# Patient Record
Sex: FEMALE | Race: WHITE | NOT HISPANIC OR LATINO | Employment: OTHER | URBAN - METROPOLITAN AREA
[De-identification: names, ages, dates, MRNs, and addresses within clinical notes are randomized per-mention and may not be internally consistent; named-entity substitution may affect disease eponyms.]

---

## 2017-11-10 PROBLEM — E03.9 HYPOTHYROIDISM: Status: ACTIVE | Noted: 2017-11-10

## 2017-11-10 PROBLEM — G47.33 OBSTRUCTIVE SLEEP APNEA SYNDROME: Status: ACTIVE | Noted: 2017-11-10

## 2017-11-10 PROBLEM — E66.9 OBESITY: Status: ACTIVE | Noted: 2017-11-10

## 2017-11-10 PROBLEM — E55.9 VITAMIN D DEFICIENCY: Status: ACTIVE | Noted: 2017-11-10

## 2017-11-10 PROBLEM — K21.9 GASTROESOPHAGEAL REFLUX DISEASE: Status: ACTIVE | Noted: 2017-11-10

## 2017-11-10 PROBLEM — G47.61 PERIODIC LIMB MOVEMENT DISORDER: Status: ACTIVE | Noted: 2017-11-10

## 2017-11-10 PROBLEM — J44.9 CHRONIC OBSTRUCTIVE LUNG DISEASE (CMS/HCC): Status: ACTIVE | Noted: 2017-11-10

## 2017-11-24 PROBLEM — J30.9 ALLERGIC RHINITIS: Status: ACTIVE | Noted: 2017-11-24

## 2017-11-24 PROBLEM — I10 BENIGN ESSENTIAL HYPERTENSION: Status: ACTIVE | Noted: 2017-11-24

## 2017-11-26 PROBLEM — R11.2 NON-INTRACTABLE VOMITING WITH NAUSEA: Status: ACTIVE | Noted: 2017-11-26

## 2017-12-28 DIAGNOSIS — N95.1 MENOPAUSAL FLUSHING: Primary | ICD-10-CM

## 2018-01-03 ENCOUNTER — TELEPHONE (OUTPATIENT)
Dept: BARIATRICS | Facility: CLINIC | Age: 78
End: 2018-01-03

## 2018-01-03 RX ORDER — ESTROGENS, CONJUGATED 0.9 MG/1
TABLET, FILM COATED ORAL
Qty: 90 TABLET | Refills: 1 | Status: SHIPPED | OUTPATIENT
Start: 2018-01-03 | End: 2020-11-03 | Stop reason: SDUPTHER

## 2018-01-03 NOTE — TELEPHONE ENCOUNTER
Called patient, she wanted to schedule an adjustment with Thelma on January 25th.  Thelma does not have an opening on that day.  Will put her on a wait list in case something opens up.  Patient does not want to schedule another day at this time.

## 2018-01-08 ENCOUNTER — OFFICE VISIT (OUTPATIENT)
Dept: FAMILY MEDICINE | Facility: CLINIC | Age: 78
End: 2018-01-08
Payer: MEDICARE

## 2018-01-08 VITALS
RESPIRATION RATE: 16 BRPM | BODY MASS INDEX: 33.98 KG/M2 | SYSTOLIC BLOOD PRESSURE: 126 MMHG | OXYGEN SATURATION: 96 % | WEIGHT: 174 LBS | DIASTOLIC BLOOD PRESSURE: 62 MMHG | TEMPERATURE: 99 F | HEART RATE: 69 BPM

## 2018-01-08 DIAGNOSIS — K64.9 HEMORRHOIDS, UNSPECIFIED HEMORRHOID TYPE: Primary | ICD-10-CM

## 2018-01-08 PROCEDURE — 99213 OFFICE O/P EST LOW 20 MIN: CPT | Performed by: FAMILY MEDICINE

## 2018-01-08 PROCEDURE — 99213 OFFICE O/P EST LOW 20 MIN: CPT | Mod: PO | Performed by: FAMILY MEDICINE

## 2018-01-08 ASSESSMENT — PAIN SCALES - GENERAL: PAINLEVEL: 0-NO PAIN

## 2018-01-08 NOTE — PROGRESS NOTES
Subjective      Katy Stack is a 77 y.o. female who presents for Hemorrhoids      HPI  Patient presents for continued hemorrhoid discomfort and bleeding. Hemorrhoids have been bothering patient for 3+ months.Patient has tried rectal suppositories with minimal relief. Pt denies fevers, chills, constipation, diarrhea, melena. +hematochezia.     The following have been reviewed and updated as appropriate in this visit:  No text in SmartText        Allergies   Allergen Reactions   • Strawberry      hives, tongue swells   • Adhesive      welts     Current Outpatient Prescriptions   Medication Sig Dispense Refill   • aspirin (ASPIR-LOW) 81 mg EC tablet 1 tab by oral route every day     • atenolol (TENORMIN) 25 mg tablet  90 0   • atorvastatin (LIPITOR) 20 mg tablet  90 0   • gabapentin (NEURONTIN) 100 mg capsule  180 0   • levothyroxine (SYNTHROID, LEVOTHROID) 100 mcg tablet  90 0   • losartan (COZAAR) 50 mg tablet  90 0   • nitroglycerin (NITROSTAT) 0.4 mg SL tablet Place by sublingual route. 25 1   • pantoprazole (PROTONIX) 40 mg EC tablet Take 1 tablet (40 mg total) by mouth daily. 90 tablet 3   • PREMARIN 0.9 mg tablet TAKE 1 TABLET BY MOUTH  EVERY DAY (Patient taking differently: TAKE 1 TABLET BY MOUTH EVERY WEEK) 90 tablet 1   • FLUZONE HIGH-DOSE 2017-18, PF, 180 mcg/0.5 mL syringe injection (65 years and up) ADM 0.5ML IM UTD  0   • omeprazole (PriLOSEC) 20 mg capsule Take 20 mg by mouth daily.       No current facility-administered medications for this visit.      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 Vela- shoulder   • THYROID LOBECTOMY Left 18/18/2010   • THYROIDECTOMY, PARTIAL  08/18/2010   • UPPER GASTROINTESTINAL ENDOSCOPY  02/19/2014    Mild chronic gastritis     Family History   Problem Relation Age of Onset   • Heart disease Mother    • Heart disease Father      Myocardial infarction   • Parkinsonism Father    • Thyroid disease Father    • Alzheimer's disease Sister    • Arthritis Sister    • Osteoporosis Sister    • Lung cancer Brother    • Diabetes Maternal Grandmother      Social History     Social History   • Marital status:      Spouse name: N/A   • Number of children: N/A   • Years of education: N/A     Social History Main Topics   • Smoking status: Former Smoker     Packs/day: 2.00     Years: 35.00     Quit date: 1955   • Smokeless tobacco: Never Used   • Alcohol use No   • Drug use: No   • Sexual activity: Not on file     Other Topics Concern   • Not on file     Social History Narrative   • No narrative on file       Review of Systems    Objective     Vitals  /62 (BP Location: Right arm, Patient Position: Sitting, Cuff Size: Large)   Pulse 69   Temp 37.2 °C (99 °F) (Temporal)   Resp 16   Wt 78.9 kg (174 lb)   SpO2 96%   BMI 33.98 kg/m²     Physical Exam   Constitutional: She appears well-developed and well-nourished. No distress.   Abdominal: Soft. Bowel sounds are normal. She exhibits no distension. There is no tenderness. There is no guarding.   Skin: Skin is warm and dry. No rash noted. She is not diaphoretic. No erythema. No pallor.   Psychiatric: She has a normal mood and affect. Her behavior is normal. Judgment and thought content normal.   Nursing note and vitals reviewed.      Assessment/Plan    Diagnoses and all orders for this visit:    Hemorrhoids, unspecified hemorrhoid type  Comments:  Will refer to general surgery for futher evaluation.  Orders:  -     Ambulatory referral to General Surgery; Future        Return if symptoms worsen or fail to improve.    ENRRIQUE BALES MD

## 2018-01-12 ENCOUNTER — CONSULT (OUTPATIENT)
Dept: SURGERY | Facility: CLINIC | Age: 78
End: 2018-01-12
Payer: MEDICARE

## 2018-01-12 VITALS
RESPIRATION RATE: 20 BRPM | WEIGHT: 174 LBS | SYSTOLIC BLOOD PRESSURE: 132 MMHG | TEMPERATURE: 99.2 F | BODY MASS INDEX: 34.16 KG/M2 | DIASTOLIC BLOOD PRESSURE: 78 MMHG | HEIGHT: 60 IN

## 2018-01-12 DIAGNOSIS — K60.0 ACUTE ANAL FISSURE: ICD-10-CM

## 2018-01-12 PROCEDURE — 99202 OFFICE O/P NEW SF 15 MIN: CPT | Performed by: SURGERY

## 2018-01-12 ASSESSMENT — ENCOUNTER SYMPTOMS
BLOOD IN STOOL: 0
ABDOMINAL PAIN: 0
CONSTIPATION: 0
RECTAL PAIN: 1
VOMITING: 0
NAUSEA: 0
DIARRHEA: 0
ANAL BLEEDING: 0
ABDOMINAL DISTENTION: 0
CONSTITUTIONAL NEGATIVE: 1

## 2018-01-12 ASSESSMENT — PAIN SCALES - GENERAL: PAINLEVEL: 4

## 2018-01-12 NOTE — PROGRESS NOTES
Clinic note    01/12/18  2:39 PM        HPI  Katy Stack is a 77 y.o. female who presents with 6 months of anal pain.  This is worse with bowel movements and last 30-45 minutes and feels like spasms.  She does have some bleeding with a bowel movement.  She states it only happens on the right side.  She currently takes MiraLAX and fiber for her bowels.  She is not constipated with this.    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 Vela- shoulder   • THYROID LOBECTOMY Left 18/18/2010   • THYROIDECTOMY, PARTIAL  08/18/2010   • UPPER GASTROINTESTINAL ENDOSCOPY  02/19/2014    Mild chronic gastritis       Family History   Problem Relation Age of Onset   • Heart disease Mother    • Heart disease Father      Myocardial infarction   • Parkinsonism Father    • Thyroid disease Father    • Alzheimer's disease Sister    • Arthritis Sister    • Osteoporosis Sister    • Lung cancer Brother    • Diabetes Maternal Grandmother        Social History     Social History   • Marital status:      Spouse name: N/A   • Number of children: N/A   • Years of education: N/A     Occupational History   • Not on file.     Social History Main Topics   • Smoking status: Former Smoker     Packs/day: 2.00     Years: 35.00     Quit date: 1955   • Smokeless tobacco:  Never Used   • Alcohol use No   • Drug use: No   • Sexual activity: Not on file     Other Topics Concern   • Not on file     Social History Narrative   • No narrative on file       Allergies   Allergen Reactions   • Strawberry      hives, tongue swells   • Adhesive      welts       Prior to Admission medications    Medication Sig Start Date End Date Taking? Authorizing Provider   aspirin (ASPIR-LOW) 81 mg EC tablet 1 tab by oral route every day 10/8/10  Yes Conversion Provider   atenolol (TENORMIN) 25 mg tablet  9/22/17  Yes Conversion Provider   atorvastatin (LIPITOR) 20 mg tablet  9/22/17  Yes Conversion Provider   FLUZONE HIGH-DOSE 2017-18, PF, 180 mcg/0.5 mL syringe injection (65 years and up) ADM 0.5ML IM UTD 9/28/17  Yes Historical Provider, MD   gabapentin (NEURONTIN) 100 mg capsule  9/26/17  Yes Conversion Provider   levothyroxine (SYNTHROID, LEVOTHROID) 100 mcg tablet  9/22/17  Yes Conversion Provider   losartan (COZAAR) 50 mg tablet  9/22/17  Yes Conversion Provider   nitroglycerin (NITROSTAT) 0.4 mg SL tablet Place by sublingual route. 5/8/15  Yes Prasanna Helms MD   omeprazole (PriLOSEC) 20 mg capsule Take 20 mg by mouth daily.   Yes Historical Provider, MD   pantoprazole (PROTONIX) 40 mg EC tablet Take 1 tablet (40 mg total) by mouth daily. 12/11/17 12/11/18 Yes Bronson Travis MD   PREMARIN 0.9 mg tablet TAKE 1 TABLET BY MOUTH  EVERY DAY  Patient taking differently: TAKE 1 TABLET BY MOUTH EVERY WEEK 1/3/18  Yes Bronson Travis MD       Review of Systems   Constitutional: Negative.    Gastrointestinal: Positive for rectal pain. Negative for abdominal distention, abdominal pain, anal bleeding, blood in stool, constipation, diarrhea, nausea and vomiting.       Temp:  [37.3 °C (99.2 °F)] 37.3 °C (99.2 °F)  Resp:  [20] 20  BP: (132)/(78) 132/78    Physical Exam   Constitutional: She appears well-developed and well-nourished.   Genitourinary:   Genitourinary Comments: Acute anal fissure with sentinel pile  at 3 o'clock position of the anal rectal ring   Vitals reviewed.      CBC with Platelet:    Lab Results   Component Value Date    WBC 9.1 11/24/2017    HGB 12.6 11/24/2017    HCT 38.8 11/24/2017     11/24/2017     Lab Results   Component Value Date    GLUCOSE 100 11/24/2017    CALCIUM 9.0 11/24/2017     11/24/2017    K 4.2 11/24/2017    CO2 25 11/24/2017     (H) 11/24/2017    BUN 23 11/24/2017    CREATININE 1.1 11/24/2017    ANIONGAP 7 11/24/2017     Magnesium:   Lab Results   Component Value Date    MG 2.1 05/08/2015     Coags: No results found for: PT, APTT, INR    No results found.    Assessment and Plan  Active Problems:  No Active Problems: There are no active problems currently on the Problem List. Please update the Problem List and refresh.      Assessment/Plan      #1 acute anal fissure    Plan I discussed the pathophysiology of acute anal fissure.  She is to continue her sitz baths twice a day.  We are going to send in prescription for nifedipine and lidocaine cream compounded to the [facility] pharmacy in [place].  She is to apply this 4 times a day.  I have told her that if she needs extra help with discomfort she may apply a and D ointment over the top of the nifedipine cream I told her to expect fairly rapid resolution of her pain.  She is to continue the medication for 1 month to allow adequate healing.    SUZANNE TOLLIVER MD

## 2018-01-25 ENCOUNTER — OFFICE VISIT (OUTPATIENT)
Dept: RHEUMATOLOGY | Facility: CLINIC | Age: 78
End: 2018-01-25
Payer: MEDICARE

## 2018-01-25 VITALS
HEART RATE: 64 BPM | BODY MASS INDEX: 35.04 KG/M2 | DIASTOLIC BLOOD PRESSURE: 56 MMHG | WEIGHT: 179.4 LBS | SYSTOLIC BLOOD PRESSURE: 138 MMHG

## 2018-01-25 DIAGNOSIS — M54.5 LOW BACK PAIN, UNSPECIFIED BACK PAIN LATERALITY, UNSPECIFIED CHRONICITY, WITH SCIATICA PRESENCE UNSPECIFIED: Primary | ICD-10-CM

## 2018-01-25 DIAGNOSIS — H16.9: ICD-10-CM

## 2018-01-25 DIAGNOSIS — M45.0 ANKYLOSING SPONDYLITIS OF MULTIPLE SITES IN SPINE (CMS/HCC): ICD-10-CM

## 2018-01-25 PROCEDURE — 99204 OFFICE O/P NEW MOD 45 MIN: CPT | Performed by: INTERNAL MEDICINE

## 2018-01-25 PROCEDURE — 99204 OFFICE O/P NEW MOD 45 MIN: CPT | Mod: PO | Performed by: INTERNAL MEDICINE

## 2018-01-25 ASSESSMENT — ENCOUNTER SYMPTOMS
MYALGIAS: 0
CONSTIPATION: 0
BRUISES/BLEEDS EASILY: 0
WEAKNESS: 0
JOINT SWELLING: 0
HEADACHES: 0
SHORTNESS OF BREATH: 0
ARTHRALGIAS: 0
CONSTITUTIONAL NEGATIVE: 1
SLEEP DISTURBANCE: 0
BACK PAIN: 0
DIARRHEA: 0
ADENOPATHY: 0
COUGH: 0

## 2018-01-25 ASSESSMENT — PAIN SCALES - GENERAL: PAINLEVEL: 4

## 2018-01-25 NOTE — PROGRESS NOTES
CONSULT NOTE      Subjective:   Chief Complaint   ?cormeal inflammation    HPI  Katy Stack is a 77 y.o. female who presents today with corneal inflammation. She states that her eye doctor recommended that she be examined for an autoimmune disease due to a film over her eye. Patient confirms that she does have sore joints in bilateral hands for the past year. Katy states that they do swell and become red. She states that she experiences these symptoms consistently. Patient states that she did have two back surgeries with Dr. Romo between November of 2017 and May of 2017. She states that her back issues give her constant pain and numbness at times in the lower extremities. Katy confirms that she has never fallen because of the pain and numbness she is experiencing. Dr. Romo has had Katy complete x-rays numerous times of the SI joints. She has also received injections from Dr. Montano. Patient denies any psoriasis or rash due to sun exposure. Patient denies any history of crohn's or ulcerative colitis. Katy also denies any discoloration in fingers and toes due to the cold weather. Katy confirms that her father and sister do have a history of Osteoarthritis. Katy has had an Echocardiogram completed.    History:  Patient Active Problem List    Diagnosis Date Noted   • Non-intractable vomiting with nausea 11/26/2017   • Allergic rhinitis 11/24/2017   • Benign essential hypertension 11/24/2017   • Chronic obstructive lung disease (CMS/HCC) 11/10/2017   • Gastroesophageal reflux disease 11/10/2017   • Hypothyroidism 11/10/2017   • Obesity 11/10/2017   • Obstructive sleep apnea syndrome 11/10/2017   • Periodic limb movement disorder 11/10/2017   • Vitamin D deficiency 11/10/2017      Current Outpatient Prescriptions on File Prior to Visit   Medication Sig Dispense Refill   • aspirin (ASPIR-LOW) 81 mg EC tablet 1 tab by oral route every day     • atenolol (TENORMIN) 25 mg tablet  90 0   • atorvastatin  (LIPITOR) 20 mg tablet  90 0   • FLUZONE HIGH-DOSE 2017-18, PF, 180 mcg/0.5 mL syringe injection (65 years and up) ADM 0.5ML IM UTD  0   • gabapentin (NEURONTIN) 100 mg capsule  180 0   • levothyroxine (SYNTHROID, LEVOTHROID) 100 mcg tablet  90 0   • losartan (COZAAR) 50 mg tablet  90 0   • nitroglycerin (NITROSTAT) 0.4 mg SL tablet Place by sublingual route. 25 1   • pantoprazole (PROTONIX) 40 mg EC tablet Take 1 tablet (40 mg total) by mouth daily. 90 tablet 3   • PREMARIN 0.9 mg tablet TAKE 1 TABLET BY MOUTH  EVERY DAY (Patient taking differently: TAKE 1 TABLET BY MOUTH EVERY WEEK) 90 tablet 1   • omeprazole (PriLOSEC) 20 mg capsule Take 20 mg by mouth daily.       No current facility-administered medications on file prior to visit.      Outpatient Prescriptions Marked as Taking for the 1/25/18 encounter (Office Visit) with Jaime Stovall DO   Medication Indication(s) Sig   • aspirin (ASPIR-LOW) 81 mg EC tablet   1 tab by oral route every day   • atenolol (TENORMIN) 25 mg tablet      • atorvastatin (LIPITOR) 20 mg tablet      • FLUZONE HIGH-DOSE 2017-18, PF, 180 mcg/0.5 mL syringe injection (65 years and up)   ADM 0.5ML IM UTD   • gabapentin (NEURONTIN) 100 mg capsule      • levothyroxine (SYNTHROID, LEVOTHROID) 100 mcg tablet      • losartan (COZAAR) 50 mg tablet      • nitroglycerin (NITROSTAT) 0.4 mg SL tablet   Place by sublingual route.   • pantoprazole (PROTONIX) 40 mg EC tablet   Take 1 tablet (40 mg total) by mouth daily.   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 Vela- shoulder   • THYROID LOBECTOMY Left 18/18/2010   • THYROIDECTOMY, PARTIAL  08/18/2010   • UPPER GASTROINTESTINAL ENDOSCOPY  02/19/2014    Mild chronic gastritis     Family History   Problem Relation Age of Onset   • Heart disease Mother    • Heart disease Father      Myocardial infarction   • Parkinsonism Father    • Thyroid disease Father    • Alzheimer's disease Sister    • Arthritis Sister    • Osteoporosis Sister    • Lung cancer Brother    • Diabetes Maternal Grandmother      Social History   Substance Use Topics   • Smoking status: Former Smoker     Packs/day: 2.00     Years: 35.00     Quit date: 1955   • Smokeless tobacco: Never Used   • Alcohol use No     Social History     Social History Narrative   • No narrative on file        Review of Systems  Review of Systems   Constitutional: Negative.    HENT: Negative for mouth sores.    Eyes: Negative for visual disturbance.   Respiratory: Negative for cough and shortness of breath.    Cardiovascular: Negative for chest pain.   Gastrointestinal: Negative for constipation and diarrhea.   Musculoskeletal: Negative for arthralgias, back pain, joint swelling and myalgias.   Skin: Negative for rash.   Neurological: Negative for weakness and headaches.   Hematological: Negative for adenopathy. Does not bruise/bleed easily.   Psychiatric/Behavioral: Negative for sleep disturbance.       Physical Exam  /56 (BP Location: Left arm, Patient Position: Sitting, Cuff Size: Reg)   Pulse 64   Wt  81.4 kg (179 lb 6.4 oz)   BMI 35.04 kg/m²   Physical Exam   Constitutional: She is oriented to person, place, and time. She appears well-developed and well-nourished.   HENT:   Head: Normocephalic and atraumatic.   Nose: Nose normal.   Mouth/Throat: Oropharynx is clear and moist.   Eyes: Conjunctivae are normal. Pupils are equal, round, and reactive to light.   Neck: Normal range of motion. Neck supple.   Cardiovascular: Normal rate, regular rhythm, normal heart sounds and intact distal pulses.    Pulmonary/Chest: Effort normal and breath sounds normal.   Musculoskeletal:        Right shoulder: Normal.        Left shoulder: Normal.        Right elbow: Normal.       Left elbow: Normal.        Right wrist: Normal.        Left wrist: Normal.        Right knee: Normal.        Left knee: Normal.        Right ankle: Normal.        Left ankle: Normal.        Right hand: Normal.        Left hand: Normal.   Neurological: She is alert and oriented to person, place, and time.   Skin: Skin is warm and dry.        Imaging  No results found.    Outside Labs  CBC with Platelet:    Lab Results   Component Value Date    WBC 9.1 11/24/2017    HGB 12.6 11/24/2017    HCT 38.8 11/24/2017     11/24/2017    RBC 4.67 11/24/2017    MCV 83.1 11/24/2017    MCH 27.0 (L) 11/24/2017    MCHC 32.5 11/24/2017    RDW 16.3 (H) 11/24/2017    MPV 8.2 11/24/2017     Comp:   Lab Results   Component Value Date     11/24/2017    K 4.2 11/24/2017     (H) 11/24/2017    CO2 25 11/24/2017    BUN 23 11/24/2017    CREATININE 1.1 11/24/2017    GLUCOSE 100 11/24/2017    CALCIUM 9.0 11/24/2017    PROT 6.6 11/24/2017    ALBUMIN 3.8 11/24/2017    AST 15 11/24/2017    ALT 8 11/24/2017    ALKPHOS 96 11/24/2017    BILITOT 0.29 11/24/2017     C-Reactive Protein Screen:   Lab Results   Component Value Date    CRP 20.7 (H) 11/24/2017     ESR:   Lab Results   Component Value Date    SEDRATE 27 (H) 11/24/2017     Lipid:   Lab Results   Component Value  Date    CHOL 97 01/20/2017    TRIG 134 01/20/2017    HDL 31 (L) 01/20/2017     U/A Macroscopic:    Lab Results   Component Value Date    COLORU Yellow 03/22/2017    SPECGRAVU 1.015 03/22/2017    KENIA 5.0 03/22/2017    LEUKOCYTESU NEG 03/22/2017    NITRITEU NEG 03/22/2017    PROTUR NEG 03/22/2017    GLUCOSEU NEG 03/22/2017    KETONESU NEG 03/22/2017    UROBILINOGEN 0.2 03/22/2017    BLOODU NEG 03/22/2017               Currently available Rheumatologic testing values noted below:  Lab Results   Component Value Date    SEDRATE 27 (H) 11/24/2017    RF <10 11/24/2017    JAILYN Negative 11/24/2017    CRP 20.7 (H) 11/24/2017     No results found for: ALDOLASE, ACE, SCL70, C3, C4, CH50, HLAB27, CKTOTAL, CKMM  No results found for: HEPAIGM, HEPBIGM, HEPCAB, HEPBSAB, HEPBSAG  TB GOLD       Assessment:   Low back pain  Corneal inflammation      Plan:   Patient is a 77-year-old female with chronic low back pain found to have 2 episodes of inflammation in the eye.  As we discussed, her clinical question today is is there a diffuse autoimmune or connective tissue etiology to the inflammation found in her eyes.  From her exam there does not appear to be so her physical exam is quite benign.  Review of her workup does not reveal a suggestion of either seronegative spinal arthropathy, inflammatory polyarthritis nor autoimmune disease.  There are some antibodies missing from the workup which we will do today.    The one interesting thing on her history would be she reports in the past she has had the sacroiliac joints injected due to inflammation if she did have some sacroiliitis we can see seronegative spinal arthropathy is a etiology of recurrent eye inflammation.  She is to see her neurosurgeon today who will likely do imaging of the low back and I have given her prescription for SI joint x-rays.  She said some of the MRIs I do not want to order an MRI dedicated to the SI joints presently but if there is a concern of active  inflammation that would be the imaging modality of choice.  We will contact her with her as to the workup if it is nonrevealing for this diffuse autoimmune etiology of her complaints we could see her back as needed      Recommendations at this time would be to complete blood work up for further investigation. Discussed x-ray order to carry to Neurology appointment today to have completed while they are already x-raying patient.     I spent 40 min with the patient, over 50% of time spent directly face-to-face counseling on the differential diagnosis of inflammation in the eye    No Follow-up on file.    Magaly Vaughn RN

## 2018-01-26 ENCOUNTER — APPOINTMENT (OUTPATIENT)
Dept: LAB | Facility: CLINIC | Age: 78
End: 2018-01-26
Payer: MEDICARE

## 2018-01-26 ENCOUNTER — ANCILLARY PROCEDURE (OUTPATIENT)
Dept: RADIOLOGY | Facility: CLINIC | Age: 78
End: 2018-01-26
Payer: MEDICARE

## 2018-01-26 DIAGNOSIS — M54.5 LOW BACK PAIN, UNSPECIFIED BACK PAIN LATERALITY, UNSPECIFIED CHRONICITY, WITH SCIATICA PRESENCE UNSPECIFIED: ICD-10-CM

## 2018-01-26 DIAGNOSIS — I10 ESSENTIAL HYPERTENSION: Primary | ICD-10-CM

## 2018-01-26 LAB
CRP SERPL-MCNC: 15.7 MG/L
ERYTHROCYTE [SEDIMENTATION RATE] IN BLOOD: 23 MM/HR

## 2018-01-26 PROCEDURE — 85652 RBC SED RATE AUTOMATED: CPT | Mod: PO | Performed by: INTERNAL MEDICINE

## 2018-01-26 PROCEDURE — 86140 C-REACTIVE PROTEIN: CPT | Performed by: INTERNAL MEDICINE

## 2018-01-26 PROCEDURE — 72202 X-RAY EXAM SI JOINTS 3/> VWS: CPT | Mod: 26 | Performed by: RADIOLOGY

## 2018-01-26 PROCEDURE — 72202 X-RAY EXAM SI JOINTS 3/> VWS: CPT | Mod: PO,FY

## 2018-01-26 PROCEDURE — 86812 HLA TYPING A B OR C: CPT | Performed by: INTERNAL MEDICINE

## 2018-01-26 PROCEDURE — 86200 CCP ANTIBODY: CPT | Performed by: INTERNAL MEDICINE

## 2018-01-26 PROCEDURE — 36415 COLL VENOUS BLD VENIPUNCTURE: CPT | Mod: PO | Performed by: INTERNAL MEDICINE

## 2018-01-26 RX ORDER — ATENOLOL 25 MG/1
TABLET ORAL
Qty: 90 TABLET | Refills: 0 | Status: SHIPPED | OUTPATIENT
Start: 2018-01-26 | End: 2018-04-22 | Stop reason: SDUPTHER

## 2018-01-29 LAB
FLOW CYTOMETRY SPECIALIST REVIEW: NORMAL
HLA-B27 QL FC: NEGATIVE

## 2018-01-30 LAB — CCP AB SER IA-ACNC: <0.5 U/ML

## 2018-01-31 ENCOUNTER — ANCILLARY PROCEDURE (OUTPATIENT)
Dept: RADIOLOGY | Facility: CLINIC | Age: 78
End: 2018-01-31
Payer: MEDICARE

## 2018-01-31 ENCOUNTER — OFFICE VISIT (OUTPATIENT)
Dept: ORTHOPEDIC SURGERY | Facility: CLINIC | Age: 78
End: 2018-01-31
Payer: MEDICARE

## 2018-01-31 VITALS — DIASTOLIC BLOOD PRESSURE: 78 MMHG | WEIGHT: 179.5 LBS | SYSTOLIC BLOOD PRESSURE: 138 MMHG | BODY MASS INDEX: 35.06 KG/M2

## 2018-01-31 DIAGNOSIS — M25.512 ACUTE PAIN OF LEFT SHOULDER: Primary | ICD-10-CM

## 2018-01-31 PROCEDURE — 73030 X-RAY EXAM OF SHOULDER: CPT | Mod: LT,PO,FY

## 2018-01-31 PROCEDURE — 73030 X-RAY EXAM OF SHOULDER: CPT | Mod: 26,FY | Performed by: ORTHOPAEDIC SURGERY

## 2018-01-31 PROCEDURE — 99213 OFFICE O/P EST LOW 20 MIN: CPT | Mod: PO | Performed by: ORTHOPAEDIC SURGERY

## 2018-01-31 PROCEDURE — 99213 OFFICE O/P EST LOW 20 MIN: CPT | Performed by: ORTHOPAEDIC SURGERY

## 2018-01-31 RX ORDER — PANTOPRAZOLE SODIUM 40 MG/1
40 TABLET, DELAYED RELEASE ORAL DAILY
COMMUNITY
End: 2018-06-21 | Stop reason: WASHOUT

## 2018-01-31 ASSESSMENT — ENCOUNTER SYMPTOMS
AGITATION: 0
VOMITING: 0
PALPITATIONS: 0
COLOR CHANGE: 0
EYE PAIN: 0
ABDOMINAL PAIN: 0
JOINT SWELLING: 0
SORE THROAT: 0
SEIZURES: 0
CHILLS: 0
HEMATURIA: 0
BACK PAIN: 0
SHORTNESS OF BREATH: 0
ARTHRALGIAS: 1
FEVER: 0
DYSURIA: 0
COUGH: 0

## 2018-01-31 ASSESSMENT — PAIN - FUNCTIONAL ASSESSMENT: PAIN_FUNCTIONAL_ASSESSMENT: 0-10

## 2018-01-31 ASSESSMENT — PAIN DESCRIPTION - DESCRIPTORS: DESCRIPTORS: BURNING;SHARP

## 2018-01-31 NOTE — PROGRESS NOTES
"  Chief Complaint   Patient presents with   • Left Shoulder - Pain   • Shoulder Pain     Pt reports several monts ago she had to use a cane and \"could feel it was starting to bother me.\" Pain with lifting her arm. No direct injury to her shoulder. Taking Norco for pain.        Subjective     Katy Stack is a 77 y.o. female who presents with left shoulder pain. The symptoms began several weeks ago. Aggravating factors: repetitive activity, using cane. Pain is located diffusely throughout the shoulder. Discomfort is described as aching, sharp/stabbing and throbbing. Symptoms are exacerbated by repetitive movements. Evaluation to date: none. Therapy to date includes: nothing specific.    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 Vela- shoulder   • THYROID LOBECTOMY Left 18/18/2010   • THYROIDECTOMY, PARTIAL  08/18/2010   • UPPER GASTROINTESTINAL ENDOSCOPY  02/19/2014    Mild chronic gastritis     Prior to Admission medications    Medication Sig Start Date End Date Taking? Authorizing Provider   aspirin (ASPIR-LOW) 81 mg EC tablet 1 tab by oral route every day 10/8/10  Yes Conversion Provider   atenolol (TENORMIN) 25 mg tablet TAKE 1 TABLET BY MOUTH  EVERY DAY AS DIRECTED 1/26/18  Yes Ruthann Simmons MD   atorvastatin (LIPITOR) 20 mg tablet  " 9/22/17  Yes Conversion Provider   gabapentin (NEURONTIN) 100 mg capsule  9/26/17  Yes Conversion Provider   levothyroxine (SYNTHROID, LEVOTHROID) 100 mcg tablet  9/22/17  Yes Conversion Provider   losartan (COZAAR) 50 mg tablet  9/22/17  Yes Conversion Provider   nitroglycerin (NITROSTAT) 0.4 mg SL tablet Place by sublingual route. 5/8/15  Yes Prasanna Helms MD   pantoprazole (PROTONIX) 40 mg EC tablet Take 40 mg by mouth daily.   Yes Historical Provider, MD   omeprazole (PriLOSEC) 20 mg capsule Take 20 mg by mouth daily.  1/31/18 Yes Historical Provider, MD   PREMARIN 0.9 mg tablet TAKE 1 TABLET BY MOUTH  EVERY DAY  Patient taking differently: TAKE 1 TABLET BY MOUTH EVERY WEEK 1/3/18   Bronson Travis MD   FLUZONE HIGH-DOSE 2017-18, PF, 180 mcg/0.5 mL syringe injection (65 years and up) ADM 0.5ML IM UTD 9/28/17 1/31/18  Historical Provider, MD   pantoprazole (PROTONIX) 40 mg EC tablet Take 1 tablet (40 mg total) by mouth daily. 12/11/17 1/31/18  Bronson Travis MD     Allergies   Allergen Reactions   • Strawberry      hives, tongue swells   • Adhesive      welts     Social History     Social History   • Marital status:      Spouse name: N/A   • Number of children: N/A   • Years of education: N/A     Occupational History   • Not on file.     Social History Main Topics   • Smoking status: Former Smoker     Packs/day: 2.00     Years: 35.00     Quit date: 1955   • Smokeless tobacco: Never Used   • Alcohol use No   • Drug use: No   • Sexual activity: Not on file     Other Topics Concern   • Not on file     Social History Narrative   • No narrative on file     Family History   Problem Relation Age of Onset   • Heart disease Mother    • Heart disease Father      Myocardial infarction   • Parkinsonism Father    • Thyroid disease Father    • Alzheimer's disease Sister    • Arthritis Sister    • Osteoporosis Sister    • Lung cancer Brother    • Diabetes Maternal Grandmother        Review of Systems  Review of  Systems   Constitutional: Negative for chills and fever.   HENT: Negative for ear pain and sore throat.    Eyes: Negative for pain and visual disturbance.   Respiratory: Negative for cough and shortness of breath.    Cardiovascular: Negative for chest pain and palpitations.   Gastrointestinal: Negative for abdominal pain and vomiting.   Genitourinary: Negative for dysuria and hematuria.   Musculoskeletal: Positive for arthralgias. Negative for back pain, gait problem and joint swelling.   Skin: Negative for color change and rash.   Neurological: Negative for seizures and syncope.   Psychiatric/Behavioral: Negative for agitation and behavioral problems.   All other systems reviewed and are negative.      Objective     /78 (BP Location: Right arm, Patient Position: Sitting, Cuff Size: Reg)   Wt 81.4 kg (179 lb 8 oz)   BMI 35.06 kg/m²   Physical Exam   Constitutional: She is oriented to person, place, and time. She appears well-developed and well-nourished. No distress.   HENT:   Head: Normocephalic and atraumatic.   Eyes: Right eye exhibits no discharge. Left eye exhibits no discharge. No scleral icterus.   Neck: Normal range of motion. Neck supple.   Cardiovascular: Normal rate.    Pulmonary/Chest: Effort normal. No respiratory distress. She has no wheezes.   Musculoskeletal: She exhibits tenderness. She exhibits no edema or deformity.   Neurological: She is alert and oriented to person, place, and time.   Skin: Skin is warm and dry. Capillary refill takes less than 2 seconds. No rash noted. She is not diaphoretic. No erythema. No pallor.   Psychiatric: She has a normal mood and affect. Her behavior is normal.   Nursing note and vitals reviewed.    Right shoulder: normal active ROM, no tenderness, no impingement sign   Left shoulder: Has full active range of motion however it is quite painful with both elevation portion of range of motion and the return to neutral.  There is significant weakness in the  supraspinatus and biceps tendon region.       Assessment/Plan     Left rotator cuff tendinitis, And likely bicep tendon/SLAP tear..    Physical therapy referral..

## 2018-02-06 ENCOUNTER — TELEPHONE (OUTPATIENT)
Dept: RHEUMATOLOGY | Facility: CLINIC | Age: 78
End: 2018-02-06

## 2018-02-07 NOTE — TELEPHONE ENCOUNTER
I was able to connect with Katy and let her know that I have submitted her x ray results to  and that we will be in touch with her as soon as I hear from .

## 2018-02-08 NOTE — TELEPHONE ENCOUNTER
Please advise si joint xrays revelaed osteoarthritis. There was no inflamation/sacroilitis. If sympotms continue she can be seen by physiatry or pain for si joint injections

## 2018-02-08 NOTE — TELEPHONE ENCOUNTER
Called zhanna to advise her of  note she did not answer. Left a message for her to call our office back.

## 2018-02-14 NOTE — TELEPHONE ENCOUNTER
Please advise zhanna that the specific blood tests for inflammatory arthritis were good. The non specific inflamatory markers were slightly elevated thanks

## 2018-02-14 NOTE — TELEPHONE ENCOUNTER
Spoke with Katy and notified her of Dr. Stovall note on her recent x-ray results. Patient stated understanding. Patient was also requesting her recent lab results. Please advise. Thank you.

## 2018-03-08 ENCOUNTER — OFFICE VISIT (OUTPATIENT)
Dept: BARIATRICS | Facility: CLINIC | Age: 78
End: 2018-03-08
Payer: MEDICARE

## 2018-03-08 VITALS
SYSTOLIC BLOOD PRESSURE: 161 MMHG | DIASTOLIC BLOOD PRESSURE: 93 MMHG | WEIGHT: 178 LBS | RESPIRATION RATE: 20 BRPM | OXYGEN SATURATION: 96 % | TEMPERATURE: 97.8 F | HEART RATE: 60 BPM | BODY MASS INDEX: 35.88 KG/M2 | HEIGHT: 59 IN

## 2018-03-08 DIAGNOSIS — E66.9 OBESITY (BMI 35.0-39.9 WITHOUT COMORBIDITY): Primary | ICD-10-CM

## 2018-03-08 DIAGNOSIS — K21.9 GASTROESOPHAGEAL REFLUX DISEASE WITHOUT ESOPHAGITIS: ICD-10-CM

## 2018-03-08 PROCEDURE — 43999 UNLISTED PROCEDURE STOMACH: CPT

## 2018-03-08 PROCEDURE — 43999 UNLISTED PROCEDURE STOMACH: CPT | Performed by: NURSE PRACTITIONER

## 2018-03-08 PROCEDURE — S2083 ADJUSTMENT GASTRIC BAND: HCPCS | Performed by: NURSE PRACTITIONER

## 2018-03-08 ASSESSMENT — ENCOUNTER SYMPTOMS
COUGH: 0
CONSTIPATION: 0
NAUSEA: 0
CHOKING: 0
ABDOMINAL PAIN: 0
DIARRHEA: 0
TROUBLE SWALLOWING: 0
ROS SKIN COMMENTS: NO REDNESS OR TENDERNESS TO PORT SITE
APPETITE CHANGE: 0
VOMITING: 0

## 2018-03-08 ASSESSMENT — PAIN SCALES - GENERAL: PAINLEVEL: 2

## 2018-03-08 NOTE — PROGRESS NOTES
Lap Band Adjustment    Subjective      Patient ID: Katy Stack is a 77 y.o. female.    HPI  Patient presents today for lab and adjustment.  Her last adjustment was in April 2016.  She has noticed lately that she can eat more and has more hunger between meals.  Is also noticed some weight regain.  She states her heartburn symptoms are completely controlled on current therapy.  She is not having any cough, nighttime reflux or any other symptoms.  We did review her diet.  She reports breakfast is typically half a cup or raisin Bran with some walnuts.  She reports lunch is typically 3 ounces of meat with a vegetable and a potato or salad.  Dinner is mostly soup of some sort.  She reports no consumption of sugar containing liquids.  She does report some snacking on popcorn, chips and candy.    Review of Systems   Constitutional: Negative for appetite change.   HENT: Negative for trouble swallowing.    Respiratory: Negative for cough and choking.    Gastrointestinal: Negative for abdominal pain, constipation, diarrhea, nausea and vomiting.   Skin:        No redness or tenderness to port site            Past Medical History:   Diagnosis Date   • Benign essential hypertension 11/24/2017   • Chronic obstructive lung disease (CMS/HCC) 11/10/2017   • Hypertension    • Hypothyroid    • Hypothyroidism 11/10/2017   • Obesity 11/10/2017   • Obstructive sleep apnea syndrome 11/10/2017    Night time oxygen   • Osteoarthritis    • Periodic limb movement disorder 11/10/2017   • Rectocele        Past Surgical History:   Procedure Laterality Date   • APPENDECTOMY  1987   • BACK SURGERY      2 surgeries [id number]   • BACK SURGERY  11/30/2016   • BACK SURGERY  05/01/2017    vetebral fusion   • CARDIAC CATHETERIZATION  08/31/2011   • CHOLECYSTECTOMY     • COLONOSCOPY  02/19/2014    No polyps   • COLONOSCOPY  04/26/2011   • FOOT SURGERY      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 Vela- shoulder   • THYROID LOBECTOMY Left 18/18/2010   • THYROIDECTOMY, PARTIAL  08/18/2010   • UPPER GASTROINTESTINAL ENDOSCOPY  02/19/2014    Mild chronic gastritis       Allergies   Allergen Reactions   • Strawberry      hives, tongue swells   • Adhesive      welts         Current Outpatient Prescriptions:   •  aspirin (ASPIR-LOW) 81 mg EC tablet, 1 tab by oral route every day, Disp: , Rfl:   •  atenolol (TENORMIN) 25 mg tablet, TAKE 1 TABLET BY MOUTH  EVERY DAY AS DIRECTED, Disp: 90 tablet, Rfl: 0  •  atorvastatin (LIPITOR) 20 mg tablet, , Disp: 90, Rfl: 0  •  gabapentin (NEURONTIN) 100 mg capsule, , Disp: 180, Rfl: 0  •  levothyroxine (SYNTHROID, LEVOTHROID) 100 mcg tablet, , Disp: 90, Rfl: 0  •  losartan (COZAAR) 50 mg tablet, , Disp: 90, Rfl: 0  •  nitroglycerin (NITROSTAT) 0.4 mg SL tablet, Place by sublingual route., Disp: 25, Rfl: 1  •  pantoprazole (PROTONIX) 40 mg EC tablet, Take 40 mg by mouth daily., Disp: , Rfl:   •  PREMARIN 0.9 mg tablet, TAKE 1 TABLET BY MOUTH  EVERY DAY (Patient taking differently: TAKE 1 TABLET BY MOUTH EVERY WEEK), Disp: 90 tablet, Rfl: 1    Family History   Problem Relation Age of Onset   • Heart disease Mother    • Heart disease Father      Myocardial infarction   • Parkinsonism Father    • Thyroid disease Father    • Alzheimer's disease Sister    • Arthritis Sister    • Osteoporosis Sister    • Lung cancer Brother    • Diabetes Maternal Grandmother        Social History     Social History   • Marital status:      Spouse name: N/A   • Number of children: N/A   • Years of education: N/A     Occupational History   • Not on file.     Social History Main Topics   • Smoking status: Former  "Smoker     Packs/day: 2.00     Years: 35.00     Quit date: 1955   • Smokeless tobacco: Never Used   • Alcohol use No   • Drug use: No   • Sexual activity: Not on file     Other Topics Concern   • Not on file     Social History Narrative   • No narrative on file        Objective   /93 (BP Location: Left arm, Patient Position: Sitting, Cuff Size: Reg)   Pulse 60   Temp 36.6 °C (97.8 °F) (Oral)   Resp 20   Ht 1.499 m (4' 11\")   Wt 80.7 kg (178 lb)   SpO2 96%   BMI 35.95 kg/m²   Physical Exam   Constitutional: She is oriented to person, place, and time. She appears well-developed and well-nourished.   Abdominal: Soft. Normal appearance. There is no tenderness.   No erythema or induration noted to port site   Neurological: She is alert and oriented to person, place, and time.   Psychiatric: She has a normal mood and affect. Judgment normal.          General Procedure  Date/Time: 3/8/2018 1:07 PM  Performed by: NETO PRABHAKAR  Authorized by: NETO PRABHAKAR   Preparation: Prepped the area over the port with betadine.  Local anesthesia used: yes  Anesthesia: local infiltration    Anesthesia:  Local anesthesia used: yes  Local Anesthetic: lidocaine 1% without epinephrine    Sedation:  Patient sedated: no  Patient tolerance: Patient tolerated the procedure well with no immediate complications  Comments: Verbal consent obtained. Isolated the lap band port. 22g non-coring, access-port needle inserted into access port. Clear fluid was aspirated. Instilled 0.25ml of saline into the port for a total of 7.8ml in a standard lap-band. Cleansed the area over the port and applied a band-aid.               Assessment and Plan:    1. Obesity (BMI 35.0-39.9 without comorbidity)  Is lost nearly 30 pounds.  She has noticed slight weight regain recently.  Did review her diet.  LAP-BAND adjustment performed today. Patient was able to tolerate fluids immediately following the procedure.  Instructed patient on a liquid diet for " the remainder of the day.  Patient may return to regular diet tomorrow as tolerated.  Patient will contact the clinic with any problems or concerns.  I will see the patient back on an as-needed basis for further LAP-BAND adjustments.    2. Gastroesophageal reflux disease without esophagitis  Patient reports complete control of symptoms on current medication.        A voice recognition program was used to aid in medical record documentation. Sometimes words are printed not exactly as they were spoken. While efforts were made to carefully edit and correct any inaccuracies, some errors may be present and should be taken within the context of the discussion.  Please contact our office if you need assistance interpreting this medical record or notice any mistakes.    Thelma White, CNP  03/08/18

## 2018-03-08 NOTE — PATIENT INSTRUCTIONS
Dr. Khalil    PRIMARY OFFICE  [address] [address]   [phone]     Lap Band Adjustment After Care    Your physician/provider has added fluid to your Lap Band today. You may notice a large difference in the amount of food you can eat or you may only notice a slight difference. It is recommended that with any intake that you eat slowly and see how you will tolerate the change in your band. You may not be able to tolerate the same foods as you did prior to today’s adjustment.  Liquids  • Only consume liquids for the first day following your adjustment  • Examples of acceptable liquids include: water, broth, tea, plain coffee, protein shake, soup without chunks.  • Always avoid liquids with calories. A protein shake as a supplement or meal replacement is the exception.    Solids  • You may start solids the day following your adjustment. GO SLOW, chew your food well and stop when you are full (even if you’ve only eaten a small amount of food).  • Foods commonly not tolerated: Soft breads, dry meats, raw fruits and vegetables (peel these first)  When to return to the Bariat[facility] Clinic  • You do not notice an increase food restriction  • You are following the recommended diet and not losing weight  • Increased heartburn, nausea, vomiting that does not resolve with a change in eating behavior or food types  • Inability to eat solid foods  • Tolerating very little liquids    If you are having issues that you feel need immediate attention after clinic hours or on the weekend go to the emergency room.

## 2018-04-03 ENCOUNTER — TELEPHONE (OUTPATIENT)
Dept: BARIATRICS | Facility: CLINIC | Age: 78
End: 2018-04-03

## 2018-04-18 ENCOUNTER — ANCILLARY PROCEDURE (OUTPATIENT)
Dept: RADIOLOGY | Facility: CLINIC | Age: 78
End: 2018-04-18
Payer: MEDICARE

## 2018-04-18 ENCOUNTER — OFFICE VISIT (OUTPATIENT)
Dept: FAMILY MEDICINE | Facility: CLINIC | Age: 78
End: 2018-04-18
Payer: MEDICARE

## 2018-04-18 ENCOUNTER — APPOINTMENT (OUTPATIENT)
Dept: CARDIOLOGY | Facility: CLINIC | Age: 78
End: 2018-04-18
Payer: MEDICARE

## 2018-04-18 VITALS
WEIGHT: 178 LBS | BODY MASS INDEX: 35.88 KG/M2 | HEIGHT: 59 IN | TEMPERATURE: 98.6 F | HEART RATE: 68 BPM | SYSTOLIC BLOOD PRESSURE: 136 MMHG | RESPIRATION RATE: 16 BRPM | OXYGEN SATURATION: 95 % | DIASTOLIC BLOOD PRESSURE: 68 MMHG

## 2018-04-18 DIAGNOSIS — R04.2 HEMOPTYSIS: Primary | ICD-10-CM

## 2018-04-18 DIAGNOSIS — J18.9 COMMUNITY ACQUIRED PNEUMONIA, UNSPECIFIED LATERALITY: ICD-10-CM

## 2018-04-18 PROCEDURE — 99213 OFFICE O/P EST LOW 20 MIN: CPT | Performed by: FAMILY MEDICINE

## 2018-04-18 PROCEDURE — 71046 X-RAY EXAM CHEST 2 VIEWS: CPT | Mod: PO,FY

## 2018-04-18 PROCEDURE — 71046 X-RAY EXAM CHEST 2 VIEWS: CPT | Mod: 26 | Performed by: RADIOLOGY

## 2018-04-18 PROCEDURE — 93005 ELECTROCARDIOGRAM TRACING: CPT | Mod: PO | Performed by: FAMILY MEDICINE

## 2018-04-18 PROCEDURE — 99213 OFFICE O/P EST LOW 20 MIN: CPT | Mod: PO | Performed by: FAMILY MEDICINE

## 2018-04-18 PROCEDURE — 93010 ELECTROCARDIOGRAM REPORT: CPT | Performed by: FAMILY MEDICINE

## 2018-04-18 RX ORDER — LUBIPROSTONE 24 UG/1
CAPSULE, GELATIN COATED ORAL
COMMUNITY
Start: 2018-02-03 | End: 2019-11-07 | Stop reason: ALTCHOICE

## 2018-04-18 RX ORDER — DOXYCYCLINE 100 MG/1
100 CAPSULE ORAL 2 TIMES DAILY
Qty: 14 CAPSULE | Refills: 0 | Status: SHIPPED | OUTPATIENT
Start: 2018-04-18 | End: 2018-04-25

## 2018-04-18 ASSESSMENT — ENCOUNTER SYMPTOMS
FEVER: 0
COUGH: 1
HEADACHES: 0
CHILLS: 1
SORE THROAT: 1
SHORTNESS OF BREATH: 1

## 2018-04-18 ASSESSMENT — PAIN SCALES - GENERAL: PAINLEVEL: 4

## 2018-04-18 NOTE — PROGRESS NOTES
"Subjective      Katy Stack is a 77 y.o. female who presents for Cough (for 2-3 days producitve of pinkish sputum.)      Patient presents for cough productive off and on with pink sputum for 2-3 days. Also has been having chills, sore throat, shortness of breath, tightness/pressure in chest, sinus congestion and drainage. Denies fevers, ear pain, headaches.      Cough   This is a new problem. The current episode started in the past 7 days. The problem has been unchanged. The problem occurs hourly. The cough is productive of sputum. Associated symptoms include chest pain, chills, nasal congestion, postnasal drip, a sore throat and shortness of breath. Pertinent negatives include no ear congestion, ear pain, fever or headaches.         Allergies   Allergen Reactions   • Strawberry      hives, tongue swells   • Adhesive      welts       Review of Systems   Constitutional: Positive for chills. Negative for fever.   HENT: Positive for postnasal drip and sore throat. Negative for ear pain.    Respiratory: Positive for cough and shortness of breath.    Cardiovascular: Positive for chest pain.   Neurological: Negative for headaches.       Objective     Vitals  /68 (BP Location: Left arm, Patient Position: Sitting, Cuff Size: Large)   Pulse 68   Temp 37 °C (98.6 °F) (Temporal)   Resp 16   Ht 1.499 m (4' 11\")   Wt 80.7 kg (178 lb)   SpO2 95%   BMI 35.95 kg/m²     Physical Exam   Constitutional: She appears well-developed and well-nourished. No distress.   Mildly ill-appearing.   HENT:   Mouth/Throat: Oropharynx is clear and moist. No oropharyngeal exudate.   Eyes: Conjunctivae are normal.   Neck: Neck supple.   Cardiovascular: Normal rate, regular rhythm and normal heart sounds.  Exam reveals no gallop and no friction rub.    No murmur heard.  Pulmonary/Chest: Effort normal and breath sounds normal. No respiratory distress. She has no wheezes. She has no rales.   Abdominal: Soft. Bowel sounds are normal. "   Lymphadenopathy:     She has no cervical adenopathy.   Skin: Skin is warm and dry. No rash noted. She is not diaphoretic.   Nursing note and vitals reviewed.      Assessment/Plan   Diagnoses and all orders for this visit:    Hemoptysis  -     ECG 12 lead  -     X-ray chest 2 views    Community acquired pneumonia, unspecified laterality  -     doxycycline (MONODOX) 100 mg capsule; Take 1 capsule (100 mg total) by mouth 2 (two) times a day for 7 days.    Concern for early community acquired pneumonia.  No indication for further workup for pulmonary embolism or malignancy at this time.  Start trial of doxycycline.  Return precautions discussed at length.  Patient will follow-up with me as needed.    Return if symptoms worsen or fail to improve.    ENRRIQUE BALES MD

## 2018-04-22 DIAGNOSIS — I10 ESSENTIAL HYPERTENSION: ICD-10-CM

## 2018-04-24 RX ORDER — ATENOLOL 25 MG/1
TABLET ORAL
Qty: 90 TABLET | Refills: 1 | Status: SHIPPED | OUTPATIENT
Start: 2018-04-24 | End: 2018-09-07 | Stop reason: SDUPTHER

## 2018-04-24 RX ORDER — LOSARTAN POTASSIUM 50 MG/1
TABLET ORAL
Qty: 90 TABLET | Refills: 1 | Status: SHIPPED | OUTPATIENT
Start: 2018-04-24 | End: 2019-01-11 | Stop reason: SDUPTHER

## 2018-05-03 ENCOUNTER — CONSULT (OUTPATIENT)
Dept: SURGERY | Facility: CLINIC | Age: 78
End: 2018-05-03
Payer: MEDICARE

## 2018-05-03 VITALS
BODY MASS INDEX: 35.93 KG/M2 | SYSTOLIC BLOOD PRESSURE: 142 MMHG | HEART RATE: 63 BPM | WEIGHT: 183 LBS | HEIGHT: 60 IN | DIASTOLIC BLOOD PRESSURE: 68 MMHG | TEMPERATURE: 98.2 F

## 2018-05-03 DIAGNOSIS — I10 ESSENTIAL HYPERTENSION: ICD-10-CM

## 2018-05-03 DIAGNOSIS — Z98.84 H/O LAPAROSCOPIC ADJUSTABLE GASTRIC BANDING: ICD-10-CM

## 2018-05-03 DIAGNOSIS — K21.9 GASTROESOPHAGEAL REFLUX DISEASE, ESOPHAGITIS PRESENCE NOT SPECIFIED: ICD-10-CM

## 2018-05-03 PROCEDURE — 99214 OFFICE O/P EST MOD 30 MIN: CPT | Mod: 25 | Performed by: NURSE PRACTITIONER

## 2018-05-03 PROCEDURE — 99214 OFFICE O/P EST MOD 30 MIN: CPT | Performed by: NURSE PRACTITIONER

## 2018-05-03 PROCEDURE — 99204 OFFICE O/P NEW MOD 45 MIN: CPT | Performed by: NURSE PRACTITIONER

## 2018-05-03 PROCEDURE — S2083 ADJUSTMENT GASTRIC BAND: HCPCS | Performed by: NURSE PRACTITIONER

## 2018-05-03 NOTE — PROGRESS NOTES
Get Established Weight Management  Lap Band Adjustment  Date of Service: 5/3/2018    Subjective      Patient ID: Katy Stack is a 77 y.o. female.    BROOKLYN Burns presents to the weight management and bariatric surgery clinic today, unaccompanied, in order to transition weight management care to a location near her residence, which is here in [place], [place].  She underwent lap band placement in 2014 with Dr. Alberto and has been treated at [facility] in [place], [place] since this time.  She reports a total loss of 20-30 lbs since lap-band placement.  Her last adjustment was 3/8/2018 with THIERNO Aguirre CNP at which point 0.25 ml was instilled into a standard lap-band for a total volume of 7.8 ml.  She reports that she is able to eat more than 1 cup portion sizes, and desires lap band fill.  She denies any routine exercise over past month due to recent pneumonia.  Prior to this she had been riding the recombinant bicycle at the local [facility] center 3-4 days per week.  She looks forward to the warmer weather as she [place] city [place] for 1 hour 6 days per week.  Due to recent illness she has been eating a fairly soft diet over the past month namely mashed potatoes and gravy, steamed vegetables for lunch, and mixed greens with egg and cubed ham.  She primarily drinks iced green tea and has 1 cup of coffee with creamer daily.  She denies any soda intake or other added sugars. She reports drinking about 48-60 fl oz water and <50 g protein daily.  She no longer maintains a food/fluid log, but does feel that this has helped her in the past. She denies meeting routinely with a RD. 9-point ROS reviewed.     Review of Systems   Constitutional: Positive for activity change (no added routine exercise over past month.). Negative for appetite change, chills, diaphoresis, fatigue, fever and unexpected weight change.   HENT: Negative for trouble swallowing and voice change.     Respiratory: Negative for cough, chest tightness and shortness of breath.         COPD and mild KATY- no supplemental O2 or CPAP    Cardiovascular: Negative for chest pain and palpitations.        Reports HTN and BBB- on losartan and atenolol   Gastrointestinal: Positive for constipation (amitizia daily). Negative for abdominal pain, diarrhea, nausea and vomiting.        Does struggle with heartburn- symptoms worse at night.   Endocrine:        History of left thyroid lobectomy- well controlled on current dose of levothyroxine   Musculoskeletal: Positive for arthralgias (left shoulder). Negative for gait problem.   Skin: Negative for color change and rash.   Neurological: Negative for dizziness, syncope, weakness and light-headedness.   Psychiatric/Behavioral: Negative.         Past Medical History:   Diagnosis Date   • Benign essential hypertension 11/24/2017   • Chronic obstructive lung disease (CMS/HCC) 11/10/2017   • Hypertension    • Hypothyroid    • Hypothyroidism 11/10/2017   • Obesity 11/10/2017   • Obstructive sleep apnea syndrome 11/10/2017    Night time oxygen   • Osteoarthritis    • Periodic limb movement disorder 11/10/2017   • Rectocele        Past Surgical History:   Procedure Laterality Date   • APPENDECTOMY  1987   • BACK SURGERY      2 surgeries [id number]   • BACK SURGERY  11/30/2016   • BACK SURGERY  05/01/2017    vetebral fusion   • CARDIAC CATHETERIZATION  08/31/2011   • CHOLECYSTECTOMY     • COLONOSCOPY  02/19/2014    No polyps   • COLONOSCOPY  04/26/2011   • FOOT SURGERY      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 Vela- shoulder   • THYROID LOBECTOMY Left  18/18/2010   • THYROIDECTOMY, PARTIAL  08/18/2010   • UPPER GASTROINTESTINAL ENDOSCOPY  02/19/2014    Mild chronic gastritis       Allergies   Allergen Reactions   • Strawberry      hives, tongue swells   • Adhesive      welts         Current Outpatient Prescriptions:   •  AMITIZA 24 mcg capsule, , Disp: , Rfl:   •  aspirin (ASPIR-LOW) 81 mg EC tablet, 1 tab by oral route every day, Disp: , Rfl:   •  atenolol (TENORMIN) 25 mg tablet, TAKE 1 TABLET BY MOUTH  EVERY DAY AS DIRECTED, Disp: 90 tablet, Rfl: 1  •  atorvastatin (LIPITOR) 20 mg tablet, , Disp: 90, Rfl: 0  •  gabapentin (NEURONTIN) 100 mg capsule, , Disp: 180, Rfl: 0  •  levothyroxine (SYNTHROID, LEVOTHROID) 100 mcg tablet, , Disp: 90, Rfl: 0  •  losartan (COZAAR) 50 mg tablet, TAKE 1 TABLET BY MOUTH  EVERY DAY AS DIRECTED, Disp: 90 tablet, Rfl: 1  •  nitroglycerin (NITROSTAT) 0.4 mg SL tablet, Place by sublingual route., Disp: 25, Rfl: 1  •  pantoprazole (PROTONIX) 40 mg EC tablet, Take 40 mg by mouth daily., Disp: , Rfl:   •  PREMARIN 0.9 mg tablet, TAKE 1 TABLET BY MOUTH  EVERY DAY (Patient taking differently: TAKE 1 TABLET BY MOUTH EVERY WEEK), Disp: 90 tablet, Rfl: 1    Family History   Problem Relation Age of Onset   • Heart disease Mother    • Heart disease Father      Myocardial infarction   • Parkinsonism Father    • Thyroid disease Father    • Alzheimer's disease Sister    • Arthritis Sister    • Osteoporosis Sister    • Lung cancer Brother    • Diabetes Maternal Grandmother        Social History     Social History   • Marital status:      Spouse name: N/A   • Number of children: N/A   • Years of education: N/A     Occupational History   • Not on file.     Social History Main Topics   • Smoking status: Former Smoker     Packs/day: 2.00     Years: 35.00     Quit date: 1955   • Smokeless tobacco: Never Used   • Alcohol use No   • Drug use: No   • Sexual activity: Not on file     Other Topics Concern   • Not on file     Social History Narrative    • No narrative on file        Objective      Vital Signs  /68   Pulse 63   Temp 36.8 °C (98.2 °F) (Temporal)   Ht 1.524 m (5')   Wt 83 kg (183 lb)   BMI 35.74 kg/m²      Physical Exam   Constitutional: She is oriented to person, place, and time. Vital signs are normal. She appears well-developed and well-nourished. No distress.   obese   HENT:   Head: Normocephalic and atraumatic.   Cardiovascular: Normal rate, regular rhythm, S1 normal, S2 normal and intact distal pulses.    Pulses:       Radial pulses are 2+ on the right side, and 2+ on the left side.   Pulmonary/Chest: Effort normal and breath sounds normal.   Abdominal: Soft. Bowel sounds are normal. There is no tenderness.   Abdominal obesity   Musculoskeletal: Normal range of motion.   Neurological: She is alert and oriented to person, place, and time.   Skin: Skin is warm, dry and intact. Capillary refill takes less than 2 seconds.   No erythema or induration over port site.    Psychiatric: She has a normal mood and affect. Her behavior is normal.   Nursing note and vitals reviewed.      Lap Band Adjustment  Date/Time: 5/3/2018 3:30 PM  Performed by: TORI QUIROZ  Authorized by: TORI QUIROZ   Preparation: Prepped area over port site with Betadine.  Local anesthesia used: yes  Anesthesia: local infiltration    Anesthesia:  Local anesthesia used: yes  Local Anesthetic: lidocaine 1% without epinephrine  Anesthetic total: 3 mL    Sedation:  Patient sedated: no  Patient tolerance: Patient tolerated the procedure well with no immediate complications  Comments: Verbal consent obtained.  Isolated the lap band port. With some difficulty a 21g standard hypdermic needle was used to access port.  Needle was inserted into access port with the assistance of Dr. Millan.  6.2 ml of clear fluid was aspirated form the lap band.  Re-instilled 6.5 ml of sterile saline into the port for a total of 8.1 ml in a standard lap-band. Cleansed the area over the port  "with alcohol swab and applied a thin layer of bacitracin and cover with a Band-Aid.      Labs:   Automated Differential:   Lab Results   Component Value Date    NEUTROABS 4.60 11/24/2017    LYMPHOPCT 36 11/24/2017    MONOPCT 9 11/24/2017    MONOSABS 0.80 11/24/2017    EOSPCT 5 (H) 11/24/2017    EOSABS 0.40 11/24/2017    BASOSABS 0.10 11/24/2017    BASOPCT 1 11/24/2017     CBC with Platelet:    Lab Results   Component Value Date    WBC 9.1 11/24/2017    HGB 12.6 11/24/2017    HCT 38.8 11/24/2017     11/24/2017    RBC 4.67 11/24/2017    MCV 83.1 11/24/2017    MCH 27.0 (L) 11/24/2017    MCHC 32.5 11/24/2017    RDW 16.3 (H) 11/24/2017    MPV 8.2 11/24/2017     Comp:   Lab Results   Component Value Date     11/24/2017    K 4.2 11/24/2017     (H) 11/24/2017    CO2 25 11/24/2017    BUN 23 11/24/2017    CREATININE 1.1 11/24/2017    GLUCOSE 100 11/24/2017    CALCIUM 9.0 11/24/2017    PROT 6.6 11/24/2017    ALBUMIN 3.8 11/24/2017    AST 15 11/24/2017    ALT 8 11/24/2017    ALKPHOS 96 11/24/2017    BILITOT 0.29 11/24/2017     Lipid:   Lab Results   Component Value Date    CHOL 97 01/20/2017    TRIG 134 01/20/2017    HDL 31 (L) 01/20/2017     Magnesium:   Lab Results   Component Value Date    MG 2.1 05/08/2015     TSH:   Lab Results   Component Value Date    TSH 0.601 05/30/2017         Assessment and Plan:  1. Class 2 obesity due to excess calories with serious comorbidity and body mass index (BMI) of 35.0 to 35.9 in adult  Katy presents today for lap band adjustment.  She has lost a total of 30 lbs since her lap-band was placed in 2014. We reviewed lifestyle modifications with diet and exercise as noted in the HPI.  We discussed the foundational components of weight loss with diet, exercise, and behavioral health.  I offered her to meet with the RD and behavioral psychologist on our bariatric team, but she declines at this time stating \"she knows what she needs to do.\"  Therefore, the following weight " loss goals were discussed and mutually agreed upon:   1. Until water walking begins, get back to recombinant bicycle for 30 minutes 4 days per week.   2. Maintain food/fluid log   3. Aim for 64 fluid ounces water daily   4. Aim for 50-70 g protein daily.   5. Follow up with weight management in 3-4 months, sooner if needed.     2. H/O laparoscopic adjustable gastric banding  - General Procedure- lap band adjustment  Lap band adjustment completed today. Patient was able to tolerate clear fluids immediately following the procedure.  Discussed full liquid diet for the remainder of the day, and advanced as tolerated to regular diet tomorrow.  Patient will contact the clinic with any problems or concerns.  We discussed that band adjustments should be carried out by one consistent provider.  She does desire care closer to home, and plans to attend [facility] clinic for future adjustments. Will see her back on an as-needed basis for further lap-band adjustments and weight management follow-up.    3. Essential hypertension  Mildly hypertensive today. Continues ARB and beta-blocker therapy. Discussed positive effects of exercise on blood pressure.     4. Gastroesophageal reflux disease, esophagitis presence not specified  Patient recently increased PPI therapy to protonix 40 mg po daily as prescribed by her PCP.  She denies any difficulty with food sticking, however, I fear that her lap-band may be exacerbating reflux symptoms. She will continue to follow with her PCP for reflux. I will continue to monitoring and subsequent weight management visits.    Katy MAIER Sreekanth will RTC in 3-4 months for ongoing weight management and as needed for further lap-band adjustments.  She participated in the decision making process and agreed with the above plan. All questions were sought and answered.     Marii Wayne CNP  5/5/2018 11:15 AM    5/3/2018 Total Time spent with the patient is 45 min with  more than 50% in direct counseling and coordination of care regarding obestiy, lap-band adjustment, GERD, HTN.

## 2018-05-03 NOTE — PATIENT INSTRUCTIONS
1. Until water walking begins, get back to recombinant bicycle for 30 minutes 4 days per week.   2. Maintain food/fluid log   3. Aim for 64 fluid ounces water daily   4. Aim for 50-70 g protein daily.   5. Follow up with weight management in 3-4 months, sooner if needed.

## 2018-05-05 ASSESSMENT — ENCOUNTER SYMPTOMS
DIZZINESS: 0
PALPITATIONS: 0
PSYCHIATRIC NEGATIVE: 1
WEAKNESS: 0
CHILLS: 0
UNEXPECTED WEIGHT CHANGE: 0
CHEST TIGHTNESS: 0
DIARRHEA: 0
SHORTNESS OF BREATH: 0
ARTHRALGIAS: 1
CONSTIPATION: 1
APPETITE CHANGE: 0
VOMITING: 0
LIGHT-HEADEDNESS: 0
ABDOMINAL PAIN: 0
VOICE CHANGE: 0
DIAPHORESIS: 0
COUGH: 0
FEVER: 0
ACTIVITY CHANGE: 1
TROUBLE SWALLOWING: 0
COLOR CHANGE: 0
NAUSEA: 0
FATIGUE: 0

## 2018-05-08 ENCOUNTER — TELEPHONE (OUTPATIENT)
Dept: SURGERY | Facility: CLINIC | Age: 78
End: 2018-05-08

## 2018-05-08 NOTE — TELEPHONE ENCOUNTER
I spoke with Katy and she is continuing to have bleeding since her appt with Dr Joshi in January. She is almost out of her fissure cream and thinks maybe she needs a recheck appt with Dr. Joshi. I was unable to find an appt time with Dr. Joshi that worked with her schedule. I will have Carolina, Dr. Joshi's nurse, give her a call tomorrow to discuss.

## 2018-05-09 NOTE — TELEPHONE ENCOUNTER
Visited with patient.  She is continuing having pain and bleeding since appointment in January.    Taking Miralax for bowels.  Using fissure ointment as directed but did not ever add A&D ointment.   Stopped sitz baths as they did not help.  Appointment made with Dr Joshi..

## 2018-05-17 ENCOUNTER — OFFICE VISIT (OUTPATIENT)
Dept: FAMILY MEDICINE | Facility: CLINIC | Age: 78
End: 2018-05-17
Payer: MEDICARE

## 2018-05-17 VITALS
HEART RATE: 64 BPM | TEMPERATURE: 98.2 F | SYSTOLIC BLOOD PRESSURE: 132 MMHG | DIASTOLIC BLOOD PRESSURE: 66 MMHG | OXYGEN SATURATION: 96 % | WEIGHT: 181 LBS | BODY MASS INDEX: 35.35 KG/M2 | RESPIRATION RATE: 16 BRPM

## 2018-05-17 DIAGNOSIS — M19.041 OSTEOARTHRITIS OF BOTH HANDS, UNSPECIFIED OSTEOARTHRITIS TYPE: Primary | ICD-10-CM

## 2018-05-17 DIAGNOSIS — M19.042 OSTEOARTHRITIS OF BOTH HANDS, UNSPECIFIED OSTEOARTHRITIS TYPE: Primary | ICD-10-CM

## 2018-05-17 DIAGNOSIS — R53.83 OTHER FATIGUE: ICD-10-CM

## 2018-05-17 PROCEDURE — 99213 OFFICE O/P EST LOW 20 MIN: CPT | Performed by: FAMILY MEDICINE

## 2018-05-17 PROCEDURE — 99213 OFFICE O/P EST LOW 20 MIN: CPT | Mod: PO | Performed by: FAMILY MEDICINE

## 2018-05-17 RX ORDER — PREDNISONE 10 MG/1
TABLET ORAL
Qty: 20 TABLET | Refills: 0 | Status: SHIPPED | OUTPATIENT
Start: 2018-05-17 | End: 2018-06-21 | Stop reason: WASHOUT

## 2018-05-17 ASSESSMENT — PAIN SCALES - GENERAL: PAINLEVEL: 2

## 2018-05-17 NOTE — PROGRESS NOTES
Subjective      Katy Stack is a 77 y.o. female who presents for Joint Swelling (Patient wondering if she has gout. Painful fingers)      Patient presents for bilateral hand/finger pain and swelling for 2-3 days. Denies fevers or chills. No other joint swelling noticed. Looks to be an arthritis flare up.    Patient has also been feeling more fatigued than normal and noticing her blood sugar numbers running in the 70's fasting in AM. 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 She states she is sleeping well, typically 7-8 hours of sleep per night.       Allergies   Allergen Reactions   • Strawberry      hives, tongue swells   • Adhesive      welts       Review of Systems    Objective     Vitals  /66 (BP Location: Left arm, Patient Position: Sitting, Cuff Size: Reg)   Pulse 64   Temp 36.8 °C (98.2 °F) (Temporal)   Resp 16   Wt 82.1 kg (181 lb)   SpO2 96%   BMI 35.35 kg/m²     Physical Exam   Constitutional: She appears well-developed and well-nourished. No distress.   Cardiovascular: Normal rate, regular rhythm and normal heart sounds.  Exam reveals no gallop and no friction rub.    No murmur heard.  Pulmonary/Chest: Effort normal and breath sounds normal. No respiratory distress. She has no wheezes. She has no rales.   Musculoskeletal:   LEFT HAND:  Mild erythema and nodules to long finger PIP joint.  Tenderness to PIP of index finger, mild erythema  RIGHT HAND:   Tenderness to DIP of all right fingers.  Degenerative changes noted BL hands     Skin: She is not diaphoretic.   Nursing note and vitals reviewed.      Assessment/Plan   Diagnoses and all orders for this visit:    Osteoarthritis of both hands, unspecified osteoarthritis type  Comments:  Start course of prednisone. F/u if symptoms worsen or persist. Monitor blood sugars.Call if sugars elevated over 300.  Orders:  -     predniSONE  (DELTASONE) 10 mg tablet; Take 4 tablets by mouth daily for 5 days    Other fatigue  Comments:  Try to increase daily exercise. F/u if symptoms worsen or persist. Return precautions given.        Return if symptoms worsen or fail to improve.    ENRRIQUE BALES MD

## 2018-06-11 ENCOUNTER — TELEPHONE (OUTPATIENT)
Dept: FAMILY MEDICINE | Facility: CLINIC | Age: 78
End: 2018-06-11

## 2018-06-21 RX ORDER — MULTIVITAMIN
1 TABLET ORAL DAILY
COMMUNITY
End: 2018-09-14 | Stop reason: WASHOUT

## 2018-06-21 ASSESSMENT — ACTIVITIES OF DAILY LIVING (ADL): ADEQUATE_TO_COMPLETE_ADL: YES

## 2018-06-21 NOTE — PRE-PROCEDURE INSTRUCTIONS
Pre-Surgery Instructions:   Medication Instructions   • AMITIZA 24 mcg capsule PRN morning of surgery/procedure   • aspirin (ASPIR-LOW) 81 mg EC tablet Stop taking 1 week prior to surgery/procedure   • atenolol (TENORMIN) 25 mg tablet Take morning of surgery/procedure   • atorvastatin (LIPITOR) 20 mg tablet Do not take morning of surgery/procedure   • gabapentin (NEURONTIN) 100 mg capsule Take morning of surgery/procedure   • levothyroxine (SYNTHROID, LEVOTHROID) 100 mcg tablet Take morning of surgery/procedure   • losartan (COZAAR) 50 mg tablet Take morning of surgery/procedure   • multivitamin (THERAGRAN) tablet tablet Take morning of surgery/procedure   • nifedipine 0.3% and lidocaine 1.5% ointment PRN morning of surgery/procedure   • nitroglycerin (NITROSTAT) 0.4 mg SL tablet PRN morning of surgery/procedure   • PREMARIN 0.9 mg tablet PRN morning of surgery/procedure   • vit A-vit C-vit E-zinc-copper (EYE VITAMIN AND MINERALS) 7,160-113-100 unit-mg-unit tablet Take morning of surgery/procedure   preop assessment and teaching done with pt,  will be with DOS, no food after 0800 and sips of liq till she comes to the surgery center [facility], pt understood.

## 2018-06-26 ENCOUNTER — ANCILLARY PROCEDURE (OUTPATIENT)
Dept: RADIOLOGY | Facility: HOSPITAL | Age: 78
End: 2018-06-26
Attending: PHYSICAL MEDICINE & REHABILITATION
Payer: MEDICARE

## 2018-06-26 ENCOUNTER — PROCEDURE VISIT (OUTPATIENT)
Dept: PAIN MEDICINE | Facility: HOSPITAL | Age: 78
End: 2018-06-26
Attending: PHYSICAL MEDICINE & REHABILITATION
Payer: MEDICARE

## 2018-06-26 VITALS
TEMPERATURE: 98 F | SYSTOLIC BLOOD PRESSURE: 165 MMHG | DIASTOLIC BLOOD PRESSURE: 64 MMHG | HEART RATE: 63 BPM | OXYGEN SATURATION: 95 %

## 2018-06-26 DIAGNOSIS — M54.16 LUMBAR RADICULOPATHY: Primary | ICD-10-CM

## 2018-06-26 DIAGNOSIS — R52 PAIN: ICD-10-CM

## 2018-06-26 PROCEDURE — 77003 FLUOROGUIDE FOR SPINE INJECT: CPT | Mod: NO READ

## 2018-06-26 PROCEDURE — 64483 NJX AA&/STRD TFRM EPI L/S 1: CPT | Performed by: PHYSICAL MEDICINE & REHABILITATION

## 2018-06-26 PROCEDURE — 6360000200 HC RX 636 W HCPCS (ALT 250 FOR IP): Performed by: PHYSICAL MEDICINE & REHABILITATION

## 2018-06-26 RX ORDER — DEXAMETHASONE SODIUM PHOSPHATE 10 MG/ML
20 INJECTION INTRAMUSCULAR; INTRAVENOUS ONCE
Status: COMPLETED | OUTPATIENT
Start: 2018-06-26 | End: 2018-06-26

## 2018-06-26 RX ORDER — LIDOCAINE HYDROCHLORIDE 10 MG/ML
3 INJECTION, SOLUTION INFILTRATION; PERINEURAL ONCE
Status: COMPLETED | OUTPATIENT
Start: 2018-06-26 | End: 2018-06-26

## 2018-06-26 RX ADMIN — LIDOCAINE HYDROCHLORIDE 3 ML: 10 INJECTION, SOLUTION INFILTRATION; PERINEURAL at 16:00

## 2018-06-26 RX ADMIN — DEXAMETHASONE SODIUM PHOSPHATE 20 MG: 10 INJECTION INTRAMUSCULAR; INTRAVENOUS at 16:00

## 2018-06-26 ASSESSMENT — PAIN SCALES - GENERAL: PAINLEVEL: 4

## 2018-06-26 ASSESSMENT — PAIN - FUNCTIONAL ASSESSMENT: PAIN_FUNCTIONAL_ASSESSMENT: 0-10

## 2018-06-26 ASSESSMENT — PAIN DESCRIPTION - DESCRIPTORS: DESCRIPTORS: ACHING;BURNING;RADIATING

## 2018-06-26 NOTE — PROGRESS NOTES
PATIENT NAME: Katy Stack  YOB: 1940  PROCEDURE DATE: 6/26/2018  PHYSICIAN: NING DUPREE MD  REFERRING PHYSICIAN: N/A  HISTORY/INDICATION: Low back and left leg pain  DIAGNOSIS: left L2 radiculopathy  PROCEDURE: left L2 transforaminal epidural steroid injection    PROCEDURE NOTE:    I reviewed the indications, contraindications and therapeutic alternatives to the procedure as well as the potential side effects and complications, including, but not necessarily limited to bleeding, infection, allergic reaction, spinal cord injury and local tissue injury. The patient agreed to proceed.    The patient's heart rate, blood pressure, temperature, and respiratory rate were all within an acceptable range prior to being taken back to the procedure room. The patient was placed in the prone position. The area over the target site was prepped with betadine and draped with sterile towels. The left L2 pedicle was identified by a fluoroscope in an oblique position. A 25g spinal needle was atraumatically introduced and advanced under fluoroscopic guidance, intermittently using a lateral view until the tip was visualized to be in the posterior/superior foramina. Following negative aspiration, 1 mL of contrast confirmed epidural flow without vascular or intrathecal uptake. A total of 5 mL of solution (3 mL 1% lidocaine and 2 mL 10 mg/mL dexamethasone) was injected without complication. The needle was removed. The patient was observed for approximately 20 minutes before being discharged in excellent condition to be driven home by a responsible adult. The patient will follow up in about two weeks.

## 2018-06-28 ENCOUNTER — ANESTHESIA EVENT (OUTPATIENT)
Dept: GASTROENTEROLOGY | Facility: HOSPITAL | Age: 78
End: 2018-06-28
Payer: MEDICARE

## 2018-06-28 ASSESSMENT — COPD QUESTIONNAIRES: COPD: 1

## 2018-06-28 NOTE — ANESTHESIA PREPROCEDURE EVALUATION
Pre-Procedure Assessment    Patient: Katy Stack, female, 78 y.o.    Ht Readings from Last 1 Encounters:   05/03/18 1.524 m (5')     Wt Readings from Last 1 Encounters:   05/17/18 82.1 kg (181 lb)       Last Vitals  /67 (07/02/18 1405)    Temp 37 °C (98.6 °F) (07/02/18 1405)    Pulse 71 (07/02/18 1405)   Resp 20 (07/02/18 1405)    SpO2 94 % (07/02/18 1405)    Pain Score         Problem list reviewed and Medical history reviewed    No history of anesthetic complications:          Airway   Mallampati: II  TM distance: <3 FB  Neck ROM: full      Dental    (+) partials    Pulmonary - normal exam    breath sounds clear to auscultation  (+) COPD moderate, sleep apnea,     ROS comment: O2 at night  No CPAP    Cardiovascular - normal exam  Exercise tolerance: good (4-7 METS)  (+) hypertension well controlled,     Rhythm: regular  Rate: normal    Mental Status/Neuro/Psych    Pt is alert.      (+) arthritis,     GI/Hepatic/Renal    (+) GERD poorly controlled,     Endo/Other    (+) hypothyroidism,   Abdominal  - normal exam          Social History   Substance Use Topics   • Smoking status: Former Smoker     Packs/day: 2.00     Years: 35.00     Quit date: 1955   • Smokeless tobacco: Never Used   • Alcohol use No      Hematology   Lab Results   Component Value Date/Time    WBC 9.1 11/24/2017 01:46 PM    RBC 4.67 11/24/2017 01:46 PM    MCV 83.1 11/24/2017 01:46 PM    HGB 12.6 11/24/2017 01:46 PM    HCT 38.8 11/24/2017 01:46 PM     11/24/2017 01:46 PM      Coagulation No results found for: PT, APTT, INR   General Chemistry   Lab Results   Component Value Date/Time    CALCIUM 9.0 11/24/2017 01:46 PM    BUN 23 11/24/2017 01:46 PM    CREATININE 1.1 11/24/2017 01:46 PM    GLUCOSE 100 11/24/2017 01:46 PM     11/24/2017 01:46 PM    K 4.2 11/24/2017 01:46 PM    MG 2.1 05/08/2015 09:48 AM    CO2 25 11/24/2017 01:46 PM     (H) 11/24/2017 01:46 PM     Anesthesia Plan    ASA 3   NPO status reviewed: > 6  hours    MAC         Induction: intravenous                 Anesthetic plan and risks discussed with patient.  Use of blood products discussed with patient whom.

## 2018-07-02 ENCOUNTER — ANESTHESIA (OUTPATIENT)
Dept: GASTROENTEROLOGY | Facility: HOSPITAL | Age: 78
End: 2018-07-02
Payer: MEDICARE

## 2018-07-02 ENCOUNTER — HOSPITAL ENCOUNTER (OUTPATIENT)
Facility: HOSPITAL | Age: 78
Setting detail: OUTPATIENT SURGERY
Discharge: HOME | End: 2018-07-02
Attending: OPHTHALMOLOGY | Admitting: OPHTHALMOLOGY
Payer: MEDICARE

## 2018-07-02 VITALS
HEART RATE: 68 BPM | RESPIRATION RATE: 20 BRPM | WEIGHT: 181 LBS | SYSTOLIC BLOOD PRESSURE: 138 MMHG | DIASTOLIC BLOOD PRESSURE: 65 MMHG | OXYGEN SATURATION: 98 % | BODY MASS INDEX: 35.35 KG/M2 | TEMPERATURE: 98.6 F

## 2018-07-02 PROCEDURE — (BLANK) HC OR LEVEL 2 PROC 1ST 15MIN: Performed by: OPHTHALMOLOGY

## 2018-07-02 PROCEDURE — 99100 ANES PT EXTEME AGE<1 YR&>70: CPT | Performed by: NURSE ANESTHETIST, CERTIFIED REGISTERED

## 2018-07-02 PROCEDURE — (BLANK) HC RECOVERY PHASE-2 1ST 1/2 HOUR ACUITY LEVEL 1: Performed by: OPHTHALMOLOGY

## 2018-07-02 PROCEDURE — (BLANK) HC OR LEVEL 2 PROC EACH ADDITIONAL MIN: Performed by: OPHTHALMOLOGY

## 2018-07-02 PROCEDURE — V2632 POST CHMBR INTRAOCULAR LENS: HCPCS | Performed by: OPHTHALMOLOGY

## 2018-07-02 PROCEDURE — 6370000100 HC RX 637 (ALT 250 FOR IP): Performed by: OPHTHALMOLOGY

## 2018-07-02 PROCEDURE — 2580000300 HC RX 258: Performed by: OPHTHALMOLOGY

## 2018-07-02 PROCEDURE — 00142 ANES PX ON EYE LENS SURGERY: CPT | Performed by: NURSE ANESTHETIST, CERTIFIED REGISTERED

## 2018-07-02 PROCEDURE — 6360000200 HC RX 636 W HCPCS (ALT 250 FOR IP): Performed by: NURSE ANESTHETIST, CERTIFIED REGISTERED

## 2018-07-02 DEVICE — IMPLANTABLE DEVICE: Type: IMPLANTABLE DEVICE | Site: EYE | Status: FUNCTIONAL

## 2018-07-02 RX ORDER — PHENYLEPHRINE HYDROCHLORIDE 25 MG/ML
1 SOLUTION/ DROPS OPHTHALMIC ONCE
Status: COMPLETED | OUTPATIENT
Start: 2018-07-02 | End: 2018-07-02

## 2018-07-02 RX ORDER — SODIUM CHLORIDE, SODIUM LACTATE, POTASSIUM CHLORIDE, CALCIUM CHLORIDE 600; 310; 30; 20 MG/100ML; MG/100ML; MG/100ML; MG/100ML
75 INJECTION, SOLUTION INTRAVENOUS CONTINUOUS
Status: DISCONTINUED | OUTPATIENT
Start: 2018-07-02 | End: 2018-07-02 | Stop reason: HOSPADM

## 2018-07-02 RX ORDER — PROPARACAINE HYDROCHLORIDE 5 MG/ML
1 SOLUTION/ DROPS OPHTHALMIC ONCE
Status: COMPLETED | OUTPATIENT
Start: 2018-07-02 | End: 2018-07-02

## 2018-07-02 RX ORDER — FENTANYL CITRATE/PF 50 MCG/ML
PLASTIC BAG, INJECTION (ML) INTRAVENOUS AS NEEDED
Status: DISCONTINUED | OUTPATIENT
Start: 2018-07-02 | End: 2018-07-02 | Stop reason: SURG

## 2018-07-02 RX ORDER — METOCLOPRAMIDE HYDROCHLORIDE 5 MG/ML
INJECTION INTRAMUSCULAR; INTRAVENOUS AS NEEDED
Status: DISCONTINUED | OUTPATIENT
Start: 2018-07-02 | End: 2018-07-02 | Stop reason: SURG

## 2018-07-02 RX ORDER — LIDOCAINE HYDROCHLORIDE 10 MG/ML
INJECTION, SOLUTION EPIDURAL; INFILTRATION; INTRACAUDAL; PERINEURAL AS NEEDED
Status: DISCONTINUED | OUTPATIENT
Start: 2018-07-02 | End: 2018-07-02 | Stop reason: HOSPADM

## 2018-07-02 RX ORDER — CYCLOPENTOLATE HYDROCHLORIDE 10 MG/ML
1 SOLUTION/ DROPS OPHTHALMIC ONCE
Status: COMPLETED | OUTPATIENT
Start: 2018-07-02 | End: 2018-07-02

## 2018-07-02 RX ORDER — MIDAZOLAM HYDROCHLORIDE 1 MG/ML
INJECTION, SOLUTION INTRAMUSCULAR; INTRAVENOUS AS NEEDED
Status: DISCONTINUED | OUTPATIENT
Start: 2018-07-02 | End: 2018-07-02 | Stop reason: SURG

## 2018-07-02 RX ADMIN — CYCLOPENTOLATE HYDROCHLORIDE 1 DROP: 10 SOLUTION/ DROPS OPHTHALMIC at 14:08

## 2018-07-02 RX ADMIN — CYCLOPENTOLATE HYDROCHLORIDE 1 DROP: 10 SOLUTION/ DROPS OPHTHALMIC at 14:24

## 2018-07-02 RX ADMIN — METOCLOPRAMIDE 10 MG: 5 INJECTION, SOLUTION INTRAMUSCULAR; INTRAVENOUS at 14:38

## 2018-07-02 RX ADMIN — SODIUM CHLORIDE, POTASSIUM CHLORIDE, SODIUM LACTATE AND CALCIUM CHLORIDE 75 ML/HR: 600; 310; 30; 20 INJECTION, SOLUTION INTRAVENOUS at 14:10

## 2018-07-02 RX ADMIN — PROPARACAINE HYDROCHLORIDE 1 DROP: 5 SOLUTION/ DROPS OPHTHALMIC at 14:03

## 2018-07-02 RX ADMIN — MIDAZOLAM HYDROCHLORIDE 0.5 MG: 1 INJECTION, SOLUTION INTRAMUSCULAR; INTRAVENOUS at 14:48

## 2018-07-02 RX ADMIN — FENTANYL CITRATE 50 MCG: 50 INJECTION, SOLUTION INTRAMUSCULAR; INTRAVENOUS at 14:46

## 2018-07-02 RX ADMIN — PHENYLEPHRINE HYDROCHLORIDE 1 DROP: 25 SOLUTION/ DROPS OPHTHALMIC at 14:22

## 2018-07-02 RX ADMIN — PHENYLEPHRINE HYDROCHLORIDE 1 DROP: 25 SOLUTION/ DROPS OPHTHALMIC at 14:05

## 2018-07-02 RX ADMIN — MIDAZOLAM HYDROCHLORIDE 0.5 MG: 1 INJECTION, SOLUTION INTRAMUSCULAR; INTRAVENOUS at 14:56

## 2018-07-02 RX ADMIN — MIDAZOLAM HYDROCHLORIDE 1 MG: 1 INJECTION, SOLUTION INTRAMUSCULAR; INTRAVENOUS at 14:46

## 2018-07-02 ASSESSMENT — ENCOUNTER SYMPTOMS: EXERCISE TOLERANCE: GOOD (4-7 METS)

## 2018-07-02 ASSESSMENT — COPD QUESTIONNAIRES: CAT_SEVERITY: MODERATE

## 2018-07-02 NOTE — ANESTHESIA POSTPROCEDURE EVALUATION
Patient: Katy Stack    Procedure Summary     Date:  07/02/18 Room / Location:  [facility] OR 01 / [facility] SURGICAL SERVICES    Anesthesia Start:  1441 Anesthesia Stop:  1521    Procedure:  OS CATARACT LEFT PHACOEMULSIFICATION OF CATARACT WITH INTRAOCULAR LENS (Left Eye) Diagnosis:  (left cataract 366.16)    Surgeon:  Emmanuel Cueto MD Responsible Provider:  Mildred Mendieta CRNA    Anesthesia Type:  MAC ASA Status:  3          Anesthesia Type: MAC  Last vitals  BP   141/95   Temp   97.7f   Pulse  69   Resp   19   SpO2   95   Pain Score   0     Anesthesia Post Evaluation    Patient location during evaluation: bedside  Patient participation: complete - patient participated  Level of consciousness: awake and alert  Pain management: satisfactory to patient  Airway patency: patent  Anesthetic complications: no  Cardiovascular status: acceptable and hemodynamically unstable  Respiratory status: acceptable, nonlabored ventilation, room air and oral airway  Hydration status: acceptable  May dismiss recovered patient based on consultation with the appropriate physicians and/or meeting appropriate discharge criteria

## 2018-07-02 NOTE — OP NOTE
OP NOTE    Procedure: Cataract extraction by phacoemulsification with intraocular lens implant  left eye    Preoperative diagnosis: Nuclear sclerosis cataract  left eye    Postoperative diagnosis: Same    Anesthesia: topical    Complications: None    Description: The patient was taken to the operative suite.  The eye was prepped and draped in the usual sterile fashion.  A lid speculum was placed.  A paracentesis incision was made.  The eye was filled with preservative-free lidocaine.  The eye was then filled with viscoelastic.  A 2.75mm temporal incision was then made in a biplanar fashion.  A cystotome was utilized to initiate and complete a capsulorrhexis.  The lens was hydrodissected and hydrodelineated and found to be freely mobile.  Phacoemulsification was then performed.  The lens was sculpted and cracked into 4 quadrants.  Each quadrant was then phacoemulsified.  Residual cortical material was removed with the irrigation aspiration tip.  The capsular bag was polished.  A 22.5 PCBOO lens was then inserted into the capsular bag.  Viscoelastic was removed with the irrigation aspiration tip.  The lens was found to be well centered.  Corneal incisions were hydrated and found to be secure.  The lid speculum was removed and the patient was transferred to the postoperative area for instructions.

## 2018-07-02 NOTE — H&P
No chief complaint on file.      HPI:  Katy Stack is an 78 y.o. female. Blurry OS    Past Medical History:   Diagnosis Date   • Benign essential hypertension 11/24/2017   • Cataract of left eye    • Chronic obstructive lung disease (CMS/HCC) 11/10/2017   • GERD (gastroesophageal reflux disease)     at times   • Hypertension    • Hypothyroid    • Hypothyroidism 11/10/2017   • Neurologic disorder    • Obesity 11/10/2017   • Obstructive sleep apnea syndrome 11/10/2017    Night time oxygen   • Osteoarthritis    • Periodic limb movement disorder 11/10/2017   • Rectocele         Past Surgical History:   Procedure Laterality Date   • APPENDECTOMY  1987   • BACK SURGERY      2 surgeries [id number]   • BACK SURGERY  11/30/2016   • BACK SURGERY  05/01/2017    vetebral fusion   • CARDIAC CATHETERIZATION  08/31/2011   • CHOLECYSTECTOMY     • COLONOSCOPY  02/19/2014    No polyps   • COLONOSCOPY  04/26/2011   • FOOT SURGERY      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 Vela- shoulder   • THYROID LOBECTOMY Left 18/18/2010   • THYROIDECTOMY, PARTIAL  08/18/2010   • UPPER GASTROINTESTINAL ENDOSCOPY  02/19/2014    Mild chronic gastritis       No current outpatient prescriptions on file.    Allergies   Allergen Reactions   • Strawberry      hives, tongue swells   • Adhesive      welts       Family History   Problem Relation Age of Onset   • Heart disease Mother    • Heart disease Father         Myocardial infarction   • Parkinsonism Father    • Thyroid disease Father    • Alzheimer's disease Sister    • Arthritis Sister    • Osteoporosis Sister    • Lung cancer Brother    • Diabetes Maternal  Grandmother        Social History     Social History   • Marital status:      Spouse name: N/A   • Number of children: N/A   • Years of education: N/A     Occupational History   • Not on file.     Social History Main Topics   • Smoking status: Former Smoker     Packs/day: 2.00     Years: 35.00     Quit date: 1955   • Smokeless tobacco: Never Used   • Alcohol use No   • Drug use: No   • Sexual activity: Not on file     Other Topics Concern   • Not on file     Social History Narrative   • No narrative on file       Review of Systems    /67   Pulse 71   Temp 37 °C (98.6 °F) (Oral)   Resp 20   Wt 82.1 kg (181 lb)   SpO2 94%   BMI 35.35 kg/m²     Physical Exam   General: a&o  Heart: RRR  Lungs: CTA    Assessment/Plan    Cataract OS: phaco/pciol OS  Active Problems:  No Active Problems: There are no active problems currently on the Problem List. Please update the Problem List and refresh.

## 2018-07-26 RX ORDER — PREDNISOLONE ACETATE 10 MG/ML
1 SUSPENSION/ DROPS OPHTHALMIC 4 TIMES DAILY
COMMUNITY
Start: 2018-06-05 | End: 2018-09-14 | Stop reason: WASHOUT

## 2018-07-26 RX ORDER — KETOROLAC TROMETHAMINE 5 MG/ML
1 SOLUTION OPHTHALMIC 4 TIMES DAILY
COMMUNITY
Start: 2018-06-05 | End: 2018-09-14 | Stop reason: WASHOUT

## 2018-07-26 RX ORDER — GATIFLOXACIN 5 MG/ML
1 SOLUTION/ DROPS OPHTHALMIC 4 TIMES DAILY
COMMUNITY
Start: 2018-06-05 | End: 2018-09-14 | Stop reason: WASHOUT

## 2018-07-26 ASSESSMENT — ACTIVITIES OF DAILY LIVING (ADL): ADEQUATE_TO_COMPLETE_ADL: YES

## 2018-07-26 NOTE — PRE-PROCEDURE INSTRUCTIONS
Pre-Surgery Instructions:   Medication Instructions   • AMITIZA 24 mcg capsule Do not take morning of surgery/procedure   • aspirin (ASPIR-LOW) 81 mg EC tablet Take morning of surgery/procedure   • atenolol (TENORMIN) 25 mg tablet Take morning of surgery/procedure   • atorvastatin (LIPITOR) 20 mg tablet Do not take morning of surgery/procedure   • gabapentin (NEURONTIN) 100 mg capsule Take morning of surgery/procedure   • levothyroxine (SYNTHROID, LEVOTHROID) 100 mcg tablet Take morning of surgery/procedure   • losartan (COZAAR) 50 mg tablet Take morning of surgery/procedure   • multivitamin (THERAGRAN) tablet tablet Take morning of surgery/procedure   • nifedipine 0.3% and lidocaine 1.5% ointment Do not take morning of surgery/procedure   • nitroglycerin (NITROSTAT) 0.4 mg SL tablet Do not take morning of surgery/procedure   • PREMARIN 0.9 mg tablet Take morning of surgery/procedure   • vit A-vit C-vit E-zinc-copper (EYE VITAMIN AND MINERALS) 7,160-113-100 unit-mg-unit tablet Take morning of surgery/procedure   PRE -OP DONE.  INSTRUCTED TO BE AT THE SURGERY CENTER AT 1230 DOS.  NOTHING TO EAT OR DRINK AFTER 0730 DOS, MAY HAVE LIGHT BREAKFAST AT 0600, BLACK COFFEE/TEA AND DRY TOAST.  REVIEWED AND DISCUSSED MEDS.  HAS EYE DROPS AND STATES UNDERSTANDS HOW TO USE, HAD L CATARACT DONE IN July.  ENCOURAGED TO HYDRATE WELL FOR THE 2 DAYS PRIOR TO DOS.  DISCUSSED SHOWERING AND NO SKIN PRODUCTS DOS.   WILL BE WITH DOS.  VERBALIZES UNDERSTANDING OF INSTRUCTIONS.

## 2018-08-01 ENCOUNTER — ANESTHESIA EVENT (OUTPATIENT)
Dept: GASTROENTEROLOGY | Facility: HOSPITAL | Age: 78
End: 2018-08-01
Payer: MEDICARE

## 2018-08-01 ENCOUNTER — TELEPHONE (OUTPATIENT)
Dept: FAMILY MEDICINE | Facility: CLINIC | Age: 78
End: 2018-08-01

## 2018-08-01 ASSESSMENT — COPD QUESTIONNAIRES: COPD: 1

## 2018-08-01 NOTE — TELEPHONE ENCOUNTER
Dr. Travis is now out of office this afternoon. Will have to address tomorrow when back in clinic. Unsure if completed DME form was saved or not, otherwise will need to re-print and re-complete and then fax back to [facility].

## 2018-08-01 NOTE — ANESTHESIA PREPROCEDURE EVALUATION
Pre-Procedure Assessment    Patient: Katy Stack, female, 78 y.o.    Ht Readings from Last 1 Encounters:   08/06/18 1.524 m (5')     Wt Readings from Last 1 Encounters:   08/06/18 81.6 kg (180 lb)       Last Vitals  /67 (08/06/18 1252)    Temp 36.9 °C (98.5 °F) (08/06/18 1252)    Pulse 70 (08/06/18 1252)   Resp 16 (08/06/18 1252)    SpO2 95 % (08/06/18 1252)    Pain Score         Problem list reviewed and Medical history reviewed           Airway   Mallampati: II  TM distance: >3 FB  Neck ROM: full      Dental - normal exam     Pulmonary - normal exam    breath sounds clear to auscultation  (+) COPD mild, sleep apnea (HS O2),   Cardiovascular - normal exam  (+) hypertension well controlled,     ECG reviewed  Rhythm: regular  Rate: normal  ROS comment: EKG 4/2018  Sinus Rhythm. LBBB    Mental Status/Neuro/Psych    Pt is alert.      (+) arthritis, back injury, neck injury (fusion),     Comments: Restless legs    GI/Hepatic/Renal    (+) GERD poorly controlled,     Endo/Other    (+) hypothyroidism,   Abdominal           Social History   Substance Use Topics   • Smoking status: Former Smoker     Packs/day: 2.00     Years: 35.00     Quit date: 1955   • Smokeless tobacco: Never Used   • Alcohol use No      Hematology   Lab Results   Component Value Date/Time    WBC 9.1 11/24/2017 01:46 PM    RBC 4.67 11/24/2017 01:46 PM    MCV 83.1 11/24/2017 01:46 PM    HGB 12.6 11/24/2017 01:46 PM    HCT 38.8 11/24/2017 01:46 PM     11/24/2017 01:46 PM      Coagulation No results found for: PT, APTT, INR   General Chemistry   Lab Results   Component Value Date/Time    CALCIUM 9.0 11/24/2017 01:46 PM    BUN 23 11/24/2017 01:46 PM    CREATININE 1.1 11/24/2017 01:46 PM    GLUCOSE 100 11/24/2017 01:46 PM     11/24/2017 01:46 PM    K 4.2 11/24/2017 01:46 PM    MG 2.1 05/08/2015 09:48 AM    CO2 25 11/24/2017 01:46 PM     (H) 11/24/2017 01:46 PM     Anesthesia Plan    ASA 2   NPO status reviewed: > 2 hours    MAC          Induction: intravenous                 Anesthetic plan and risks discussed with patient.      Plan discussed with attending and CRNA.

## 2018-08-06 ENCOUNTER — ANESTHESIA (OUTPATIENT)
Dept: GASTROENTEROLOGY | Facility: HOSPITAL | Age: 78
End: 2018-08-06
Payer: MEDICARE

## 2018-08-06 ENCOUNTER — HOSPITAL ENCOUNTER (OUTPATIENT)
Facility: HOSPITAL | Age: 78
Setting detail: OUTPATIENT SURGERY
Discharge: HOME | End: 2018-08-06
Attending: OPHTHALMOLOGY | Admitting: OPHTHALMOLOGY
Payer: MEDICARE

## 2018-08-06 VITALS
HEIGHT: 60 IN | SYSTOLIC BLOOD PRESSURE: 168 MMHG | TEMPERATURE: 98.4 F | OXYGEN SATURATION: 94 % | BODY MASS INDEX: 35.34 KG/M2 | WEIGHT: 180 LBS | DIASTOLIC BLOOD PRESSURE: 70 MMHG | HEART RATE: 69 BPM | RESPIRATION RATE: 18 BRPM

## 2018-08-06 PROCEDURE — 6360000200 HC RX 636 W HCPCS (ALT 250 FOR IP): Performed by: NURSE ANESTHETIST, CERTIFIED REGISTERED

## 2018-08-06 PROCEDURE — 2500000200 HC RX 250 WO HCPCS: Performed by: OPHTHALMOLOGY

## 2018-08-06 PROCEDURE — V2632 POST CHMBR INTRAOCULAR LENS: HCPCS | Performed by: OPHTHALMOLOGY

## 2018-08-06 PROCEDURE — (BLANK) HC OR LEVEL 2 PROC 1ST 15MIN: Performed by: OPHTHALMOLOGY

## 2018-08-06 PROCEDURE — 6370000100 HC RX 637 (ALT 250 FOR IP): Performed by: OPHTHALMOLOGY

## 2018-08-06 PROCEDURE — (BLANK) HC OR LEVEL 2 PROC EACH ADDITIONAL MIN: Performed by: OPHTHALMOLOGY

## 2018-08-06 PROCEDURE — (BLANK) HC RECOVERY PHASE-2 1ST 1/2 HOUR ACUITY LEVEL 1: Performed by: OPHTHALMOLOGY

## 2018-08-06 PROCEDURE — 00142 ANES PX ON EYE LENS SURGERY: CPT | Performed by: NURSE ANESTHETIST, CERTIFIED REGISTERED

## 2018-08-06 PROCEDURE — 2580000300 HC RX 258: Performed by: OPHTHALMOLOGY

## 2018-08-06 PROCEDURE — 99100 ANES PT EXTEME AGE<1 YR&>70: CPT | Performed by: NURSE ANESTHETIST, CERTIFIED REGISTERED

## 2018-08-06 DEVICE — LENS IMPLANT 22.0D ZCB00: Type: IMPLANTABLE DEVICE | Site: EYE | Status: FUNCTIONAL

## 2018-08-06 RX ORDER — PROPARACAINE HYDROCHLORIDE 5 MG/ML
1 SOLUTION/ DROPS OPHTHALMIC ONCE
Status: COMPLETED | OUTPATIENT
Start: 2018-08-06 | End: 2018-08-06

## 2018-08-06 RX ORDER — METOCLOPRAMIDE HYDROCHLORIDE 5 MG/ML
INJECTION INTRAMUSCULAR; INTRAVENOUS AS NEEDED
Status: DISCONTINUED | OUTPATIENT
Start: 2018-08-06 | End: 2018-08-06 | Stop reason: SURG

## 2018-08-06 RX ORDER — SODIUM CHLORIDE, SODIUM LACTATE, POTASSIUM CHLORIDE, CALCIUM CHLORIDE 600; 310; 30; 20 MG/100ML; MG/100ML; MG/100ML; MG/100ML
75 INJECTION, SOLUTION INTRAVENOUS CONTINUOUS
Status: DISCONTINUED | OUTPATIENT
Start: 2018-08-06 | End: 2018-08-06 | Stop reason: HOSPADM

## 2018-08-06 RX ORDER — MIDAZOLAM HYDROCHLORIDE 1 MG/ML
INJECTION INTRAMUSCULAR; INTRAVENOUS AS NEEDED
Status: DISCONTINUED | OUTPATIENT
Start: 2018-08-06 | End: 2018-08-06 | Stop reason: SURG

## 2018-08-06 RX ORDER — PHENYLEPHRINE HYDROCHLORIDE 25 MG/ML
1 SOLUTION/ DROPS OPHTHALMIC ONCE
Status: COMPLETED | OUTPATIENT
Start: 2018-08-06 | End: 2018-08-06

## 2018-08-06 RX ORDER — FENTANYL CITRATE/PF 50 MCG/ML
PLASTIC BAG, INJECTION (ML) INTRAVENOUS AS NEEDED
Status: DISCONTINUED | OUTPATIENT
Start: 2018-08-06 | End: 2018-08-06 | Stop reason: SURG

## 2018-08-06 RX ORDER — LIDOCAINE HYDROCHLORIDE 10 MG/ML
INJECTION, SOLUTION EPIDURAL; INFILTRATION; INTRACAUDAL; PERINEURAL AS NEEDED
Status: DISCONTINUED | OUTPATIENT
Start: 2018-08-06 | End: 2018-08-06 | Stop reason: HOSPADM

## 2018-08-06 RX ORDER — CYCLOPENTOLATE HYDROCHLORIDE 10 MG/ML
1 SOLUTION/ DROPS OPHTHALMIC ONCE
Status: COMPLETED | OUTPATIENT
Start: 2018-08-06 | End: 2018-08-06

## 2018-08-06 RX ADMIN — CYCLOPENTOLATE HYDROCHLORIDE 1 DROP: 10 SOLUTION/ DROPS OPHTHALMIC at 12:53

## 2018-08-06 RX ADMIN — FENTANYL CITRATE 25 MCG: 50 INJECTION, SOLUTION INTRAMUSCULAR; INTRAVENOUS at 13:40

## 2018-08-06 RX ADMIN — CYCLOPENTOLATE HYDROCHLORIDE 1 DROP: 10 SOLUTION/ DROPS OPHTHALMIC at 12:39

## 2018-08-06 RX ADMIN — MIDAZOLAM HYDROCHLORIDE 0.5 MG: 1 INJECTION, SOLUTION INTRAMUSCULAR; INTRAVENOUS at 13:31

## 2018-08-06 RX ADMIN — PHENYLEPHRINE HYDROCHLORIDE 1 DROP: 25 SOLUTION/ DROPS OPHTHALMIC at 12:54

## 2018-08-06 RX ADMIN — METOCLOPRAMIDE 10 MG: 5 INJECTION, SOLUTION INTRAMUSCULAR; INTRAVENOUS at 13:12

## 2018-08-06 RX ADMIN — MIDAZOLAM HYDROCHLORIDE 0.5 MG: 1 INJECTION, SOLUTION INTRAMUSCULAR; INTRAVENOUS at 13:40

## 2018-08-06 RX ADMIN — MIDAZOLAM HYDROCHLORIDE 1 MG: 1 INJECTION, SOLUTION INTRAMUSCULAR; INTRAVENOUS at 13:21

## 2018-08-06 RX ADMIN — FENTANYL CITRATE 25 MCG: 50 INJECTION, SOLUTION INTRAMUSCULAR; INTRAVENOUS at 13:31

## 2018-08-06 RX ADMIN — SODIUM CHLORIDE, POTASSIUM CHLORIDE, SODIUM LACTATE AND CALCIUM CHLORIDE 75 ML/HR: 600; 310; 30; 20 INJECTION, SOLUTION INTRAVENOUS at 12:54

## 2018-08-06 RX ADMIN — PHENYLEPHRINE HYDROCHLORIDE 1 DROP: 25 SOLUTION/ DROPS OPHTHALMIC at 12:40

## 2018-08-06 RX ADMIN — FENTANYL CITRATE 50 MCG: 50 INJECTION, SOLUTION INTRAMUSCULAR; INTRAVENOUS at 13:21

## 2018-08-06 RX ADMIN — PROPARACAINE HYDROCHLORIDE 1 DROP: 5 SOLUTION/ DROPS OPHTHALMIC at 12:38

## 2018-08-06 ASSESSMENT — COPD QUESTIONNAIRES: CAT_SEVERITY: MILD

## 2018-08-06 NOTE — ANESTHESIA POSTPROCEDURE EVALUATION
Patient: Katy Stack    Procedure Summary     Date:  08/06/18 Room / Location:  [facility] OR 01 / [facility]    Anesthesia Start:  1317 Anesthesia Stop:  1357    Procedure:  OD CATARACT RIGHT PHACOEMULSIFICATION OF CATARACT WITH INTRAOCULAR LENS (Right Eye) Diagnosis:  (right cataract  366.16)    Surgeon:  Emmanuel Cueto MD Responsible Provider:  Aysha Rollins CRNA    Anesthesia Type:  MAC ASA Status:  2          Anesthesia Type: MAC  Last vitals  BP (P) 173/72 (08/06/18 1403)    Temp (P) 36.9 °C (98.4 °F) (08/06/18 1403)    Pulse (P) 70 (08/06/18 1403)   Resp (P) 18 (08/06/18 1403)    SpO2 (P) 92 % (08/06/18 1403)    Pain Score        Anesthesia Post Evaluation    Patient location during evaluation: PACU  Patient participation: complete - patient participated  Level of consciousness: awake and alert  Pain management: adequate  Airway patency: patent  Anesthetic complications: no  Cardiovascular status: acceptable  Respiratory status: acceptable  Hydration status: acceptable  May dismiss recovered patient based on consultation with the appropriate physicians and/or meeting appropriate discharge criteria

## 2018-08-06 NOTE — H&P
No chief complaint on file.      HPI:  Katy Stack is an 78 y.o. female. Blurry OD    Past Medical History:   Diagnosis Date   • Benign essential hypertension 11/24/2017   • Cataract of left eye    • Chronic obstructive lung disease (CMS/HCC) 11/10/2017   • GERD (gastroesophageal reflux disease)     at times   • Hypertension    • Hypothyroid    • Hypothyroidism 11/10/2017   • Neurologic disorder    • Obesity 11/10/2017   • Obstructive sleep apnea syndrome 11/10/2017    Night time oxygen   • Osteoarthritis    • Periodic limb movement disorder 11/10/2017   • Rectocele         Past Surgical History:   Procedure Laterality Date   • APPENDECTOMY  1987   • BACK SURGERY      2 surgeries [id number]   • BACK SURGERY  11/30/2016   • BACK SURGERY  05/01/2017    vetebral fusion   • CARDIAC CATHETERIZATION  08/31/2011   • CATARACT EXTRACTION W/  INTRAOCULAR LENS IMPLANT Left 7/2/2018    Procedure: OS CATARACT LEFT PHACOEMULSIFICATION OF CATARACT WITH INTRAOCULAR LENS;  Surgeon: Emmanuel Cueto MD;  Location: [facility];  Service: Ophthalmology;  Laterality: Left;   • CHOLECYSTECTOMY     • COLONOSCOPY  02/19/2014    No polyps   • COLONOSCOPY  04/26/2011   • FOOT SURGERY      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 Vela- shoulder   • THYROID LOBECTOMY Left 18/18/2010   • THYROIDECTOMY, PARTIAL  08/18/2010   • UPPER GASTROINTESTINAL ENDOSCOPY  02/19/2014    Mild chronic gastritis       No current outpatient prescriptions on file.    Allergies   Allergen Reactions   • Strawberry      hives, tongue swells   • Adhesive      welts       Family History   Problem Relation Age of  Onset   • Heart disease Mother    • Heart disease Father         Myocardial infarction   • Parkinsonism Father    • Thyroid disease Father    • Alzheimer's disease Sister    • Arthritis Sister    • Osteoporosis Sister    • Lung cancer Brother    • Diabetes Maternal Grandmother        Social History     Social History   • Marital status:      Spouse name: N/A   • Number of children: N/A   • Years of education: N/A     Occupational History   • Not on file.     Social History Main Topics   • Smoking status: Former Smoker     Packs/day: 2.00     Years: 35.00     Quit date: 1955   • Smokeless tobacco: Never Used   • Alcohol use No   • Drug use: No   • Sexual activity: Not on file     Other Topics Concern   • Not on file     Social History Narrative   • No narrative on file       Review of Systems    /67   Pulse 70   Temp 36.9 °C (98.5 °F) (Oral)   Resp 16   Ht 1.524 m (5')   Wt 81.6 kg (180 lb)   SpO2 95%   BMI 35.15 kg/m²     Physical Exam   General: A&O  Heart: RRR  Lungs: CTA    Assessment/Plan    Cataract OD: phaco/pciol OD  Active Problems:  No Active Problems: There are no active problems currently on the Problem List. Please update the Problem List and refresh.

## 2018-08-06 NOTE — OP NOTE
OP NOTE    Procedure: Cataract extraction by phacoemulsification with intraocular lens implant  right eye    Preoperative diagnosis: Nuclear sclerosis cataract  right eye    Postoperative diagnosis: Same    Anesthesia: topical    Complications: None    Description: The patient was taken to the operative suite.  The eye was prepped and draped in the usual sterile fashion.  A lid speculum was placed.  A paracentesis incision was made.  The eye was filled with preservative-free lidocaine.  The eye was then filled with viscoelastic.  A 2.5 mm temporal incision was then made in a biplanar fashion.  A cystotome was utilized to initiate and complete a capsulorrhexis.  The lens was hydrodissected and hydrodelineated and found to be freely mobile.  Phacoemulsification was then performed.  The lens was sculpted and cracked into 4 quadrants.  Each quadrant was then phacoemulsified.  Residual cortical material was removed with the irrigation aspiration tip.  The capsular bag was polished.  A 22.0 ZCBOO lens was then inserted into the capsular bag.  Viscoelastic was removed with the irrigation aspiration tip.  The lens was found to be well centered.  Corneal incisions were hydrated and found to be secure.  The lid speculum was removed and the patient was transferred to the postoperative area for instructions.

## 2018-08-09 DIAGNOSIS — E03.9 HYPOTHYROIDISM, UNSPECIFIED TYPE: Primary | ICD-10-CM

## 2018-08-09 RX ORDER — LEVOTHYROXINE SODIUM 100 UG/1
TABLET ORAL
Qty: 90 TABLET | Refills: 0 | Status: SHIPPED | OUTPATIENT
Start: 2018-08-09 | End: 2018-09-12 | Stop reason: SDUPTHER

## 2018-09-07 DIAGNOSIS — E03.9 HYPOTHYROIDISM, UNSPECIFIED TYPE: ICD-10-CM

## 2018-09-07 DIAGNOSIS — I10 ESSENTIAL HYPERTENSION: ICD-10-CM

## 2018-09-08 RX ORDER — LEVOTHYROXINE SODIUM 100 UG/1
100 TABLET ORAL
OUTPATIENT
Start: 2018-09-08

## 2018-09-10 RX ORDER — ATENOLOL 25 MG/1
TABLET ORAL
Qty: 90 TABLET | Refills: 1 | Status: SHIPPED | OUTPATIENT
Start: 2018-09-10 | End: 2019-01-11 | Stop reason: SDUPTHER

## 2018-09-12 DIAGNOSIS — E03.9 HYPOTHYROIDISM, UNSPECIFIED TYPE: ICD-10-CM

## 2018-09-12 RX ORDER — LEVOTHYROXINE SODIUM 100 UG/1
100 TABLET ORAL
Qty: 90 TABLET | Refills: 0 | Status: SHIPPED | OUTPATIENT
Start: 2018-09-12 | End: 2019-01-11 | Stop reason: SDUPTHER

## 2018-09-13 ENCOUNTER — PROCEDURE VISIT (OUTPATIENT)
Dept: PAIN MEDICINE | Facility: HOSPITAL | Age: 78
End: 2018-09-13
Attending: PHYSICAL MEDICINE & REHABILITATION
Payer: MEDICARE

## 2018-09-13 ENCOUNTER — ANCILLARY PROCEDURE (OUTPATIENT)
Dept: RADIOLOGY | Facility: HOSPITAL | Age: 78
End: 2018-09-13
Attending: PHYSICAL MEDICINE & REHABILITATION
Payer: MEDICARE

## 2018-09-13 VITALS
TEMPERATURE: 98.2 F | RESPIRATION RATE: 20 BRPM | SYSTOLIC BLOOD PRESSURE: 139 MMHG | OXYGEN SATURATION: 94 % | HEART RATE: 65 BPM | DIASTOLIC BLOOD PRESSURE: 57 MMHG

## 2018-09-13 DIAGNOSIS — M51.16 DISPLACEMENT OF LUMBAR DISC WITH RADICULOPATHY: Primary | ICD-10-CM

## 2018-09-13 DIAGNOSIS — R52 PAIN: ICD-10-CM

## 2018-09-13 PROCEDURE — 64483 NJX AA&/STRD TFRM EPI L/S 1: CPT | Performed by: PHYSICAL MEDICINE & REHABILITATION

## 2018-09-13 PROCEDURE — 77003 FLUOROGUIDE FOR SPINE INJECT: CPT | Mod: NO READ

## 2018-09-13 PROCEDURE — 2550000100 HC RX 255: Mod: JW | Performed by: PHYSICAL MEDICINE & REHABILITATION

## 2018-09-13 PROCEDURE — 6360000200 HC RX 636 W HCPCS (ALT 250 FOR IP): Performed by: PHYSICAL MEDICINE & REHABILITATION

## 2018-09-13 RX ORDER — IOPAMIDOL 408 MG/ML
10 INJECTION, SOLUTION INTRATHECAL ONCE
Status: COMPLETED | OUTPATIENT
Start: 2018-09-13 | End: 2018-09-13

## 2018-09-13 RX ORDER — LIDOCAINE HYDROCHLORIDE 20 MG/ML
5 INJECTION, SOLUTION INFILTRATION; PERINEURAL ONCE
Status: COMPLETED | OUTPATIENT
Start: 2018-09-13 | End: 2018-09-13

## 2018-09-13 RX ORDER — DEXAMETHASONE SODIUM PHOSPHATE 4 MG/ML
4 INJECTION, SOLUTION INTRA-ARTICULAR; INTRALESIONAL; INTRAMUSCULAR; INTRAVENOUS; SOFT TISSUE ONCE
Status: COMPLETED | OUTPATIENT
Start: 2018-09-13 | End: 2018-09-13

## 2018-09-13 RX ORDER — PANTOPRAZOLE SODIUM 40 MG/1
40 TABLET, DELAYED RELEASE ORAL EVERY EVENING
COMMUNITY
Start: 2018-09-07 | End: 2019-03-29 | Stop reason: WASHOUT

## 2018-09-13 RX ORDER — LIDOCAINE HYDROCHLORIDE 10 MG/ML
4 INJECTION, SOLUTION EPIDURAL; INFILTRATION; INTRACAUDAL; PERINEURAL ONCE
Status: COMPLETED | OUTPATIENT
Start: 2018-09-13 | End: 2018-09-13

## 2018-09-13 RX ADMIN — LIDOCAINE HYDROCHLORIDE 4 ML: 10 INJECTION, SOLUTION EPIDURAL; INFILTRATION; INTRACAUDAL; PERINEURAL at 15:45

## 2018-09-13 RX ADMIN — IOPAMIDOL 10 ML: 408 INJECTION, SOLUTION INTRATHECAL at 15:45

## 2018-09-13 RX ADMIN — DEXAMETHASONE SODIUM PHOSPHATE 4 MG: 4 INJECTION, SOLUTION INTRAMUSCULAR; INTRAVENOUS at 15:45

## 2018-09-13 RX ADMIN — LIDOCAINE HYDROCHLORIDE 100 MG: 20 INJECTION, SOLUTION INFILTRATION; PERINEURAL at 15:45

## 2018-09-13 ASSESSMENT — PAIN - FUNCTIONAL ASSESSMENT: PAIN_FUNCTIONAL_ASSESSMENT: 0-10

## 2018-09-13 ASSESSMENT — PAIN DESCRIPTION - DESCRIPTORS: DESCRIPTORS: BURNING;TINGLING

## 2018-09-13 ASSESSMENT — PAIN SCALES - GENERAL: PAINLEVEL: 4

## 2018-09-13 NOTE — PROGRESS NOTES
[place] -  #3 Transforaminal Epidural Injection    HISTORY AND INDICATION  Katy Stack  is referred for consideration of epidural steroid injections.  She  has left lumbar radicular pain.  She has failed conservative measures including trial of oral medication, relative rest, and physical therapy.  Despite these conservative measures, She continues to have significant pain.  She  has undergone a comprehensive evaluation and is appropriately referred here for an epidural steroid injection.  She  has provided us with a past medical history, family history, social history, occupational history, medication list, and allergies, as well as the impact of their pain on their activities of daily living and a pain rating.  I have reviewed all of this information.      DIAGNOSIS  1.  Radicular syndrome, lower limbs     2.  Lumbar degenerative disk disease       PROCEDURE   L2-3   transforaminal epidural.    PROCEDURE NOTE  Katy Stack  presented to [facility] and all questions regarding the potential risks and benefits of the procedure were asked and answered.  Signed informed consent was obtained.   Heart rate, blood pressure, temperature and respiratory rate were all within acceptable range prior to being taken to the special procedure room.  She  was then placed in a prone position on the fluoroscopy table.  She  was then prepped with a Betadine scrub and draped with a sterile drape. The  L2-3  neural foramen was then localized by fluoroscopic guidance.  Then, 2 mL of 2% Xylocaine was infused into the subcutaneous tissue overlying the neural foramen for local anesthesia.  A spinal needle was then inserted into the  L2-3  level neural foramen using fluoroscopic guidance.  After negative aspiration, 0.5 mL of Omnipaque as infused and with fluoroscopic imaging, was noted to be flowing along the  L2-3  level nerve-root and within the epidural space.  Then a mixture that contained 4 mL of preservative free 1% Xylocaine and  2 mL of Dexamethasone was infused into the  L2-3  level neural foramen and epidural space.    She tolerated the procedure well.  She was taken to the recovery area, observed and was discharged with no unexpected neurologic changes.  She was instructed that should She experience any fever, chills, excessive redness at the injection site, or prolonged numbness or weakness, She should phone me immediately.  She was instructed that She should use ice over the area of the injection and limit activity for the rest of the day.      A follow-up phone call or appointment will be made in 14 days to assess the efficacy of the block.  Further treatment needs will be considered and discussed at that time.

## 2018-09-14 ENCOUNTER — OFFICE VISIT (OUTPATIENT)
Dept: FAMILY MEDICINE | Facility: CLINIC | Age: 78
End: 2018-09-14
Payer: MEDICARE

## 2018-09-14 ENCOUNTER — APPOINTMENT (OUTPATIENT)
Dept: LAB | Facility: CLINIC | Age: 78
End: 2018-09-14
Payer: MEDICARE

## 2018-09-14 ENCOUNTER — APPOINTMENT (OUTPATIENT)
Dept: CARDIOLOGY | Facility: CLINIC | Age: 78
End: 2018-09-14
Payer: MEDICARE

## 2018-09-14 VITALS
BODY MASS INDEX: 36.33 KG/M2 | WEIGHT: 186 LBS | SYSTOLIC BLOOD PRESSURE: 176 MMHG | HEART RATE: 86 BPM | OXYGEN SATURATION: 94 % | RESPIRATION RATE: 18 BRPM | TEMPERATURE: 97.6 F | DIASTOLIC BLOOD PRESSURE: 74 MMHG

## 2018-09-14 DIAGNOSIS — I10 BENIGN ESSENTIAL HYPERTENSION: ICD-10-CM

## 2018-09-14 DIAGNOSIS — R53.83 OTHER FATIGUE: Primary | ICD-10-CM

## 2018-09-14 DIAGNOSIS — R53.1 WEAKNESS: ICD-10-CM

## 2018-09-14 DIAGNOSIS — K21.9 GASTROESOPHAGEAL REFLUX DISEASE, ESOPHAGITIS PRESENCE NOT SPECIFIED: ICD-10-CM

## 2018-09-14 PROBLEM — R11.2 NON-INTRACTABLE VOMITING WITH NAUSEA: Status: RESOLVED | Noted: 2017-11-26 | Resolved: 2018-09-14

## 2018-09-14 LAB
ALBUMIN SERPL-MCNC: 3.6 G/DL (ref 3.5–5.3)
ALP SERPL-CCNC: 81 U/L (ref 55–142)
ALT SERPL-CCNC: 6 U/L (ref 0–52)
ANION GAP SERPL CALC-SCNC: 8 MMOL/L (ref 3–11)
AST SERPL-CCNC: 10 U/L (ref 0–39)
BASOPHILS # BLD AUTO: 0 10*3/UL
BASOPHILS NFR BLD AUTO: 0 % (ref 0–2)
BILIRUB SERPL-MCNC: 0.3 MG/DL (ref 0–1.4)
BUN SERPL-MCNC: 16 MG/DL (ref 7–25)
CALCIUM ALBUM COR SERPL-MCNC: 9.7 MG/DL (ref 8.5–10.1)
CALCIUM SERPL-MCNC: 9.4 MG/DL (ref 8.6–10.3)
CHLORIDE SERPL-SCNC: 110 MMOL/L (ref 98–107)
CO2 SERPL-SCNC: 23 MMOL/L (ref 21–32)
CREAT SERPL-MCNC: 0.9 MG/DL (ref 0.6–1.2)
EOSINOPHIL # BLD AUTO: 0 10*3/UL
EOSINOPHIL NFR BLD AUTO: 0 % (ref 0–3)
ERYTHROCYTE [DISTWIDTH] IN BLOOD BY AUTOMATED COUNT: 14.9 % (ref 11.5–14)
GFR SERPL CREATININE-BSD FRML MDRD: 61 ML/MIN/1.73M*2
GLUCOSE SERPL-MCNC: 140 MG/DL (ref 70–105)
HCT VFR BLD AUTO: 39.1 % (ref 34–45)
HGB BLD-MCNC: 13.1 G/DL (ref 11.5–15.5)
LYMPHOCYTES # BLD AUTO: 1.7 10*3/UL
LYMPHOCYTES NFR BLD AUTO: 17 % (ref 11–47)
MAGNESIUM SERPL-MCNC: 2.3 MG/DL (ref 1.8–2.4)
MCH RBC QN AUTO: 27.9 PG (ref 28–33)
MCHC RBC AUTO-ENTMCNC: 33.5 G/DL (ref 32–36)
MCV RBC AUTO: 83.2 FL (ref 81–97)
MONOCYTES # BLD AUTO: 0.2 10*3/UL
MONOCYTES NFR BLD AUTO: 2 % (ref 3–11)
NEUTROPHILS # BLD AUTO: 7.7 10*3/UL
NEUTROPHILS NFR BLD AUTO: 80 % (ref 41–81)
PHOSPHATE SERPL-MCNC: 2.8 MG/DL (ref 2.5–4.9)
PLATELET # BLD AUTO: 317 10*3/UL (ref 140–350)
PMV BLD AUTO: 7.6 FL (ref 6.9–10.8)
POTASSIUM SERPL-SCNC: 4.2 MMOL/L (ref 3.5–5.1)
PROT SERPL-MCNC: 6.8 G/DL (ref 6–8.3)
RBC # BLD AUTO: 4.69 10*6/ΜL (ref 3.7–5.3)
SODIUM SERPL-SCNC: 141 MMOL/L (ref 135–145)
TSH SERPL DL<=0.05 MIU/L-ACNC: 0.35 UIU/ML (ref 0.34–4.82)
WBC # BLD AUTO: 9.6 10*3/UL (ref 4.5–10.5)

## 2018-09-14 PROCEDURE — 84100 ASSAY OF PHOSPHORUS: CPT | Mod: PO | Performed by: FAMILY MEDICINE

## 2018-09-14 PROCEDURE — 99213 OFFICE O/P EST LOW 20 MIN: CPT | Performed by: FAMILY MEDICINE

## 2018-09-14 PROCEDURE — 85025 COMPLETE CBC W/AUTO DIFF WBC: CPT | Mod: PO | Performed by: FAMILY MEDICINE

## 2018-09-14 PROCEDURE — 80053 COMPREHEN METABOLIC PANEL: CPT | Mod: PO | Performed by: FAMILY MEDICINE

## 2018-09-14 PROCEDURE — 99213 OFFICE O/P EST LOW 20 MIN: CPT | Mod: PO | Performed by: FAMILY MEDICINE

## 2018-09-14 PROCEDURE — 36415 COLL VENOUS BLD VENIPUNCTURE: CPT | Mod: PO | Performed by: FAMILY MEDICINE

## 2018-09-14 PROCEDURE — 93010 ELECTROCARDIOGRAM REPORT: CPT | Performed by: FAMILY MEDICINE

## 2018-09-14 PROCEDURE — 84443 ASSAY THYROID STIM HORMONE: CPT | Performed by: FAMILY MEDICINE

## 2018-09-14 PROCEDURE — 93005 ELECTROCARDIOGRAM TRACING: CPT | Mod: PO | Performed by: FAMILY MEDICINE

## 2018-09-14 PROCEDURE — 83735 ASSAY OF MAGNESIUM: CPT | Mod: PO | Performed by: FAMILY MEDICINE

## 2018-09-14 NOTE — PROGRESS NOTES
Subjective      Katy Stack is a 78 y.o. female who presents for Extremity Weakness (x 3 episodes over 1-2 weeks ) and Headache  .    Patient presents for a thyroid recheck. She is taking levothyroxine 100mcg daily due to a hx of hypothyroidism. She has been feeling fatigued and worn out lately.    Patient has noticed 3 episodes of extremity weakness over the past 1-2 weeks. She states she all of a sudden feels like she cant lift her arms and feels tightness/band sensation across her chest and has to go and sit down, after she sat for 1-2 minutes she felt fine again. She denies dizziness, chest pain, or shortness of breath during these episodes. She was not doing anything strenuous when these episodes took place. She has also been having off and on headaches at the base of her skull since these episodes started. Her blood pressure is also elevated today upon intake. She has a hx of bundle branch block. She is currently on oxygen nightly due to hx of COPD, she is not on any inhalers at this time. She has been under a lot of stress lately due to caring for her  who has been having TIA's recently.    Patient is on pantoprazole 40mg daily for a hx of GERD. She has had worse acid reflux at night recently and has been having off and on epigastric pain.      The following have been reviewed and updated as appropriate in this visit:  Allergies  Meds  Problems         Allergies   Allergen Reactions   • Strawberry      hives, tongue swells   • Adhesive      welts     Current Outpatient Prescriptions   Medication Sig Dispense Refill   • AMITIZA 24 mcg capsule      • aspirin (ASPIR-LOW) 81 mg EC tablet 1 tab by oral route every day     • atenolol (TENORMIN) 25 mg tablet TAKE 1 TABLET BY MOUTH  EVERY DAY AS DIRECTED 90 tablet 1   • atorvastatin (LIPITOR) 20 mg tablet  90 0   • gabapentin (NEURONTIN) 100 mg capsule  180 0   • levothyroxine (SYNTHROID, LEVOTHROID) 100 mcg tablet Take 1 tablet (100 mcg total) by  mouth once daily. Pt must keep upcoming appt 90 tablet 0   • losartan (COZAAR) 50 mg tablet TAKE 1 TABLET BY MOUTH  EVERY DAY AS DIRECTED 90 tablet 1   • nifedipine 0.3% and lidocaine 1.5% ointment Insert 1 application into the rectum 4 (four) times a day. 1 each 3   • nitroglycerin (NITROSTAT) 0.4 mg SL tablet Place by sublingual route. 25 1   • pantoprazole (PROTONIX) 40 mg EC tablet      • PREMARIN 0.9 mg tablet TAKE 1 TABLET BY MOUTH  EVERY DAY (Patient taking differently: TAKE 1 TABLET BY MOUTH EVERY WEEK) 90 tablet 1   • vit A-vit C-vit E-zinc-copper (EYE VITAMIN AND MINERALS) 7,160-113-100 unit-mg-unit tablet Take 1 tab/cap by mouth 2 (two) times a day.       No current facility-administered medications for this visit.      Past Medical History:   Diagnosis Date   • Benign essential hypertension 11/24/2017   • Cataract of left eye    • Chronic obstructive lung disease (CMS/HCC) 11/10/2017   • GERD (gastroesophageal reflux disease)     at times   • Hypertension    • Hypothyroid    • Hypothyroidism 11/10/2017   • Neurologic disorder    • Obesity 11/10/2017   • Obstructive sleep apnea syndrome 11/10/2017    Night time oxygen   • Osteoarthritis    • Periodic limb movement disorder 11/10/2017   • Rectocele      Past Surgical History:   Procedure Laterality Date   • APPENDECTOMY  1987   • BACK SURGERY      2 surgeries [id number]   • BACK SURGERY  11/30/2016   • BACK SURGERY  05/01/2017    vetebral fusion   • CARDIAC CATHETERIZATION  08/31/2011   • CATARACT EXTRACTION W/  INTRAOCULAR LENS IMPLANT Left 7/2/2018    Procedure: OS CATARACT LEFT PHACOEMULSIFICATION OF CATARACT WITH INTRAOCULAR LENS;  Surgeon: Emmanuel Cueto MD;  Location: [facility];  Service: Ophthalmology;  Laterality: Left;   • CATARACT EXTRACTION W/  INTRAOCULAR LENS IMPLANT Right 8/6/2018    Procedure: OD CATARACT RIGHT PHACOEMULSIFICATION OF CATARACT WITH INTRAOCULAR LENS;  Surgeon: Emmanuel Cueto MD;  Location: [facility] SURG[facility]  SERVICES;  Service: Ophthalmology;  Laterality: Right;   • CHOLECYSTECTOMY     • COLONOSCOPY  02/19/2014    No polyps   • COLONOSCOPY  04/26/2011   • FOOT SURGERY      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 Vela- shoulder   • THYROID LOBECTOMY Left 18/18/2010   • THYROIDECTOMY, PARTIAL  08/18/2010   • UPPER GASTROINTESTINAL ENDOSCOPY  02/19/2014    Mild chronic gastritis     Family History   Problem Relation Age of Onset   • Heart disease Mother    • Heart disease Father         Myocardial infarction   • Parkinsonism Father    • Thyroid disease Father    • Alzheimer's disease Sister    • Arthritis Sister    • Osteoporosis Sister    • Lung cancer Brother    • Diabetes Maternal Grandmother      Social History     Social History   • Marital status:      Spouse name: N/A   • Number of children: N/A   • Years of education: N/A     Social History Main Topics   • Smoking status: Former Smoker     Packs/day: 2.00     Years: 35.00     Quit date: 1955   • Smokeless tobacco: Never Used   • Alcohol use No   • Drug use: No   • Sexual activity: Not on file     Other Topics Concern   • Not on file     Social History Narrative   • No narrative on file       Review of Systems    Objective     Vitals  /74 (BP Location: Right arm, Patient Position: Sitting, Cuff Size: Large)   Pulse 86   Temp 36.4 °C (97.6 °F) (Temporal)   Resp 18   Wt 84.4 kg (186 lb)   SpO2 94%   BMI 36.33 kg/m²     Physical Exam   Constitutional: She appears well-developed and well-nourished. No distress.   HENT:   Head: Normocephalic and atraumatic.   Right Ear: External ear normal.   Left Ear: External ear  normal.   Nose: Nose normal.   Mouth/Throat: Oropharynx is clear and moist. No oropharyngeal exudate.   Eyes: Pupils are equal, round, and reactive to light. Conjunctivae and EOM are normal. Right eye exhibits no discharge. Left eye exhibits no discharge. No scleral icterus.   Neck: Neck supple. No tracheal deviation present. No thyromegaly present.   Cardiovascular: Normal rate, regular rhythm and intact distal pulses.  Exam reveals no gallop and no friction rub.    Murmur (intermittent 1/6 systolic ejection murmur.) heard.  Pulmonary/Chest: Effort normal and breath sounds normal. No respiratory distress. She has no wheezes. She has no rales.   Abdominal: Soft. Bowel sounds are normal. She exhibits no distension and no mass. There is no tenderness.   Musculoskeletal: Normal range of motion. She exhibits no edema, tenderness or deformity.   Lymphadenopathy:     She has no cervical adenopathy.   Neurological: She is alert. She displays abnormal reflex. No cranial nerve deficit or sensory deficit. She exhibits normal muscle tone. Coordination normal.   4+ reflex to right arm, 3+ reflex to left.   Skin: Skin is warm and dry. No rash noted. She is not diaphoretic. No erythema. No pallor.   Psychiatric: Judgment and thought content normal.   Nursing note and vitals reviewed.      Assessment/Plan   Diagnoses and all orders for this visit:    Other fatigue   Electrolyte abnormality versus arrhythmia.  Will send for labs and EKG.  Holter if normal.  Consider echo and stress testing of all normal.  Return precautions given.  -     Thyroid Stimulating Hormone, Ultrasensitive Blood, Venous  -     CBC w/auto differential Blood, Venous  -     Comprehensive metabolic panel Blood, Venous  -     ECG 12 lead  -     Magnesium Blood, Venous  -     Phosphorus Blood, Venous    Weakness  -     Thyroid Stimulating Hormone, Ultrasensitive Blood, Venous  -     CBC w/auto differential Blood, Venous  -     Comprehensive metabolic panel Blood,  Venous  -     ECG 12 lead  -     Magnesium Blood, Venous  -     Phosphorus Blood, Venous    Gastroesophageal reflux disease, esophagitis presence not specified  Comments:  consider H2 blocker if worsens    Benign essential hypertension  Comments:  Adjust meds pending labs        Return if symptoms worsen or fail to improve.    ENRRIQUE BALES MD

## 2018-09-18 ENCOUNTER — TELEPHONE (OUTPATIENT)
Dept: FAMILY MEDICINE | Facility: CLINIC | Age: 78
End: 2018-09-18

## 2018-09-18 NOTE — TELEPHONE ENCOUNTER
Called and talked to the .  He said she is at work at [employer] and he thinks she is doing fine.  Advised to have her call back if she needs anything from us.

## 2018-09-19 ENCOUNTER — TELEPHONE (OUTPATIENT)
Dept: FAMILY MEDICINE | Facility: CLINIC | Age: 78
End: 2018-09-19

## 2018-09-19 NOTE — TELEPHONE ENCOUNTER
Called patient , yesterday had a spell with her heart, states like a band around her chest and unable to lift her arms. Lasted about 1-1.5 min's relieved with NTG. No shortness of breath , no radiating of pain , no diaphoresis   She has been having these spells on and off for the past 2-3 weeks. Saw Dr. Travis last Friday.

## 2018-09-20 ENCOUNTER — TELEPHONE (OUTPATIENT)
Dept: FAMILY MEDICINE | Facility: CLINIC | Age: 78
End: 2018-09-20

## 2018-09-20 NOTE — TELEPHONE ENCOUNTER
Called patient , she is feeling well, no further episodes, will just f/u after cardiology appointments, continue to call if any episodes. Having Holter applied tomorrow .

## 2018-09-21 ENCOUNTER — ANCILLARY PROCEDURE (OUTPATIENT)
Dept: CARDIOLOGY | Facility: CLINIC | Age: 78
End: 2018-09-21
Payer: MEDICARE

## 2018-09-21 PROCEDURE — 93227 XTRNL ECG REC<48 HR R&I: CPT | Performed by: INTERNAL MEDICINE

## 2018-09-21 PROCEDURE — 93226 XTRNL ECG REC<48 HR SCAN A/R: CPT | Mod: PO

## 2018-09-27 ENCOUNTER — TELEPHONE (OUTPATIENT)
Dept: FAMILY MEDICINE | Facility: CLINIC | Age: 78
End: 2018-09-27

## 2018-09-27 DIAGNOSIS — K21.9 GASTROESOPHAGEAL REFLUX DISEASE, ESOPHAGITIS PRESENCE NOT SPECIFIED: Primary | ICD-10-CM

## 2018-09-27 RX ORDER — SUCRALFATE 1 G/1
1 TABLET ORAL 4 TIMES DAILY
Qty: 120 TABLET | Refills: 1 | Status: SHIPPED | OUTPATIENT
Start: 2018-09-27 | End: 2019-03-29 | Stop reason: WASHOUT

## 2018-09-27 NOTE — TELEPHONE ENCOUNTER
Per Dr Travis, called patient back and explained he wants her to be referred to general surgery to explore acid reflux and begin carafate 4 times daily.  patient verbalized understanding and agreement.    Called patient back.  She said she spoke with Yamila yesterday and was aware of the normal holter monitor and to follow up with cardiology to have echo and stress test.  Patient states she continues to have heart burn daily, worse with certain foods, and always severe from 1-4 a.m. Nightly.  She states she know it is heart burn because it burns in her upper chest and throat and the acid comes up and burns the back of her throat.  She said the ranitidine helps during the day time, but not for the evening and nighttime.  She says she has gas issues and can burp 24 hours a day.  She said she has a lap band, and wonders if taking the fluid out of it would decrease her issues, as the lap band is not helping with weight loss.  She said Yamila was discussing with Dr Travis if they needed to add medication for her acid reflux/heartburn issues.  I informed patient I would inform Dr Travis and Yamila of our conversation and get back to her.  I also informed her to keep track of symptoms and let us know if worse.

## 2018-10-03 NOTE — TELEPHONE ENCOUNTER
Called patient and left Dr Travis's recommendation on secure voicemail.  Told her to call us if she needs referral to dr burgess

## 2018-10-04 ENCOUNTER — CONSULT (OUTPATIENT)
Dept: SURGERY | Facility: CLINIC | Age: 78
End: 2018-10-04
Payer: MEDICARE

## 2018-10-04 ENCOUNTER — ANCILLARY PROCEDURE (OUTPATIENT)
Dept: CARDIOLOGY | Facility: CLINIC | Age: 78
End: 2018-10-04
Payer: MEDICARE

## 2018-10-04 VITALS
BODY MASS INDEX: 36.12 KG/M2 | SYSTOLIC BLOOD PRESSURE: 124 MMHG | HEIGHT: 60 IN | HEART RATE: 57 BPM | DIASTOLIC BLOOD PRESSURE: 70 MMHG | WEIGHT: 184 LBS | TEMPERATURE: 98.1 F

## 2018-10-04 VITALS
DIASTOLIC BLOOD PRESSURE: 57 MMHG | BODY MASS INDEX: 36.52 KG/M2 | SYSTOLIC BLOOD PRESSURE: 174 MMHG | HEIGHT: 60 IN | WEIGHT: 186 LBS

## 2018-10-04 DIAGNOSIS — R13.14 PHARYNGOESOPHAGEAL DYSPHAGIA: Primary | ICD-10-CM

## 2018-10-04 DIAGNOSIS — K21.9 GASTROESOPHAGEAL REFLUX DISEASE, ESOPHAGITIS PRESENCE NOT SPECIFIED: ICD-10-CM

## 2018-10-04 DIAGNOSIS — Z98.84 H/O LAPAROSCOPIC ADJUSTABLE GASTRIC BANDING: ICD-10-CM

## 2018-10-04 PROCEDURE — 6360000200 HC RX 636 W HCPCS (ALT 250 FOR IP): Mod: PO | Performed by: FAMILY MEDICINE

## 2018-10-04 PROCEDURE — 99214 OFFICE O/P EST MOD 30 MIN: CPT | Mod: 25 | Performed by: NURSE PRACTITIONER

## 2018-10-04 PROCEDURE — 93016 CV STRESS TEST SUPVJ ONLY: CPT | Performed by: FAMILY MEDICINE

## 2018-10-04 PROCEDURE — A9500 TC99M SESTAMIBI: HCPCS | Mod: PO | Performed by: PHYSICIAN ASSISTANT

## 2018-10-04 PROCEDURE — 99214 OFFICE O/P EST MOD 30 MIN: CPT | Performed by: NURSE PRACTITIONER

## 2018-10-04 PROCEDURE — 43999 UNLISTED PROCEDURE STOMACH: CPT | Performed by: NURSE PRACTITIONER

## 2018-10-04 PROCEDURE — 93018 CV STRESS TEST I&R ONLY: CPT | Performed by: INTERNAL MEDICINE

## 2018-10-04 PROCEDURE — G0463 HOSPITAL OUTPT CLINIC VISIT: HCPCS | Mod: 25 | Performed by: NURSE PRACTITIONER

## 2018-10-04 PROCEDURE — 78452 HT MUSCLE IMAGE SPECT MULT: CPT | Mod: 26 | Performed by: INTERNAL MEDICINE

## 2018-10-04 RX ORDER — REGADENOSON 0.08 MG/ML
0.4 INJECTION, SOLUTION INTRAVENOUS ONCE AS NEEDED
Status: COMPLETED | OUTPATIENT
Start: 2018-10-04 | End: 2018-10-04

## 2018-10-04 RX ORDER — SODIUM CHLORIDE 0.9 % (FLUSH) 0.9 %
10 SYRINGE (ML) INJECTION ONCE
Status: COMPLETED | OUTPATIENT
Start: 2018-10-04 | End: 2018-10-04

## 2018-10-04 RX ORDER — GABAPENTIN 100 MG/1
200 CAPSULE ORAL 3 TIMES DAILY
COMMUNITY
End: 2019-08-08 | Stop reason: DRUGHIGH

## 2018-10-04 RX ADMIN — REGADENOSON 0.4 MG: 0.08 INJECTION, SOLUTION INTRAVENOUS at 13:06

## 2018-10-04 RX ADMIN — Medication 10 ML: at 13:06

## 2018-10-04 NOTE — PROGRESS NOTES
"10/4/2018         [facility] GENERAL SURGERY   Katy Stack   [id number]     REFERRING PROVIDER: ENRRIQUE BALES MD    SUBJECTIVE     CHIEF COMPLAINT:   Chief Complaint   Patient presents with   • GERD     Consult per Dr Bales  Lap gastric banding/hiatal hernia 3-27-14    Heartburn and belching  Colon 4-26-11               HPI:  Katy Stack is a 78 y.o. female who presents to the surgery clinic for discussion of reflux symptoms following referral by her PCP ENRRIQUE BALES MD.  She is well known to me since her weight management consult earlier this year in May.  She underwent hiatal hernia repair and LAP-BAND implantation with Dr. Alberto on 3/27/2014 and lost a total of 30 lbs.  Over the past year she has experienced a 20 lb weight regain, and therefore has had her band filled twice in the past 8 months.  Subsequently, her reflux has worsened significantly within the past 6 months. She has been on PPI therapy since 08/2017 and is currently taking pantoprazole 40 mg po daily, ranitidine 150 mg po twice daily, and Carafate 1 g po four times daily for symptom management.     She reports primary symptoms including acid regurgitation with subsequent coughing nightly at 0300, pharyngoesophageal and epigastric dysphagia with meats, breads, and tomato skins, constant vocal hoarseness, excessive belching, and regurgitating \"white foam\" with some food intake.    Denies acid brash, excessive flatus, frequent throat clearing, and globus sensation.     Reports sleeping with wedge pillow if eats close to bedtime, but typically sleeps flat as her last intake is usually 2-3 hours before bed. Denies frequent chocolate intake, spicy/citrus foods, peppermint, NSAID use, alcohol, or nicotine.     9-point ROS is reviewed and otherwise negative.    GERD HRQOL Questionaire: Answered as if not taking any medication for reflux  (0 = no symptoms, 1 = symptoms noticeable but not bothersome, 2= symptoms noticeable and " bothersome but not every day, 3=symptoms bothersome every day, 4=symptoms affect daily activities, 5 = symptoms are incapacitating, unable to do activities)     1.  How bad is your heartburn:    2.  Heartburn when lying down:    3.  Heartburn when standing up:  3/5  4.  Heartburn after meals: 3/5  5.  Does heartburn change your diet:    6.  Does heartburn wake you up from sleep:    7.  Do you have difficulty swallowin/5  8.  Do you have pain with swallowin/5  9.  How bad is your regurgitation?   10.  Regurgitation when lying down?    11.  Regurgitation when standing up?  3/5  12.  Regurgitation after meals?    13.  Does regurgitation change your diet?    14.  Does regurgitation wake you from sleep?    15.  If you take reflux medications, does this affect your daily life?    16.  How satisfied are you with your present condition?  unsatisfied     Total:  57/75     PERTINENT DIAGNOSTIC STUDIES:  EGD on 2014 (pre- bariatric surgery with LAP-BAND) revealed:  Mild chronic gastritis    Colonoscopy on 2011 revealed:   Polyp at 40 cm: polypoid colonic mucosa containing lymphoid aggregate, no abnormalities found  Recommended 10 year follow up ()     Past Medical History:   Diagnosis Date   • Benign essential hypertension 2017   • Cataract of left eye    • Chronic obstructive lung disease (CMS/HCC) (HCC) 11/10/2017   • GERD (gastroesophageal reflux disease)     at times   • Hypertension    • Hypothyroid    • Hypothyroidism 11/10/2017   • Neurologic disorder    • Obesity 11/10/2017   • Obstructive sleep apnea syndrome 11/10/2017    Night time oxygen   • Osteoarthritis    • Periodic limb movement disorder 11/10/2017   • Rectocele         Past Surgical History:   Procedure Laterality Date   • APPENDECTOMY     • BACK SURGERY      2 surgeries [id number]   • BACK SURGERY  2016   • BACK SURGERY  2017    vetebral fusion   • CARDIAC CATHETERIZATION  2011    • CATARACT EXTRACTION W/  INTRAOCULAR LENS IMPLANT Left 7/2/2018    Procedure: OS CATARACT LEFT PHACOEMULSIFICATION OF CATARACT WITH INTRAOCULAR LENS;  Surgeon: Emmanuel Cueto MD;  Location: [facility];  Service: Ophthalmology;  Laterality: Left;   • CATARACT EXTRACTION W/  INTRAOCULAR LENS IMPLANT Right 8/6/2018    Procedure: OD CATARACT RIGHT PHACOEMULSIFICATION OF CATARACT WITH INTRAOCULAR LENS;  Surgeon: Emmanuel Cueto MD;  Location: [facility];  Service: Ophthalmology;  Laterality: Right;   • CHOLECYSTECTOMY     • COLONOSCOPY  02/19/2014    No polyps   • COLONOSCOPY  04/26/2011   • FOOT SURGERY      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 Vela- shoulder   • THYROID LOBECTOMY Left 18/18/2010   • THYROIDECTOMY, PARTIAL  08/18/2010   • UPPER GASTROINTESTINAL ENDOSCOPY  02/19/2014    Mild chronic gastritis         Current Outpatient Prescriptions:   •  AMITIZA 24 mcg capsule, , Disp: , Rfl:   •  aspirin (ASPIR-LOW) 81 mg EC tablet, 1 tab by oral route every day, Disp: , Rfl:   •  atenolol (TENORMIN) 25 mg tablet, TAKE 1 TABLET BY MOUTH  EVERY DAY AS DIRECTED, Disp: 90 tablet, Rfl: 1  •  atorvastatin (LIPITOR) 20 mg tablet, , Disp: 90, Rfl: 0  •  gabapentin (NEURONTIN) 100 mg capsule, Take 100 mg by mouth 4 (four) times a day., Disp: , Rfl:   •  levothyroxine (SYNTHROID, LEVOTHROID) 100 mcg tablet, Take 1 tablet (100 mcg total) by mouth once daily. Pt must keep upcoming appt, Disp: 90 tablet, Rfl: 0  •  losartan (COZAAR) 50 mg tablet, TAKE 1 TABLET BY MOUTH  EVERY DAY AS DIRECTED, Disp: 90 tablet, Rfl: 1  •  nifedipine 0.3% and lidocaine 1.5%  ointment, Insert 1 application into the rectum 4 (four) times a day., Disp: 1 each, Rfl: 3  •  nitroglycerin (NITROSTAT) 0.4 mg SL tablet, Place by sublingual route., Disp: 25, Rfl: 1  •  PREMARIN 0.9 mg tablet, TAKE 1 TABLET BY MOUTH  EVERY DAY (Patient taking differently: TAKE 1 TABLET BY MOUTH EVERY WEEK), Disp: 90 tablet, Rfl: 1  •  sucralfate (CARAFATE) 1 gram tablet, Take 1 tablet (1 g total) by mouth 4 (four) times a day., Disp: 120 tablet, Rfl: 1  •  FLUAD [id number], 65 YR UP,,PF, 45 mcg (15 mcg x 3)/0.5 mL syringe, ADM 0.5ML IM UTD, Disp: , Rfl: 0  •  pantoprazole (PROTONIX) 40 mg EC tablet, , Disp: , Rfl:   •  ranitidine (ZANTAC) 150 mg capsule, Take 1 capsule (150 mg total) by mouth 2 (two) times a day. (Patient not taking: Reported on 10/4/2018 ), Disp: 60 capsule, Rfl: 2  •  vit A-vit C-vit E-zinc-copper (EYE VITAMIN AND MINERALS) 7,160-113-100 unit-mg-unit tablet, Take 1 tab/cap by mouth 2 (two) times a day., Disp: , Rfl:     Allergies   Allergen Reactions   • Strawberry      hives, tongue swells   • Adhesive      welts       Family History   Problem Relation Age of Onset   • Heart disease Mother    • Heart disease Father         Myocardial infarction   • Parkinsonism Father    • Thyroid disease Father    • Alzheimer's disease Sister    • Arthritis Sister    • Osteoporosis Sister    • Lung cancer Brother    • Diabetes Maternal Grandmother        Social History     Social History   • Marital status:      Spouse name: N/A   • Number of children: N/A   • Years of education: N/A     Occupational History   • Not on file.     Social History Main Topics   • Smoking status: Former Smoker     Packs/day: 2.00     Years: 35.00     Quit date: 1955   • Smokeless tobacco: Never Used   • Alcohol use No   • Drug use: No   • Sexual activity: Not on file     Other Topics Concern   • Not on file     Social History Narrative   • No narrative on file        Review of Systems   Pertinent items are noted in the HPI.       OBJECTIVE:    Vital Signs  /70   Pulse 57   Temp 36.7 °C (98.1 °F) (Temporal)   Ht 1.524 m (5')   Wt 83.5 kg (184 lb)   BMI 35.94 kg/m²      Physical Exam   Constitutional: She is oriented to person, place, and time. Vital signs are normal. She appears well-developed and well-nourished. No distress.   obese   HENT:   Head: Normocephalic and atraumatic.   Cardiovascular: Normal rate, regular rhythm, normal heart sounds and intact distal pulses.    Pulmonary/Chest: Effort normal and breath sounds normal.   Abdominal: Soft. Bowel sounds are normal. There is no tenderness. No hernia.   LAP-BAND port site palpated to LUQ.    Neurological: She is alert and oriented to person, place, and time.   Skin: Skin is warm, dry and intact. Capillary refill takes less than 2 seconds.   Psychiatric: She has a normal mood and affect. Her behavior is normal.   Nursing note and vitals reviewed.       Lap Band Adjustment  Date/Time: 10/4/2018 10:30 AM  Performed by: TORI QUIROZ  Authorized by: TORI QUIROZ   Preparation: Prepped area over port site with Betadine.  Local anesthesia used: yes  Anesthesia: local infiltration   Anesthesia:  Local anesthesia used: yes  Local Anesthetic: lidocaine 1% without epinephrine  Anesthetic total: 3 mL     Sedation:  Patient sedated: no  Patient tolerance: Patient tolerated the procedure well with no immediate complications  Comments: Verbal consent obtained.  Isolated the lap band port.  A non-coring needle was used to access port.  Needle was inserted into access port with the assistance of Dr. Millan under direct ultrasound visualization.  We were unable to aspirate any fluid from the LAP-BAND.  Cleansed the area over the port with alcohol swab and applied a thin layer of bacitracin and covered with a Band-Aid.       ASSESSMENT/PLAN:   1. Gastroesophageal reflux disease, esophagitis presence not specified  - Ambulatory referral to General Surgery  - FL esophagram complete;  Future    2. Pharyngoesophageal dysphagia  - FL esophagram complete; Future    3. H/O laparoscopic adjustable gastric banding    Katy Stack presents today for discussion of reflux symptoms.  I suspect that her reflux symptoms are worsened by her LAP-BAND, as she underwent 2 band fills over the past 8 months and it was within this time that her reflux symptoms worsened.  Therefore, it was mutually decided to remove all fluid from the LAP-BAND to see if this improves her symptoms.  Upon band adjustment we were unable to aspirate any fluid from the LAP-BAND port indicating that there is likely a leak somewhere in the LAP-BAND port system. Will obtain marshmallow/bagel esophagram for further evaluation of potential LAP-BAND complication, recurrent hiatal hernia, and reflux. Pending results of esophagram she may require EGD and ultimately removal of band.    She will continue current medications prescribed by her PCP and is advised to sleep with HOB elevated nightly.        Katy Stack will RTC as scheduled for diagnostic workup. Katy Stack participated in the decision-making process and agreed with the above plan. All questions were sought and answered to her satisfaction.         Marii Wayne CNP  10/4/2018 10:00 AM    Total Time spent with the patient is >30 min with more than 50% in direct counseling and coordination of care regarding GERD, dysphagia, and s/p laparoscopic adjustable gastric banding.

## 2018-10-18 ENCOUNTER — HOSPITAL ENCOUNTER (OUTPATIENT)
Dept: FLUOROSCOPY | Facility: HOSPITAL | Age: 78
Discharge: HOME | End: 2018-10-18
Payer: MEDICARE

## 2018-10-18 ENCOUNTER — TELEPHONE (OUTPATIENT)
Dept: SURGERY | Facility: CLINIC | Age: 78
End: 2018-10-18

## 2018-10-18 DIAGNOSIS — K21.9 GASTROESOPHAGEAL REFLUX DISEASE, ESOPHAGITIS PRESENCE NOT SPECIFIED: ICD-10-CM

## 2018-10-18 DIAGNOSIS — R13.14 PHARYNGOESOPHAGEAL DYSPHAGIA: ICD-10-CM

## 2018-10-18 PROCEDURE — 74220 X-RAY XM ESOPHAGUS 1CNTRST: CPT | Mod: 26 | Performed by: RADIOLOGY

## 2018-10-18 PROCEDURE — 74220 X-RAY XM ESOPHAGUS 1CNTRST: CPT

## 2018-10-18 NOTE — TELEPHONE ENCOUNTER
Returned call to Katy. Imaging reviewed with Dr. Millan and recommendation is to remove LAP-BAND as this may be contributing to her symptoms. If symptoms do not improve following LAP-BAND removal, would then consider anti-reflux surgery. Also offered continued weight management services through lifestyle or prescription weight loss. Katy voiced understanding and is transferred to the scheduling team to schedule preop appt with Dr. Millan.  She was told that she will need a preop clearance with her PCP as well.

## 2018-10-24 ENCOUNTER — OFFICE VISIT (OUTPATIENT)
Dept: SURGERY | Facility: CLINIC | Age: 78
End: 2018-10-24
Payer: MEDICARE

## 2018-10-24 VITALS
WEIGHT: 185 LBS | HEIGHT: 60 IN | OXYGEN SATURATION: 96 % | HEART RATE: 68 BPM | DIASTOLIC BLOOD PRESSURE: 62 MMHG | BODY MASS INDEX: 36.32 KG/M2 | SYSTOLIC BLOOD PRESSURE: 116 MMHG

## 2018-10-24 DIAGNOSIS — Z98.84 LAP-BAND SURGERY STATUS: Primary | ICD-10-CM

## 2018-10-24 PROCEDURE — 99214 OFFICE O/P EST MOD 30 MIN: CPT | Performed by: SURGERY

## 2018-10-24 RX ORDER — SODIUM CHLORIDE, SODIUM LACTATE, POTASSIUM CHLORIDE, CALCIUM CHLORIDE 600; 310; 30; 20 MG/100ML; MG/100ML; MG/100ML; MG/100ML
50 INJECTION, SOLUTION INTRAVENOUS CONTINUOUS
Status: CANCELLED | OUTPATIENT
Start: 2018-10-24

## 2018-10-24 NOTE — PROGRESS NOTES
GENERAL SURGERY ADMISSION HISTORY & PHYSICAL     10/24/2018     Katy Stack     [id number]     CHIEF COMPLAINT: Morbid obesity     HISTORY OF PRESENT ILLNESS:  Katy Stack is a 78 y.o.  female who has a BMI of Body mass index is 36.13 kg/m²..  She has a longstanding history of obesity with multiple failed attempts at weight loss in the past. She underwent hiatal hernia repair and LAP-BAND implantation with Dr. Alberto on 3/27/2014 and lost a total of 30 lbs.  Over the past year she has experienced a 20 lb weight regain, and therefore has had her band filled twice in the past 8 months.  Subsequently, her reflux has worsened significantly within the past 6 months. She has been on PPI therapy since 08/2017 and is currently taking pantoprazole 40 mg po daily, ranitidine 150 mg po twice daily, and Carafate 1 g po four times daily for symptom management.     She did have a esophagram showing significant reflux and residual contrast in the proximal area of the stomach.         Patient Active Problem List   Diagnosis   • Chronic obstructive lung disease (CMS/HCC) (HCC)   • Gastroesophageal reflux disease   • Hypothyroidism   • Obesity   • Obstructive sleep apnea syndrome   • Periodic limb movement disorder   • Vitamin D deficiency   • Allergic rhinitis   • Benign essential hypertension   • Obesity (BMI 35.0-39.9 without comorbidity)        Past Surgical History:   Procedure Laterality Date   • APPENDECTOMY  1987   • BACK SURGERY      2 surgeries [id number]   • BACK SURGERY  11/30/2016   • BACK SURGERY  05/01/2017    vetebral fusion   • CARDIAC CATHETERIZATION  08/31/2011   • CHOLECYSTECTOMY     • COLONOSCOPY  02/19/2014    No polyps   • COLONOSCOPY  04/26/2011   • FOOT SURGERY      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 Vela- shoulder   • THYROID LOBECTOMY Left 18/18/2010   • THYROIDECTOMY, PARTIAL  08/18/2010   • UPPER GASTROINTESTINAL ENDOSCOPY  02/19/2014    Mild chronic gastritis         Current Outpatient Medications:   •  AMITIZA 24 mcg capsule, , Disp: , Rfl:   •  aspirin (ASPIR-LOW) 81 mg EC tablet, 1 tab by oral route every day, Disp: , Rfl:   •  atenolol (TENORMIN) 25 mg tablet, TAKE 1 TABLET BY MOUTH  EVERY DAY AS DIRECTED, Disp: 90 tablet, Rfl: 1  •  atorvastatin (LIPITOR) 20 mg tablet, , Disp: 90, Rfl: 0  •  gabapentin (NEURONTIN) 100 mg capsule, Take 100 mg by mouth 4 (four) times a day., Disp: , Rfl:   •  levothyroxine (SYNTHROID, LEVOTHROID) 100 mcg tablet, Take 1 tablet (100 mcg total) by mouth once daily. Pt must keep upcoming appt, Disp: 90 tablet, Rfl: 0  •  losartan (COZAAR) 50 mg tablet, TAKE 1 TABLET BY MOUTH  EVERY DAY AS DIRECTED, Disp: 90 tablet, Rfl: 1  •  nifedipine 0.3% and lidocaine 1.5% ointment, Insert 1 application into the rectum 4 (four) times a day., Disp: 1 each, Rfl: 3  •  nitroglycerin (NITROSTAT) 0.4 mg SL tablet, Place by sublingual route., Disp: 25, Rfl: 1  •  pantoprazole (PROTONIX) 40 mg EC tablet, , Disp: , Rfl:   •  PREMARIN 0.9 mg tablet, TAKE 1 TABLET BY MOUTH  EVERY DAY (Patient taking differently: TAKE 1 TABLET BY MOUTH EVERY WEEK), Disp: 90 tablet, Rfl: 1  •  sucralfate (CARAFATE) 1 gram tablet, Take 1 tablet (1 g total) by mouth 4 (four) times a day., Disp: 120 tablet, Rfl: 1  •  vit A-vit C-vit E-zinc-copper (EYE VITAMIN AND MINERALS) 7,160-113-100 unit-mg-unit tablet, Take 1 tab/cap by mouth 2 (two) times a day., Disp: , Rfl:   •  FLUAD [id number], 65 YR UP,,PF, 45 mcg (15 mcg x 3)/0.5 mL syringe, ADM 0.5ML IM UTD, Disp: , Rfl: 0  •  ranitidine (ZANTAC) 150 mg capsule, Take 1 capsule (150 mg total) by mouth 2 (two) times a  day. (Patient not taking: Reported on 10/24/2018 ), Disp: 60 capsule, Rfl: 2    Allergies   Allergen Reactions   • Strawberry      hives, tongue swells   • Adhesive      welts       Family History   Problem Relation Age of Onset   • Heart disease Mother    • Heart disease Father         Myocardial infarction   • Parkinsonism Father    • Thyroid disease Father    • Alzheimer's disease Sister    • Arthritis Sister    • Osteoporosis Sister    • Lung cancer Brother    • Diabetes Maternal Grandmother        Social History     Socioeconomic History   • Marital status:      Spouse name: Not on file   • Number of children: Not on file   • Years of education: Not on file   • Highest education level: Not on file   Social Needs   • Financial resource strain: Not on file   • Food insecurity - worry: Not on file   • Food insecurity - inability: Not on file   • Transportation needs - medical: Not on file   • Transportation needs - non-medical: Not on file   Occupational History   • Not on file   Tobacco Use   • Smoking status: Former Smoker     Packs/day: 2.00     Years: 35.00     Pack years: 70.00     Last attempt to quit: 5     Years since quittin.8   • Smokeless tobacco: Never Used   Substance and Sexual Activity   • Alcohol use: No   • Drug use: No   • Sexual activity: Not on file   Other Topics Concern   • Not on file   Social History Narrative   • Not on file        REVIEW OF SYSTEMS:     Constitutional: Negative for hot flashes.   Negative for weakness, fever, chills, night sweats, loss of appetite, fatigue or weight loss.   HEENT: Negative for headache, eye tearing, visual problems, decreased hearing tinnitus, rhinorrhea, mouth sores, and pharyngitis.   Respiratory: Negative for cough, dyspnea, hemoptysis, wheezing, and pleuritic chest pain.   Cardiac: Negative for exertional chest pain, pedal edema, dyspnea on exertion, and palpitations.   Gastrointestinal: Negative for nausea, vomiting, reflux,  constipation, diarrhea, abdominal pain, and painful stools.   Genitourinary: Negative for frequency, dysuria and hematuria.   Dermatologic: Negative for rash, pruritus, skin discoloration, and new skin lump.   Musculoskeletal: As above.   Hematologic: Negative for easy bruising, bleeding, and lymphadenopathy.   Neurologic: Negative for dizziness, paresthesias in the fingers/hands,   paresthesias of the toes, feet, and neuropathic pain.   Psychiatric: Negative for depression, anxiety.        PHYSICAL EXAMINATION:  /62 (BP Location: Left arm, Patient Position: Sitting, Cuff Size: Reg)   Pulse 68   Ht 1.524 m (5')   Wt 83.9 kg (185 lb)   SpO2 96%   BMI 36.13 kg/m²    General appearance: alert and cooperative  Head: atraumatic  Eyes: conjunctivae/corneas clear. PERRL, EOM's intact.   Ears: normal  Lungs: clear to auscultation bilaterally  Heart: regular rate and rhythm  Abdomen: morbidly obese, soft, non tender, previous incisions consistent with surgeries as above. Lap antonio, lap band and SCOTT.  Extremities: extremities normal, warm and well-perfused; no cyanosis, clubbing, or edema  Neurologic: Alert and oriented X 3, normal strength and tone.          ASSESSMENT & PLAN:  This is a 78 y.o. female with a lap band which appears on esophagram to have slipped to the body of the stomach resulting in dysphagia and GERD.     Risks and benefits of laparoscopic band removal have been carefully explained to the patient. She elects to proceed with surgery.  Preoperative labs were ordered.    I do recommend that she have an EGD prior to removal to assess for erosion and mucosal surfaces.      Preoperatively, she will receive intravenous fluid resuscitation and intravenous antibiotics.  She will continue to take nothing by mouth the morning of surgery. She She will receive chemical and mechanical deep venous thrombosis prophylaxis and a urinary catheter.     Katy Stack participated in the decision making process  and agreed with the plan of care.  All questions were sought and answered.     Chris Khalil MD       Total Time spent with the patient is 30 min with more than 50% in direct counseling and coordination of care regarding her lap band

## 2018-10-24 NOTE — H&P (VIEW-ONLY)
GENERAL SURGERY ADMISSION HISTORY & PHYSICAL     10/24/2018     Katy Stack     [id number]     CHIEF COMPLAINT: Morbid obesity     HISTORY OF PRESENT ILLNESS:  Katy Stack is a 78 y.o.  female who has a BMI of Body mass index is 36.13 kg/m²..  She has a longstanding history of obesity with multiple failed attempts at weight loss in the past. She underwent hiatal hernia repair and LAP-BAND implantation with Dr. Alberto on 3/27/2014 and lost a total of 30 lbs.  Over the past year she has experienced a 20 lb weight regain, and therefore has had her band filled twice in the past 8 months.  Subsequently, her reflux has worsened significantly within the past 6 months. She has been on PPI therapy since 08/2017 and is currently taking pantoprazole 40 mg po daily, ranitidine 150 mg po twice daily, and Carafate 1 g po four times daily for symptom management.     She did have a esophagram showing significant reflux and residual contrast in the proximal area of the stomach.         Patient Active Problem List   Diagnosis   • Chronic obstructive lung disease (CMS/HCC) (HCC)   • Gastroesophageal reflux disease   • Hypothyroidism   • Obesity   • Obstructive sleep apnea syndrome   • Periodic limb movement disorder   • Vitamin D deficiency   • Allergic rhinitis   • Benign essential hypertension   • Obesity (BMI 35.0-39.9 without comorbidity)        Past Surgical History:   Procedure Laterality Date   • APPENDECTOMY  1987   • BACK SURGERY      2 surgeries [id number]   • BACK SURGERY  11/30/2016   • BACK SURGERY  05/01/2017    vetebral fusion   • CARDIAC CATHETERIZATION  08/31/2011   • CHOLECYSTECTOMY     • COLONOSCOPY  02/19/2014    No polyps   • COLONOSCOPY  04/26/2011   • FOOT SURGERY      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 Vela- shoulder   • THYROID LOBECTOMY Left 18/18/2010   • THYROIDECTOMY, PARTIAL  08/18/2010   • UPPER GASTROINTESTINAL ENDOSCOPY  02/19/2014    Mild chronic gastritis         Current Outpatient Medications:   •  AMITIZA 24 mcg capsule, , Disp: , Rfl:   •  aspirin (ASPIR-LOW) 81 mg EC tablet, 1 tab by oral route every day, Disp: , Rfl:   •  atenolol (TENORMIN) 25 mg tablet, TAKE 1 TABLET BY MOUTH  EVERY DAY AS DIRECTED, Disp: 90 tablet, Rfl: 1  •  atorvastatin (LIPITOR) 20 mg tablet, , Disp: 90, Rfl: 0  •  gabapentin (NEURONTIN) 100 mg capsule, Take 100 mg by mouth 4 (four) times a day., Disp: , Rfl:   •  levothyroxine (SYNTHROID, LEVOTHROID) 100 mcg tablet, Take 1 tablet (100 mcg total) by mouth once daily. Pt must keep upcoming appt, Disp: 90 tablet, Rfl: 0  •  losartan (COZAAR) 50 mg tablet, TAKE 1 TABLET BY MOUTH  EVERY DAY AS DIRECTED, Disp: 90 tablet, Rfl: 1  •  nifedipine 0.3% and lidocaine 1.5% ointment, Insert 1 application into the rectum 4 (four) times a day., Disp: 1 each, Rfl: 3  •  nitroglycerin (NITROSTAT) 0.4 mg SL tablet, Place by sublingual route., Disp: 25, Rfl: 1  •  pantoprazole (PROTONIX) 40 mg EC tablet, , Disp: , Rfl:   •  PREMARIN 0.9 mg tablet, TAKE 1 TABLET BY MOUTH  EVERY DAY (Patient taking differently: TAKE 1 TABLET BY MOUTH EVERY WEEK), Disp: 90 tablet, Rfl: 1  •  sucralfate (CARAFATE) 1 gram tablet, Take 1 tablet (1 g total) by mouth 4 (four) times a day., Disp: 120 tablet, Rfl: 1  •  vit A-vit C-vit E-zinc-copper (EYE VITAMIN AND MINERALS) 7,160-113-100 unit-mg-unit tablet, Take 1 tab/cap by mouth 2 (two) times a day., Disp: , Rfl:   •  FLUAD [id number], 65 YR UP,,PF, 45 mcg (15 mcg x 3)/0.5 mL syringe, ADM 0.5ML IM UTD, Disp: , Rfl: 0  •  ranitidine (ZANTAC) 150 mg capsule, Take 1 capsule (150 mg total) by mouth 2 (two) times a  day. (Patient not taking: Reported on 10/24/2018 ), Disp: 60 capsule, Rfl: 2    Allergies   Allergen Reactions   • Strawberry      hives, tongue swells   • Adhesive      welts       Family History   Problem Relation Age of Onset   • Heart disease Mother    • Heart disease Father         Myocardial infarction   • Parkinsonism Father    • Thyroid disease Father    • Alzheimer's disease Sister    • Arthritis Sister    • Osteoporosis Sister    • Lung cancer Brother    • Diabetes Maternal Grandmother        Social History     Socioeconomic History   • Marital status:      Spouse name: Not on file   • Number of children: Not on file   • Years of education: Not on file   • Highest education level: Not on file   Social Needs   • Financial resource strain: Not on file   • Food insecurity - worry: Not on file   • Food insecurity - inability: Not on file   • Transportation needs - medical: Not on file   • Transportation needs - non-medical: Not on file   Occupational History   • Not on file   Tobacco Use   • Smoking status: Former Smoker     Packs/day: 2.00     Years: 35.00     Pack years: 70.00     Last attempt to quit: 5     Years since quittin.8   • Smokeless tobacco: Never Used   Substance and Sexual Activity   • Alcohol use: No   • Drug use: No   • Sexual activity: Not on file   Other Topics Concern   • Not on file   Social History Narrative   • Not on file        REVIEW OF SYSTEMS:     Constitutional: Negative for hot flashes.   Negative for weakness, fever, chills, night sweats, loss of appetite, fatigue or weight loss.   HEENT: Negative for headache, eye tearing, visual problems, decreased hearing tinnitus, rhinorrhea, mouth sores, and pharyngitis.   Respiratory: Negative for cough, dyspnea, hemoptysis, wheezing, and pleuritic chest pain.   Cardiac: Negative for exertional chest pain, pedal edema, dyspnea on exertion, and palpitations.   Gastrointestinal: Negative for nausea, vomiting, reflux,  constipation, diarrhea, abdominal pain, and painful stools.   Genitourinary: Negative for frequency, dysuria and hematuria.   Dermatologic: Negative for rash, pruritus, skin discoloration, and new skin lump.   Musculoskeletal: As above.   Hematologic: Negative for easy bruising, bleeding, and lymphadenopathy.   Neurologic: Negative for dizziness, paresthesias in the fingers/hands,   paresthesias of the toes, feet, and neuropathic pain.   Psychiatric: Negative for depression, anxiety.        PHYSICAL EXAMINATION:  /62 (BP Location: Left arm, Patient Position: Sitting, Cuff Size: Reg)   Pulse 68   Ht 1.524 m (5')   Wt 83.9 kg (185 lb)   SpO2 96%   BMI 36.13 kg/m²    General appearance: alert and cooperative  Head: atraumatic  Eyes: conjunctivae/corneas clear. PERRL, EOM's intact.   Ears: normal  Lungs: clear to auscultation bilaterally  Heart: regular rate and rhythm  Abdomen: morbidly obese, soft, non tender, previous incisions consistent with surgeries as above. Lap antonio, lap band and SCOTT.  Extremities: extremities normal, warm and well-perfused; no cyanosis, clubbing, or edema  Neurologic: Alert and oriented X 3, normal strength and tone.          ASSESSMENT & PLAN:  This is a 78 y.o. female with a lap band which appears on esophagram to have slipped to the body of the stomach resulting in dysphagia and GERD.     Risks and benefits of laparoscopic band removal have been carefully explained to the patient. She elects to proceed with surgery.  Preoperative labs were ordered.    I do recommend that she have an EGD prior to removal to assess for erosion and mucosal surfaces.      Preoperatively, she will receive intravenous fluid resuscitation and intravenous antibiotics.  She will continue to take nothing by mouth the morning of surgery. She She will receive chemical and mechanical deep venous thrombosis prophylaxis and a urinary catheter.     Katy Stack participated in the decision making process  and agreed with the plan of care.  All questions were sought and answered.     Chris Khalil MD       Total Time spent with the patient is 30 min with more than 50% in direct counseling and coordination of care regarding her lap band

## 2018-10-25 ENCOUNTER — ANCILLARY PROCEDURE (OUTPATIENT)
Dept: CARDIOLOGY | Facility: CLINIC | Age: 78
End: 2018-10-25
Payer: MEDICARE

## 2018-10-25 VITALS
WEIGHT: 185 LBS | DIASTOLIC BLOOD PRESSURE: 60 MMHG | SYSTOLIC BLOOD PRESSURE: 116 MMHG | HEIGHT: 60 IN | BODY MASS INDEX: 36.32 KG/M2

## 2018-10-25 PROBLEM — R12 HEARTBURN: Status: ACTIVE | Noted: 2018-10-25

## 2018-10-25 PROCEDURE — 93306 TTE W/DOPPLER COMPLETE: CPT | Mod: PO

## 2018-10-25 PROCEDURE — 93306 TTE W/DOPPLER COMPLETE: CPT | Mod: 26 | Performed by: INTERNAL MEDICINE

## 2018-10-26 DIAGNOSIS — E78.5 HYPERLIPIDEMIA, UNSPECIFIED HYPERLIPIDEMIA TYPE: Primary | ICD-10-CM

## 2018-10-26 RX ORDER — ATORVASTATIN CALCIUM 20 MG/1
TABLET, FILM COATED ORAL
Qty: 90 TABLET | Refills: 0 | Status: SHIPPED | OUTPATIENT
Start: 2018-10-26 | End: 2019-01-11 | Stop reason: SDUPTHER

## 2018-11-01 ENCOUNTER — OFFICE VISIT (OUTPATIENT)
Dept: FAMILY MEDICINE | Facility: CLINIC | Age: 78
End: 2018-11-01
Payer: MEDICARE

## 2018-11-01 ENCOUNTER — APPOINTMENT (OUTPATIENT)
Dept: LAB | Facility: CLINIC | Age: 78
End: 2018-11-01
Payer: MEDICARE

## 2018-11-01 VITALS
DIASTOLIC BLOOD PRESSURE: 60 MMHG | SYSTOLIC BLOOD PRESSURE: 146 MMHG | TEMPERATURE: 98.3 F | HEART RATE: 80 BPM | OXYGEN SATURATION: 97 % | BODY MASS INDEX: 36.33 KG/M2 | WEIGHT: 186 LBS

## 2018-11-01 DIAGNOSIS — E03.8 OTHER SPECIFIED HYPOTHYROIDISM: ICD-10-CM

## 2018-11-01 DIAGNOSIS — E66.812 CLASS 2 OBESITY WITH BODY MASS INDEX (BMI) OF 36.0 TO 36.9 IN ADULT, UNSPECIFIED OBESITY TYPE, UNSPECIFIED WHETHER SERIOUS COMORBIDITY PRESENT: ICD-10-CM

## 2018-11-01 DIAGNOSIS — K21.9 GASTROESOPHAGEAL REFLUX DISEASE, ESOPHAGITIS PRESENCE NOT SPECIFIED: ICD-10-CM

## 2018-11-01 DIAGNOSIS — Z01.818 PREOP EXAMINATION: ICD-10-CM

## 2018-11-01 DIAGNOSIS — G47.33 OBSTRUCTIVE SLEEP APNEA SYNDROME: ICD-10-CM

## 2018-11-01 DIAGNOSIS — I10 BENIGN ESSENTIAL HYPERTENSION: Primary | ICD-10-CM

## 2018-11-01 LAB
ALBUMIN SERPL-MCNC: 4 G/DL (ref 3.5–5.3)
ALP SERPL-CCNC: 95 U/L (ref 55–142)
ALT SERPL-CCNC: 6 U/L (ref 0–52)
ANION GAP SERPL CALC-SCNC: 5 MMOL/L (ref 3–11)
AST SERPL-CCNC: 12 U/L (ref 0–39)
BASOPHILS # BLD AUTO: 0 10*3/UL
BASOPHILS NFR BLD AUTO: 1 % (ref 0–2)
BILIRUB SERPL-MCNC: 0.32 MG/DL (ref 0–1.4)
BUN SERPL-MCNC: 18 MG/DL (ref 7–25)
CALCIUM ALBUM COR SERPL-MCNC: 9.1 MG/DL (ref 8.6–10.3)
CALCIUM SERPL-MCNC: 9.1 MG/DL (ref 8.6–10.3)
CHLORIDE SERPL-SCNC: 110 MMOL/L (ref 98–107)
CO2 SERPL-SCNC: 28 MMOL/L (ref 21–32)
CREAT SERPL-MCNC: 1.01 MG/DL (ref 0.6–1.2)
EOSINOPHIL # BLD AUTO: 0.4 10*3/UL
EOSINOPHIL NFR BLD AUTO: 6 % (ref 0–3)
ERYTHROCYTE [DISTWIDTH] IN BLOOD BY AUTOMATED COUNT: 15.1 % (ref 11.5–14)
GFR SERPL CREATININE-BSD FRML MDRD: 53 ML/MIN/1.73M*2
GLUCOSE SERPL-MCNC: 118 MG/DL (ref 70–105)
HCT VFR BLD AUTO: 38.9 % (ref 34–45)
HGB BLD-MCNC: 12.7 G/DL (ref 11.5–15.5)
LYMPHOCYTES # BLD AUTO: 3.1 10*3/UL
LYMPHOCYTES NFR BLD AUTO: 40 % (ref 11–47)
MCH RBC QN AUTO: 27.4 PG (ref 28–33)
MCHC RBC AUTO-ENTMCNC: 32.7 G/DL (ref 32–36)
MCV RBC AUTO: 83.9 FL (ref 81–97)
MONOCYTES # BLD AUTO: 0.6 10*3/UL
MONOCYTES NFR BLD AUTO: 7 % (ref 3–11)
NEUTROPHILS # BLD AUTO: 3.7 10*3/UL
NEUTROPHILS NFR BLD AUTO: 47 % (ref 41–81)
PLATELET # BLD AUTO: 262 10*3/UL (ref 140–350)
PMV BLD AUTO: 7.7 FL (ref 6.9–10.8)
POTASSIUM SERPL-SCNC: 4.1 MMOL/L (ref 3.5–5.1)
PROT SERPL-MCNC: 6.8 G/DL (ref 6–8.3)
RBC # BLD AUTO: 4.63 10*6/ΜL (ref 3.7–5.3)
SODIUM SERPL-SCNC: 143 MMOL/L (ref 135–145)
WBC # BLD AUTO: 7.8 10*3/UL (ref 4.5–10.5)

## 2018-11-01 PROCEDURE — 99214 OFFICE O/P EST MOD 30 MIN: CPT | Mod: PO | Performed by: FAMILY MEDICINE

## 2018-11-01 PROCEDURE — 99214 OFFICE O/P EST MOD 30 MIN: CPT | Performed by: FAMILY MEDICINE

## 2018-11-01 PROCEDURE — 80053 COMPREHEN METABOLIC PANEL: CPT | Mod: PO | Performed by: FAMILY MEDICINE

## 2018-11-01 PROCEDURE — 36415 COLL VENOUS BLD VENIPUNCTURE: CPT | Mod: PO | Performed by: FAMILY MEDICINE

## 2018-11-01 PROCEDURE — 85025 COMPLETE CBC W/AUTO DIFF WBC: CPT | Mod: PO | Performed by: FAMILY MEDICINE

## 2018-11-01 ASSESSMENT — ENCOUNTER SYMPTOMS
EYE DISCHARGE: 0
TROUBLE SWALLOWING: 0
CHEST TIGHTNESS: 0
WHEEZING: 0
DIFFICULTY URINATING: 0
EYE PAIN: 0
RHINORRHEA: 0
ADENOPATHY: 0
UNEXPECTED WEIGHT CHANGE: 1
ARTHRALGIAS: 0
NUMBNESS: 0
WEAKNESS: 0
PALPITATIONS: 0
EYE REDNESS: 0
DIARRHEA: 0
ABDOMINAL PAIN: 0
CHILLS: 0
FEVER: 0
JOINT SWELLING: 0
BACK PAIN: 0
MYALGIAS: 0
SINUS PRESSURE: 0
DIZZINESS: 0
BRUISES/BLEEDS EASILY: 1
HEMATURIA: 0
HEADACHES: 0
BLOOD IN STOOL: 0
FREQUENCY: 0
WOUND: 0
NERVOUS/ANXIOUS: 0
SLEEP DISTURBANCE: 0
DIAPHORESIS: 0
FATIGUE: 0
VOICE CHANGE: 0
CONSTIPATION: 0
POLYDIPSIA: 0
DYSPHORIC MOOD: 0
COUGH: 0
APPETITE CHANGE: 0
VOMITING: 0
DYSURIA: 0
NAUSEA: 0
SHORTNESS OF BREATH: 1
SORE THROAT: 0

## 2018-11-01 ASSESSMENT — PAIN SCALES - GENERAL: PAINLEVEL: 3

## 2018-11-01 NOTE — PROGRESS NOTES
Subjective      No chief complaint on file.      Katy Stack is a 78 y.o. female and is here for a pre-operative risk assessment. No chief complaint on file.    Pt denies any specific concerns or complaints. Pt denies dyspnea on exertion, chest pain, or palpitations.  No hx of MI, CVA, or DVT.     Patient also with history of hypertension.  Blood pressure controlled without any symptoms.    Patient also with history of hyperlipidemia currently on atorvastatin.  She denies any current issues.    Patient also with history of hypothyroidism, currently well controlled.    Patient also with history obstructive sleep apnea currently using CPAP.  She denies any new or worsening issues.    Review of Systems   Constitutional: Positive for unexpected weight change. Negative for appetite change, chills, diaphoresis, fatigue and fever.   HENT: Negative for congestion, dental problem, ear discharge, ear pain, hearing loss, mouth sores, nosebleeds, rhinorrhea, sinus pressure, sore throat, tinnitus, trouble swallowing and voice change.    Eyes: Negative for pain, discharge, redness and visual disturbance.   Respiratory: Positive for shortness of breath. Negative for cough, chest tightness and wheezing.    Cardiovascular: Negative for chest pain, palpitations and leg swelling.   Gastrointestinal: Negative for abdominal pain, blood in stool, constipation, diarrhea, nausea and vomiting.   Endocrine: Negative for cold intolerance, heat intolerance, polydipsia and polyuria.   Genitourinary: Negative for difficulty urinating, dysuria, frequency and hematuria.   Musculoskeletal: Negative for arthralgias, back pain, joint swelling and myalgias.   Skin: Negative for rash and wound.   Allergic/Immunologic: Positive for environmental allergies.   Neurological: Negative for dizziness, weakness, numbness and headaches.   Hematological: Negative for adenopathy. Bruises/bleeds easily.   Psychiatric/Behavioral: Negative for behavioral  problems, dysphoric mood and sleep disturbance. The patient is not nervous/anxious.        The following portions of the patient's history were reviewed and updated as appropriate: allergies, current medications, past family history, past medical history, past social history, past surgical history and problem list.    Objective   /60 (BP Location: Left arm, Patient Position: Sitting, Cuff Size: Reg)   Pulse 80   Temp 36.8 °C (98.3 °F) (Temporal)   Wt 84.4 kg (186 lb)   SpO2 97%   BMI 36.33 kg/m²     Physical Exam   Constitutional: She is oriented to person, place, and time. She appears well-developed and well-nourished. No distress.   Obese   HENT:   Head: Normocephalic and atraumatic.   Right Ear: External ear normal.   Left Ear: External ear normal.   Nose: Nose normal.   Mouth/Throat: Oropharynx is clear and moist. No oropharyngeal exudate.   Eyes: Conjunctivae and EOM are normal. Pupils are equal, round, and reactive to light. Right eye exhibits no discharge. Left eye exhibits no discharge. No scleral icterus.   Neck: Neck supple. No tracheal deviation present. No thyromegaly present.   Cardiovascular: Normal rate, regular rhythm, normal heart sounds and intact distal pulses. Exam reveals no gallop and no friction rub.   No murmur heard.  Pulmonary/Chest: Effort normal and breath sounds normal. No respiratory distress. She has no wheezes. She has no rales.   Abdominal: Soft. Bowel sounds are normal. She exhibits no distension and no mass. There is no tenderness.   Musculoskeletal: She exhibits no edema.   Lymphadenopathy:     She has no cervical adenopathy.   Neurological: She is alert and oriented to person, place, and time. She has normal reflexes. She displays normal reflexes. No cranial nerve deficit.   Skin: Skin is warm and dry. No rash noted. She is not diaphoretic. No erythema. No pallor.   Psychiatric: She has a normal mood and affect. Her behavior is normal. Judgment and thought content  normal.   Nursing note and vitals reviewed.       Results for orders placed or performed in visit on 11/01/18   CBC w/auto differential Blood, Venous   Result Value Ref Range    WBC 7.8 4.5 - 10.5 10*3/uL    RBC 4.63 3.70 - 5.30 10*6/µL    Hemoglobin 12.7 11.5 - 15.5 g/dL    Hematocrit 38.9 34.0 - 45.0 %    MCV 83.9 81.0 - 97.0 fL    MCH 27.4 (L) 28.0 - 33.0 pg    MCHC 32.7 32.0 - 36.0 g/dL    RDW 15.1 (H) 11.5 - 14.0 %    Platelets 262 140 - 350 10*3/uL    MPV 7.7 6.9 - 10.8 fL    Neutrophils% 47 41 - 81 %    Lymphocytes% 40 11 - 47 %    Monocytes% 7 3 - 11 %    Eosinophils% 6 (H) 0 - 3 %    Basophils% 1 0 - 2 %    Neutrophils Absolute 3.70 10*3/uL    Lymphocytes Absolute 3.10 10*3/uL    Monocytes Absolute 0.60 10*3/uL    Eosinophils Absolute 0.40 10*3/uL    Basophils Absolute 0.00 10*3/uL   Comprehensive metabolic panel Blood, Venous   Result Value Ref Range    Sodium 143 135 - 145 mmol/L    Potassium 4.1 3.5 - 5.1 mmol/L    Chloride 110 (H) 98 - 107 mmol/L    CO2 28 21 - 32 mmol/L    Anion Gap 5 3 - 11 mmol/L    BUN 18 7 - 25 mg/dL    Creatinine 1.01 0.60 - 1.20 mg/dL    Glucose 118 (H) 70 - 105 mg/dL    Calcium 9.1 8.6 - 10.3 mg/dL    AST 12 0 - 39 U/L    ALT (SGPT) 6 0 - 52 U/L    Alkaline Phosphatase 95 55 - 142 U/L    Total Protein 6.8 6.0 - 8.3 g/dL    Albumin 4.0 3.5 - 5.3 g/dL    Total Bilirubin 0.32 0.00 - 1.40 mg/dL    eGFR 53 (L) >60 mL/min/1.73m*2    Corrected Calcium 9.1 8.6 - 10.3 mg/dL        Assessment/Plan          Diagnoses and all orders for this visit:    Benign essential hypertension  -     CBC w/auto differential Blood, Venous  -     Comprehensive metabolic panel Blood, Venous    Preop examination    Class 2 obesity with body mass index (BMI) of 36.0 to 36.9 in adult, unspecified obesity type, unspecified whether serious comorbidity present    Gastroesophageal reflux disease, esophagitis presence not specified    Obstructive sleep apnea syndrome    Other specified hypothyroidism          1.  Revised Cardiac Risk Index: 1 (0.9% risk)     2 . Post Op Respiratory Failure Risk: 3%    3. Duke Activity Status Index: 5.07 METS    4. EKG: Not indicated    5. CBC/CMP: Hyperchloremia, Otherwise normal.    6. No further risk stratification needed prior to surgery      Bronson Travis MD

## 2018-11-02 ENCOUNTER — ANESTHESIA EVENT (OUTPATIENT)
Dept: GASTROENTEROLOGY | Facility: HOSPITAL | Age: 78
End: 2018-11-02
Payer: MEDICARE

## 2018-11-02 ENCOUNTER — TELEPHONE (OUTPATIENT)
Dept: FAMILY MEDICINE | Facility: CLINIC | Age: 78
End: 2018-11-02

## 2018-11-02 ASSESSMENT — COPD QUESTIONNAIRES: COPD: 1

## 2018-11-02 NOTE — ANESTHESIA PREPROCEDURE EVALUATION
Pre-Procedure Assessment    Patient: Katy Stack, female, 78 y.o.    Ht Readings from Last 1 Encounters:   10/25/18 1.524 m (5')     Wt Readings from Last 1 Encounters:   18 84.4 kg (186 lb)       Last Vitals  BP      Temp      Pulse     Resp      SpO2      Pain Score         Problem list reviewed and Medical history reviewed           Airway   Mallampati: II  TM distance: >3 FB  Neck ROM: full      Dental - normal exam     Pulmonary     breath sounds clear to auscultation  (+) COPD, sleep apnea,     ROS comment: O2 at noc  Cardiovascular - normal exam  Exercise tolerance: good (4-7 METS)  (+) hypertension well controlled,     ECG reviewed  Rhythm: regular  Rate: normal  ROS comment:   . Sinus rhythm  . Left bundle branch block    Stress test  · Well-tolerated Lexiscan with nonspecific symptoms.  · Normal blood pressure response.  · Nondiagnostic electrocardiogram secondary to underlying left bundle branch block.  · Normal left ventricular size with normal wall motion, EF 73%.  · Essentially normal myocardial perfusion study other than an anterior septal defect suggestive of breast attenuation    Mental Status/Neuro/Psych    Pt is alert.      (+) arthritis,     GI/Hepatic/Renal    (+) GERD poorly controlled,     Endo/Other    (+) hypothyroidism,   Abdominal  - normal exam          Social History     Tobacco Use   • Smoking status: Former Smoker     Packs/day: 2.00     Years: 35.00     Pack years: 70.00     Last attempt to quit: 1955     Years since quittin.8   • Smokeless tobacco: Never Used   Substance Use Topics   • Alcohol use: No      Hematology   Lab Results   Component Value Date/Time    WBC 7.8 2018 02:43 PM    RBC 4.63 2018 02:43 PM    MCV 83.9 2018 02:43 PM    HGB 12.7 2018 02:43 PM    HCT 38.9 2018 02:43 PM     2018 02:43 PM      Coagulation No results found for: PT, APTT, INR   General Chemistry   Lab Results   Component Value Date/Time     CALCIUM 9.1 11/01/2018 02:43 PM    BUN 18 11/01/2018 02:43 PM    CREATININE 1.01 11/01/2018 02:43 PM    GLUCOSE 118 (H) 11/01/2018 02:43 PM     11/01/2018 02:43 PM    K 4.1 11/01/2018 02:43 PM    MG 2.3 09/14/2018 09:54 AM    CO2 28 11/01/2018 02:43 PM     (H) 11/01/2018 02:43 PM     Anesthesia Plan    ASA 3   NPO status reviewed: > 8 hours    MAC                      Plan for postoperative opioid use.    Anesthetic plan and risks discussed with patient.

## 2018-11-05 NOTE — PRE-PROCEDURE INSTRUCTIONS
Pre-Surgery Instructions:   Medication Instructions   • AMITIZA 24 mcg capsule Do not take morning of surgery/procedure   • aspirin (ASPIR-LOW) 81 mg EC tablet Do not take morning of surgery/procedure   • atenolol (TENORMIN) 25 mg tablet Take morning of surgery/procedure   • atorvastatin (LIPITOR) 20 mg tablet Do not take morning of surgery/procedure   • gabapentin (NEURONTIN) 100 mg capsule Do not take morning of surgery/procedure   • levothyroxine (SYNTHROID, LEVOTHROID) 100 mcg tablet Take morning of surgery/procedure   • losartan (COZAAR) 50 mg tablet Take morning of surgery/procedure   • nifedipine 0.3% and lidocaine 1.5% ointment PRN morning of surgery/procedure   • pantoprazole (PROTONIX) 40 mg EC tablet Do not take morning of surgery/procedure   • PREMARIN 0.9 mg tablet Do not take morning of surgery/procedure   • sucralfate (CARAFATE) 1 gram tablet Do not take morning of surgery/procedure   • vit A-vit C-vit E-zinc-copper (EYE VITAMIN AND MINERALS) 7,160-113-100 unit-mg-unit tablet Do not take morning of surgery/procedure   • nitroglycerin (NITROSTAT) 0.4 mg SL tablet PRN morning of surgery/procedure     Pre-op instructions and teaching completed with patient. Patient spouse Cain will accompany patient day of surgery. Patient instructed to take atenolol, losartan, and synthroid DOS. Reviewed antibacterial soap/bodwash shower instructions. Instructed patient not to apply any skin or hair products after pre-procedure shower. Patient is also instructed to remove any artificial nails or nail polish, and all body piercings and jewelry prior to DOS. Arrival time of 0600 on 11/6/18 and NPO time of 2359 on 11/5/18. Instructed to contact surgeon office if any illness or infection occurs and with any additional questions or concerns. Patient verbalizes understanding.

## 2018-11-06 ENCOUNTER — HOSPITAL ENCOUNTER (OUTPATIENT)
Facility: HOSPITAL | Age: 78
Setting detail: OUTPATIENT SURGERY
Discharge: HOME | End: 2018-11-06
Attending: SURGERY | Admitting: SURGERY
Payer: MEDICARE

## 2018-11-06 ENCOUNTER — ANESTHESIA (OUTPATIENT)
Dept: GASTROENTEROLOGY | Facility: HOSPITAL | Age: 78
End: 2018-11-06
Payer: MEDICARE

## 2018-11-06 VITALS
OXYGEN SATURATION: 94 % | TEMPERATURE: 98.4 F | DIASTOLIC BLOOD PRESSURE: 52 MMHG | RESPIRATION RATE: 16 BRPM | HEART RATE: 69 BPM | SYSTOLIC BLOOD PRESSURE: 134 MMHG

## 2018-11-06 PROCEDURE — 00731 ANES UPR GI NDSC PX NOS: CPT | Performed by: NURSE ANESTHETIST, CERTIFIED REGISTERED

## 2018-11-06 PROCEDURE — (BLANK): Performed by: SURGERY

## 2018-11-06 PROCEDURE — (BLANK) HC RECOVERY PHASE-2 1ST 1/2 HOUR ACUITY LEVEL 1: Performed by: SURGERY

## 2018-11-06 PROCEDURE — 6370000100 HC RX 637 (ALT 250 FOR IP): Performed by: SURGERY

## 2018-11-06 PROCEDURE — 99100 ANES PT EXTEME AGE<1 YR&>70: CPT | Performed by: NURSE ANESTHETIST, CERTIFIED REGISTERED

## 2018-11-06 PROCEDURE — 43239 EGD BIOPSY SINGLE/MULTIPLE: CPT | Performed by: SURGERY

## 2018-11-06 PROCEDURE — 6360000200 HC RX 636 W HCPCS (ALT 250 FOR IP): Mod: JW | Performed by: NURSE ANESTHETIST, CERTIFIED REGISTERED

## 2018-11-06 PROCEDURE — 2580000300 HC RX 258: Performed by: SURGERY

## 2018-11-06 RX ORDER — LIDOCAINE HYDROCHLORIDE 20 MG/ML
INJECTION, SOLUTION EPIDURAL; INFILTRATION; INTRACAUDAL; PERINEURAL AS NEEDED
Status: DISCONTINUED | OUTPATIENT
Start: 2018-11-06 | End: 2018-11-06 | Stop reason: SURG

## 2018-11-06 RX ORDER — SODIUM CHLORIDE, SODIUM LACTATE, POTASSIUM CHLORIDE, CALCIUM CHLORIDE 600; 310; 30; 20 MG/100ML; MG/100ML; MG/100ML; MG/100ML
50 INJECTION, SOLUTION INTRAVENOUS CONTINUOUS
Status: DISCONTINUED | OUTPATIENT
Start: 2018-11-06 | End: 2018-11-06 | Stop reason: HOSPADM

## 2018-11-06 RX ORDER — MIDAZOLAM HYDROCHLORIDE 1 MG/ML
10 INJECTION INTRAMUSCULAR; INTRAVENOUS ONCE
Status: CANCELLED | OUTPATIENT
Start: 2018-11-06 | End: 2018-11-06

## 2018-11-06 RX ORDER — SODIUM CHLORIDE, SODIUM LACTATE, POTASSIUM CHLORIDE, CALCIUM CHLORIDE 600; 310; 30; 20 MG/100ML; MG/100ML; MG/100ML; MG/100ML
50 INJECTION, SOLUTION INTRAVENOUS CONTINUOUS
Status: CANCELLED | OUTPATIENT
Start: 2018-11-06

## 2018-11-06 RX ORDER — FENTANYL CITRATE/PF 50 MCG/ML
250 PLASTIC BAG, INJECTION (ML) INTRAVENOUS
Status: CANCELLED | OUTPATIENT
Start: 2018-11-06

## 2018-11-06 RX ORDER — PROPOFOL 10 MG/ML
INJECTION, EMULSION INTRAVENOUS AS NEEDED
Status: DISCONTINUED | OUTPATIENT
Start: 2018-11-06 | End: 2018-11-06 | Stop reason: SURG

## 2018-11-06 RX ORDER — SODIUM CHLORIDE 9 MG/ML
INJECTION INTRAMUSCULAR; INTRAVENOUS; SUBCUTANEOUS
Status: DISCONTINUED
Start: 2018-11-06 | End: 2018-11-08 | Stop reason: HOSPADM

## 2018-11-06 RX ORDER — MIDAZOLAM HYDROCHLORIDE 1 MG/ML
10 INJECTION INTRAMUSCULAR; INTRAVENOUS ONCE
Status: DISCONTINUED | OUTPATIENT
Start: 2018-11-06 | End: 2018-11-06 | Stop reason: HOSPADM

## 2018-11-06 RX ORDER — TOPICAL ANESTHETIC 200 MG/ML
SPRAY DENTAL; PERIODONTAL
Status: DISCONTINUED
Start: 2018-11-06 | End: 2018-11-08 | Stop reason: HOSPADM

## 2018-11-06 RX ORDER — TOPICAL ANESTHETIC 200 MG/ML
SPRAY DENTAL; PERIODONTAL AS NEEDED
Status: DISCONTINUED | OUTPATIENT
Start: 2018-11-06 | End: 2018-11-06 | Stop reason: HOSPADM

## 2018-11-06 RX ORDER — FENTANYL CITRATE/PF 50 MCG/ML
250 PLASTIC BAG, INJECTION (ML) INTRAVENOUS
Status: DISCONTINUED | OUTPATIENT
Start: 2018-11-06 | End: 2018-11-06 | Stop reason: HOSPADM

## 2018-11-06 RX ADMIN — SODIUM CHLORIDE, POTASSIUM CHLORIDE, SODIUM LACTATE AND CALCIUM CHLORIDE 50 ML/HR: 600; 310; 30; 20 INJECTION, SOLUTION INTRAVENOUS at 06:53

## 2018-11-06 RX ADMIN — LIDOCAINE HYDROCHLORIDE 40 MG: 20 INJECTION, SOLUTION EPIDURAL; INFILTRATION; INTRACAUDAL; PERINEURAL at 07:01

## 2018-11-06 RX ADMIN — PROPOFOL 20 MG: 10 INJECTION, EMULSION INTRAVENOUS at 07:09

## 2018-11-06 RX ADMIN — PROPOFOL 30 MG: 10 INJECTION, EMULSION INTRAVENOUS at 07:01

## 2018-11-06 ASSESSMENT — PAIN DESCRIPTION - DESCRIPTORS: DESCRIPTORS: ACHING

## 2018-11-06 ASSESSMENT — ENCOUNTER SYMPTOMS: EXERCISE TOLERANCE: GOOD (4-7 METS)

## 2018-11-06 NOTE — ANESTHESIA POSTPROCEDURE EVALUATION
Patient: Katy Stack    Procedure Summary     Date:  11/06/18 Room / Location:  [facility] ENDO 01 / [facility] Endoscopy    Anesthesia Start:  0700 Anesthesia Stop:      Procedure:  EGD - ESOPHAGOGASTRODUODENOSCOPY (N/A Esophagus) Diagnosis:       Heartburn      (slipped lap band)    Provider:  Chris Millan MD Responsible Provider:  Toy Marin CRNA    Anesthesia Type:  MAC ASA Status:  3          Anesthesia Type: MAC  Last vitals  BP      Temp      Pulse     Resp      SpO2      Pain Score   0     Anesthesia Post Evaluation    Patient location during evaluation: PACU  Patient participation: complete - patient participated  Level of consciousness: awake and alert  Pain management: adequate  Airway patency: patent  Anesthetic complications: no  Cardiovascular status: acceptable  Respiratory status: acceptable  Hydration status: acceptable  May dismiss recovered patient based on consultation with the appropriate physicians and/or meeting appropriate discharge criteria

## 2018-11-06 NOTE — DISCHARGE INSTRUCTIONS
Upper Endoscopy, Care After  Refer to this sheet in the next few weeks. These instructions provide you with information about caring for yourself after your procedure. Your health care provider may also give you more specific instructions. Your treatment has been planned according to current medical practices, but problems sometimes occur. Call your health care provider if you have any problems or questions after your procedure.  What can I expect after the procedure?  After the procedure, it is common to have:  · A sore throat.  · Bloating.  · Nausea.    Follow these instructions at home:  · Follow instructions from your health care provider about what to eat or drink after your procedure.  · Return to your normal activities as told by your health care provider. Ask your health care provider what activities are safe for you.  · Take over-the-counter and prescription medicines only as told by your health care provider.  · Do not drive for 24 hours if you received a sedative.  · Keep all follow-up visits as told by your health care provider. This is important.  Contact a health care provider if:  · You have a sore throat that lasts longer than one day.  · You have trouble swallowing.  Get help right away if:  · You have a fever.  · You vomit blood or your vomit looks like coffee grounds.  · You have bloody, black, or tarry stools.  · You have a severe sore throat or you cannot swallow.  · You have difficulty breathing.  · You have severe pain in your chest or belly.  This information is not intended to replace advice given to you by your health care provider. Make sure you discuss any questions you have with your health care provider.  Document Released: 06/18/2013 Document Revised: 05/25/2017 Document Reviewed: 09/29/2016  [employer] Interactive Patient Education © 2018 [employer]

## 2018-11-06 NOTE — OP NOTE
Katy Stack  11/6/2018    Pre op Diagnosis: Heartburn, history of lap band    Post Op Diagnosis: slipped band    Procedure: Upper endoscopy with WATS 3D biopsy    Surgeon(s):  Chris Millan MD    Anesthesia:    Propofol with CRNA    Staff:   Circulator: Yasemin Lee RN  Endo Technician: Rajendra Silverman    Estimated Blood Loss:  No blood loss documented.    Indications:  worsening heartburn and dysphagia    Specimens:  No specimens collected during this procedure.      Findings:  Hill grade unable to be determined due to band  -salmon colored esophageal mucosa  -slipped band with minimal to no stomach pouch        Procedure:   The patient was brought to the endoscopy suite and placed in a left lateral decubitus position on the operative table. A time out to patient and procedure was performed. After adequate sedation and topical benzocaine spray the gastroscope was introduced and passed through the oropharynx and into the esophagus without difficulty. It was advanced down the fourth portion of the duodenum. Duodenum appeared to be grossly normal. The scope was retroflexed in the stomach and entire stomach examined. Findings on withdrawal are as follows:  -No significant reflux esophagitis with GE junction at 32 cm, Mcclellan colored mucosa extending Circumferentially starting 1cm above the GE junction and a tongue extending up to 2cm above the GE junction. Biopsy performed at this location    -very small stomach pouch      Complications:  None    Chris Millan MD  Phone Number: [phone]    Plan:  -Follow up with me.    Date: 11/6/2018  Time: 7:21 AM

## 2018-11-08 ENCOUNTER — ANCILLARY PROCEDURE (OUTPATIENT)
Dept: RADIOLOGY | Facility: HOSPITAL | Age: 78
End: 2018-11-08
Attending: PHYSICAL MEDICINE & REHABILITATION
Payer: MEDICARE

## 2018-11-08 ENCOUNTER — PROCEDURE VISIT (OUTPATIENT)
Dept: PAIN MEDICINE | Facility: HOSPITAL | Age: 78
End: 2018-11-08
Attending: PHYSICAL MEDICINE & REHABILITATION
Payer: MEDICARE

## 2018-11-08 VITALS
OXYGEN SATURATION: 95 % | SYSTOLIC BLOOD PRESSURE: 155 MMHG | RESPIRATION RATE: 18 BRPM | DIASTOLIC BLOOD PRESSURE: 62 MMHG | TEMPERATURE: 97.5 F | HEART RATE: 67 BPM

## 2018-11-08 DIAGNOSIS — M48.062 SPINAL STENOSIS, LUMBAR REGION WITH NEUROGENIC CLAUDICATION: Primary | ICD-10-CM

## 2018-11-08 DIAGNOSIS — R52 PAIN: ICD-10-CM

## 2018-11-08 PROCEDURE — 2550000100 HC RX 255: Performed by: PHYSICAL MEDICINE & REHABILITATION

## 2018-11-08 PROCEDURE — 77003 FLUOROGUIDE FOR SPINE INJECT: CPT | Mod: NO READ

## 2018-11-08 PROCEDURE — 6360000200 HC RX 636 W HCPCS (ALT 250 FOR IP): Performed by: PHYSICAL MEDICINE & REHABILITATION

## 2018-11-08 PROCEDURE — 62323 NJX INTERLAMINAR LMBR/SAC: CPT | Performed by: PHYSICAL MEDICINE & REHABILITATION

## 2018-11-08 PROCEDURE — 2500000200 HC RX 250 WO HCPCS: Performed by: PHYSICAL MEDICINE & REHABILITATION

## 2018-11-08 RX ORDER — DEXAMETHASONE SODIUM PHOSPHATE 4 MG/ML
4 INJECTION, SOLUTION INTRA-ARTICULAR; INTRALESIONAL; INTRAMUSCULAR; INTRAVENOUS; SOFT TISSUE ONCE
Status: COMPLETED | OUTPATIENT
Start: 2018-11-08 | End: 2018-11-08

## 2018-11-08 RX ORDER — IOPAMIDOL 408 MG/ML
10 INJECTION, SOLUTION INTRATHECAL ONCE
Status: COMPLETED | OUTPATIENT
Start: 2018-11-08 | End: 2018-11-08

## 2018-11-08 RX ORDER — LIDOCAINE HYDROCHLORIDE 20 MG/ML
5 INJECTION, SOLUTION INFILTRATION; PERINEURAL ONCE
Status: COMPLETED | OUTPATIENT
Start: 2018-11-08 | End: 2018-11-08

## 2018-11-08 RX ORDER — LIDOCAINE HYDROCHLORIDE 10 MG/ML
4 INJECTION, SOLUTION EPIDURAL; INFILTRATION; INTRACAUDAL; PERINEURAL ONCE
Status: COMPLETED | OUTPATIENT
Start: 2018-11-08 | End: 2018-11-08

## 2018-11-08 RX ADMIN — Medication 1 APPLICATION: at 16:00

## 2018-11-08 RX ADMIN — LIDOCAINE HYDROCHLORIDE 100 MG: 20 INJECTION, SOLUTION INFILTRATION; PERINEURAL at 16:00

## 2018-11-08 RX ADMIN — DEXAMETHASONE SODIUM PHOSPHATE 20 MG: 4 INJECTION, SOLUTION INTRAMUSCULAR; INTRAVENOUS at 16:14

## 2018-11-08 RX ADMIN — IOPAMIDOL 20 ML: 408 INJECTION, SOLUTION INTRATHECAL at 16:05

## 2018-11-08 RX ADMIN — LIDOCAINE HYDROCHLORIDE 30 ML: 10 INJECTION, SOLUTION EPIDURAL; INFILTRATION; INTRACAUDAL; PERINEURAL at 16:13

## 2018-11-08 ASSESSMENT — PAIN SCALES - GENERAL: PAINLEVEL: 3

## 2018-11-08 ASSESSMENT — PAIN - FUNCTIONAL ASSESSMENT: PAIN_FUNCTIONAL_ASSESSMENT: 0-10

## 2018-11-08 ASSESSMENT — PAIN DESCRIPTION - DESCRIPTORS: DESCRIPTORS: DISCOMFORT;RADIATING

## 2018-11-08 NOTE — PROGRESS NOTES
Dusty - #1 Caudal Epidural    HISTORY AND INDICATION  Katy Stack  has provided us with a past medical history, family history, social history, occupational history, medication list, and allergies, as well as the impact of their pain on their activities of daily living and a pain rating.  I have reviewed all of this information.  This information is available in [facility] electronic medical record, which is available upon request from [facility].    DIAGNOSIS  Radicular syndrome, lower limbs.    PROCEDURE  Caudal epidural steroid injection.    PROCEDURE NOTE  Katy Stack  presented to the [facility] and all questions regarding the potential risks and benefits of the procedure were asked and answered. Signed informed consent was obtained.    Katy Stack 's heart rate, blood pressure, temperature and respiratory rate were all within normal, acceptable range prior to being taken to the special procedure room.  She was then placed in a prone position on the fluoroscopy table. She  was then prepped with Betadine scrub and draped with a sterile drape.  The sacral hiatus was localized by palpation.  Then, 2 mL of 2% Xylocaine was infused in the subcutaneous tissue overlying the sacral hiatus for local anesthesia.  A 22-gauge 2-1/2 inch spinal needle was then inserted into the epidural space via the sacral hiatus.  After negative aspiration, 0.5 mL of Omnipaque was infused and with fluoroscopic imaging, was noted to be flowing within the epidural space.  Then a mixture that contained 8 mL of normal saline, 4 mL of preservative free 1% Xylocaine and 2 mL of Celestone was infused into the epidural space via the caudal route.    She  tolerated the procedure well. She was taken to the recovery room, observed, and was discharged with no unexpected neurologic changes. She was instructed that should She  experience any fever, chills, excessive redness at the injection site, or prolonged numbness or weakness, She should phone me  immediately.  She was instructed that She should use ice over the area of injection, and limit activity for the rest of the day.    A follow up phone call or appointment will be made in 14 days to assess the efficacy of the block.  Further treatment needs will be discussed at that time.

## 2018-11-16 ASSESSMENT — ACTIVITIES OF DAILY LIVING (ADL): ADEQUATE_TO_COMPLETE_ADL: YES

## 2018-11-16 NOTE — PRE-PROCEDURE INSTRUCTIONS
Pre-Surgery Instructions:   Medication Instructions   • AMITIZA 24 mcg capsule Do not take morning of surgery/procedure   • aspirin (ASPIR-LOW) 81 mg EC tablet Stop taking 1 week prior to surgery/procedure   • atenolol (TENORMIN) 25 mg tablet Take morning of surgery/procedure   • atorvastatin (LIPITOR) 20 mg tablet Do not take morning of surgery/procedure   • gabapentin (NEURONTIN) 100 mg capsule Do not take morning of surgery/procedure   • levothyroxine (SYNTHROID, LEVOTHROID) 100 mcg tablet Take morning of surgery/procedure   • losartan (COZAAR) 50 mg tablet Take morning of surgery/procedure   • nitroglycerin (NITROSTAT) 0.4 mg SL tablet Do not take morning of surgery/procedure   • pantoprazole (PROTONIX) 40 mg EC tablet Do not take morning of surgery/procedure   • PREMARIN 0.9 mg tablet Do not take morning of surgery/procedure   • sucralfate (CARAFATE) 1 gram tablet Do not take morning of surgery/procedure   • vit A-vit C-vit E-zinc-copper (EYE VITAMIN AND MINERALS) 7,160-113-100 unit-mg-unit tablet Stop taking 1 week prior to surgery/procedure   DR. BALES CLEARED PT. ON 11/1/18.  INSTRUCTED SURGERY DEPT. WILL CALL 1-2 DAYS PRIOR TO DOS WITH ARRIVAL TIME AND NPO INSTRUCTIONS.  REVIEWED AND DISCUSSED MEDS.  ENCOURAGED TO HYDRATE WELL FOR THE 2 DAYS PRIOR TO DOS.  DISCUSSED SHOWERING AND NO SKIN PRODUCTS DOS.   WILL BE WITH DOS.  VERBALIZES UNDERSTANDING OF INSTRUCTIONS.

## 2018-11-26 ENCOUNTER — ANESTHESIA EVENT (OUTPATIENT)
Dept: GASTROENTEROLOGY | Facility: HOSPITAL | Age: 78
End: 2018-11-26
Payer: MEDICARE

## 2018-11-26 ASSESSMENT — COPD QUESTIONNAIRES: COPD: 1

## 2018-11-26 ASSESSMENT — ENCOUNTER SYMPTOMS: DYSRHYTHMIAS: 1

## 2018-11-26 NOTE — ANESTHESIA PREPROCEDURE EVALUATION
Pre-Procedure Assessment    Patient: Katy Stack, female, 78 y.o.    Ht Readings from Last 1 Encounters:   10/25/18 1.524 m (5')     Wt Readings from Last 1 Encounters:   11/01/18 84.4 kg (186 lb)       Last Vitals  BP      Temp      Pulse     Resp      SpO2      Pain Score         Problem list reviewed and Medical history reviewed           Airway   Mallampati: II  TM distance: >3 FB  Neck ROM: full      Dental - normal exam     Pulmonary - normal exam    breath sounds clear to auscultation  (+) COPD mild, sleep apnea,   Cardiovascular - normal exam  Exercise tolerance: good (4-7 METS)  (+) hypertension well controlled, dysrhythmias, angina at rest,     ECG reviewed  Rhythm: regular  Rate: normal  ROS comment: Assessment/Plan              Diagnoses and all orders for this visit:     Benign essential hypertension  -     CBC w/auto differential Blood, Venous  -     Comprehensive metabolic panel Blood, Venous     Preop examination     Class 2 obesity with body mass index (BMI) of 36.0 to 36.9 in adult, unspecified obesity type, unspecified whether serious comorbidity present     Gastroesophageal reflux disease, esophagitis presence not specified     Obstructive sleep apnea syndrome     Other specified hypothyroidism           1. Revised Cardiac Risk Index: 1 (0.9% risk)      2 . Post Op Respiratory Failure Risk: 3%     3. Duke Activity Status Index: 5.07 METS     4. EKG: Not indicated     5. CBC/CMP: Hyperchloremia, Otherwise normal.     6. No further risk stratification needed prior to surgery        Bronson Travis MD    EKG LBBB    Interpretation Summary     1.  Normal LV systolic function.  EF 56%.  No regional wall motion abnormalities.  2.  Mild concentric left ventricular hypertrophy.  3.  Grade 1 LV diastolic dysfunction.  4.  Minimally elevated left ventricular end-diastolic pressure.  5.  Mild aortic stenosis.  Valve area 1.8 cm².  Trivial aortic insufficiency.  6.  Trivial mitral and tricuspid  insufficiency.  7.  Mild pulmonary hypertension with estimated PA pressure of 43 mmHg.    Interpretation Summary     · Well-tolerated Lexiscan with nonspecific symptoms.  · Normal blood pressure response.  · Nondiagnostic electrocardiogram secondary to underlying left bundle branch block.  · Normal left ventricular size with normal wall motion, EF 73%.  · Essentially normal myocardial perfusion study other than an anterior septal defect suggestive of breast attenuation.           Mental Status/Neuro/Psych    Pt is alert.      (+) arthritis, peripheral neuropathy,     GI/Hepatic/Renal    (+) GERD poorly controlled,     Endo/Other    (+) hypothyroidism,   Abdominal  - normal exam  (+) obese,     Abdomen: soft.          Social History     Tobacco Use   • Smoking status: Former Smoker     Packs/day: 2.00     Years: 35.00     Pack years: 70.00     Last attempt to quit: 1995     Years since quittin.9   • Smokeless tobacco: Never Used   Substance Use Topics   • Alcohol use: No      Hematology   Lab Results   Component Value Date/Time    WBC 7.8 2018 02:43 PM    RBC 4.63 2018 02:43 PM    MCV 83.9 2018 02:43 PM    HGB 12.7 2018 02:43 PM    HCT 38.9 2018 02:43 PM     2018 02:43 PM      Coagulation No results found for: PT, APTT, INR   General Chemistry   Lab Results   Component Value Date/Time    CALCIUM 9.1 2018 02:43 PM    BUN 18 2018 02:43 PM    CREATININE 1.01 2018 02:43 PM    GLUCOSE 118 (H) 2018 02:43 PM     2018 02:43 PM    K 4.1 2018 02:43 PM    MG 2.3 2018 09:54 AM    CO2 28 2018 02:43 PM     (H) 2018 02:43 PM     Anesthesia Plan    ASA 3   NPO status reviewed: > 8 hours    General         Induction: intravenous and rapid sequence   Airway Planning: oral ET tube          Plan for postoperative opioid use.    Anesthetic plan and risks discussed with patient.  Use of blood products discussed with patient  whom.

## 2018-11-28 ENCOUNTER — ANESTHESIA (OUTPATIENT)
Dept: GASTROENTEROLOGY | Facility: HOSPITAL | Age: 78
End: 2018-11-28
Payer: MEDICARE

## 2018-11-28 ENCOUNTER — HOSPITAL ENCOUNTER (OUTPATIENT)
Facility: HOSPITAL | Age: 78
Setting detail: OUTPATIENT SURGERY
Discharge: HOME | End: 2018-11-28
Attending: SURGERY | Admitting: SURGERY
Payer: MEDICARE

## 2018-11-28 VITALS
WEIGHT: 186 LBS | RESPIRATION RATE: 12 BRPM | DIASTOLIC BLOOD PRESSURE: 65 MMHG | BODY MASS INDEX: 36.33 KG/M2 | TEMPERATURE: 97 F | SYSTOLIC BLOOD PRESSURE: 157 MMHG | HEART RATE: 65 BPM | OXYGEN SATURATION: 83 %

## 2018-11-28 DIAGNOSIS — Z48.89 ENCOUNTER FOR POSTOPERATIVE CARE: Primary | ICD-10-CM

## 2018-11-28 PROCEDURE — 2580000300 HC RX 258: Performed by: SURGERY

## 2018-11-28 PROCEDURE — 2500000200 HC RX 250 WO HCPCS: Performed by: NURSE ANESTHETIST, CERTIFIED REGISTERED

## 2018-11-28 PROCEDURE — 99100 ANES PT EXTEME AGE<1 YR&>70: CPT | Performed by: NURSE ANESTHETIST, CERTIFIED REGISTERED

## 2018-11-28 PROCEDURE — 00790 ANES IPER UPR ABD NOS: CPT | Performed by: NURSE ANESTHETIST, CERTIFIED REGISTERED

## 2018-11-28 PROCEDURE — (BLANK) HC RECOVERY PHASE-2 EACH ADDITIONAL 1/2 HOUR ACUITY LEVEL 1: Performed by: SURGERY

## 2018-11-28 PROCEDURE — C1729 CATH, DRAINAGE: HCPCS | Performed by: SURGERY

## 2018-11-28 PROCEDURE — (BLANK) HC OR LEVEL 3 PROC 1ST 15MIN: Performed by: SURGERY

## 2018-11-28 PROCEDURE — 6360000200 HC RX 636 W HCPCS (ALT 250 FOR IP): Performed by: NURSE ANESTHETIST, CERTIFIED REGISTERED

## 2018-11-28 PROCEDURE — 43774 LAP RMVL GASTR ADJ ALL PARTS: CPT | Performed by: SURGERY

## 2018-11-28 PROCEDURE — (BLANK) HC RECOVERY PHASE-2 1ST 1/2 HOUR ACUITY LEVEL 1: Performed by: SURGERY

## 2018-11-28 PROCEDURE — 2500000200 HC RX 250 WO HCPCS: Performed by: SURGERY

## 2018-11-28 PROCEDURE — 43774 LAP RMVL GASTR ADJ ALL PARTS: CPT | Mod: AS | Performed by: NURSE PRACTITIONER

## 2018-11-28 PROCEDURE — (BLANK) HC OR LEVEL 3 PROC EACH ADDITIONAL MIN: Performed by: SURGERY

## 2018-11-28 PROCEDURE — 6360000200 HC RX 636 W HCPCS (ALT 250 FOR IP): Performed by: SURGERY

## 2018-11-28 PROCEDURE — (BLANK) HC RECOVERY PHASE-1 1ST  HOUR ACUITY LEVEL 3: Performed by: SURGERY

## 2018-11-28 RX ORDER — ONDANSETRON HYDROCHLORIDE 2 MG/ML
4 INJECTION, SOLUTION INTRAVENOUS ONCE AS NEEDED
Status: DISCONTINUED | OUTPATIENT
Start: 2018-11-28 | End: 2018-11-28 | Stop reason: HOSPADM

## 2018-11-28 RX ORDER — NALOXONE HYDROCHLORIDE 0.4 MG/ML
0.2 INJECTION, SOLUTION INTRAMUSCULAR; INTRAVENOUS; SUBCUTANEOUS AS NEEDED
Status: DISCONTINUED | OUTPATIENT
Start: 2018-11-28 | End: 2018-11-28 | Stop reason: HOSPADM

## 2018-11-28 RX ORDER — HYDROMORPHONE HYDROCHLORIDE 2 MG/ML
0.5 INJECTION, SOLUTION INTRAMUSCULAR; INTRAVENOUS; SUBCUTANEOUS EVERY 5 MIN PRN
Status: DISCONTINUED | OUTPATIENT
Start: 2018-11-28 | End: 2018-11-28 | Stop reason: HOSPADM

## 2018-11-28 RX ORDER — MELOXICAM 7.5 MG/1
7.5 TABLET ORAL DAILY
Qty: 14 TABLET | Refills: 0 | Status: SHIPPED | OUTPATIENT
Start: 2018-11-28 | End: 2018-12-12

## 2018-11-28 RX ORDER — SODIUM CHLORIDE 9 MG/ML
250 INJECTION, SOLUTION INTRAVENOUS ONCE AS NEEDED
Status: DISCONTINUED | OUTPATIENT
Start: 2018-11-28 | End: 2018-11-28 | Stop reason: HOSPADM

## 2018-11-28 RX ORDER — PROMETHAZINE HYDROCHLORIDE 25 MG/ML
12.5 INJECTION, SOLUTION INTRAMUSCULAR; INTRAVENOUS ONCE AS NEEDED
Status: DISCONTINUED | OUTPATIENT
Start: 2018-11-28 | End: 2018-11-28 | Stop reason: HOSPADM

## 2018-11-28 RX ORDER — ACETAMINOPHEN 500 MG
1000 TABLET ORAL EVERY 6 HOURS PRN
Status: CANCELLED | OUTPATIENT
Start: 2018-11-28

## 2018-11-28 RX ORDER — DEXAMETHASONE SODIUM PHOSPHATE 4 MG/ML
4 INJECTION, SOLUTION INTRA-ARTICULAR; INTRALESIONAL; INTRAMUSCULAR; INTRAVENOUS; SOFT TISSUE ONCE AS NEEDED
Status: DISCONTINUED | OUTPATIENT
Start: 2018-11-28 | End: 2018-11-28 | Stop reason: HOSPADM

## 2018-11-28 RX ORDER — SODIUM CHLORIDE, SODIUM LACTATE, POTASSIUM CHLORIDE, AND CALCIUM CHLORIDE .6; .31; .03; .02 G/100ML; G/100ML; G/100ML; G/100ML
500 INJECTION, SOLUTION INTRAVENOUS ONCE AS NEEDED
Status: DISCONTINUED | OUTPATIENT
Start: 2018-11-28 | End: 2018-11-28 | Stop reason: HOSPADM

## 2018-11-28 RX ORDER — METOPROLOL TARTRATE 1 MG/ML
1 INJECTION, SOLUTION INTRAVENOUS EVERY 5 MIN PRN
Status: DISCONTINUED | OUTPATIENT
Start: 2018-11-28 | End: 2018-11-28 | Stop reason: HOSPADM

## 2018-11-28 RX ORDER — KETOROLAC TROMETHAMINE 15 MG/ML
15 INJECTION, SOLUTION INTRAMUSCULAR; INTRAVENOUS ONCE AS NEEDED
Status: CANCELLED | OUTPATIENT
Start: 2018-11-28 | End: 2018-11-28

## 2018-11-28 RX ORDER — PROPOFOL 10 MG/ML
INJECTION, EMULSION INTRAVENOUS AS NEEDED
Status: DISCONTINUED | OUTPATIENT
Start: 2018-11-28 | End: 2018-11-28 | Stop reason: SURG

## 2018-11-28 RX ORDER — FENTANYL CITRATE/PF 50 MCG/ML
PLASTIC BAG, INJECTION (ML) INTRAVENOUS AS NEEDED
Status: DISCONTINUED | OUTPATIENT
Start: 2018-11-28 | End: 2018-11-28 | Stop reason: SURG

## 2018-11-28 RX ORDER — SODIUM CHLORIDE, SODIUM LACTATE, POTASSIUM CHLORIDE, CALCIUM CHLORIDE 600; 310; 30; 20 MG/100ML; MG/100ML; MG/100ML; MG/100ML
50 INJECTION, SOLUTION INTRAVENOUS CONTINUOUS
Status: DISCONTINUED | OUTPATIENT
Start: 2018-11-28 | End: 2018-11-28 | Stop reason: HOSPADM

## 2018-11-28 RX ORDER — LIDOCAINE HYDROCHLORIDE 20 MG/ML
INJECTION, SOLUTION EPIDURAL; INFILTRATION; INTRACAUDAL; PERINEURAL AS NEEDED
Status: DISCONTINUED | OUTPATIENT
Start: 2018-11-28 | End: 2018-11-28 | Stop reason: SURG

## 2018-11-28 RX ORDER — MIDAZOLAM HYDROCHLORIDE 1 MG/ML
INJECTION INTRAMUSCULAR; INTRAVENOUS AS NEEDED
Status: DISCONTINUED | OUTPATIENT
Start: 2018-11-28 | End: 2018-11-28 | Stop reason: SURG

## 2018-11-28 RX ORDER — FENTANYL CITRATE/PF 50 MCG/ML
50 PLASTIC BAG, INJECTION (ML) INTRAVENOUS EVERY 5 MIN PRN
Status: DISCONTINUED | OUTPATIENT
Start: 2018-11-28 | End: 2018-11-28 | Stop reason: HOSPADM

## 2018-11-28 RX ORDER — LABETALOL HYDROCHLORIDE 5 MG/ML
5 INJECTION, SOLUTION INTRAVENOUS EVERY 5 MIN PRN
Status: DISCONTINUED | OUTPATIENT
Start: 2018-11-28 | End: 2018-11-28 | Stop reason: HOSPADM

## 2018-11-28 RX ORDER — DIPHENHYDRAMINE HYDROCHLORIDE 50 MG/ML
25 INJECTION INTRAMUSCULAR; INTRAVENOUS ONCE AS NEEDED
Status: DISCONTINUED | OUTPATIENT
Start: 2018-11-28 | End: 2018-11-28 | Stop reason: HOSPADM

## 2018-11-28 RX ORDER — ONDANSETRON HYDROCHLORIDE 2 MG/ML
INJECTION, SOLUTION INTRAVENOUS AS NEEDED
Status: DISCONTINUED | OUTPATIENT
Start: 2018-11-28 | End: 2018-11-28 | Stop reason: SURG

## 2018-11-28 RX ORDER — MIDAZOLAM HYDROCHLORIDE 1 MG/ML
1 INJECTION INTRAMUSCULAR; INTRAVENOUS EVERY 5 MIN PRN
Status: DISCONTINUED | OUTPATIENT
Start: 2018-11-28 | End: 2018-11-28 | Stop reason: HOSPADM

## 2018-11-28 RX ORDER — BUPIVACAINE HCL/EPINEPHRINE 0.25-.0005
VIAL (ML) INJECTION AS NEEDED
Status: DISCONTINUED | OUTPATIENT
Start: 2018-11-28 | End: 2018-11-28 | Stop reason: HOSPADM

## 2018-11-28 RX ORDER — ROCURONIUM BROMIDE 50 MG/5 ML
SYRINGE (ML) INTRAVENOUS AS NEEDED
Status: DISCONTINUED | OUTPATIENT
Start: 2018-11-28 | End: 2018-11-28 | Stop reason: SURG

## 2018-11-28 RX ORDER — IPRATROPIUM BROMIDE AND ALBUTEROL SULFATE 2.5; .5 MG/3ML; MG/3ML
3 SOLUTION RESPIRATORY (INHALATION) ONCE AS NEEDED
Status: DISCONTINUED | OUTPATIENT
Start: 2018-11-28 | End: 2018-11-28 | Stop reason: HOSPADM

## 2018-11-28 RX ORDER — METOCLOPRAMIDE HYDROCHLORIDE 5 MG/ML
10 INJECTION INTRAMUSCULAR; INTRAVENOUS ONCE AS NEEDED
Status: DISCONTINUED | OUTPATIENT
Start: 2018-11-28 | End: 2018-11-28 | Stop reason: HOSPADM

## 2018-11-28 RX ORDER — SODIUM CHLORIDE, SODIUM LACTATE, POTASSIUM CHLORIDE, AND CALCIUM CHLORIDE .6; .31; .03; .02 G/100ML; G/100ML; G/100ML; G/100ML
250 INJECTION, SOLUTION INTRAVENOUS ONCE AS NEEDED
Status: DISCONTINUED | OUTPATIENT
Start: 2018-11-28 | End: 2018-11-28 | Stop reason: HOSPADM

## 2018-11-28 RX ORDER — CEFAZOLIN SODIUM 10 G/1
2000 INJECTION, POWDER, FOR SOLUTION INTRAVENOUS ONCE
Status: COMPLETED | OUTPATIENT
Start: 2018-11-28 | End: 2018-11-28

## 2018-11-28 RX ORDER — SUCCINYLCHOLINE CHLORIDE 20 MG/ML INJECTION SOLUTION
SOLUTION AS NEEDED
Status: DISCONTINUED | OUTPATIENT
Start: 2018-11-28 | End: 2018-11-28 | Stop reason: SURG

## 2018-11-28 RX ORDER — ACETAMINOPHEN 500 MG
1000 TABLET ORAL EVERY 8 HOURS
Qty: 84 TABLET | Refills: 0
Start: 2018-11-28 | End: 2018-12-12

## 2018-11-28 RX ADMIN — FENTANYL CITRATE 50 MCG: 50 INJECTION INTRAMUSCULAR; INTRAVENOUS at 08:22

## 2018-11-28 RX ADMIN — Medication 40 MG: at 07:14

## 2018-11-28 RX ADMIN — CEFAZOLIN 2000 MG: 10 INJECTION, POWDER, FOR SOLUTION INTRAVENOUS at 07:10

## 2018-11-28 RX ADMIN — SUGAMMADEX 200 MG: 100 INJECTION, SOLUTION INTRAVENOUS at 08:00

## 2018-11-28 RX ADMIN — HYDROMORPHONE HYDROCHLORIDE 0.5 MG: 2 INJECTION, SOLUTION INTRAMUSCULAR; INTRAVENOUS; SUBCUTANEOUS at 08:33

## 2018-11-28 RX ADMIN — MIDAZOLAM HYDROCHLORIDE 2 MG: 1 INJECTION, SOLUTION INTRAMUSCULAR; INTRAVENOUS at 07:02

## 2018-11-28 RX ADMIN — ONDANSETRON 4 MG: 2 INJECTION INTRAMUSCULAR; INTRAVENOUS at 07:05

## 2018-11-28 RX ADMIN — FENTANYL CITRATE 50 MCG: 50 INJECTION, SOLUTION INTRAMUSCULAR; INTRAVENOUS at 07:17

## 2018-11-28 RX ADMIN — SODIUM CHLORIDE, POTASSIUM CHLORIDE, SODIUM LACTATE AND CALCIUM CHLORIDE 50 ML/HR: 600; 310; 30; 20 INJECTION, SOLUTION INTRAVENOUS at 06:32

## 2018-11-28 RX ADMIN — FENTANYL CITRATE 100 MCG: 50 INJECTION, SOLUTION INTRAMUSCULAR; INTRAVENOUS at 07:02

## 2018-11-28 RX ADMIN — Medication 120 MG: at 07:02

## 2018-11-28 RX ADMIN — FENTANYL CITRATE 50 MCG: 50 INJECTION INTRAMUSCULAR; INTRAVENOUS at 08:34

## 2018-11-28 RX ADMIN — LIDOCAINE HYDROCHLORIDE 60 MG: 20 INJECTION, SOLUTION EPIDURAL; INFILTRATION; INTRACAUDAL; PERINEURAL at 07:02

## 2018-11-28 RX ADMIN — FENTANYL CITRATE 50 MCG: 50 INJECTION, SOLUTION INTRAMUSCULAR; INTRAVENOUS at 07:30

## 2018-11-28 RX ADMIN — FENTANYL CITRATE 50 MCG: 50 INJECTION INTRAMUSCULAR; INTRAVENOUS at 08:28

## 2018-11-28 RX ADMIN — PROPOFOL 130 MG: 10 INJECTION, EMULSION INTRAVENOUS at 07:02

## 2018-11-28 ASSESSMENT — ENCOUNTER SYMPTOMS: EXERCISE TOLERANCE: GOOD (4-7 METS)

## 2018-11-28 ASSESSMENT — COPD QUESTIONNAIRES: CAT_SEVERITY: MILD

## 2018-11-28 NOTE — OP NOTE
Katy Stack  11/28/18    PREOPERATIVE DIAGNOSIS:  Morbid Obesity, heartburn and barretts    POSTOPERATIVE DIAGNOSIS:  Same    PROCEDURES:    1. Laparoscopic removal of adjustable gastric band      Surgeon(s):  Chris Millan MD      ASSISTANT:   Marii Wayne CNP      ANESTHETIST:  CHRISTIANO: Emmanuel Pendleton CRNA       ANESTHESIA TYPE:  General       I/O this shift:  In: 804 [I.V.:804]  Out: 10 [Blood:10]    SPECIMENS: No specimens collected during this procedure.      COMPLICATIONS:  None    Indications: Katy Stack is a 78 y.o.  female who has a BMI of Body mass index is 36.33 kg/m²..  She has a longstanding history of obesity and had a lap adjustable gastric band placed. This has slipped and his causing reflux and dysphagia and is therefore recommended for removal    FINDINGS:  No evidence of erosion    DESCRIPTION OF PROCEDURE:   The patient was brought to the procedural suite, placed in supine position on the operating table. A timeout confirming the patient and procedure was performed. After adequate sedation, Thromboguards were in place. Local anesthetic was infiltrated in the left upper quadrant and a Veress needle was placed and verified to be intraabdominal with low initial insufflation pressures and water drop test. A 5mm port was then placed through this incision, two additional ports were then placed after adequate local anesthetic through previous incision sites. Omental adhesions to the abdominal wall were taken down using electrocoagulation. The band was identified and clasp freed with the aid of electrocautery. The tubing was cut with scissors and band slid out of its scar sheathing. The tubing was cut at the anterior abdominal wall and all tubing and the port was removed from the abdomen. Hemostasis was verified and the band was inspected to verify no signs of erosion. Pneumoperitoneum was then released and ports removed. An incision connecting two of the 5mm port sites overlying  the band port were connected and electrocoagulation used to dissect down to the port itself. This was freed and all suture material was removed. The scar capsule was closed down using interrupted 2-0 vicryl and skin closed with subcuticular 4-0 Monocryl.      There were no complications. All sponge and instrument counts were correct at the conclusion of the procedure    Due to the nature and complexity of the operation, Marii Wayne CNP was a critical part of the operation and aided in retraction and dissection of tissue through the left lateral port site.     EBL: 10 mL    Chris Millan MD  Phone Number: [phone]    DATE: 11/28/18      TIME: 8:28 AM

## 2018-11-28 NOTE — ANESTHESIA POSTPROCEDURE EVALUATION
Patient: Katy Stack    Procedure Summary     Date:  11/28/18 Room / Location:  [facility] OR 04 / [facility] OR    Anesthesia Start:  0659 Anesthesia Stop:      Procedure:  Laparoscopic band removal (N/A Abdomen) Diagnosis:       LAP-BAND surgery status      (LAP-BAND surgery status [Z98.84])    Surgeon:  Chris Millan MD Responsible Provider:  Emmanuel Pendleton CRNA    Anesthesia Type:  general ASA Status:  3          Anesthesia Type: general  Last vitals  BP      Temp      Pulse     Resp      SpO2      Pain Score   3     Anesthesia Post Evaluation    Patient location during evaluation: PACU  Patient participation: complete - patient participated  Level of consciousness: awake and alert  Pain management: adequate  Airway patency: patent  Anesthetic complications: no  Cardiovascular status: acceptable  Respiratory status: acceptable  Hydration status: acceptable  May dismiss recovered patient based on consultation with the appropriate physicians and/or meeting appropriate discharge criteria

## 2018-11-28 NOTE — DISCHARGE INSTRUCTIONS
General Anesthesia Post-Operative Instructions    Due to surgery, you may feel tired or lightheaded and your judgment and motor abilities are going to be impaired for the next 24 hours. For your safety, please follow these instructions:  · DO NOT drive or operate heavy machinery for the next 24 hours.  · DO NOT use alcohol or sleeping pills for the next 24 hours.  · DO NOT make any critical decisions or sign any legal documents for the next 24 hours.  · A responsible adult needs to stay with you for the next 24 hours. Do not stay home alone.  · Limit your activity for the next 24 hours. You may return to your normal activities when your doctor gives you permission.     Anesthesia may cause nausea and vomiting in some individuals. To help prevent this, follow these tips:  · Start with water, clear liquids, and light foods, such as toast, crackers, or jello.  · If you are tolerating light foods without any problems, you may gradually increase diet as desired.  · If nausea or vomiting persists, call your doctor or come into the ER after clinic hours.    To help prevent blood clots, move your feet in a [place] or up and down 10-30 times an hour while sitting or lying down.    Call your doctor if you experience:  · A fever over a 100.4 F.   · If you notice pus or red streaks coming from your wound.  · If you notice increased redness or swelling around the wound.  · If you notice increasing bleeding from your wound.       Medications    You were given:    You may have more:      If you have any problems or questions you may call:    [facility]:  [phone]  [facility]:  [phone]  [facility]:  [phone]  [facility]:  [phone]  [facility]:  [phone]    Laparoscopic   gases used intraop may cause shoulder/neck discomfort, mobility is encouraged to help body absorb these gases

## 2018-11-28 NOTE — ANESTHESIA PROCEDURE NOTES
ANESTHESIA INTUBATION  Urgency: elective    Airway not difficult    General Information and Staff    Patient location during procedure: OR  CRNA: Emmanuel Pendleton CRNA  Performed: CRNA     Indications and Patient Condition  Indications for airway management: anesthesia and airway protection  Spontaneous Ventilation: absent  Sedation level: deep  Preoxygenated: yes  Patient position: sniffing  MILS maintained throughout  Mask difficulty assessment: 1 - vent by mask    Final Airway Details  Final airway type: endotracheal airway      Successful airway: ETT  Cuffed: yes   Successful intubation technique: direct laryngoscopy and rapid sequence induction  Facilitating devices/methods: stylet and cricoid pressure  Endotracheal tube insertion site: oral  Blade: Stubbs  Blade size: #2  ETT size (mm): 7.0  Cormack-Lehane Classification: grade IIa - partial view of glottis  Placement verified by: chest auscultation and capnometry   Measured from: lips  ETT to lips (cm): 21  Number of attempts at approach: 1  Ventilation between attempts: none  Number of other approaches attempted: 0    Additional Comments  Cords visualized, atraumatic intubation, dentition unchanged.           73 y/o male pmhx incarcerated R inguinal hernia, HLD, CAD w coronary angioplasty no stents, presented with a chief complaint of incidental liver lesions, cirrhosis and portal vein thrombosis found on outpatient US and elevated liver enzymes from PCP. Patient had recent history of 20lb weight loss, decreased po intake, weakness, dizziness, and multiple episodes of non bloody diarrhea for several weeks before admission. On this admission patient was started on a heparin GGT per vascular surgery recommendations for the portal vein thrombosis, his hospital stay was complicated by an episode of hemoperitoneum 2/2 to the therapeutic A/C. The heparin GGT was stopped immediately after the critical finding was conveyed by the radiologist. Patient remained asymptomatic with just complaints of mild abdominal discomfort and bloating. Hb dropped from 14.1 to 9.9 and has remained stable at 9.3. Patient also was discovered to have incidental R segmental and subsegmental pulmonary emboli, but remained asymptomatic LE duplex negative for DVT, denies shortness of breath and O2 challenged saturating at 94-97% on ambulation.   MRI confirmed there is a 15.5x10.0x17.2 cm invasive lesion in the R hepatic lobe with 1.4 cm lesion in the L hepatic lobe and tumor invasion into the main portal vein and several of its branches. [place] [address], which confirms the diagnosis for Hepatocellular Carcinoma. Surgery and IR were consulted and the lesion is surgically unresectable and Catheter directed chemotherapy or Radiation with Y-90 are not an option due to high risk for for fulminant hepatic failure. Heme oncology is following the patient and he will follow up outpatient for treatment options with Dr. Landon on Aug 15th, 2018 at 3:00pm. Patient is feeling improved since admission with fluids and rest, despite the unfortunate information conveyed on this visit and ready to go home.

## 2018-11-28 NOTE — PERIOPERATIVE NURSING NOTE
Patient reports wears 2L O2 at night, informed her due to low oxygen saturation advised her to wear her oxygen while sleeping today, tonight and tomorrow. Encouraged deep breathing and instructed patient on how to use incentive spirometer and advised to us IS 6x per hour while awake today.

## 2018-12-20 ENCOUNTER — OFFICE VISIT (OUTPATIENT)
Dept: SURGERY | Facility: CLINIC | Age: 78
End: 2018-12-20
Payer: MEDICARE

## 2018-12-20 VITALS
OXYGEN SATURATION: 94 % | BODY MASS INDEX: 37.3 KG/M2 | SYSTOLIC BLOOD PRESSURE: 132 MMHG | WEIGHT: 190 LBS | HEART RATE: 80 BPM | HEIGHT: 60 IN | DIASTOLIC BLOOD PRESSURE: 70 MMHG

## 2018-12-20 DIAGNOSIS — Z98.84 LAP-BAND SURGERY STATUS: Primary | ICD-10-CM

## 2018-12-20 PROCEDURE — 99024 POSTOP FOLLOW-UP VISIT: CPT | Performed by: SURGERY

## 2018-12-20 NOTE — PROGRESS NOTES
SURGERY CLINIC POST-OPERATIVE CHECK  Katy Stack [id number]  [facility] GENERAL SURGERY -   SUBJECTIVE:  Katy Stack is a 78 y.o. female who presents to clinic after a lap band removal performed on 11/28/18.  The patient reports that since discharge from the hospital she has been doing well. She denies any reflux symptoms. Denies any dysphagia and is very satisfied with her present condition..  Brief Review of Systems: Normal BM's, denies hematochezia, melena or pain.    Medications an allergies were reviewed at the time of the visit.  OBJECTIVE:  There were no vitals taken for this visit.    Examination    General:  Awake, alert and in no acute distress.  Abdomen: soft, non tender, incisions healing well without erythema or drainage.    ASSESSMENT & PLAN:  Katy Stack is a 78 y.o. female who is post op from lap band removal and is progressing well.      No further follow up is necessary.  Katy Stack was advised to call for any concerns.    Chris Khalil MD

## 2019-01-03 ENCOUNTER — TELEPHONE (OUTPATIENT)
Dept: FAMILY MEDICINE | Facility: CLINIC | Age: 79
End: 2019-01-03

## 2019-01-03 DIAGNOSIS — M54.9 CHRONIC BACK PAIN, UNSPECIFIED BACK LOCATION, UNSPECIFIED BACK PAIN LATERALITY: Primary | ICD-10-CM

## 2019-01-03 DIAGNOSIS — G89.29 CHRONIC BACK PAIN, UNSPECIFIED BACK LOCATION, UNSPECIFIED BACK PAIN LATERALITY: Primary | ICD-10-CM

## 2019-01-03 NOTE — TELEPHONE ENCOUNTER
"Patient having continued back pain related to \"two failed back surgeries\" Patient states she has seen Rashmi at [facility] in the past for treatment. She is requesting another referral be sent. Per jesu Hickman to send referral. Patient notified and verbalized understanding.  "

## 2019-01-11 ENCOUNTER — TELEPHONE (OUTPATIENT)
Dept: FAMILY MEDICINE | Facility: CLINIC | Age: 79
End: 2019-01-11

## 2019-01-11 DIAGNOSIS — E03.9 HYPOTHYROIDISM, UNSPECIFIED TYPE: ICD-10-CM

## 2019-01-11 DIAGNOSIS — E78.5 HYPERLIPIDEMIA, UNSPECIFIED HYPERLIPIDEMIA TYPE: ICD-10-CM

## 2019-01-11 DIAGNOSIS — I10 ESSENTIAL HYPERTENSION: ICD-10-CM

## 2019-01-11 RX ORDER — LEVOTHYROXINE SODIUM 100 UG/1
100 TABLET ORAL DAILY
Qty: 21 TABLET | Refills: 0 | Status: SHIPPED | OUTPATIENT
Start: 2019-01-11 | End: 2019-04-04 | Stop reason: SDUPTHER

## 2019-01-11 RX ORDER — LOSARTAN POTASSIUM 50 MG/1
50 TABLET ORAL DAILY
Qty: 90 TABLET | Refills: 3 | Status: SHIPPED | OUTPATIENT
Start: 2019-01-11 | End: 2020-03-20 | Stop reason: SDUPTHER

## 2019-01-11 RX ORDER — ATORVASTATIN CALCIUM 20 MG/1
20 TABLET, FILM COATED ORAL DAILY
Qty: 90 TABLET | Refills: 3 | Status: SHIPPED | OUTPATIENT
Start: 2019-01-11 | End: 2020-03-20 | Stop reason: SDUPTHER

## 2019-01-11 RX ORDER — ATENOLOL 25 MG/1
25 TABLET ORAL DAILY
Qty: 90 TABLET | Refills: 3 | Status: SHIPPED | OUTPATIENT
Start: 2019-01-11 | End: 2020-03-20 | Stop reason: SDUPTHER

## 2019-01-11 RX ORDER — LEVOTHYROXINE SODIUM 100 UG/1
100 TABLET ORAL DAILY
Qty: 90 TABLET | Refills: 3 | Status: SHIPPED | OUTPATIENT
Start: 2019-01-11 | End: 2019-06-04 | Stop reason: SDUPTHER

## 2019-01-11 NOTE — TELEPHONE ENCOUNTER
Patient's last TSH 09/14/18 and was WNL. Will send 3 weeks worth to [facility] and a year's worth to her mail in pharmacy.

## 2019-01-28 ENCOUNTER — TELEPHONE (OUTPATIENT)
Dept: FAMILY MEDICINE | Facility: CLINIC | Age: 79
End: 2019-01-28

## 2019-01-28 DIAGNOSIS — Z23 IMMUNIZATION DUE: Primary | ICD-10-CM

## 2019-02-21 ENCOUNTER — ANCILLARY PROCEDURE (OUTPATIENT)
Dept: BONE DENSITY | Facility: CLINIC | Age: 79
End: 2019-02-21
Payer: MEDICARE

## 2019-02-21 DIAGNOSIS — Z78.0 POST-MENOPAUSAL: ICD-10-CM

## 2019-02-21 DIAGNOSIS — M54.50 LOW BACK PAIN: ICD-10-CM

## 2019-03-26 ENCOUNTER — TELEPHONE (OUTPATIENT)
Dept: BARIATRICS | Facility: HOSPITAL | Age: 79
End: 2019-03-26

## 2019-03-26 NOTE — TELEPHONE ENCOUNTER
Called pt to f/u for her annual visit.  Pt states she had her band removed by Dr. Quinn.  Pt states she is gaining weight and wonders if she can see someone in [place].  Informed pt she could call Dr. Quinn's office.  Pt has no other questions at this time.

## 2019-03-29 ENCOUNTER — OFFICE VISIT (OUTPATIENT)
Dept: FAMILY MEDICINE | Facility: CLINIC | Age: 79
End: 2019-03-29
Payer: MEDICARE

## 2019-03-29 VITALS
WEIGHT: 199 LBS | BODY MASS INDEX: 38.86 KG/M2 | OXYGEN SATURATION: 97 % | HEART RATE: 61 BPM | DIASTOLIC BLOOD PRESSURE: 68 MMHG | RESPIRATION RATE: 16 BRPM | TEMPERATURE: 98.9 F | SYSTOLIC BLOOD PRESSURE: 144 MMHG

## 2019-03-29 DIAGNOSIS — J44.9 CHRONIC OBSTRUCTIVE PULMONARY DISEASE, UNSPECIFIED COPD TYPE (CMS/HCC): Primary | ICD-10-CM

## 2019-03-29 DIAGNOSIS — E03.8 OTHER SPECIFIED HYPOTHYROIDISM: ICD-10-CM

## 2019-03-29 LAB
ALBUMIN SERPL-MCNC: 3.7 G/DL (ref 3.5–5.3)
ALP SERPL-CCNC: 82 U/L (ref 55–142)
ALT SERPL-CCNC: 11 U/L (ref 0–52)
ANION GAP SERPL CALC-SCNC: 7 MMOL/L (ref 3–11)
ANISOCYTOSIS PRESENCE IN BLOOD, ANALYZER: ABNORMAL
AST SERPL-CCNC: 15 U/L (ref 0–39)
BASOPHILS # BLD AUTO: 0 10*3/UL
BASOPHILS NFR BLD AUTO: 1 % (ref 0–2)
BILIRUB SERPL-MCNC: 0.33 MG/DL (ref 0–1.4)
BUN SERPL-MCNC: 18 MG/DL (ref 7–25)
CALCIUM ALBUM COR SERPL-MCNC: 9.4 MG/DL (ref 8.6–10.3)
CALCIUM SERPL-MCNC: 9.2 MG/DL (ref 8.6–10.3)
CHLORIDE SERPL-SCNC: 108 MMOL/L (ref 98–107)
CO2 SERPL-SCNC: 27 MMOL/L (ref 21–32)
CREAT SERPL-MCNC: 1.11 MG/DL (ref 0.6–1.2)
EOSINOPHIL # BLD AUTO: 0.3 10*3/UL
EOSINOPHIL NFR BLD AUTO: 4 % (ref 0–3)
ERYTHROCYTE [DISTWIDTH] IN BLOOD BY AUTOMATED COUNT: 17.4 % (ref 11.5–14)
GFR SERPL CREATININE-BSD FRML MDRD: 48 ML/MIN/1.73M*2
GLUCOSE SERPL-MCNC: 96 MG/DL (ref 70–105)
HCT VFR BLD AUTO: 40.6 % (ref 34–45)
HGB BLD-MCNC: 13.3 G/DL (ref 11.5–15.5)
LYMPHOCYTES # BLD AUTO: 2.6 10*3/UL
LYMPHOCYTES NFR BLD AUTO: 34 % (ref 11–47)
MCH RBC QN AUTO: 27.1 PG (ref 28–33)
MCHC RBC AUTO-ENTMCNC: 32.7 G/DL (ref 32–36)
MCV RBC AUTO: 83 FL (ref 81–97)
MONOCYTES # BLD AUTO: 0.5 10*3/UL
MONOCYTES NFR BLD AUTO: 6 % (ref 3–11)
NEUTROPHILS # BLD AUTO: 4.3 10*3/UL
NEUTROPHILS NFR BLD AUTO: 55 % (ref 41–81)
PLATELET # BLD AUTO: 255 10*3/UL (ref 140–350)
PMV BLD AUTO: 8.4 FL (ref 6.9–10.8)
POTASSIUM SERPL-SCNC: 4.2 MMOL/L (ref 3.5–5.1)
PROT SERPL-MCNC: 6.6 G/DL (ref 6–8.3)
RBC # BLD AUTO: 4.89 10*6/ΜL (ref 3.7–5.3)
SODIUM SERPL-SCNC: 142 MMOL/L (ref 135–145)
TSH SERPL DL<=0.05 MIU/L-ACNC: 4.02 UIU/ML (ref 0.34–4.82)
WBC # BLD AUTO: 7.8 10*3/UL (ref 4.5–10.5)

## 2019-03-29 PROCEDURE — 85025 COMPLETE CBC W/AUTO DIFF WBC: CPT | Mod: PO | Performed by: FAMILY MEDICINE

## 2019-03-29 PROCEDURE — G0463 HOSPITAL OUTPT CLINIC VISIT: HCPCS | Mod: PO | Performed by: FAMILY MEDICINE

## 2019-03-29 PROCEDURE — 36415 COLL VENOUS BLD VENIPUNCTURE: CPT | Mod: PO | Performed by: FAMILY MEDICINE

## 2019-03-29 PROCEDURE — 84443 ASSAY THYROID STIM HORMONE: CPT | Performed by: FAMILY MEDICINE

## 2019-03-29 PROCEDURE — 99214 OFFICE O/P EST MOD 30 MIN: CPT | Performed by: FAMILY MEDICINE

## 2019-03-29 PROCEDURE — 80053 COMPREHEN METABOLIC PANEL: CPT | Mod: PO | Performed by: FAMILY MEDICINE

## 2019-03-29 ASSESSMENT — PAIN SCALES - GENERAL: PAINLEVEL: 3

## 2019-03-29 NOTE — PROGRESS NOTES
Subjective      Katy Stack is a 78 y.o. female who presents for Fatigue (Has noticed fatigue/lack of energy over the last several months. Last TSH in 09/2018- Normal) and Shortness of Breath (Feels COPD is exacerbated during colder days. )  .    Patient presents due to concerns of worse than usual fatigue, tiredness, and lack of energy over the last several weeks. Her last TSH was drawn on 9/14/18 with a reading of 0.349. She is currently taking levothyroxine 100mcg daily. She denies worse sweats, constipation, or increased hair loss during this time.    Patient has worse shortness of breath and hoarseness of voice during winter/colder weather. She does have a hx of COPD but is not using any inhalers at this time. She would be agreeable to doing a PFT.    Patient was recently seen by [facility] dermatology to have squamous skin cancer removed from her scalp. No fevers, chills, or discharge          The following have been reviewed and updated as appropriate in this visit:  Tobacco  Allergies  Meds  Problems  Med Hx  Surg Hx  Fam Hx         Allergies   Allergen Reactions   • Strawberry      hives, tongue swells   • Adhesive      welts     Current Outpatient Medications   Medication Sig Dispense Refill   • levothyroxine (SYNTHROID, LEVOTHROID) 100 mcg tablet Take 1 tablet (100 mcg total) by mouth daily 90 tablet 3   • levothyroxine (SYNTHROID, LEVOTHROID) 100 mcg tablet Take 1 tablet (100 mcg total) by mouth daily Pt must keep upcoming appt 21 tablet 0   • atenolol (TENORMIN) 25 mg tablet Take 1 tablet (25 mg total) by mouth daily 90 tablet 3   • atorvastatin (LIPITOR) 20 mg tablet Take 1 tablet (20 mg total) by mouth daily 90 tablet 3   • losartan (COZAAR) 50 mg tablet Take 1 tablet (50 mg total) by mouth daily 90 tablet 3   • gabapentin (NEURONTIN) 100 mg capsule Take 200 mg by mouth 3 (three) times a day.       • vit A-vit C-vit E-zinc-copper (EYE VITAMIN AND MINERALS) 7,160-113-100 unit-mg-unit  tablet Take 1 tab/cap by mouth 2 (two) times a day.     • PREMARIN 0.9 mg tablet TAKE 1 TABLET BY MOUTH  EVERY DAY (Patient taking differently: TAKE 1 TABLET BY MOUTH EVERY WEEK) 90 tablet 1   • nitroglycerin (NITROSTAT) 0.4 mg SL tablet Place by sublingual route. 25 1   • aspirin (ASPIR-LOW) 81 mg EC tablet 1 tab by oral route every day     • AMITIZA 24 mcg capsule        No current facility-administered medications for this visit.      Past Medical History:   Diagnosis Date   • Angina pectoris (CMS/HCC) ([facility])    • Benign essential hypertension 11/24/2017   • Cataract of left eye    • Chronic obstructive lung disease (CMS/HCC) ([facility]) 11/10/2017   • Dysrhythmia     LBBB   • GERD (gastroesophageal reflux disease)     at times   • Hypertension    • Hypothyroid    • Hypothyroidism 11/10/2017   • Neurologic disorder    • Obesity 11/10/2017   • Obstructive sleep apnea syndrome 11/10/2017    Night time oxygen/2L   • Osteoarthritis    • Periodic limb movement disorder 11/10/2017   • Peripheral neuropathy     left thigh into left groin    • Rectocele    • Respiratory disease      Past Surgical History:   Procedure Laterality Date   • APPENDECTOMY  1987   • BACK SURGERY      2 surgeries [id number]   • BACK SURGERY  11/30/2016   • BACK SURGERY  05/01/2017    vetebral fusion   • CARDIAC CATHETERIZATION  08/31/2011   • CATARACT EXTRACTION W/  INTRAOCULAR LENS IMPLANT Left 7/2/2018    Procedure: OS CATARACT LEFT PHACOEMULSIFICATION OF CATARACT WITH INTRAOCULAR LENS;  Surgeon: Emmanuel Cueto MD;  Location: [facility];  Service: Ophthalmology;  Laterality: Left;   • CATARACT EXTRACTION W/  INTRAOCULAR LENS IMPLANT Right 8/6/2018    Procedure: OD CATARACT RIGHT PHACOEMULSIFICATION OF CATARACT WITH INTRAOCULAR LENS;  Surgeon: Emmanuel Cueto MD;  Location: [facility];  Service: Ophthalmology;  Laterality: Right;   • CHOLECYSTECTOMY     • COLONOSCOPY  02/19/2014    No polyps   • COLONOSCOPY  04/26/2011   •  DIAGNOSTIC LAPAROSCOPY N/A 2018    Procedure: Laparoscopic band removal;  Surgeon: Chris Millan MD;  Location: [facility] OR;  Service: General;  Laterality: N/A;   • ESOPHAGOGASTRODUODENOSCOPY N/A 2018    Procedure: EGD - ESOPHAGOGASTRODUODENOSCOPY;  Surgeon: Chris Millan MD;  Location: [facility]"" Endoscopy;  Service: Endoscopy;  Laterality: N/A;   • FOOT SURGERY      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 Vela- shoulder   • THYROID LOBECTOMY Left    • THYROIDECTOMY, PARTIAL  2010   • UPPER GASTROINTESTINAL ENDOSCOPY  2014    Mild chronic gastritis     Family History   Problem Relation Age of Onset   • Heart disease Mother    • Heart disease Father         Myocardial infarction   • Parkinsonism Father    • Thyroid disease Father    • Alzheimer's disease Sister    • Arthritis Sister    • Osteoporosis Sister    • Lung cancer Brother    • Diabetes Maternal Grandmother      Social History     Socioeconomic History   • Marital status:      Spouse name: None   • Number of children: None   • Years of education: None   • Highest education level: None   Social Needs   • Financial resource strain: None   • Food insecurity - worry: None   • Food insecurity - inability: None   • Transportation needs - medical: None   • Transportation needs - non-medical: None   Occupational History   • None   Tobacco Use   • Smoking status: Former Smoker     Packs/day: 2.00     Years: 35.00     Pack years: 70.00     Last attempt to quit:      Years since quittin.2   • Smokeless tobacco: Never Used   Substance and Sexual Activity   • Alcohol use: No   • Drug use:  No   • Sexual activity: None   Other Topics Concern   • None   Social History Narrative   • None       Review of Systems    Objective     Vitals  /68 (BP Location: Left arm, Patient Position: Sitting, Cuff Size: Reg)   Pulse 61   Temp 37.2 °C (98.9 °F) (Temporal)   Resp 16   Wt 90.3 kg (199 lb)   SpO2 97%   BMI 38.86 kg/m²     Physical Exam   Constitutional: She is oriented to person, place, and time. She appears well-developed and well-nourished. No distress.   Obese.  Corrected vision.   HENT:   Head: Normocephalic and atraumatic.   Right Ear: External ear normal.   Left Ear: External ear normal.   Nose: Nose normal.   Mouth/Throat: Oropharynx is clear and moist. No oropharyngeal exudate.   Eyes: Pupils are equal, round, and reactive to light. Conjunctivae and EOM are normal. Right eye exhibits no discharge. Left eye exhibits no discharge. No scleral icterus.   Neck: Neck supple. No tracheal deviation present. No thyromegaly present.   Cardiovascular: Normal rate, regular rhythm and intact distal pulses. Exam reveals no gallop and no friction rub.   Murmur (1/6 systolic ejection murmur) heard.  Pulmonary/Chest: Effort normal and breath sounds normal. No respiratory distress. She has no wheezes. She has no rales.   Abdominal: Soft. Bowel sounds are normal. She exhibits no distension and no mass. There is no tenderness.   Musculoskeletal: She exhibits no edema.   Lymphadenopathy:     She has no cervical adenopathy.   Neurological: She is alert and oriented to person, place, and time. She has normal reflexes. She displays normal reflexes. No cranial nerve deficit.   Skin: Skin is warm and dry. No rash noted. She is not diaphoretic. No erythema. No pallor.   Healing incision to crown of scalp.   Psychiatric: She has a normal mood and affect. Her behavior is normal. Judgment and thought content normal.   Nursing note and vitals reviewed.      Procedures    Assessment/Plan   Diagnoses and all orders for this  visit:    Chronic obstructive pulmonary disease, unspecified COPD type (CMS/HCC) (HCC)  Comments:  We will send for PFTs.  Albuterol inhaler as needed for now.  Follow-up pending results.  Orders:  -     PFT complete study; Future    Other specified hypothyroidism  Comments:  Repeat labs today, consider further eval of KATY.   Orders:  -     Thyroid Stimulating Hormone, Ultrasensitive Blood, Venous; Future  -     Comprehensive metabolic panel Blood, Venous; Future  -     CBC w/auto differential Blood, Venous; Future    Squamous cell carcinoma  Comments:  Well healing, no s/s of infection. F/u prn.           Return if symptoms worsen or fail to improve.    ENRRIQUE BALES MD    Portions of this note were documented by Nneka Archer as I performed the exam and collected the information from Katy Stack. I attest that I have reviewed the information as documented, verified the accuracy of the documentation and added additional information as needed.

## 2019-04-04 ENCOUNTER — CONSULT (OUTPATIENT)
Dept: SURGERY | Facility: CLINIC | Age: 79
End: 2019-04-04
Payer: MEDICARE

## 2019-04-04 VITALS
TEMPERATURE: 98.2 F | HEIGHT: 60 IN | BODY MASS INDEX: 39.46 KG/M2 | SYSTOLIC BLOOD PRESSURE: 136 MMHG | DIASTOLIC BLOOD PRESSURE: 70 MMHG | OXYGEN SATURATION: 96 % | HEART RATE: 58 BPM | WEIGHT: 201 LBS

## 2019-04-04 DIAGNOSIS — E66.812 CLASS 2 SEVERE OBESITY DUE TO EXCESS CALORIES WITH SERIOUS COMORBIDITY AND BODY MASS INDEX (BMI) OF 39.0 TO 39.9 IN ADULT (CMS/HCC): Primary | ICD-10-CM

## 2019-04-04 DIAGNOSIS — K21.9 GASTROESOPHAGEAL REFLUX DISEASE, ESOPHAGITIS PRESENCE NOT SPECIFIED: ICD-10-CM

## 2019-04-04 DIAGNOSIS — Z98.84 H/O LAPAROSCOPIC ADJUSTABLE GASTRIC BANDING: ICD-10-CM

## 2019-04-04 DIAGNOSIS — E66.01 CLASS 2 SEVERE OBESITY DUE TO EXCESS CALORIES WITH SERIOUS COMORBIDITY AND BODY MASS INDEX (BMI) OF 39.0 TO 39.9 IN ADULT (CMS/HCC): Primary | ICD-10-CM

## 2019-04-04 PROCEDURE — 99215 OFFICE O/P EST HI 40 MIN: CPT | Performed by: NURSE PRACTITIONER

## 2019-04-04 PROCEDURE — G0463 HOSPITAL OUTPT CLINIC VISIT: HCPCS | Performed by: NURSE PRACTITIONER

## 2019-04-04 NOTE — PATIENT INSTRUCTIONS
Maintain food/fluid log.   Aim for 60-80 gm protein per day (20-30 gm per meal). Refer to protein handout. May supplement with protein shake if needed.   Exercise by walking 10 minutes 3 days per week. Increase duration, frequency, and intensity as tolerated (ie time, days per week, and pace/resistance - adding wrist weights); when outdoor pool opens, water walk 7 days per week.

## 2019-04-04 NOTE — PROGRESS NOTES
GENERAL SURGERY ADMISSION HISTORY & PHYSICAL     4/4/2019     Katy Stack     [id number]    REFERRING PROVIDER: Self     CHIEF COMPLAINT  Chief Complaint   Patient presents with   • Obesity     Bariatric consult  Follows with Dr Thaddeus MORRISON Lap band removal 11-28-18      HISTORY OF PRESENT ILLNESS:  Katy Stack is a 78 y.o.  female who presents to the weight management and bariatric surgery clinic today to discuss weight loss options.  Her Body mass index is 39.26 kg/m²..  She has a longstanding history of obesity with multiple failed attempts at weight loss in the past including LAP-BAND, exclusion of carbohydrates, Weight Watchers, [employer], and caloric restriction with most successful attempt being LAP-BAND with a loss of 50 lbs.  She is well known to me as she did experience slippage of the LAP-BAND and had this removed recently.  She has had complete resolution of reflux symptoms since LAP-BAND removal.  She had regained 20 lbs with LAP-BAND still in place, and has now regained another 16 lbs.   She reports highest adult weight 211 lbs, and lowest adult weight 140 lbs.  She has been overweight for approximately 50 years and attributes initial weight gain to childbirth.  She is most interested in revisional surgery for weight loss.  She is a former smoker, quit 25 years ago.    Weight related co morbidities including hyperlipidemia, hypertension, COPD with supplemental oxygen at night only.  Denies psychological weight related co morbidities.    She reports currently working on portion control with her diet.  She reports rarely eating out, but when she does she eats a Whopper Abdirashid from [employer].   Her current diet consists of:   -Breakfast: bagel and orange or cereal  -Lunch: largest portion. 1/4-1/2 cup potato, 4 oz piece of meat, green side salad, occasional piece of fresh fruit.   -Dinner: soup (vegetable beef, beef barley) and a cookie 3-4 nights per week. Leftovers including 1/2  roast beef sandwich, roast, vegetables, or spaghetti on the other 3-4 nights per week.  -Snacks: Grazes on potato chips throughout day. Occasional mini candy bar.   -Beverages: water, green tea, and coffee with sugar-free creamer. Denies sugared beverages.      She denies any current routine exercise.      Past Medical History:   Diagnosis Date   • Angina pectoris (CMS/[facility]) ([facility])    • Benign essential hypertension 11/24/2017   • Cataract of left eye    • Chronic obstructive lung disease (CMS/HCC) ([facility]) 11/10/2017   • Dysrhythmia     LBBB   • GERD (gastroesophageal reflux disease)     at times   • Hypertension    • Hypothyroid    • Hypothyroidism 11/10/2017   • Neurologic disorder    • Obesity 11/10/2017   • Obstructive sleep apnea syndrome 11/10/2017    Night time oxygen/2L   • Osteoarthritis    • Periodic limb movement disorder 11/10/2017   • Peripheral neuropathy     left thigh into left groin    • Rectocele    • Respiratory disease         Past Surgical History:   Procedure Laterality Date   • APPENDECTOMY  1987   • BACK SURGERY      2 surgeries [id number]   • BACK SURGERY  11/30/2016   • BACK SURGERY  05/01/2017    vetebral fusion   • CARDIAC CATHETERIZATION  08/31/2011   • CATARACT EXTRACTION W/  INTRAOCULAR LENS IMPLANT Left 7/2/2018    Procedure: OS CATARACT LEFT PHACOEMULSIFICATION OF CATARACT WITH INTRAOCULAR LENS;  Surgeon: Emmanuel Cueto MD;  Location: [facility];  Service: Ophthalmology;  Laterality: Left;   • CATARACT EXTRACTION W/  INTRAOCULAR LENS IMPLANT Right 8/6/2018    Procedure: OD CATARACT RIGHT PHACOEMULSIFICATION OF CATARACT WITH INTRAOCULAR LENS;  Surgeon: Emmanuel Cueto MD;  Location: [facility];  Service: Ophthalmology;  Laterality: Right;   • CHOLECYSTECTOMY     • COLONOSCOPY  02/19/2014    No polyps   • COLONOSCOPY  04/26/2011   • DIAGNOSTIC LAPAROSCOPY N/A 11/28/2018    Procedure: Laparoscopic band removal;  Surgeon: Chris Millan MD;  Location: [facility];   Service: General;  Laterality: N/A;   • ESOPHAGOGASTRODUODENOSCOPY N/A 11/6/2018    Procedure: EGD - ESOPHAGOGASTRODUODENOSCOPY;  Surgeon: Chris Millan MD;  Location: [facility] Endoscopy;  Service: Endoscopy;  Laterality: N/A;   • FOOT SURGERY      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 Vela- shoulder   • THYROID LOBECTOMY Left 18/18/2010   • THYROIDECTOMY, PARTIAL  08/18/2010   • UPPER GASTROINTESTINAL ENDOSCOPY  02/19/2014    Mild chronic gastritis         Current Outpatient Medications:   •  levothyroxine (SYNTHROID, LEVOTHROID) 100 mcg tablet, Take 1 tablet (100 mcg total) by mouth daily, Disp: 90 tablet, Rfl: 3  •  atenolol (TENORMIN) 25 mg tablet, Take 1 tablet (25 mg total) by mouth daily, Disp: 90 tablet, Rfl: 3  •  atorvastatin (LIPITOR) 20 mg tablet, Take 1 tablet (20 mg total) by mouth daily, Disp: 90 tablet, Rfl: 3  •  losartan (COZAAR) 50 mg tablet, Take 1 tablet (50 mg total) by mouth daily, Disp: 90 tablet, Rfl: 3  •  gabapentin (NEURONTIN) 100 mg capsule, Take 200 mg by mouth 3 (three) times a day.  , Disp: , Rfl:   •  vit A-vit C-vit E-zinc-copper (EYE VITAMIN AND MINERALS) 7,160-113-100 unit-mg-unit tablet, Take 1 tab/cap by mouth 2 (two) times a day., Disp: , Rfl:   •  AMITIZA 24 mcg capsule, , Disp: , Rfl:   •  PREMARIN 0.9 mg tablet, TAKE 1 TABLET BY MOUTH  EVERY DAY (Patient taking differently: TAKE 1 TABLET BY MOUTH EVERY WEEK), Disp: 90 tablet, Rfl: 1  •  nitroglycerin (NITROSTAT) 0.4 mg SL tablet, Place by sublingual route., Disp: 25, Rfl: 1  •  aspirin (ASPIR-LOW) 81 mg EC tablet, 1 tab by oral route every day, Disp: , Rfl:     Allergies   Allergen  Reactions   • Strawberry      hives, tongue swells   • Adhesive      welts       Family History   Problem Relation Age of Onset   • Heart disease Mother    • Heart disease Father         Myocardial infarction   • Parkinsonism Father    • Thyroid disease Father    • Alzheimer's disease Sister    • Arthritis Sister    • Osteoporosis Sister    • Lung cancer Brother    • Diabetes Maternal Grandmother        Social History     Socioeconomic History   • Marital status:      Spouse name: Not on file   • Number of children: Not on file   • Years of education: Not on file   • Highest education level: Not on file   Social Needs   • Financial resource strain: Not on file   • Food insecurity - worry: Not on file   • Food insecurity - inability: Not on file   • Transportation needs - medical: Not on file   • Transportation needs - non-medical: Not on file   Occupational History   • Not on file   Tobacco Use   • Smoking status: Former Smoker     Packs/day: 2.00     Years: 35.00     Pack years: 70.00     Last attempt to quit:      Years since quittin.3   • Smokeless tobacco: Never Used   Substance and Sexual Activity   • Alcohol use: No   • Drug use: No   • Sexual activity: Not on file   Other Topics Concern   • Not on file   Social History Narrative   • Not on file          Review of Systems   Constitutional: Positive for unexpected weight change (gain). Negative for activity change, appetite change, chills, diaphoresis and fever.   HENT: Negative for trouble swallowing.    Respiratory: Positive for shortness of breath (with walking and pre-existing COPD). Negative for cough and chest tightness.    Cardiovascular: Negative for chest pain and palpitations.   Gastrointestinal: Negative for abdominal pain, constipation, diarrhea, nausea and vomiting.   Musculoskeletal: Negative for arthralgias, back pain and gait problem.   Skin: Negative for color change and pallor.   Neurological: Negative for dizziness, syncope  "and light-headedness.   Psychiatric/Behavioral: Negative.        Objective    Vital Signs  /70   Pulse 58   Temp 36.8 °C (98.2 °F) (Temporal)   Ht 1.524 m (5')   Wt 91.2 kg (201 lb)   SpO2 96%   BMI 39.26 kg/m²         Physical Exam   Constitutional: She is oriented to person, place, and time. Vital signs are normal. She appears well-developed and well-nourished. No distress.   Obese    HENT:   Head: Normocephalic and atraumatic.   Eyes: Conjunctivae are normal.   Neck: Neck supple.   Cardiovascular: Normal rate, regular rhythm, S1 normal and S2 normal.   No murmur heard.  Pulses:       Radial pulses are 2+ on the right side, and 2+ on the left side.   Pulmonary/Chest: Effort normal and breath sounds normal.   Abdominal: Soft. Bowel sounds are normal. There is no tenderness.   Obese; incisions consistent with surgical history   Neurological: She is alert and oriented to person, place, and time. She has normal strength. No cranial nerve deficit.   Skin: Skin is warm and dry. Capillary refill takes less than 2 seconds.   Psychiatric: She has a normal mood and affect.   Nursing note and vitals reviewed.        ASSESSMENT & PLAN:  1. Class 2 severe obesity due to excess calories with serious comorbidity and body mass index (BMI) of 39.0 to 39.9 in adult (CMS/HCC) ([facility])  - Ambulatory referral to Psychology; Future  Katy Stack is a 78 y.o. female.  Her Body mass index is 39.26 kg/m². and multiple comorbid conditions that are noted in the HPI.  She presents today to discuss weight loss options in order to improve her health and quality of life.  We reviewed weight management options including medically supervised weight management, prescription weight loss, and bariatric surgery.  She was provided the Bariatric Welcome Packet with supplemental materials and is aware of the bariatric support group.     Supplemental materials provided in Welcome Packet: \"Laparoscopic Bariatric Surgery\", \"Welcome to the " "[facility]'s Minimally Invasive Bariatric Surgery Program\", \"[facility] Bariatric Support Group: Your body. Your Life.\", \"Mindful Eating Tips\", \"Recipe Substitutions\", \"[employer] Food Records\", and \"Perfect Protein.\"    Online media reviewed: \"Time to Act on Obesity\"      Katy Stack has had multiple failed attempts at weight loss in the past that are noted in the HPI.  We reviewed foundational components of weight loss including nutrition, physical activity, and behavioral health as well as metabolic adaptation with weight loss and the effects of dieting.  We discussed importance of protein-rich diet and resistance training to combat metabolic adaptation, as well as to prevent loss of lean muscle and bone mass following bariatric surgery.      Upon conclusion of our discussion she reports that she is most interested in pursuing a surgical weight loss solution.  We reviewed risks/benefits of sleeve vs RYGB.  She will meet with the bariatric surgeon in 1 month for further discussion regarding risks/benefits of sleeve gastrectomy vs RYGB to determine best surgical weight loss option based on co morbid conditions and age.  The following bariatric surgery program requirements were discussed:      Preop:   - Medically supervised visits x 4 visits   - Nutrition counseling x 5 visits through IBT program. Will need referral from PCP.   - Behavioral health counseling x 3 visits  - No further weight gain  - Remain free of nicotine/tobacco use  - Preoperative lab work. Had EGD with Dr. Millan 11/2018, will not require repeat endoscopy.  - Preop diet     Postop:   - Ongoing follow-up at 3 weeks, 3 months, 6 months, 9 months, 12 months, 18 months, 2 years, and as needed thereafter  - 1 behavioral health counseling follow up within 6 months following surgery.  - Annual labs     The following 1-month weight loss goals were discussed and mutually agreed upon:   -Maintain food/fluid " log.   -Aim for 60-80 gm protein per day (20-30 gm per meal). Refer to protein handout. May supplement with protein shake if needed.   -Exercise by walking 10 minutes 3 days per week. Increase duration, frequency, and intensity as tolerated (ie time, days per week, and pace/resistance - adding wrist weights); when outdoor pool opens, water walk 7 days per week.     2. H/O laparoscopic adjustable gastric banding  Removed 11/2018 and patient without recurrent reflux symptoms at this time. She had experienced 20 lb weight gain with LAP-BAND still in place, and has gained another 16 lbs since removal. Reviewed risk for weight regain with band due to lack of hormonal/metabolic changes.  We did review how sleeve and RYGB differ from LAGB.     3. Gastroesophageal reflux disease, esophagitis presence not specified  She has had complete resolution of reflux symptoms since removal of LAP-BAND and is fearful of recurrent reflux. We did review higher risk for recurrent reflux with sleeve, and Braulio-en-Y gastric bypass as anti-reflux surgery; but also higher intra-operative risk with bypass particularly due to longer surgery time and advanced age.        Katy LIVIER Stack will RTC in 1 month for ongoing medically supervised weight management and to meet with the bariatric surgeon.  She participated in the decision making process and agreed with the plan of care.  All questions were sought and answered.      Marii Wayne CNP  11:56 AM, 4/15/2019    Total Time spent with the patient is >40 min with more than 50% in direct counseling and coordination of care regarding the above diagnoses.

## 2019-04-08 ENCOUNTER — TELEPHONE (OUTPATIENT)
Dept: SURGERY | Facility: CLINIC | Age: 79
End: 2019-04-08

## 2019-04-08 ENCOUNTER — TELEPHONE (OUTPATIENT)
Dept: FAMILY MEDICINE | Facility: CLINIC | Age: 79
End: 2019-04-08

## 2019-04-08 DIAGNOSIS — M54.5 CHRONIC BILATERAL LOW BACK PAIN, WITH SCIATICA PRESENCE UNSPECIFIED: Primary | ICD-10-CM

## 2019-04-08 DIAGNOSIS — G89.29 CHRONIC BILATERAL LOW BACK PAIN, WITH SCIATICA PRESENCE UNSPECIFIED: Primary | ICD-10-CM

## 2019-04-08 NOTE — TELEPHONE ENCOUNTER
Katy will call to Thaddeus's office to schedule an appointment so she can be referred for IBT to Mey Willoughby, nutritionist. She is Medicare and needs to be referred by her PCP.

## 2019-04-08 NOTE — TELEPHONE ENCOUNTER
Patient would like to be referred to PT for on going Lower back and down left leg, numb, tingle, burning.

## 2019-04-15 ASSESSMENT — ENCOUNTER SYMPTOMS
CHILLS: 0
UNEXPECTED WEIGHT CHANGE: 1
APPETITE CHANGE: 0
COLOR CHANGE: 0
LIGHT-HEADEDNESS: 0
ACTIVITY CHANGE: 0
PALPITATIONS: 0
BACK PAIN: 0
NAUSEA: 0
ARTHRALGIAS: 0
TROUBLE SWALLOWING: 0
FEVER: 0
DIAPHORESIS: 0
COUGH: 0
VOMITING: 0
PSYCHIATRIC NEGATIVE: 1
SHORTNESS OF BREATH: 1
ABDOMINAL PAIN: 0
CHEST TIGHTNESS: 0
CONSTIPATION: 0
DIZZINESS: 0
DIARRHEA: 0

## 2019-04-16 ENCOUNTER — OFFICE VISIT (OUTPATIENT)
Dept: FAMILY MEDICINE | Facility: CLINIC | Age: 79
End: 2019-04-16
Payer: MEDICARE

## 2019-04-16 VITALS
OXYGEN SATURATION: 97 % | DIASTOLIC BLOOD PRESSURE: 62 MMHG | HEIGHT: 60 IN | TEMPERATURE: 98.3 F | HEART RATE: 67 BPM | SYSTOLIC BLOOD PRESSURE: 126 MMHG | BODY MASS INDEX: 39.27 KG/M2 | RESPIRATION RATE: 16 BRPM | WEIGHT: 200 LBS

## 2019-04-16 DIAGNOSIS — E66.9 OBESITY (BMI 35.0-39.9 WITHOUT COMORBIDITY): Primary | ICD-10-CM

## 2019-04-16 PROCEDURE — 99213 OFFICE O/P EST LOW 20 MIN: CPT | Performed by: FAMILY MEDICINE

## 2019-04-16 PROCEDURE — G0463 HOSPITAL OUTPT CLINIC VISIT: HCPCS | Mod: PO | Performed by: FAMILY MEDICINE

## 2019-04-16 ASSESSMENT — PAIN SCALES - GENERAL: PAINLEVEL: 3

## 2019-04-16 NOTE — PROGRESS NOTES
Subjective      Katy Stack is a 78 y.o. female who presents for Follow-up (discuss referral to nutrition to help with weight loss)  .    Patient presents to discuss interest in being referred to Mey Willoughby with Nutrition. Patient had her lap band removed on 11/28/19 and since then has gained 15-20lbs. She has previously tried Nutrisystem and weight watchers diets with little to no improvement. Patient has a difficult time getting daily exercise due to her chronic lower back and left leg pain. She is able to get some water exercise in during the summer months when the [email or web address] pool is open and states she does well with this and enjoys it. Patient continues to take gabapentin 200mg three times daily due to her chronic pain but is concerned this is causing some of her weight gain.            Review of Systems    Objective     Vitals  /62 (BP Location: Left arm, Patient Position: Sitting, Cuff Size: Large)   Pulse 67   Temp 36.8 °C (98.3 °F) (Temporal)   Resp 16   Ht 1.524 m (5')   Wt 90.7 kg (200 lb)   SpO2 97%   BMI 39.06 kg/m²     Physical Exam   Constitutional: She appears well-developed and well-nourished. No distress.   Corrected vision.  Obese.   Neurological: She is alert.   Skin: She is not diaphoretic.   Psychiatric: She has a normal mood and affect. Her behavior is normal.   Nursing note and vitals reviewed.      Procedures    Assessment/Plan   Diagnoses and all orders for this visit:    Obesity (BMI 35.0-39.9 without comorbidity)  Comments:  Will refer for IBT due to BMI of 39 and failed lap band and prior diet attempts.   Orders:  -     Ambulatory Referral for IBT; Future        Return if symptoms worsen or fail to improve.    ENRRIQUE BALES MD    Portions of this note were documented by Nneka Archer as I performed the exam and collected the information from Katy Stack. I attest that I have reviewed the information as documented, verified the accuracy of the documentation and  added additional information as needed.

## 2019-05-02 ENCOUNTER — OFFICE VISIT (OUTPATIENT)
Dept: SURGERY | Facility: CLINIC | Age: 79
End: 2019-05-02
Payer: MEDICARE

## 2019-05-02 ENCOUNTER — OFFICE VISIT NO LOS (OUTPATIENT)
Dept: DIABETES SERVICES | Facility: CLINIC | Age: 79
End: 2019-05-02
Payer: MEDICARE

## 2019-05-02 VITALS
DIASTOLIC BLOOD PRESSURE: 80 MMHG | SYSTOLIC BLOOD PRESSURE: 138 MMHG | BODY MASS INDEX: 39.85 KG/M2 | HEART RATE: 68 BPM | WEIGHT: 203 LBS | HEIGHT: 60 IN

## 2019-05-02 DIAGNOSIS — E66.9 OBESITY (BMI 35.0-39.9 WITHOUT COMORBIDITY): ICD-10-CM

## 2019-05-02 DIAGNOSIS — E66.9 OBESITY (BMI 35.0-39.9 WITHOUT COMORBIDITY): Primary | ICD-10-CM

## 2019-05-02 PROCEDURE — G0447 BEHAVIOR COUNSEL OBESITY 15M: HCPCS | Performed by: DIETITIAN, REGISTERED

## 2019-05-02 PROCEDURE — G0447 BEHAVIOR COUNSEL OBESITY 15M: HCPCS | Mod: PO | Performed by: DIETITIAN, REGISTERED

## 2019-05-02 PROCEDURE — G0463 HOSPITAL OUTPT CLINIC VISIT: HCPCS | Performed by: SURGERY

## 2019-05-02 PROCEDURE — 99214 OFFICE O/P EST MOD 30 MIN: CPT | Performed by: SURGERY

## 2019-05-02 NOTE — PROGRESS NOTES
"Intensive Behavior Therapy (IBT) for Obesity    Referring Provider: Dr. Travis                           Supervising Provider: Dr. Denson      IBT Visit Number: #1  Date:  5/2/2019  Beginning Time: 11: 03 AM    End Time: 11:45 AM    BMI from initial provider referral: 39.06    Previous IBT Counseling: No     78 y.o. female with BMI of 39.65  Current weight:203#  Current height:5\"    Patient reports usual body weight (UBW) of: no Usual  Patient has experienced previous weight loss history: Weight Watchers, Nutri System, Atkins  Had lap band in 4868-6896 and was removed by Dr. Martinez in Fall in 2018; highest wt was 211#, lowest wt 165#; has gained at least 15# since Nov.       Food recall: breakfast: bagel w/pb; 10:30 4 crackers, turkey and cheese, noon: small meat, veggie, salad, potatoes/gravy; supper: soup or 1/2 sandwich, maybe a cookie; snack fruit like an apple; beverages: coffee w/creamer, tiny bit, green tea, water; eats out twice a week: chili relliano or taco salad and butterfly shrimp and baked potator  Physical activity:    Discussion:  Time spent face to face with patient:  42 minutes  Education provided:  Individual  Patient attends today:  unaccompanied    Assess:  Nutrition Assessment reviewed with patient: Yes      Advise:  Required education provided on the risks of obesity and benefit of weight loss: Yes. Discussed laparoscopic bariatric surgery, nutrition guidelines, pre wt loss surgery goals, importance of food journaling.  Patient has weight goal of: 150#  Resources/Handouts provided: Lifestyle, Nutrition and Physical Activity Changes After Bariatric Surgery.    Agree:  Patient is ready for behavior change.  At this time, patient would like to work on journaling food and fluid intake.    Goals:   Track food and fluid intake  15 gm protein per meal    Specific goal weight goals: 150#    Assist:  The following behavior change tool was used today:   Monitoring by Patient    Arrange:  Follow " up scheduled in  1 week

## 2019-05-02 NOTE — PROGRESS NOTES
GENERAL SURGERY ADMISSION HISTORY & PHYSICAL     5/2/2019     Katy Stack     [id number]     CHIEF COMPLAINT: Morbid obesity     HISTORY OF PRESENT ILLNESS:  Katy Stack is a 78 y.o.  female who's Body mass index is 39.65 kg/m².She is well known to me for lap band removal in November 2018.  She has a longstanding history of obesity with multiple failed attempts at weight loss in the past including LAP-BAND, exclusion of carbohydrates, Weight Watchers, Nutri Systems, and caloric restriction with most successful attempt being LAP-BAND with a loss of 50 lbs.  She is well known to me as she did experience slippage of the LAP-BAND and had this removed recently.  She has had complete resolution of reflux symptoms since LAP-BAND removal.  She had regained 20 lbs with LAP-BAND still in place, and has now regained another 16 lbs.   She reports highest adult weight 211 lbs, and lowest adult weight 140 lbs.  She has been overweight for approximately 50 years and attributes initial weight gain to childbirth.  She is most interested in revisional surgery for weight loss.  She is a former smoker, quit 25 years ago.    Her biggest motivating factor in wanting to lose weight is overall quality of life and health. She has noted slow increase in weight since having the band removed. She is meeting with Mey kirkland RD.      Weight related co morbidities including hyperlipidemia, hypertension, COPD with supplemental oxygen at night only.  Denies psychological weight related co morbidities.       Patient Active Problem List   Diagnosis   • Chronic obstructive lung disease (CMS/HCC) (HCC)   • Gastroesophageal reflux disease   • Hypothyroidism   • Obesity   • Obstructive sleep apnea syndrome   • Periodic limb movement disorder   • Vitamin D deficiency   • Allergic rhinitis   • Benign essential hypertension   • Obesity (BMI 35.0-39.9 without comorbidity)   • LAP-BAND surgery status   • Heartburn        Past Surgical  History:   Procedure Laterality Date   • APPENDECTOMY  1987   • BACK SURGERY      2 surgeries [id number]   • BACK SURGERY  11/30/2016   • BACK SURGERY  05/01/2017    vetebral fusion   • CARDIAC CATHETERIZATION  08/31/2011   • CATARACT EXTRACTION W/  INTRAOCULAR LENS IMPLANT Left 7/2/2018    Procedure: OS CATARACT LEFT PHACOEMULSIFICATION OF CATARACT WITH INTRAOCULAR LENS;  Surgeon: Emmanuel Cueto MD;  Location: [facility];  Service: Ophthalmology;  Laterality: Left;   • CATARACT EXTRACTION W/  INTRAOCULAR LENS IMPLANT Right 8/6/2018    Procedure: OD CATARACT RIGHT PHACOEMULSIFICATION OF CATARACT WITH INTRAOCULAR LENS;  Surgeon: Emmanuel Cueto MD;  Location: [facility];  Service: Ophthalmology;  Laterality: Right;   • CHOLECYSTECTOMY     • COLONOSCOPY  02/19/2014    No polyps   • COLONOSCOPY  04/26/2011   • DIAGNOSTIC LAPAROSCOPY N/A 11/28/2018    Procedure: Laparoscopic band removal;  Surgeon: Chris Millan MD;  Location: [facility];  Service: General;  Laterality: N/A;   • ESOPHAGOGASTRODUODENOSCOPY N/A 11/6/2018    Procedure: EGD - ESOPHAGOGASTRODUODENOSCOPY;  Surgeon: Chris Millan MD;  Location: [facility];  Service: Endoscopy;  Laterality: N/A;   • FOOT SURGERY      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 Vela- shoulder   • THYROID LOBECTOMY Left 18/18/2010   • THYROIDECTOMY, PARTIAL  08/18/2010   • UPPER GASTROINTESTINAL ENDOSCOPY  02/19/2014    Mild chronic gastritis         Current Outpatient Medications:   •  levothyroxine (SYNTHROID, LEVOTHROID) 100 mcg tablet, Take 1 tablet (100 mcg total) by mouth daily, Disp: 90 tablet, Rfl: 3  •   atenolol (TENORMIN) 25 mg tablet, Take 1 tablet (25 mg total) by mouth daily, Disp: 90 tablet, Rfl: 3  •  atorvastatin (LIPITOR) 20 mg tablet, Take 1 tablet (20 mg total) by mouth daily, Disp: 90 tablet, Rfl: 3  •  losartan (COZAAR) 50 mg tablet, Take 1 tablet (50 mg total) by mouth daily, Disp: 90 tablet, Rfl: 3  •  gabapentin (NEURONTIN) 100 mg capsule, Take 200 mg by mouth 3 (three) times a day.  , Disp: , Rfl:   •  vit A-vit C-vit E-zinc-copper (EYE VITAMIN AND MINERALS) 7,160-113-100 unit-mg-unit tablet, Take 1 tab/cap by mouth 2 (two) times a day., Disp: , Rfl:   •  AMITIZA 24 mcg capsule, , Disp: , Rfl:   •  PREMARIN 0.9 mg tablet, TAKE 1 TABLET BY MOUTH  EVERY DAY (Patient taking differently: TAKE 1 TABLET BY MOUTH EVERY WEEK), Disp: 90 tablet, Rfl: 1  •  nitroglycerin (NITROSTAT) 0.4 mg SL tablet, Place by sublingual route., Disp: 25, Rfl: 1  •  aspirin (ASPIR-LOW) 81 mg EC tablet, 1 tab by oral route every day, Disp: , Rfl:     Allergies   Allergen Reactions   • Shepherd Angioedema and Hives     hives, tongue swells  Swelling of tongue   • Adhesive      welts       Family History   Problem Relation Age of Onset   • Heart disease Mother    • Heart disease Father         Myocardial infarction   • Parkinsonism Father    • Thyroid disease Father    • Alzheimer's disease Sister    • Arthritis Sister    • Osteoporosis Sister    • Lung cancer Brother    • Diabetes Maternal Grandmother        Social History     Socioeconomic History   • Marital status:      Spouse name: Not on file   • Number of children: Not on file   • Years of education: Not on file   • Highest education level: Not on file   Social Needs   • Financial resource strain: Not on file   • Food insecurity - worry: Not on file   • Food insecurity - inability: Not on file   • Transportation needs - medical: Not on file   • Transportation needs - non-medical: Not on file   Occupational History   • Not on file   Tobacco Use   • Smoking status:  Former Smoker     Packs/day: 2.00     Years: 35.00     Pack years: 70.00     Last attempt to quit:      Years since quittin.3   • Smokeless tobacco: Never Used   Substance and Sexual Activity   • Alcohol use: No   • Drug use: No   • Sexual activity: Not on file   Other Topics Concern   • Not on file   Social History Narrative   • Not on file           REVIEW OF SYSTEMS:     Constitutional: Negative for hot flashes.   Negative for weakness, fever, chills, night sweats, loss of appetite, fatigue or weight loss.   HEENT: Negative for headache, eye tearing, visual problems, decreased hearing tinnitus, rhinorrhea, mouth sores, and pharyngitis.   Respiratory: Negative for cough, dyspnea, hemoptysis, wheezing, and pleuritic chest pain.   Cardiac: Negative for exertional chest pain, pedal edema, dyspnea on exertion, and palpitations.   Gastrointestinal: Negative for nausea, vomiting, reflux, constipation, diarrhea, abdominal pain, and painful stools.   Genitourinary: Negative for frequency, dysuria and hematuria.   Dermatologic: Negative for rash, pruritus, skin discoloration, and new skin lump.   Musculoskeletal: As above.   Hematologic: Negative for easy bruising, bleeding, and lymphadenopathy.   Neurologic: Negative for dizziness, paresthesias in the fingers/hands,   paresthesias of the toes, feet, and neuropathic pain.   Psychiatric: Negative for depression, anxiety.        PHYSICAL EXAMINATION:  /80 (BP Location: Right arm, Patient Position: Sitting, Cuff Size: Large)   Pulse 68   Ht 1.524 m (5')   Wt 92.1 kg (203 lb)   BMI 39.65 kg/m²    General appearance: alert and cooperative  Head: atraumatic  Eyes: conjunctivae/corneas clear. PERRL, EOM's intact.   Ears: normal  Lungs: clear to auscultation bilaterally  Heart: regular rate and rhythm  Abdomen: morbidly obese, soft, non tender  Extremities: extremities normal, warm and well-perfused; no cyanosis, clubbing, or edema  Neurologic: Alert and  oriented X 3, normal strength and tone.          ASSESSMENT & PLAN:  This is a 78 y.o. female with a body mass index of Body mass index is 39.65 kg/m². and comorbid conditions.      She has multiple failed attempts at weight loss in the past. We discussed foundational components of weight loss including nutrition, exercise and behavioral health as well as metabolic rate and the effects of dieting. Katy Stack Has attempted to lose weight in the past and is interested in pursuing a surgical weight loss solution. She is most interested in pursuing sleeve gastrectomy.  I would recommend the following prior to this:      - Medically supervised visits x 4 visits   - Nutrition counseling x 6 visits  - Behavioral health counseling x 3 visits  - No further weight gain  - Remain free of nicotine/tobacco use  - Preoperative lab work  - Preop EGD       Katy Stack participated in the decision making process and agreed with the plan of care.  All questions were sought and answered.     Chris Quinn MD         Total Time spent with the patient is 30 min with more than 50% in direct counseling and coordination of care regarding her morbid obesity

## 2019-05-10 ENCOUNTER — OFFICE VISIT NO LOS (OUTPATIENT)
Dept: DIABETES SERVICES | Facility: CLINIC | Age: 79
End: 2019-05-10
Payer: MEDICARE

## 2019-05-10 DIAGNOSIS — E66.9 OBESITY (BMI 35.0-39.9 WITHOUT COMORBIDITY): Primary | ICD-10-CM

## 2019-05-10 PROCEDURE — G0447 BEHAVIOR COUNSEL OBESITY 15M: HCPCS | Mod: PO | Performed by: DIETITIAN, REGISTERED

## 2019-05-10 PROCEDURE — G0447 BEHAVIOR COUNSEL OBESITY 15M: HCPCS | Performed by: DIETITIAN, REGISTERED

## 2019-05-10 NOTE — PROGRESS NOTES
"Intensive Behavior Therapy (IBT) for Obesity    Referring Provider: Dr. Travis                           Supervising Provider: Dr. Travis      IBT Visit Number: #2  Date:  5/10/2019  Beginning Time: 10: 03 AM    End Time: 10:45 AM    BMI from initial provider referral: 39.06     78 y.o. female with BMI of 39.65  Current weight: 202.2# Wt was: 203# on 5/2 with initial IBT appt  Current height:5\"    Patient reports usual body weight (UBW) of: no Usual  Patient has experienced previous weight loss history: Weight Watchers, Nutri System, Atkins  Had lap band in 3964-0309 and was removed by Dr. Martinez in Fall in 2018; highest wt was 211#, lowest wt 165#; has gained at least 15# since Nov.       Food recall: breakfast: bagel w/pb; 10:30 4 crackers, turkey and cheese, noon: small meat, veggie, salad, potatoes/gravy; supper: soup or 1/2 sandwich, maybe a cookie; snack fruit like an apple; beverages: coffee w/creamer, tiny bit, green tea, water; eats out twice a week: chili relliano or taco salad and butterfly shrimp and baked potator  Physical activity:    Discussion:  Time spent face to face with patient:  42 minutes  Education provided:  Individual  Patient attends today:  unaccompanied    Assess:  Progress toward previously set goals:  Track food and fluid intake- goal met 100% of the time  15 gm protein per meal- goal met 80% of the time  Consume at least 48 oz water per day- goal met 75% of the time      Advise:  Discussed importance of protein, reviewed protein sources, protein supplements.  Patient has weight goal of: 150#  Resources/Handouts provided: Perfect protein, Unjury samples    Agree:  Patient is ready for behavior change.  At this time, patient would like to work on journaling food and fluid intake.    Goals:   Track food and fluid intake  15 gm protein per meal  Consume at least least 48 oz water    Specific goal weight goals: 150#    Assist:  The following behavior change tool was used today: "   Monitoring by Patient, discussion, handout    Arrange:  Follow up scheduled in  3 week

## 2019-05-15 VITALS — WEIGHT: 202.2 LBS | BODY MASS INDEX: 39.49 KG/M2

## 2019-05-16 ENCOUNTER — HOSPITAL ENCOUNTER (OUTPATIENT)
Dept: RESPIRATORY THERAPY | Facility: HOSPITAL | Age: 79
Discharge: HOME | End: 2019-05-16
Payer: MEDICARE

## 2019-05-16 PROCEDURE — 94726 PLETHYSMOGRAPHY LUNG VOLUMES: CPT | Mod: 26 | Performed by: INTERNAL MEDICINE

## 2019-05-16 PROCEDURE — 94060 EVALUATION OF WHEEZING: CPT

## 2019-05-16 PROCEDURE — 94729 DIFFUSING CAPACITY: CPT | Mod: 26 | Performed by: INTERNAL MEDICINE

## 2019-05-16 PROCEDURE — 94060 EVALUATION OF WHEEZING: CPT | Mod: 26 | Performed by: INTERNAL MEDICINE

## 2019-06-03 RX ORDER — NITROGLYCERIN 0.4 MG/1
TABLET SUBLINGUAL
Qty: 25 TABLET | OUTPATIENT
Start: 2019-06-03

## 2019-06-04 ENCOUNTER — OFFICE VISIT (OUTPATIENT)
Dept: FAMILY MEDICINE | Facility: CLINIC | Age: 79
End: 2019-06-04
Payer: MEDICARE

## 2019-06-04 VITALS
OXYGEN SATURATION: 95 % | SYSTOLIC BLOOD PRESSURE: 130 MMHG | DIASTOLIC BLOOD PRESSURE: 58 MMHG | WEIGHT: 205 LBS | BODY MASS INDEX: 40.04 KG/M2 | HEART RATE: 66 BPM | TEMPERATURE: 98.7 F

## 2019-06-04 DIAGNOSIS — I27.20 PULMONARY HYPERTENSION (CMS/HCC): Primary | ICD-10-CM

## 2019-06-04 DIAGNOSIS — E03.9 HYPOTHYROIDISM, UNSPECIFIED TYPE: ICD-10-CM

## 2019-06-04 PROCEDURE — 99213 OFFICE O/P EST LOW 20 MIN: CPT | Performed by: FAMILY MEDICINE

## 2019-06-04 PROCEDURE — G0463 HOSPITAL OUTPT CLINIC VISIT: HCPCS | Mod: PO | Performed by: FAMILY MEDICINE

## 2019-06-04 RX ORDER — LEVOTHYROXINE SODIUM 100 UG/1
100 TABLET ORAL DAILY
Qty: 90 TABLET | Refills: 3 | Status: SHIPPED | OUTPATIENT
Start: 2019-06-04 | End: 2020-05-19

## 2019-06-04 RX ORDER — NITROGLYCERIN 0.4 MG/1
0.4 TABLET SUBLINGUAL EVERY 5 MIN PRN
Qty: 25 TABLET | Refills: 1 | Status: SHIPPED | OUTPATIENT
Start: 2019-06-04

## 2019-06-04 ASSESSMENT — PAIN SCALES - GENERAL: PAINLEVEL: 3

## 2019-06-04 NOTE — PROGRESS NOTES
Subjective      Katy Stack is a 78 y.o. female who presents for Follow-up (Review PFT results. Also having issues getting her Levothyroxine filled. She has been out of medication for several days now. )  .    Patient presents to follow up and review PFT results. Discussed results in detail with patient with verbalized understanding. Patient states she has not previously used a CPAP machine nightly but does have a hx of low oxygen at night and uses supplemental oxygen nightly. She states overall she feels like she sleeps well at night and denies PND that she is aware of. She does have shortness of breath while exercising or with exertion during the day. She is agreeable to following up with cardiology. Her last lexiscan cardiolite complete was on 10/4/18 (normal) and last ECHO was done on 10/25/18 and showed mild pulm HTN..    Patient is needing a refill of her levothyroxine 100mcg daily sent to [facility] mail order, she states they told her they did not have a prescription of this for her on file. She has been out of this medication for 1 week now. Her last TSH was 4.023 on 3/29/19.    Patient is also needing a refill sent of her nitroglycerin 0.4mg tablets as [facility] did not have this on file.    The following have been reviewed and updated as appropriate in this visit:  Tobacco  Allergies  Meds  Problems  Med Hx  Surg Hx  Fam Hx         Allergies   Allergen Reactions   • Elora Angioedema and Hives     hives, tongue swells  Swelling of tongue   • Adhesive      welts     Current Outpatient Medications   Medication Sig Dispense Refill   • nitroglycerin (NITROSTAT) 0.4 mg SL tablet Place 1 tablet (0.4 mg total) under the tongue every 5 (five) minutes as needed for chest pain 25 tablet 1   • levothyroxine (SYNTHROID, LEVOTHROID) 100 mcg tablet Take 1 tablet (100 mcg total) by mouth daily 90 tablet 3   • atenolol (TENORMIN) 25 mg tablet Take 1 tablet (25 mg total) by mouth daily 90 tablet 3   •  atorvastatin (LIPITOR) 20 mg tablet Take 1 tablet (20 mg total) by mouth daily 90 tablet 3   • losartan (COZAAR) 50 mg tablet Take 1 tablet (50 mg total) by mouth daily 90 tablet 3   • gabapentin (NEURONTIN) 100 mg capsule Take 200 mg by mouth 3 (three) times a day.       • vit A-vit C-vit E-zinc-copper (EYE VITAMIN AND MINERALS) 7,160-113-100 unit-mg-unit tablet Take 1 tab/cap by mouth 2 (two) times a day.     • AMITIZA 24 mcg capsule      • PREMARIN 0.9 mg tablet TAKE 1 TABLET BY MOUTH  EVERY DAY (Patient taking differently: TAKE 1 TABLET BY MOUTH EVERY WEEK) 90 tablet 1   • aspirin (ASPIR-LOW) 81 mg EC tablet 1 tab by oral route every day       No current facility-administered medications for this visit.      Past Medical History:   Diagnosis Date   • Angina pectoris (CMS/HCC) ([facility])    • Benign essential hypertension 11/24/2017   • Cataract of left eye    • Chronic obstructive lung disease (CMS/HCC) ([facility]) 11/10/2017   • Dysrhythmia     LBBB   • GERD (gastroesophageal reflux disease)     at times   • Hypertension    • Hypothyroid    • Hypothyroidism 11/10/2017   • Neurologic disorder    • Obesity 11/10/2017   • Obstructive sleep apnea syndrome 11/10/2017    Night time oxygen/2L   • Osteoarthritis    • Periodic limb movement disorder 11/10/2017   • Peripheral neuropathy     left thigh into left groin    • Rectocele    • Respiratory disease      Past Surgical History:   Procedure Laterality Date   • APPENDECTOMY  1987   • BACK SURGERY      2 surgeries [id number]   • BACK SURGERY  11/30/2016   • BACK SURGERY  05/01/2017    vetebral fusion   • CARDIAC CATHETERIZATION  08/31/2011   • CATARACT EXTRACTION W/  INTRAOCULAR LENS IMPLANT Left 7/2/2018    Procedure: OS CATARACT LEFT PHACOEMULSIFICATION OF CATARACT WITH INTRAOCULAR LENS;  Surgeon: Emmanuel Cueto MD;  Location: [facility];  Service: Ophthalmology;  Laterality: Left;   • CATARACT EXTRACTION W/  INTRAOCULAR LENS IMPLANT Right 8/6/2018    Procedure:  OD CATARACT RIGHT PHACOEMULSIFICATION OF CATARACT WITH INTRAOCULAR LENS;  Surgeon: Emmanuel Cueto MD;  Location: [facility];  Service: Ophthalmology;  Laterality: Right;   • CHOLECYSTECTOMY     • COLONOSCOPY  02/19/2014    No polyps   • COLONOSCOPY  04/26/2011   • DIAGNOSTIC LAPAROSCOPY N/A 11/28/2018    Procedure: Laparoscopic band removal;  Surgeon: Chris Millan MD;  [place] [facility] [place];  Service: General;  Laterality: N/A;   • ESOPHAGOGASTRODUODENOSCOPY N/A 11/6/2018    Procedure: EGD - ESOPHAGOGASTRODUODENOSCOPY;  Surgeon: Chris Millan MD;  Location: [facility];  Service: Endoscopy;  Laterality: N/A;   • FOOT SURGERY      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 Vela- shoulder   • THYROID LOBECTOMY Left 18/18/2010   • THYROIDECTOMY, PARTIAL  08/18/2010   • UPPER GASTROINTESTINAL ENDOSCOPY  02/19/2014    Mild chronic gastritis     Family History   Problem Relation Age of Onset   • Heart disease Mother    • Heart disease Father         Myocardial infarction   • Parkinsonism Father    • Thyroid disease Father    • Alzheimer's disease Sister    • Arthritis Sister    • Osteoporosis Sister    • Lung cancer Brother    • Diabetes Maternal Grandmother      Social History     Socioeconomic History   • Marital status:      Spouse name: Not on file   • Number of children: Not on file   • Years of education: Not on file   • Highest education level: Not on file   Occupational History   • Not on file   Social Needs   • Financial resource strain: Not on file   • Food insecurity:     Worry: Not on file     Inability: Not on file   • Transportation needs:      Medical: Not on file     Non-medical: Not on file   Tobacco Use   • Smoking status: Former Smoker     Packs/day: 2.00     Years: 35.00     Pack years: 70.00     Last attempt to quit:      Years since quittin.4   • Smokeless tobacco: Never Used   Substance and Sexual Activity   • Alcohol use: No   • Drug use: No   • Sexual activity: Not on file   Lifestyle   • Physical activity:     Days per week: Not on file     Minutes per session: Not on file   • Stress: Not on file   Relationships   • Social connections:     Talks on phone: Not on file     Gets together: Not on file     Attends Je[employer]'s witness service: Not on file     Active member of club or organization: Not on file     Attends meetings of clubs or organizations: Not on file     Relationship status: Not on file   Other Topics Concern   • Not on file   Social History Narrative   • Not on file       Review of Systems    Objective     Vitals  /58 (BP Location: Left arm, Patient Position: Sitting, Cuff Size: Large)   Pulse 66   Temp 37.1 °C (98.7 °F) (Temporal)   Wt 93 kg (205 lb)   SpO2 95%   BMI 40.04 kg/m²     Physical Exam   Constitutional: She appears well-developed and well-nourished. No distress.   Corrected vision.  Obese.   Cardiovascular: Normal rate, regular rhythm and normal heart sounds. Exam reveals no gallop and no friction rub.   No murmur heard.  Pulmonary/Chest: Effort normal and breath sounds normal. No stridor. No respiratory distress. She has no wheezes. She has no rales.   Neurological: She is alert.   Skin: She is not diaphoretic.   Psychiatric: She has a normal mood and affect.   Nursing note and vitals reviewed.      Procedures    Assessment/Plan   Diagnoses and all orders for this visit:    Pulmonary hypertension (CMS/HCC) (HCC)  Comments:  Referral sent to cardiology for another opinion on pulm HTN. Consider formal sleep study.   Orders:  -     nitroglycerin (NITROSTAT) 0.4 mg SL tablet; Place 1 tablet (0.4 mg total) under  the tongue every 5 (five) minutes as needed for chest pain  -     Ambulatory referral to Cardiology; Future    Hypothyroidism, unspecified type  Comments:  Medication refilled.  Orders:  -     levothyroxine (SYNTHROID, LEVOTHROID) 100 mcg tablet; Take 1 tablet (100 mcg total) by mouth daily        Return if symptoms worsen or fail to improve.    ENRRIQUE BALES MD    Portions of this note were documented by Nneka Archer as I performed the exam and collected the information from Katy Stack. I attest that I have reviewed the information as documented, verified the accuracy of the documentation and added additional information as needed.

## 2019-06-06 ENCOUNTER — OFFICE VISIT NO LOS (OUTPATIENT)
Dept: DIABETES SERVICES | Facility: CLINIC | Age: 79
End: 2019-06-06
Payer: MEDICARE

## 2019-06-06 DIAGNOSIS — E66.9 OBESITY (BMI 35.0-39.9 WITHOUT COMORBIDITY): Primary | ICD-10-CM

## 2019-06-06 PROCEDURE — G0447 BEHAVIOR COUNSEL OBESITY 15M: HCPCS | Mod: PO | Performed by: DIETITIAN, REGISTERED

## 2019-06-06 PROCEDURE — G0447 BEHAVIOR COUNSEL OBESITY 15M: HCPCS | Performed by: DIETITIAN, REGISTERED

## 2019-06-06 NOTE — PROGRESS NOTES
"Intensive Behavior Therapy (IBT) for Obesity    Referring Provider: Dr. Travis                           Supervising Provider: Dr. Travis      IBT Visit Number: #3  Date:  6/6/2019  Beginning Time: 8: 33 AM    End Time: 8:49 AM    BMI from initial provider referral: 39.06     78 y.o. female with BMI of 39.76  Current weight: 203.6#, wt was 202.2# for 5/10,  Wt was: 203# on 5/2 with initial IBT appt  Current height:5\"    Patient reports usual body weight (UBW) of: no Usual  Patient has experienced previous weight loss history: Weight Watchers, Nutri System, Atkins  Had lap band in [id number] and was removed by Dr. Martinez in Fall in 2018; highest wt was 211#, lowest wt 165#; has gained at least 15# since Nov.   ALLERGIES: STRAWBERRIES CAUSES HIVES,TONGUE SWELLING    Food recall: breakfast: bagel w/pb; 10:30 4 crackers, turkey and cheese, noon: small meat, veggie, salad, potatoes/gravy; supper: soup or 1/2 sandwich, maybe a cookie; snack fruit like an apple; beverages: coffee w/creamer, tiny bit, green tea, water; eats out twice a week: chili relliano or taco salad and butterfly shrimp and baked potator  Physical activity: pt is starting to use the river walk for 30 minutes at least 20-30 minutes per time    Discussion:  Time spent face to face with patient:  16 minutes  Education provided:  Individual  Patient attends today:  unaccompanied    Assess:  Progress toward previously set goals:  Track food and fluid intake- goal met 100% of the time  15 gm protein per meal- goal met 95% of the time  Consume at least 48 oz water per day- goal met 75% of the time      Advise:  Pt is looking forward to having the bariatric laparoscopic surgery, pt states the sooner the better. Discussed importance of limiting foods high in added sugars, reviewed label reading. Pt is trying be more active, uses the river walk at the [facility]. Today is her birthday, states she has eaten a little more celebrating her birthday but will " get back on track.  Patient has weight goal of: 150#  Resources/Handouts provided: Sugar Shockers    Agree:  Patient is ready for behavior change.  At this time, patient would like to work on journaling food and fluid intake.    Goals:   Track food and fluid intake  15 gm protein per meal  Consume at least least 48 oz water    Specific goal weight goals: 150#    Assist:  The following behavior change tool was used today:   Monitoring by Patient, discussion, handout    Arrange:  Follow up scheduled in  4 week

## 2019-06-09 VITALS — WEIGHT: 203.6 LBS | BODY MASS INDEX: 39.76 KG/M2

## 2019-06-13 PROBLEM — I27.20 PULMONARY HYPERTENSION (CMS/HCC): Status: ACTIVE | Noted: 2019-06-13

## 2019-06-13 NOTE — PROGRESS NOTES
"[facility] [place]  [address] [place] [address]                                           Cardiology Outpatient Follow-up Note    Subjective    Patient ID: Katy Stack is a 79 y.o. female.      HPI  This is a 79-year-old female who is a patient of Dr. Quintero.  I last evaluated her 2.5 years ago.    Katy is here today at the request of her PCP, Dr. Travis.  Over the last 6 months she has had increased shortness of breath.  Her activity is limited to 1 block.  She feels a \"heaviness \"that feels like she cannot catch her breath.  Activity is also limited by back and knee pain.  Last November she had her lap band removed by Dr. Khalil because she was having problems with indigestion.  Over the last 6 months she has gained approximately 25-30 pounds.  In the last couple of weeks she has been doing water aerobics, this week she went 3 times.  She denies any shortness of breath at rest, PND, orthopnea, edema, or cough.  No chest pain.  Blood pressures have been well controlled, but does not routinely check at home.  Does admit that many years ago when working in [employer] she was exposed to a dust/chemical due to nearby construction.  She was hospitalized multiple times for an inhalation injury.    She did have an echocardiogram and a Lexiscan Cardiolite stress test last fall.  I reviewed both of those with her.    Cardiology Problem List:      Last updated by MARGARETTE Downs Dr.     Coronary artery disease, mild nonobstructive  -Cardiac catheterization 2011 demonstrating mild CAD with a 30% mid RCA lesion    Intermittent LBBB   - Holter 2011; no evidence of high degree AVB but intermittent LBBB, no arrhythmias    Risk factors:  HTN  Type 2 Diabetes    CARDIOVASCULAR PROCEDURES:  - Echo 10/2018; EF 56%, no WMA, mild LVH, grade I dd, mild AS with NASRA 1.8 cm2, mild pHTN with PA 43 mmHg  - Lexiscan 10/2018; no ischemia, normal wall motion  - Cath (EF 0.60 (60%), LM " minor calcification; RCA 30% mid) - 9/1/2011  - Lexiscan 2011;   - ETT - 5/19/2011    Past Medical History:   Diagnosis Date   • Angina pectoris (CMS/HCC) ([facility])    • Benign essential hypertension 11/24/2017   • Cataract of left eye    • Chronic obstructive lung disease (CMS/HCC) ([facility]) 11/10/2017   • Dysrhythmia     LBBB   • GERD (gastroesophageal reflux disease)     at times   • Hypertension    • Hypothyroid    • Hypothyroidism 11/10/2017   • Neurologic disorder    • Obesity 11/10/2017   • Obstructive sleep apnea syndrome 11/10/2017    Night time oxygen/2L   • Osteoarthritis    • Periodic limb movement disorder 11/10/2017   • Peripheral neuropathy     left thigh into left groin    • Rectocele    • Respiratory disease        Past Surgical History:   Procedure Laterality Date   • APPENDECTOMY  1987   • BACK SURGERY      2 surgeries [id number]   • BACK SURGERY  11/30/2016   • BACK SURGERY  05/01/2017    vetebral fusion   • CARDIAC CATHETERIZATION  08/31/2011   • CATARACT EXTRACTION W/  INTRAOCULAR LENS IMPLANT Left 7/2/2018    Procedure: OS CATARACT LEFT PHACOEMULSIFICATION OF CATARACT WITH INTRAOCULAR LENS;  Surgeon: Emmanuel Cueto MD;  Location: [facility];  Service: Ophthalmology;  Laterality: Left;   • CATARACT EXTRACTION W/  INTRAOCULAR LENS IMPLANT Right 8/6/2018    Procedure: OD CATARACT RIGHT PHACOEMULSIFICATION OF CATARACT WITH INTRAOCULAR LENS;  Surgeon: Emmanuel Cueto MD;  Location: [facility];  Service: Ophthalmology;  Laterality: Right;   • CHOLECYSTECTOMY     • COLONOSCOPY  02/19/2014    No polyps   • COLONOSCOPY  04/26/2011   • DIAGNOSTIC LAPAROSCOPY N/A 11/28/2018    Procedure: Laparoscopic band removal;  Surgeon: Chris Millan MD;  Location: [facility] OR;  Service: General;  Laterality: N/A;   • ESOPHAGOGASTRODUODENOSCOPY N/A 11/6/2018    Procedure: EGD - ESOPHAGOGASTRODUODENOSCOPY;  Surgeon: Chris Millan MD;  Location: [facility];  Service: Endoscopy;  Laterality:  N/A;   • FOOT SURGERY      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 Vela- shoulder   • THYROID LOBECTOMY Left 18/18/2010   • THYROIDECTOMY, PARTIAL  08/18/2010   • UPPER GASTROINTESTINAL ENDOSCOPY  02/19/2014    Mild chronic gastritis       Allergies as of 06/14/2019 - Reviewed 06/14/2019   Allergen Reaction Noted   • [place] Angioedema and Hives 06/20/2013   • Adhesive  03/27/2014       Current Outpatient Medications   Medication Sig Dispense Refill   • calcium carbonate-vitamin D3 600 mg(1,500mg) -400 unit per tablet Take 1 tablet by mouth daily     • nitroglycerin (NITROSTAT) 0.4 mg SL tablet Place 1 tablet (0.4 mg total) under the tongue every 5 (five) minutes as needed for chest pain 25 tablet 1   • levothyroxine (SYNTHROID, LEVOTHROID) 100 mcg tablet Take 1 tablet (100 mcg total) by mouth daily 90 tablet 3   • atenolol (TENORMIN) 25 mg tablet Take 1 tablet (25 mg total) by mouth daily 90 tablet 3   • atorvastatin (LIPITOR) 20 mg tablet Take 1 tablet (20 mg total) by mouth daily 90 tablet 3   • losartan (COZAAR) 50 mg tablet Take 1 tablet (50 mg total) by mouth daily 90 tablet 3   • gabapentin (NEURONTIN) 100 mg capsule Take 200 mg by mouth 3 (three) times a day.       • vit A-vit C-vit E-zinc-copper (EYE VITAMIN AND MINERALS) 7,160-113-100 unit-mg-unit tablet Take 1 tab/cap by mouth 2 (two) times a day.     • AMITIZA 24 mcg capsule      • PREMARIN 0.9 mg tablet TAKE 1 TABLET BY MOUTH  EVERY DAY (Patient taking differently: TAKE 1 TABLET BY MOUTH EVERY WEEK) 90 tablet 1   • aspirin (ASPIR-LOW) 81 mg EC tablet 1 tab by oral route every day     • furosemide (LASIX) 20 mg  tablet Take 1 tablet (20 mg total) by mouth daily 30 tablet 6   • potassium chloride (KLOR-CON) 10 mEq CR tablet Take 1 tablet (10 mEq total) by mouth daily 30 tablet 6     No current facility-administered medications for this visit.        Family History   Problem Relation Age of Onset   • Heart disease Mother    • Heart disease Father         Myocardial infarction   • Parkinsonism Father    • Thyroid disease Father    • Alzheimer's disease Sister    • Arthritis Sister    • Osteoporosis Sister    • Lung cancer Brother    • Diabetes Maternal Grandmother        Social History     Tobacco Use   • Smoking status: Former Smoker     Packs/day: 2.00     Years: 35.00     Pack years: 70.00     Last attempt to quit:      Years since quittin.4   • Smokeless tobacco: Never Used   Substance Use Topics   • Alcohol use: No   • Drug use: No       Review of Systems   Constitution: Positive for malaise/fatigue.   Cardiovascular: Positive for chest pain and dyspnea on exertion.   Respiratory: Positive for shortness of breath.    All other systems reviewed and are negative.      Objective     Vitals:    19 1033   BP: 142/80   Pulse: 67   SpO2: 93%     Weight: 94.8 kg (209 lb)  Physical Exam    Constitutional: Well built, overweight, nourished, no acute distress.   HEENT: Head is normocephalic and atraumatic. Sclera anicteric.   Neck: Supple. No carotid bruits.  No JVD.  Lungs: Effort normal. Breath sounds are clear without wheezes or rales. No respiratory distress.   Heart: S1-S2, Regular rate and rhythm without murmur.   Extremities: Without any cyanosis, clubbing, lesions or edema. Radial pulses 2+.  Musculoskeletal: Normal ROM.    Neurologic: No focal deficits.  Psychiartic: cooperative, alert and orientated X 3.  Skin: warm, dry, intact.      Data Review:   Lab Results   Component Value Date     2019    K 4.2 2019     (H) 2019    CO2 27 2019    BUN 18 2019    CREATININE 1.11  03/29/2019    GLUCOSE 96 03/29/2019    CALCIUM 9.2 03/29/2019     No results found for: CKTOTAL, CKMB, CKMBINDEX, TROPONINI, BNP, POCBNP  Lab Results   Component Value Date    WBC 7.8 03/29/2019    HGB 13.3 03/29/2019    HCT 40.6 03/29/2019    MCV 83.0 03/29/2019     03/29/2019     Lab Results   Component Value Date    CHOL 97 01/20/2017    TRIG 134 01/20/2017    HDL 31 (L) 01/20/2017     Lab Results   Component Value Date    TSH 4.023 03/29/2019       Assessment/Plan   Diagnoses and all orders for this visit:    Pulmonary hypertension (CMS/[facility]) ([facility])  -     Ambulatory referral to Cardiology  -     furosemide (LASIX) 20 mg tablet; Take 1 tablet (20 mg total) by mouth daily  -     potassium chloride (KLOR-CON) 10 mEq CR tablet; Take 1 tablet (10 mEq total) by mouth daily  -     Basic metabolic panel Blood, Venous; Future    Benign essential hypertension    Obstructive sleep apnea syndrome    Pulmonary hypertension (CMS/[facility]) ([facility])  Comments:  Referral sent to cardiology for another opinion on pulm HTN. Consider formal sleep study.   Orders:  -     Ambulatory referral to Cardiology  -     furosemide (LASIX) 20 mg tablet; Take 1 tablet (20 mg total) by mouth daily  -     potassium chloride (KLOR-CON) 10 mEq CR tablet; Take 1 tablet (10 mEq total) by mouth daily  -     Basic metabolic panel Blood, Venous; Future      Katy has had increased shortness of breath over the last 6 months.  This was associated around the same time that she had her lap band removed with an associated 25-30 pound weight gain since then.  She does attribute some of her shortness of breath to this.  I reviewed echocardiogram from last November.  Her pulmonary pressures are mildly elevated, otherwise benign echocardiogram.  She does wear oxygen at night.  I would recommend that she proceed with a formal sleep study.  Treating with the CPAP may help lower her PA pressures.  For symptomatically relief, we will try adding a low-dose diuretic  to help lower pressures as well.  Could consider switching from a beta-blocker to a calcium channel blocker in the future.      There are no Patient Instructions on file for this visit.    Return in about 1 year (around 6/14/2020).    The patient verbalized correct understanding and agreement to today's instructions and plan.  The patient verbalized that all questions have been addressed.    Electronically signed by: CHAPARRO Downs  6/14/2019  11:08 AM    A voice recognition program was used to aid in documentation of this record. Sometimes words are not printed exactly as they were spoken. While efforts were made to carefully edit and correct any inaccuracies, some errors may be present; please take these into context. Please contact the provider if errors are identified.

## 2019-06-14 ENCOUNTER — OFFICE VISIT (OUTPATIENT)
Dept: CARDIOLOGY | Facility: CLINIC | Age: 79
End: 2019-06-14
Payer: MEDICARE

## 2019-06-14 VITALS
BODY MASS INDEX: 41.03 KG/M2 | SYSTOLIC BLOOD PRESSURE: 142 MMHG | DIASTOLIC BLOOD PRESSURE: 80 MMHG | OXYGEN SATURATION: 93 % | HEIGHT: 60 IN | HEART RATE: 67 BPM | WEIGHT: 209 LBS

## 2019-06-14 DIAGNOSIS — I27.20 PULMONARY HYPERTENSION (CMS/HCC): Primary | ICD-10-CM

## 2019-06-14 DIAGNOSIS — I10 BENIGN ESSENTIAL HYPERTENSION: ICD-10-CM

## 2019-06-14 DIAGNOSIS — G47.33 OBSTRUCTIVE SLEEP APNEA SYNDROME: ICD-10-CM

## 2019-06-14 DIAGNOSIS — I27.20 PULMONARY HYPERTENSION (CMS/HCC): ICD-10-CM

## 2019-06-14 PROCEDURE — 99214 OFFICE O/P EST MOD 30 MIN: CPT | Performed by: PHYSICIAN ASSISTANT

## 2019-06-14 PROCEDURE — G0463 HOSPITAL OUTPT CLINIC VISIT: HCPCS | Mod: PO | Performed by: PHYSICIAN ASSISTANT

## 2019-06-14 RX ORDER — FUROSEMIDE 20 MG/1
20 TABLET ORAL DAILY
Qty: 30 TABLET | Refills: 6 | Status: SHIPPED | OUTPATIENT
Start: 2019-06-14 | End: 2019-07-01 | Stop reason: SDUPTHER

## 2019-06-14 RX ORDER — MULTIVITAMIN
1 TABLET ORAL DAILY
COMMUNITY

## 2019-06-14 RX ORDER — POTASSIUM CHLORIDE 750 MG/1
10 TABLET, EXTENDED RELEASE ORAL DAILY
Qty: 30 TABLET | Refills: 6 | Status: SHIPPED | OUTPATIENT
Start: 2019-06-14 | End: 2019-07-01 | Stop reason: SDUPTHER

## 2019-06-14 ASSESSMENT — PAIN SCALES - GENERAL: PAINLEVEL: 7

## 2019-06-14 ASSESSMENT — ENCOUNTER SYMPTOMS: SHORTNESS OF BREATH: 1

## 2019-06-17 ENCOUNTER — OFFICE VISIT (OUTPATIENT)
Dept: SURGERY | Facility: CLINIC | Age: 79
End: 2019-06-17
Payer: MEDICARE

## 2019-06-17 VITALS
BODY MASS INDEX: 40.44 KG/M2 | SYSTOLIC BLOOD PRESSURE: 138 MMHG | HEART RATE: 68 BPM | WEIGHT: 206 LBS | RESPIRATION RATE: 24 BRPM | DIASTOLIC BLOOD PRESSURE: 60 MMHG | HEIGHT: 60 IN

## 2019-06-17 DIAGNOSIS — E78.5 HYPERLIPIDEMIA, UNSPECIFIED HYPERLIPIDEMIA TYPE: ICD-10-CM

## 2019-06-17 DIAGNOSIS — E66.01 CLASS 2 SEVERE OBESITY DUE TO EXCESS CALORIES WITH SERIOUS COMORBIDITY AND BODY MASS INDEX (BMI) OF 39.0 TO 39.9 IN ADULT (CMS/HCC): Primary | ICD-10-CM

## 2019-06-17 DIAGNOSIS — E66.812 CLASS 2 SEVERE OBESITY DUE TO EXCESS CALORIES WITH SERIOUS COMORBIDITY AND BODY MASS INDEX (BMI) OF 39.0 TO 39.9 IN ADULT (CMS/HCC): Primary | ICD-10-CM

## 2019-06-17 DIAGNOSIS — R79.89 ABNORMAL CBC: ICD-10-CM

## 2019-06-17 DIAGNOSIS — I10 ESSENTIAL HYPERTENSION: ICD-10-CM

## 2019-06-17 DIAGNOSIS — Z01.818 PREOPERATIVE EVALUATION TO RULE OUT SURGICAL CONTRAINDICATION: ICD-10-CM

## 2019-06-17 PROCEDURE — 99214 OFFICE O/P EST MOD 30 MIN: CPT | Performed by: NURSE PRACTITIONER

## 2019-06-17 PROCEDURE — G0463 HOSPITAL OUTPT CLINIC VISIT: HCPCS | Performed by: NURSE PRACTITIONER

## 2019-06-17 ASSESSMENT — PAIN SCALES - GENERAL: PAINLEVEL: 4

## 2019-06-17 NOTE — PATIENT INSTRUCTIONS
1. Follow up with Dr. Millan (bariatric surgeon) in 1 month for final medically supervised weight management visit and surgical preop visit. Have fasting labs drawn 1 week prior to this visit.  2. Schedule preop clearance visit with your PCP ENRRIQUE BALES MD within 2 weeks of scheduled surgery date of 8/14/2019.  3. Complete visits with Mey Willoughby RD. Follow her nutrition recommendations.  4. Work on increasing protein at breakfast meal through cottage, yogurt, breakfast meats, etc.  5. Increase water or sugar-free fluid (Powerade Zero, Propel, Crystal Light, [place] Flavoring) to 48-64 fl oz.   6. Continue exercise by water walking 30-45 minutes at least 3 days per week.

## 2019-06-17 NOTE — PROGRESS NOTES
Follow Up Weight Management  Preop laparoscopic vertical sleeve gastrectomy  Date of Service: 6/17/2019    Subjective      Patient ID: Katy Stack is a 79 y.o. female.    Chief Complaint   Patient presents with   • Advice Only     wants to discuss bariatric surgery       HPI  Katy presents to the weight management and bariatric surgery clinic today for ongoing medically supervised weight management prior to laparoscopic vertical sleeve gastrectomy following removal of LAP-BAND secondary to slippage.  She has had significant improvement in reflux symptoms since removal of the band, but continues uncontrollable weight gain of 16 lbs over the past 6 months.  She has completed her visits with the behavioral psychologist.      Nutrition:  She has been working with the RD through the IBT program and has met with her 3 times over the past 2 months.  She is working on nutrition changes including label reading, protein supplementation (Premier Protein is palatable for her), tracking foods/fluids, increased water intake and reduced caffeine, and reduced portion sizes.  She gets about 32 fl oz water daily and is now using saucer plate.   Menu recall:   Breakfast: waffles with peanut butter and homemade syrup  Lunch: 4 oz meat protein with potatoes, vegetable, and sometime salad  Supper: bowl of soup or half sandwich. Apple.   Snacks: cheetos, cheese sticks.     Exercise:   She is water walking 3 days per week for 30 minutes, and increasing duration as tolerated.  She tried to increase to 1 hour and this was too much as she experienced significant back pain with this.  She can tolerate a 40-45 min session of water walking.    Denies any significant changes to her health since her last visit.     Review of Systems   Constitutional: Positive for unexpected weight change (gain). Negative for activity change, appetite change, chills, diaphoresis and fever.   HENT: Negative for trouble swallowing.    Respiratory: Negative  for cough and chest tightness.    Cardiovascular: Negative for chest pain and palpitations.   Gastrointestinal: Negative for abdominal pain, constipation, diarrhea, nausea and vomiting.   Musculoskeletal: Negative for arthralgias, back pain and gait problem.   Skin: Negative for color change and pallor.   Neurological: Negative for dizziness, syncope and light-headedness.   Psychiatric/Behavioral: Negative.      Past Medical History:   Diagnosis Date   • Angina pectoris (CMS/[facility]) ([facility])    • Benign essential hypertension 11/24/2017   • Cataract of left eye    • Chronic obstructive lung disease (CMS/[facility]) ([facility]) 11/10/2017   • Dysrhythmia     LBBB   • GERD (gastroesophageal reflux disease)     at times   • Hypertension    • Hypothyroid    • Hypothyroidism 11/10/2017   • Neurologic disorder    • Obesity 11/10/2017   • Obstructive sleep apnea syndrome 11/10/2017    Night time oxygen/2L   • Osteoarthritis    • Periodic limb movement disorder 11/10/2017   • Peripheral neuropathy     left thigh into left groin    • Rectocele    • Respiratory disease        Past Surgical History:   Procedure Laterality Date   • APPENDECTOMY  1987   • BACK SURGERY      2 surgeries [id number]   • BACK SURGERY  11/30/2016   • BACK SURGERY  05/01/2017    vetebral fusion   • CARDIAC CATHETERIZATION  08/31/2011   • CATARACT EXTRACTION W/  INTRAOCULAR LENS IMPLANT Left 7/2/2018    Procedure: OS CATARACT LEFT PHACOEMULSIFICATION OF CATARACT WITH INTRAOCULAR LENS;  Surgeon: Emmanuel Cueto MD;  Location: [facility];  Service: Ophthalmology;  Laterality: Left;   • CATARACT EXTRACTION W/  INTRAOCULAR LENS IMPLANT Right 8/6/2018    Procedure: OD CATARACT RIGHT PHACOEMULSIFICATION OF CATARACT WITH INTRAOCULAR LENS;  Surgeon: Emmanuel Cueto MD;  Location: [facility];  Service: Ophthalmology;  Laterality: Right;   • CHOLECYSTECTOMY     • COLONOSCOPY  02/19/2014    No polyps   • COLONOSCOPY  04/26/2011   • DIAGNOSTIC LAPAROSCOPY  N/A 11/28/2018    Procedure: Laparoscopic band removal;  Surgeon: Chris Millan MD;  Location: [facility] OR;  Service: General;  Laterality: N/A;   • ESOPHAGOGASTRODUODENOSCOPY N/A 11/6/2018    Procedure: EGD - ESOPHAGOGASTRODUODENOSCOPY;  Surgeon: Chris Millan MD;  Location: [facility];  Service: Endoscopy;  Laterality: N/A;   • FOOT SURGERY      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 Vela- shoulder   • THYROID LOBECTOMY Left 18/18/2010   • THYROIDECTOMY, PARTIAL  08/18/2010   • UPPER GASTROINTESTINAL ENDOSCOPY  02/19/2014    Mild chronic gastritis       Allergies   Allergen Reactions   • [place] Angioedema and Hives     hives, tongue swells  Swelling of tongue   • Adhesive      welts         Current Outpatient Medications:   •  calcium carbonate-vitamin D3 600 mg(1,500mg) -400 unit per tablet, Take 1 tablet by mouth daily, Disp: , Rfl:   •  furosemide (LASIX) 20 mg tablet, Take 1 tablet (20 mg total) by mouth daily, Disp: 30 tablet, Rfl: 6  •  potassium chloride (KLOR-CON) 10 mEq CR tablet, Take 1 tablet (10 mEq total) by mouth daily, Disp: 30 tablet, Rfl: 6  •  nitroglycerin (NITROSTAT) 0.4 mg SL tablet, Place 1 tablet (0.4 mg total) under the tongue every 5 (five) minutes as needed for chest pain, Disp: 25 tablet, Rfl: 1  •  levothyroxine (SYNTHROID, LEVOTHROID) 100 mcg tablet, Take 1 tablet (100 mcg total) by mouth daily, Disp: 90 tablet, Rfl: 3  •  atenolol (TENORMIN) 25 mg tablet, Take 1 tablet (25 mg total) by mouth daily, Disp: 90 tablet, Rfl: 3  •  atorvastatin (LIPITOR) 20 mg tablet, Take 1 tablet (20 mg total) by mouth daily, Disp: 90 tablet, Rfl: 3  •   losartan (COZAAR) 50 mg tablet, Take 1 tablet (50 mg total) by mouth daily, Disp: 90 tablet, Rfl: 3  •  gabapentin (NEURONTIN) 100 mg capsule, Take 200 mg by mouth 3 (three) times a day.  , Disp: , Rfl:   •  vit A-vit C-vit E-zinc-copper (EYE VITAMIN AND MINERALS) 7,160-113-100 unit-mg-unit tablet, Take 1 tab/cap by mouth 2 (two) times a day., Disp: , Rfl:   •  AMITIZA 24 mcg capsule, , Disp: , Rfl:   •  PREMARIN 0.9 mg tablet, TAKE 1 TABLET BY MOUTH  EVERY DAY (Patient taking differently: TAKE 1 TABLET BY MOUTH EVERY WEEK), Disp: 90 tablet, Rfl: 1  •  aspirin (ASPIR-LOW) 81 mg EC tablet, 1 tab by oral route every day, Disp: , Rfl:     Family History   Problem Relation Age of Onset   • Heart disease Mother    • Heart disease Father         Myocardial infarction   • Parkinsonism Father    • Thyroid disease Father    • Alzheimer's disease Sister    • Arthritis Sister    • Osteoporosis Sister    • Lung cancer Brother    • Diabetes Maternal Grandmother        Social History     Socioeconomic History   • Marital status:      Spouse name: Not on file   • Number of children: Not on file   • Years of education: Not on file   • Highest education level: Not on file   Occupational History   • Not on file   Social Needs   • Financial resource strain: Not on file   • Food insecurity:     Worry: Not on file     Inability: Not on file   • Transportation needs:     Medical: Not on file     Non-medical: Not on file   Tobacco Use   • Smoking status: Former Smoker     Packs/day: 2.00     Years: 35.00     Pack years: 70.00     Last attempt to quit:      Years since quittin.4   • Smokeless tobacco: Never Used   Substance and Sexual Activity   • Alcohol use: No   • Drug use: No   • Sexual activity: Not on file   Lifestyle   • Physical activity:     Days per week: Not on file     Minutes per session: Not on file   • Stress: Not on file   Relationships   • Social connections:     Talks on phone: Not on file     Gets  together: Not on file     Attends Orthodoxy service: Not on file     Active member of club or organization: Not on file     Attends meetings of clubs or organizations: Not on file     Relationship status: Not on file   • Intimate partner violence:     Fear of current or ex partner: Not on file     Emotionally abused: Not on file     Physically abused: Not on file     Forced sexual activity: Not on file   Other Topics Concern   • Not on file   Social History Narrative   • Not on file          Objective      Vital Signs  /60   Pulse 68   Resp 24   Ht 1.524 m (5')   Wt 93.4 kg (206 lb)   BMI 40.23 kg/m²     Physical Exam   Constitutional: She is oriented to person, place, and time. Vital signs are normal. She appears well-developed and well-nourished. No distress.   Obese    HENT:   Head: Normocephalic and atraumatic.   Eyes: Conjunctivae are normal.   Neck: Neck supple.   Cardiovascular: Normal rate, regular rhythm, S1 normal and S2 normal.   No murmur heard.  Pulses:       Radial pulses are 2+ on the right side, and 2+ on the left side.   Pulmonary/Chest: Effort normal and breath sounds normal.   Abdominal: Soft. Bowel sounds are normal. There is no tenderness.   Obese; incisions consistent with surgical history   Neurological: She is alert and oriented to person, place, and time. She has normal strength. No cranial nerve deficit.   Skin: Skin is warm and dry. Capillary refill takes less than 2 seconds.   Psychiatric: She has a normal mood and affect.   Nursing note and vitals reviewed.    Labs:   No results found for this or any previous visit (from the past 672 hour(s)).    Assessment and Plan:    1. Class 2 severe obesity due to excess calories with serious comorbidity and body mass index (BMI) of 39.0 to 39.9 in adult (CMS/HCC) ([facility])  - Folate Blood, Venous; Future  - H. pylori antigen, stool Stool; Future  - Iron panel Blood, Venous; Future  - Lipid panel Blood, Venous; Future  - Magnesium Blood,  Venous; Future  - Methylmalonic acid, serum Blood, Venous; Future  - Vitamin A Blood, Venous; Future  - Vitamin B1 (Thiamin) Blood, Venous; Future  - Vitamin D 25 hydroxy Blood, Venous; Future  - Vitamin E Blood, Venous; Future  - Vitamin K Blood, Venous; Future  - Phosphorus Blood, Venous; Future  - Vitamin B12 Blood, Venous; Future  - ECG 12 lead; Future    2. Preoperative evaluation to rule out surgical contraindication  - Folate Blood, Venous; Future  - H. pylori antigen, stool Stool; Future  - Iron panel Blood, Venous; Future  - Lipid panel Blood, Venous; Future  - Magnesium Blood, Venous; Future  - Methylmalonic acid, serum Blood, Venous; Future  - Vitamin A Blood, Venous; Future  - Vitamin B1 (Thiamin) Blood, Venous; Future  - Vitamin D 25 hydroxy Blood, Venous; Future  - Vitamin E Blood, Venous; Future  - Vitamin K Blood, Venous; Future  - Phosphorus Blood, Venous; Future  - Vitamin B12 Blood, Venous; Future  - ECG 12 lead; Future    3. Essential hypertension  - Folate Blood, Venous; Future  - H. pylori antigen, stool Stool; Future  - Iron panel Blood, Venous; Future  - Lipid panel Blood, Venous; Future  - Magnesium Blood, Venous; Future  - Methylmalonic acid, serum Blood, Venous; Future  - Vitamin A Blood, Venous; Future  - Vitamin B1 (Thiamin) Blood, Venous; Future  - Vitamin D 25 hydroxy Blood, Venous; Future  - Vitamin E Blood, Venous; Future  - Vitamin K Blood, Venous; Future  - Phosphorus Blood, Venous; Future  - Vitamin B12 Blood, Venous; Future  - ECG 12 lead; Future    4. Hyperlipidemia, unspecified hyperlipidemia type  - Folate Blood, Venous; Future  - H. pylori antigen, stool Stool; Future  - Iron panel Blood, Venous; Future  - Lipid panel Blood, Venous; Future  - Magnesium Blood, Venous; Future  - Methylmalonic acid, serum Blood, Venous; Future  - Vitamin A Blood, Venous; Future  - Vitamin B1 (Thiamin) Blood, Venous; Future  - Vitamin D 25 hydroxy Blood, Venous; Future  - Vitamin E Blood, Venous;  Future  - Vitamin K Blood, Venous; Future  - Phosphorus Blood, Venous; Future  - Vitamin B12 Blood, Venous; Future  - ECG 12 lead; Future    5. Abnormal CBC  - Iron panel Blood, Venous; Future    Katy presents today for ongoing medically supervised weight management prior to anticipated sleeve gastrectomy.  Will check preop labs as recommended and with associated weight related co morbidities including hypertension and hyperlipidemia. She did have evidence of evolving microcytic anemia on most recent CBC, therefore will check iron panel as she will be at increased risk for TIMOTHY s/p bariatric surgery.  Did have EGD prior to lap band removal less than 1 year ago, therefore, will not need repeat EGD prior to sleeve. She is progressing well with lifestyle modifications, but continues to gain weight.     The following was discussed and mutually agreed upon:   1. Follow up with Dr. Millan (bariatric surgeon) in 1 month for final medically supervised weight management visit and surgical preop visit. Have fasting labs drawn 1 week prior to this visit.  2. Schedule preop clearance visit with your PCP ENRRIQUE BALES MD within 2 weeks of scheduled surgery date of 8/14/2019.  3. Complete visits with Mey Willoughby RD. Follow her nutrition recommendations.  4. Work on increasing protein at breakfast meal through cottage, yogurt, breakfast meats, etc.  5. Increase water or sugar-free fluid (Powerade Zero, Propel, Crystal Light, Irineo Flavoring) to 48-64 fl oz.   6. Continue exercise by water walking 30-45 minutes at least 3 days per week.    Katy Stack will return in 1 month for preop visit with bariatric surgeon and labs.  She participated in the decision making process and agreed with the above plan. All questions were sought and answered to her satisfaction.     Marii Wayne CNP  6/25/2019  5:05 PM    Total Time spent with the patient is 32 min with more than 50% in direct counseling and coordination of care regarding  obesity, hypertension, hyperlipidemia, preop evaluation, and abnormal CBC.

## 2019-06-28 ENCOUNTER — APPOINTMENT (OUTPATIENT)
Dept: LAB | Facility: CLINIC | Age: 79
End: 2019-06-28
Payer: MEDICARE

## 2019-06-28 DIAGNOSIS — I27.20 PULMONARY HYPERTENSION (CMS/HCC): ICD-10-CM

## 2019-06-28 LAB
ANION GAP SERPL CALC-SCNC: 8 MMOL/L (ref 3–11)
BUN SERPL-MCNC: 24 MG/DL (ref 7–25)
CALCIUM SERPL-MCNC: 9.1 MG/DL (ref 8.6–10.3)
CHLORIDE SERPL-SCNC: 106 MMOL/L (ref 98–107)
CO2 SERPL-SCNC: 26 MMOL/L (ref 21–32)
CREAT SERPL-MCNC: 1.2 MG/DL (ref 0.6–1.2)
GFR SERPL CREATININE-BSD FRML MDRD: 43 ML/MIN/1.73M*2
GLUCOSE SERPL-MCNC: 93 MG/DL (ref 70–105)
POTASSIUM SERPL-SCNC: 4.6 MMOL/L (ref 3.5–5.1)
SODIUM SERPL-SCNC: 140 MMOL/L (ref 135–145)

## 2019-06-28 PROCEDURE — 80048 BASIC METABOLIC PNL TOTAL CA: CPT | Mod: PO

## 2019-06-28 PROCEDURE — 36415 COLL VENOUS BLD VENIPUNCTURE: CPT | Mod: PO

## 2019-07-01 DIAGNOSIS — I27.20 PULMONARY HYPERTENSION (CMS/HCC): ICD-10-CM

## 2019-07-01 RX ORDER — POTASSIUM CHLORIDE 750 MG/1
10 TABLET, EXTENDED RELEASE ORAL DAILY
Qty: 90 TABLET | Refills: 3 | Status: SHIPPED | OUTPATIENT
Start: 2019-07-01 | End: 2019-08-15 | Stop reason: HOSPADM

## 2019-07-01 RX ORDER — FUROSEMIDE 20 MG/1
20 TABLET ORAL DAILY
Qty: 90 TABLET | Refills: 3 | Status: SHIPPED | OUTPATIENT
Start: 2019-07-01 | End: 2019-08-15 | Stop reason: HOSPADM

## 2019-07-05 ENCOUNTER — OFFICE VISIT NO LOS (OUTPATIENT)
Dept: DIABETES SERVICES | Facility: CLINIC | Age: 79
End: 2019-07-05
Payer: MEDICARE

## 2019-07-05 VITALS — WEIGHT: 211.2 LBS | BODY MASS INDEX: 41.25 KG/M2

## 2019-07-05 DIAGNOSIS — E66.9 OBESITY (BMI 35.0-39.9 WITHOUT COMORBIDITY): Primary | ICD-10-CM

## 2019-07-05 PROCEDURE — G0447 BEHAVIOR COUNSEL OBESITY 15M: HCPCS | Mod: PO | Performed by: DIETITIAN, REGISTERED

## 2019-07-05 PROCEDURE — G0447 BEHAVIOR COUNSEL OBESITY 15M: HCPCS | Performed by: DIETITIAN, REGISTERED

## 2019-07-05 NOTE — PROGRESS NOTES
"Intensive Behavior Therapy (IBT) for Obesity    Referring Provider: Dr. Travis                           Supervising Provider: Dr. LIVIER Marquez      IBT Visit Number: #4  Date:  7/5/2019  Beginning Time: 9:01 AM    End Time: 9:40 AM    BMI from initial provider referral: 39.06     78 y.o. female with BMI of 41.25  Current weight: 211.4#,  Wt was: 203.6# on 6/6, wt was 202.2# for 5/10,  Wt was: 203# on 5/2 with initial IBT appt  Current height:5\"    Patient reports usual body weight (UBW) of: no Usual  Patient has experienced previous weight loss history: Weight Watchers, Nutri System, Atkins  Had lap band in [id number] and was removed by Dr. Martinez in Fall in 2018; highest wt was 211#, lowest wt 165#; has gained at least 15# since Nov.   ALLERGIES: STRAWBERRIES CAUSES HIVES,TONGUE SWELLING    Food recall: breakfast: bagel w/pb; 10:30 4 crackers, turkey and cheese, noon: small meat, veggie, salad, potatoes/gravy; supper: soup or 1/2 sandwich, maybe a cookie; snack fruit like an apple; beverages: coffee w/creamer, tiny bit, green tea, water; eats out twice a week: chili relliano or taco salad and butterfly shrimp and baked potato  Beverages: decaf surgery, green tea  Physical activity: used the river walk for 30 minutes at least 20-30 minutes per time or used the recumbent bike when the weather is inclement    Dislikes: eggs, yogurt      Discussion:  Time spent face to face with patient:  39 minutes  Education provided:  Individual  Patient attends today:  unaccompanied    Assess:  Progress toward previously set goals:  Track food and fluid intake- goal met 100% of the time  15 gm protein per meal- goal met 95% of the time  Consume at least 48 oz water per day- goal met 100% of the time she is drinking 56 oz water per day      Advise:  Pt is looking forward to having the bariatric sleeve laparoscopic surgery, pt states the sooner the better. She states she ate more over the 4th. States she knows she will need to " change her way of eating especially with celebrations, special events. She has been going to the pool on a daily basis at least 5 days per week which she enjoys. Discussed the 2 week presurgery and 3 week post surgery diets and reviewed vitamins/minerals needed for life after surgery. Reviewed the pre weight loss surgery goals.Pt states she needs to work on chewing every bite 20 times and to work on taking sips of fluids vs gulps.  Patient has weight goal of: 150#  Resources/Handouts provided: 2 week pre surgery diet, 3 week post surgery diet, Laparoscopic Bariatric workbook.    Agree:  Patient is ready for behavior change.  At this time, patient would like to cont to work on journaling food and fluid intake.    Goals:   Track food and fluid intake  15 gm protein per meal  Consume at least least 48 oz water    Specific goal weight goals: 150#    Assist:  The following behavior change tool was used today:   Monitoring by Patient, discussion, handouts    Arrange:  Follow up scheduled in  2 weeks

## 2019-07-11 ENCOUNTER — APPOINTMENT (OUTPATIENT)
Dept: LAB | Facility: CLINIC | Age: 79
End: 2019-07-11
Payer: MEDICARE

## 2019-07-11 DIAGNOSIS — E78.5 HYPERLIPIDEMIA, UNSPECIFIED HYPERLIPIDEMIA TYPE: ICD-10-CM

## 2019-07-11 DIAGNOSIS — E66.01 CLASS 2 SEVERE OBESITY DUE TO EXCESS CALORIES WITH SERIOUS COMORBIDITY AND BODY MASS INDEX (BMI) OF 39.0 TO 39.9 IN ADULT (CMS/HCC): ICD-10-CM

## 2019-07-11 DIAGNOSIS — Z01.818 PREOPERATIVE EVALUATION TO RULE OUT SURGICAL CONTRAINDICATION: ICD-10-CM

## 2019-07-11 DIAGNOSIS — R79.89 ABNORMAL CBC: ICD-10-CM

## 2019-07-11 DIAGNOSIS — I10 ESSENTIAL HYPERTENSION: ICD-10-CM

## 2019-07-11 DIAGNOSIS — E66.812 CLASS 2 SEVERE OBESITY DUE TO EXCESS CALORIES WITH SERIOUS COMORBIDITY AND BODY MASS INDEX (BMI) OF 39.0 TO 39.9 IN ADULT (CMS/HCC): ICD-10-CM

## 2019-07-11 LAB
25(OH)D3 SERPL-MCNC: 52 NG/ML (ref 30–100)
CHOLEST SERPL-MCNC: 110 MG/DL (ref 0–199)
FASTING STATUS PATIENT QL REPORTED: YES
FERRITIN SERPL-MCNC: 12.2 NG/ML (ref 11–307)
FOLATE SERPL-MCNC: 17.7 NG/ML
HDLC SERPL-MCNC: 38 MG/DL
IRON SATN MFR SERPL: 24 % (ref 13–50)
IRON SERPL-MCNC: 86 UG/DL (ref 50–175)
LDLC SERPL CALC-MCNC: 42 MG/DL (ref 20–99)
MAGNESIUM SERPL-MCNC: 2.3 MG/DL (ref 1.8–2.4)
PHOSPHATE SERPL-MCNC: 3.4 MG/DL (ref 2.5–4.9)
TIBC SERPL-MCNC: 355 UG/DL (ref 250–450)
TRIGL SERPL-MCNC: 149 MG/DL
UIBC SERPL-MCNC: 269 UG/DL (ref 155–355)
VIT B12 SERPL-MCNC: 341 PG/ML (ref 180–914)

## 2019-07-11 PROCEDURE — 80061 LIPID PANEL: CPT

## 2019-07-11 PROCEDURE — 82728 ASSAY OF FERRITIN: CPT

## 2019-07-11 PROCEDURE — 84100 ASSAY OF PHOSPHORUS: CPT

## 2019-07-11 PROCEDURE — 84425 ASSAY OF VITAMIN B-1: CPT

## 2019-07-11 PROCEDURE — 82306 VITAMIN D 25 HYDROXY: CPT

## 2019-07-11 PROCEDURE — 83735 ASSAY OF MAGNESIUM: CPT

## 2019-07-11 PROCEDURE — 82607 VITAMIN B-12: CPT

## 2019-07-11 PROCEDURE — 83540 ASSAY OF IRON: CPT

## 2019-07-11 PROCEDURE — 84597 ASSAY OF VITAMIN K: CPT

## 2019-07-11 PROCEDURE — 84446 ASSAY OF VITAMIN E: CPT

## 2019-07-11 PROCEDURE — 82746 ASSAY OF FOLIC ACID SERUM: CPT

## 2019-07-11 PROCEDURE — 83921 ORGANIC ACID SINGLE QUANT: CPT

## 2019-07-11 PROCEDURE — 84590 ASSAY OF VITAMIN A: CPT

## 2019-07-11 PROCEDURE — 36415 COLL VENOUS BLD VENIPUNCTURE: CPT

## 2019-07-13 LAB — PHYTONADIONE SERPL-MCNC: 0.21 NG/ML (ref 0.1–2.2)

## 2019-07-14 LAB
METHYLMALONATE SERPL-SCNC: 0.32 NMOL/ML
VIT B1 BLD-SCNC: 157 NMOL/L (ref 70–180)

## 2019-07-15 LAB — VIT A SERPL-MCNC: 45.1 MCG/DL (ref 32.5–78)

## 2019-07-16 LAB — A-TOCOPHEROL VIT E SERPL-MCNC: 16.5 MG/L (ref 5.5–17)

## 2019-07-18 ENCOUNTER — OFFICE VISIT NO LOS (OUTPATIENT)
Dept: DIABETES SERVICES | Facility: CLINIC | Age: 79
End: 2019-07-18
Payer: MEDICARE

## 2019-07-18 VITALS — BODY MASS INDEX: 39.57 KG/M2 | WEIGHT: 202.6 LBS

## 2019-07-18 DIAGNOSIS — E66.9 OBESITY (BMI 35.0-39.9 WITHOUT COMORBIDITY): Primary | ICD-10-CM

## 2019-07-18 PROCEDURE — G0447 BEHAVIOR COUNSEL OBESITY 15M: HCPCS | Mod: PO | Performed by: DIETITIAN, REGISTERED

## 2019-07-18 PROCEDURE — G0447 BEHAVIOR COUNSEL OBESITY 15M: HCPCS | Performed by: DIETITIAN, REGISTERED

## 2019-07-18 NOTE — PROGRESS NOTES
"Intensive Behavior Therapy (IBT) for Obesity    Referring Provider: Dr. Travis                           Supervising Provider: Dr. Denson      IBT Visit Number: #4  Date:  7/5/2019  Beginning Time: 9:01 AM    End Time: 9:40 AM    BMI from initial provider referral: 39.06     78 y.o. female with BMI of 40.04  Current weight: 202.6#, wt was: 211.4# on 7/5,  Wt was: 203.6# on 6/6, wt was 202.2# for 5/10,  Wt was: 203# on 5/2 with initial IBT appt  Current height:5\"  Wt is down 8.8# over the past 2 weeks. Pt states she feels she has less fluid retention now that she is back on her usual meal plan.  Patient reports usual body weight (UBW) of: no Usual  Patient has experienced previous weight loss history: Weight Watchers, Nutri System, Chary  Had lap band in [id number] and was removed by Dr. Martinez in Fall in 2018; highest wt was 211#, lowest wt 165#; has gained at least 15# since Nov.   ALLERGIES: STRAWBERRIES CAUSES HIVES,TONGUE SWELLING    Food recall: breakfast: bagel w/pb; 10:30 4 crackers, turkey and cheese, noon: small meat, veggie, salad, potatoes/gravy; supper: soup or 1/2 sandwich, maybe a cookie; snack fruit like an apple; beverages: coffee w/creamer, tiny bit, green tea, water; eats out twice a week: chili relliano or taco salad and butterfly shrimp and baked potato  Beverages: decaf surgery, green tea  Physical activity: used the river walk for 30 minutes at least 20-30 minutes per time or used the recumbent bike when the weather is inclement    Dislikes: eggs, yogurt      Discussion:  Time spent face to face with patient:  39 minutes  Education provided:  Individual  Patient attends today:  unaccompanied    Assess:  Progress toward previously set goals:  Track food and fluid intake- goal met 100% of the time  15 gm protein per meal- goal met 95% of the time  Consume at least 48 oz water per day- goal met 100% of the time she is drinking 56 oz water per day      Advise:  Pt is looking forward to " having the bariatric sleeve laparoscopic surgery, she will have surgery on 8/14/19. Pt states she is working on chewing every bite 20 times and  taking sips of fluids vs gulps. Reviewed the 2 week presurgery and 3 week post surgery diet. Discussed vit/minerals needed after surgery. Pt brought in bottles of vit/min that she has purchased. Discussed importance of taking chewables, tablets instead of gummies. Discussed shopping for items pre surgery. Patient has weight goal of: 150#  Resources/Handouts provided: 2 week pre surgery diet, 3 week post surgery diet, Laparoscopic Bariatric workbook. Pre surgery shopping guide.    Agree:  Patient is ready for behavior change.  At this time, patient would like to cont to work on journaling food and fluid intake.    Goals:   Track food and fluid intake  15 gm protein per meal  Consume at least least 48 oz water    Specific goal weight goals: 150#    Assist:  The following behavior change tool was used today:   Monitoring by Patient, discussion, handouts    Arrange:  Follow up scheduled in  2 weeks for body comp and measurements.

## 2019-07-22 ENCOUNTER — OFFICE VISIT (OUTPATIENT)
Dept: SURGERY | Facility: CLINIC | Age: 79
End: 2019-07-22
Payer: MEDICARE

## 2019-07-22 VITALS
SYSTOLIC BLOOD PRESSURE: 162 MMHG | WEIGHT: 205 LBS | BODY MASS INDEX: 40.25 KG/M2 | DIASTOLIC BLOOD PRESSURE: 88 MMHG | HEIGHT: 60 IN | HEART RATE: 78 BPM

## 2019-07-22 DIAGNOSIS — E66.01 MORBID OBESITY (CMS/HCC): Primary | ICD-10-CM

## 2019-07-22 PROCEDURE — G0463 HOSPITAL OUTPT CLINIC VISIT: HCPCS | Performed by: SURGERY

## 2019-07-22 PROCEDURE — 99214 OFFICE O/P EST MOD 30 MIN: CPT | Performed by: SURGERY

## 2019-07-22 RX ORDER — ACETAMINOPHEN 10 MG/ML
1000 INJECTION, SOLUTION INTRAVENOUS
Status: CANCELLED | OUTPATIENT
Start: 2019-08-14

## 2019-07-22 RX ORDER — SODIUM CHLORIDE, SODIUM LACTATE, POTASSIUM CHLORIDE, AND CALCIUM CHLORIDE .6; .31; .03; .02 G/100ML; G/100ML; G/100ML; G/100ML
1000 INJECTION, SOLUTION INTRAVENOUS ONCE
Status: CANCELLED | OUTPATIENT
Start: 2019-07-22 | End: 2019-07-22

## 2019-07-22 NOTE — PROGRESS NOTES
GENERAL SURGERY ADMISSION HISTORY & PHYSICAL     7/22/2019     Katy Stack     [id number]     CHIEF COMPLAINT: Morbid obesity     HISTORY OF PRESENT ILLNESS:  Katy Stack is a 79 y.o.  female who's Body mass index is 40.04 kg/m²..  She has a longstanding history of obesity with multiple failed attempts at weight loss in the past including She is well known to me for lap band removal in November 2018. As per my previous note,    She has a longstanding history of obesity with multiple failed attempts at weight loss in the past including LAP-BAND, exclusion of carbohydrates, Weight Watchers, [employer] Systems, and caloric restriction with most successful attempt being LAP-BAND with a loss of 50 lbs.  She is well known to me as she did experience slippage of the LAP-BAND and had this removed recently.  She has had complete resolution of reflux symptoms since LAP-BAND removal.  She had regained 20 lbs with LAP-BAND still in place, and has now regained another 16 lbs.   She reports highest adult weight 211 lbs, and lowest adult weight 140 lbs.  She has been overweight for approximately 50 years and attributes initial weight gain to childbirth.  She is most interested in revisional surgery for weight loss.  She is a former smoker, quit 25 years ago.     Her biggest motivating factor in wanting to lose weight is overall quality of life and health. She has noted slow increase in weight since having the band removed. She is meeting with Mey kirkland RD.      Weight related co morbidities including hyperlipidemia, hypertension, COPD with supplemental oxygen at night only.  Denies psychological weight related co morbidities.       Patient Active Problem List   Diagnosis   • Chronic obstructive lung disease (CMS/HCC) (HCC)   • Gastroesophageal reflux disease   • Hypothyroidism   • Obesity   • Obstructive sleep apnea syndrome   • Periodic limb movement disorder   • Vitamin D deficiency   • Allergic rhinitis   • Benign  essential hypertension   • Obesity (BMI 35.0-39.9 without comorbidity)   • LAP-BAND surgery status   • Heartburn   • Pulmonary hypertension (CMS/HCC) (HCC)        Past Surgical History:   Procedure Laterality Date   • APPENDECTOMY  1987   • BACK SURGERY      2 surgeries [id number]   • BACK SURGERY  11/30/2016   • BACK SURGERY  05/01/2017    vetebral fusion   • CARDIAC CATHETERIZATION  08/31/2011   • CATARACT EXTRACTION W/  INTRAOCULAR LENS IMPLANT Left 7/2/2018    Procedure: OS CATARACT LEFT PHACOEMULSIFICATION OF CATARACT WITH INTRAOCULAR LENS;  Surgeon: Emmanuel Cueto MD;  Location: [facility] SURGICAL SERVICES;  Service: Ophthalmology;  Laterality: Left;   • CATARACT EXTRACTION W/  INTRAOCULAR LENS IMPLANT Right 8/6/2018    Procedure: OD CATARACT RIGHT PHACOEMULSIFICATION OF CATARACT WITH INTRAOCULAR LENS;  Surgeon: Emmanuel Cueto MD;  Location: [facility] SURGICAL SERVICES;  Service: Ophthalmology;  Laterality: Right;   • CHOLECYSTECTOMY     • COLONOSCOPY  02/19/2014    No polyps   • COLONOSCOPY  04/26/2011   • DIAGNOSTIC LAPAROSCOPY N/A 11/28/2018    Procedure: Laparoscopic band removal;  Surgeon: Chris Millan MD;  Location: 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 Vela- shoulder   • THYROID LOBECTOMY Left 18/18/2010   • THYROIDECTOMY, PARTIAL  08/18/2010   • UPPER GASTROINTESTINAL ENDOSCOPY  02/19/2014    Mild chronic  gastritis         Current Outpatient Medications:   •  furosemide (LASIX) 20 mg tablet, Take 1 tablet (20 mg total) by mouth daily, Disp: 90 tablet, Rfl: 3  •  potassium chloride (KLOR-CON) 10 mEq CR tablet, Take 1 tablet (10 mEq total) by mouth daily, Disp: 90 tablet, Rfl: 3  •  calcium carbonate-vitamin D3 600 mg(1,500mg) -400 unit per tablet, Take 1 tablet by mouth daily, Disp: , Rfl:   •  nitroglycerin (NITROSTAT) 0.4 mg SL tablet, Place 1 tablet (0.4 mg total) under the tongue every 5 (five) minutes as needed for chest pain, Disp: 25 tablet, Rfl: 1  •  levothyroxine (SYNTHROID, LEVOTHROID) 100 mcg tablet, Take 1 tablet (100 mcg total) by mouth daily, Disp: 90 tablet, Rfl: 3  •  atenolol (TENORMIN) 25 mg tablet, Take 1 tablet (25 mg total) by mouth daily, Disp: 90 tablet, Rfl: 3  •  atorvastatin (LIPITOR) 20 mg tablet, Take 1 tablet (20 mg total) by mouth daily, Disp: 90 tablet, Rfl: 3  •  losartan (COZAAR) 50 mg tablet, Take 1 tablet (50 mg total) by mouth daily, Disp: 90 tablet, Rfl: 3  •  gabapentin (NEURONTIN) 100 mg capsule, Take 200 mg by mouth 3 (three) times a day.  , Disp: , Rfl:   •  vit A-vit C-vit E-zinc-copper (EYE VITAMIN AND MINERALS) 7,160-113-100 unit-mg-unit tablet, Take 1 tab/cap by mouth 2 (two) times a day., Disp: , Rfl:   •  AMITIZA 24 mcg capsule, , Disp: , Rfl:   •  PREMARIN 0.9 mg tablet, TAKE 1 TABLET BY MOUTH  EVERY DAY (Patient taking differently: TAKE 1 TABLET BY MOUTH EVERY WEEK), Disp: 90 tablet, Rfl: 1  •  aspirin (ASPIR-LOW) 81 mg EC tablet, 1 tab by oral route every day, Disp: , Rfl:     Allergies   Allergen Reactions   • [place] Angioedema and Hives     hives, tongue swells  Swelling of tongue   • Adhesive      welts       Family History   Problem Relation Age of Onset   • Heart disease Mother    • Heart disease Father         Myocardial infarction   • Parkinsonism Father    • Thyroid disease Father    • Alzheimer's disease Sister    • Arthritis Sister    • Osteoporosis  Sister    • Lung cancer Brother    • Diabetes Maternal Grandmother        Social History     Socioeconomic History   • Marital status:      Spouse name: Not on file   • Number of children: Not on file   • Years of education: Not on file   • Highest education level: Not on file   Occupational History   • Not on file   Social Needs   • Financial resource strain: Not on file   • Food insecurity:     Worry: Not on file     Inability: Not on file   • Transportation needs:     Medical: Not on file     Non-medical: Not on file   Tobacco Use   • Smoking status: Former Smoker     Packs/day: 2.00     Years: 35.00     Pack years: 70.00     Last attempt to quit:      Years since quittin.5   • Smokeless tobacco: Never Used   Substance and Sexual Activity   • Alcohol use: No   • Drug use: No   • Sexual activity: Not on file   Lifestyle   • Physical activity:     Days per week: Not on file     Minutes per session: Not on file   • Stress: Not on file   Relationships   • Social connections:     Talks on phone: Not on file     Gets together: Not on file     Attends Baptism service: Not on file     Active member of club or organization: Not on file     Attends meetings of clubs or organizations: Not on file     Relationship status: Not on file   • Intimate partner violence:     Fear of current or ex partner: Not on file     Emotionally abused: Not on file     Physically abused: Not on file     Forced sexual activity: Not on file   Other Topics Concern   • Not on file   Social History Narrative   • Not on file        REVIEW OF SYSTEMS:     Constitutional: Negative for hot flashes.   Negative for weakness, fever, chills, night sweats, loss of appetite, fatigue or weight loss.   HEENT: Negative for headache, eye tearing, visual problems, decreased hearing tinnitus, rhinorrhea, mouth sores, and pharyngitis.   Respiratory: Negative for cough, dyspnea, hemoptysis, wheezing, and pleuritic chest pain.   Cardiac: Negative for  exertional chest pain, pedal edema, dyspnea on exertion, and palpitations.   Gastrointestinal: Negative for nausea, vomiting, reflux, constipation, diarrhea, abdominal pain, and painful stools.   Genitourinary: Negative for frequency, dysuria and hematuria.   Dermatologic: Negative for rash, pruritus, skin discoloration, and new skin lump.   Musculoskeletal: As above.   Hematologic: Negative for easy bruising, bleeding, and lymphadenopathy.   Neurologic: Negative for dizziness, paresthesias in the fingers/hands,   paresthesias of the toes, feet, and neuropathic pain.   Psychiatric: Negative for depression, anxiety.        PHYSICAL EXAMINATION:  /88 (BP Location: Left arm, Patient Position: Sitting, Cuff Size: Large)   Pulse 78   Ht 1.524 m (5')   Wt 93 kg (205 lb)   BMI 40.04 kg/m²    General appearance: alert and cooperative  Head: atraumatic  Eyes: conjunctivae/corneas clear. PERRL, EOM's intact.   Ears: normal  Lungs: clear to auscultation bilaterally  Heart: regular rate and rhythm  Abdomen: morbidly obese, soft, non tender, previous incisions noted and consistent with previous operations  Extremities: extremities normal, warm and well-perfused; no cyanosis, clubbing, or edema  Neurologic: Alert and oriented X 3, normal strength and tone.          ASSESSMENT & PLAN:  This is a 79 y.o. female with a body mass index of Body mass index is 40.04 kg/m². and comorbid conditions.     Risks and benefits of laparoscopic sleeve gastrectomy have been carefully explained to the patient. She elects to proceed with surgery.  Preoperative labs were ordered.     Preoperatively, she will receive intravenous fluid resuscitation and intravenous antibiotics.  She will continue to take nothing by mouth the morning of surgery. She She will receive chemical and mechanical deep venous thrombosis prophylaxis and a urinary catheter.     Katy Stack participated in the decision making process and agreed with the plan of  care.  All questions were sought and answered.     Chris Quinn MD       Total Time spent with the patient is 30 min with more than 50% in direct counseling and coordination of care regarding her morbid obesity

## 2019-07-22 NOTE — H&P (VIEW-ONLY)
GENERAL SURGERY ADMISSION HISTORY & PHYSICAL     7/22/2019     Katy Stack     [id number]     CHIEF COMPLAINT: Morbid obesity     HISTORY OF PRESENT ILLNESS:  Katy Stack is a 79 y.o.  female who's Body mass index is 40.04 kg/m²..  She has a longstanding history of obesity with multiple failed attempts at weight loss in the past including She is well known to me for lap band removal in November 2018. As per my previous note,    She has a longstanding history of obesity with multiple failed attempts at weight loss in the past including LAP-BAND, exclusion of carbohydrates, Weight Watchers, [employer], and caloric restriction with most successful attempt being LAP-BAND with a loss of 50 lbs.  She is well known to me as she did experience slippage of the LAP-BAND and had this removed recently.  She has had complete resolution of reflux symptoms since LAP-BAND removal.  She had regained 20 lbs with LAP-BAND still in place, and has now regained another 16 lbs.   She reports highest adult weight 211 lbs, and lowest adult weight 140 lbs.  She has been overweight for approximately 50 years and attributes initial weight gain to childbirth.  She is most interested in revisional surgery for weight loss.  She is a former smoker, quit 25 years ago.     Her biggest motivating factor in wanting to lose weight is overall quality of life and health. She has noted slow increase in weight since having the band removed. She is meeting with Mey kirkland RD.      Weight related co morbidities including hyperlipidemia, hypertension, COPD with supplemental oxygen at night only.  Denies psychological weight related co morbidities.       Patient Active Problem List   Diagnosis   • Chronic obstructive lung disease (CMS/HCC) (HCC)   • Gastroesophageal reflux disease   • Hypothyroidism   • Obesity   • Obstructive sleep apnea syndrome   • Periodic limb movement disorder   • Vitamin D deficiency   • Allergic rhinitis   • Benign  essential hypertension   • Obesity (BMI 35.0-39.9 without comorbidity)   • LAP-BAND surgery status   • Heartburn   • Pulmonary hypertension (CMS/HCC) (HCC)        Past Surgical History:   Procedure Laterality Date   • APPENDECTOMY  1987   • BACK SURGERY      2 surgeries [id number]   • BACK SURGERY  11/30/2016   • BACK SURGERY  05/01/2017    vetebral fusion   • CARDIAC CATHETERIZATION  08/31/2011   • CATARACT EXTRACTION W/  INTRAOCULAR LENS IMPLANT Left 7/2/2018    Procedure: OS CATARACT LEFT PHACOEMULSIFICATION OF CATARACT WITH INTRAOCULAR LENS;  Surgeon: Emmanuel Cueto MD;  Location: [facility] SURGICAL SERVICES;  Service: Ophthalmology;  Laterality: Left;   • CATARACT EXTRACTION W/  INTRAOCULAR LENS IMPLANT Right 8/6/2018    Procedure: OD CATARACT RIGHT PHACOEMULSIFICATION OF CATARACT WITH INTRAOCULAR LENS;  Surgeon: Emmanuel Cueto MD;  Location: [facility] SURG[facility] SERVICES;  Service: Ophthalmology;  Laterality: Right;   • CHOLECYSTECTOMY     • COLONOSCOPY  02/19/2014    No polyps   • COLONOSCOPY  04/26/2011   • DIAGNOSTIC LAPAROSCOPY N/A 11/28/2018    Procedure: Laparoscopic band removal;  Surgeon: Chris Millan MD;  Location: [facility] [place] OR;  Service: General;  Laterality: N/A;   • ESOPHAGOGASTRODUODENOSCOPY N/A 11/6/2018    Procedure: EGD - ESOPHAGOGASTRODUODENOSCOPY;  Surgeon: Chris Millan MD;  Location: [facility] Endoscopy;  Service: Endoscopy;  Laterality: N/A;   • FOOT SURGERY      11 surgeries for [place] Neuroma [id number]   • HYSTERECTOMY  1987    Total   • LAPAROSCOPIC GASTRIC BANDING      03/27/2014-standard Ap   • OTHER SURGICAL HISTORY      General surgery: Lt rotator cuff 2002   • OTHER SURGICAL HISTORY  12/01/2014    Right facial skin cancer   • OTHER SURGICAL HISTORY  1987    Ob gyn surgery:Bladder tuck   • SHOULDER ARTHROSCOPY  08/02/2013    Dr. Vela- shoulder   • THYROID LOBECTOMY Left 18/18/2010   • THYROIDECTOMY, PARTIAL  08/18/2010   • UPPER GASTROINTESTINAL ENDOSCOPY  02/19/2014    Mild chronic  gastritis         Current Outpatient Medications:   •  furosemide (LASIX) 20 mg tablet, Take 1 tablet (20 mg total) by mouth daily, Disp: 90 tablet, Rfl: 3  •  potassium chloride (KLOR-CON) 10 mEq CR tablet, Take 1 tablet (10 mEq total) by mouth daily, Disp: 90 tablet, Rfl: 3  •  calcium carbonate-vitamin D3 600 mg(1,500mg) -400 unit per tablet, Take 1 tablet by mouth daily, Disp: , Rfl:   •  nitroglycerin (NITROSTAT) 0.4 mg SL tablet, Place 1 tablet (0.4 mg total) under the tongue every 5 (five) minutes as needed for chest pain, Disp: 25 tablet, Rfl: 1  •  levothyroxine (SYNTHROID, LEVOTHROID) 100 mcg tablet, Take 1 tablet (100 mcg total) by mouth daily, Disp: 90 tablet, Rfl: 3  •  atenolol (TENORMIN) 25 mg tablet, Take 1 tablet (25 mg total) by mouth daily, Disp: 90 tablet, Rfl: 3  •  atorvastatin (LIPITOR) 20 mg tablet, Take 1 tablet (20 mg total) by mouth daily, Disp: 90 tablet, Rfl: 3  •  losartan (COZAAR) 50 mg tablet, Take 1 tablet (50 mg total) by mouth daily, Disp: 90 tablet, Rfl: 3  •  gabapentin (NEURONTIN) 100 mg capsule, Take 200 mg by mouth 3 (three) times a day.  , Disp: , Rfl:   •  vit A-vit C-vit E-zinc-copper (EYE VITAMIN AND MINERALS) 7,160-113-100 unit-mg-unit tablet, Take 1 tab/cap by mouth 2 (two) times a day., Disp: , Rfl:   •  AMITIZA 24 mcg capsule, , Disp: , Rfl:   •  PREMARIN 0.9 mg tablet, TAKE 1 TABLET BY MOUTH  EVERY DAY (Patient taking differently: TAKE 1 TABLET BY MOUTH EVERY WEEK), Disp: 90 tablet, Rfl: 1  •  aspirin (ASPIR-LOW) 81 mg EC tablet, 1 tab by oral route every day, Disp: , Rfl:     Allergies   Allergen Reactions   • [place] Angioedema and Hives     hives, tongue swells  Swelling of tongue   • Adhesive      welts       Family History   Problem Relation Age of Onset   • Heart disease Mother    • Heart disease Father         Myocardial infarction   • Parkinsonism Father    • Thyroid disease Father    • Alzheimer's disease Sister    • Arthritis Sister    • Osteoporosis  Sister    • Lung cancer Brother    • Diabetes Maternal Grandmother        Social History     Socioeconomic History   • Marital status:      Spouse name: Not on file   • Number of children: Not on file   • Years of education: Not on file   • Highest education level: Not on file   Occupational History   • Not on file   Social Needs   • Financial resource strain: Not on file   • Food insecurity:     Worry: Not on file     Inability: Not on file   • Transportation needs:     Medical: Not on file     Non-medical: Not on file   Tobacco Use   • Smoking status: Former Smoker     Packs/day: 2.00     Years: 35.00     Pack years: 70.00     Last attempt to quit:      Years since quittin.5   • Smokeless tobacco: Never Used   Substance and Sexual Activity   • Alcohol use: No   • Drug use: No   • Sexual activity: Not on file   Lifestyle   • Physical activity:     Days per week: Not on file     Minutes per session: Not on file   • Stress: Not on file   Relationships   • Social connections:     Talks on phone: Not on file     Gets together: Not on file     Attends [employer] service: Not on file     Active member of club or organization: Not on file     Attends meetings of clubs or organizations: Not on file     Relationship status: Not on file   • Intimate partner violence:     Fear of current or ex partner: Not on file     Emotionally abused: Not on file     Physically abused: Not on file     Forced sexual activity: Not on file   Other Topics Concern   • Not on file   Social History Narrative   • Not on file        REVIEW OF SYSTEMS:     Constitutional: Negative for hot flashes.   Negative for weakness, fever, chills, night sweats, loss of appetite, fatigue or weight loss.   HEENT: Negative for headache, eye tearing, visual problems, decreased hearing tinnitus, rhinorrhea, mouth sores, and pharyngitis.   Respiratory: Negative for cough, dyspnea, hemoptysis, wheezing, and pleuritic chest pain.   Cardiac: Negative for  exertional chest pain, pedal edema, dyspnea on exertion, and palpitations.   Gastrointestinal: Negative for nausea, vomiting, reflux, constipation, diarrhea, abdominal pain, and painful stools.   Genitourinary: Negative for frequency, dysuria and hematuria.   Dermatologic: Negative for rash, pruritus, skin discoloration, and new skin lump.   Musculoskeletal: As above.   Hematologic: Negative for easy bruising, bleeding, and lymphadenopathy.   Neurologic: Negative for dizziness, paresthesias in the fingers/hands,   paresthesias of the toes, feet, and neuropathic pain.   Psychiatric: Negative for depression, anxiety.        PHYSICAL EXAMINATION:  /88 (BP Location: Left arm, Patient Position: Sitting, Cuff Size: Large)   Pulse 78   Ht 1.524 m (5')   Wt 93 kg (205 lb)   BMI 40.04 kg/m²    General appearance: alert and cooperative  Head: atraumatic  Eyes: conjunctivae/corneas clear. PERRL, EOM's intact.   Ears: normal  Lungs: clear to auscultation bilaterally  Heart: regular rate and rhythm  Abdomen: morbidly obese, soft, non tender, previous incisions noted and consistent with previous operations  Extremities: extremities normal, warm and well-perfused; no cyanosis, clubbing, or edema  Neurologic: Alert and oriented X 3, normal strength and tone.          ASSESSMENT & PLAN:  This is a 79 y.o. female with a body mass index of Body mass index is 40.04 kg/m². and comorbid conditions.     Risks and benefits of laparoscopic sleeve gastrectomy have been carefully explained to the patient. She elects to proceed with surgery.  Preoperative labs were ordered.     Preoperatively, she will receive intravenous fluid resuscitation and intravenous antibiotics.  She will continue to take nothing by mouth the morning of surgery. She She will receive chemical and mechanical deep venous thrombosis prophylaxis and a urinary catheter.     Katy Stack participated in the decision making process and agreed with the plan of  care.  All questions were sought and answered.     Chris Quinn MD       Total Time spent with the patient is 30 min with more than 50% in direct counseling and coordination of care regarding her morbid obesity

## 2019-08-01 ENCOUNTER — APPOINTMENT (OUTPATIENT)
Dept: LAB | Facility: CLINIC | Age: 79
End: 2019-08-01
Payer: MEDICARE

## 2019-08-01 ENCOUNTER — APPOINTMENT (OUTPATIENT)
Dept: CARDIOLOGY | Facility: CLINIC | Age: 79
End: 2019-08-01
Payer: MEDICARE

## 2019-08-01 ENCOUNTER — OFFICE VISIT (OUTPATIENT)
Dept: FAMILY MEDICINE | Facility: CLINIC | Age: 79
End: 2019-08-01
Payer: MEDICARE

## 2019-08-01 VITALS
WEIGHT: 204 LBS | SYSTOLIC BLOOD PRESSURE: 134 MMHG | OXYGEN SATURATION: 96 % | DIASTOLIC BLOOD PRESSURE: 64 MMHG | RESPIRATION RATE: 16 BRPM | HEART RATE: 74 BPM | BODY MASS INDEX: 39.84 KG/M2 | TEMPERATURE: 97.8 F

## 2019-08-01 DIAGNOSIS — E78.5 HYPERLIPIDEMIA, UNSPECIFIED HYPERLIPIDEMIA TYPE: ICD-10-CM

## 2019-08-01 DIAGNOSIS — I10 ESSENTIAL HYPERTENSION: ICD-10-CM

## 2019-08-01 DIAGNOSIS — I27.20 PULMONARY HYPERTENSION (CMS/HCC): ICD-10-CM

## 2019-08-01 DIAGNOSIS — Z01.818 PREOP EXAMINATION: Primary | ICD-10-CM

## 2019-08-01 DIAGNOSIS — G47.33 OBSTRUCTIVE SLEEP APNEA SYNDROME: ICD-10-CM

## 2019-08-01 DIAGNOSIS — E03.9 HYPOTHYROIDISM, UNSPECIFIED TYPE: ICD-10-CM

## 2019-08-01 PROBLEM — J44.9 CHRONIC OBSTRUCTIVE LUNG DISEASE (CMS/HCC): Status: RESOLVED | Noted: 2017-11-10 | Resolved: 2019-08-01

## 2019-08-01 LAB
ALBUMIN SERPL-MCNC: 4.1 G/DL (ref 3.5–5.3)
ALP SERPL-CCNC: 79 U/L (ref 55–142)
ALT SERPL-CCNC: 9 U/L (ref 0–52)
ANION GAP SERPL CALC-SCNC: 7 MMOL/L (ref 3–11)
AST SERPL-CCNC: 13 U/L (ref 0–39)
BASOPHILS # BLD AUTO: 0 10*3/UL
BASOPHILS NFR BLD AUTO: 0 % (ref 0–2)
BILIRUB SERPL-MCNC: 0.51 MG/DL (ref 0–1.4)
BUN SERPL-MCNC: 28 MG/DL (ref 7–25)
CALCIUM ALBUM COR SERPL-MCNC: 9.6 MG/DL (ref 8.6–10.3)
CALCIUM SERPL-MCNC: 9.7 MG/DL (ref 8.6–10.3)
CHLORIDE SERPL-SCNC: 106 MMOL/L (ref 98–107)
CO2 SERPL-SCNC: 28 MMOL/L (ref 21–32)
CREAT SERPL-MCNC: 1.22 MG/DL (ref 0.6–1.2)
EOSINOPHIL # BLD AUTO: 0.3 10*3/UL
EOSINOPHIL NFR BLD AUTO: 4 % (ref 0–3)
ERYTHROCYTE [DISTWIDTH] IN BLOOD BY AUTOMATED COUNT: 15.8 % (ref 11.5–14)
GFR SERPL CREATININE-BSD FRML MDRD: 42 ML/MIN/1.73M*2
GLUCOSE SERPL-MCNC: 97 MG/DL (ref 70–105)
HCT VFR BLD AUTO: 41.9 % (ref 34–45)
HGB BLD-MCNC: 13.8 G/DL (ref 11.5–15.5)
LYMPHOCYTES # BLD AUTO: 2.5 10*3/UL
LYMPHOCYTES NFR BLD AUTO: 34 % (ref 11–47)
MCH RBC QN AUTO: 28.6 PG (ref 28–33)
MCHC RBC AUTO-ENTMCNC: 33 G/DL (ref 32–36)
MCV RBC AUTO: 86.8 FL (ref 81–97)
MONOCYTES # BLD AUTO: 0.7 10*3/UL
MONOCYTES NFR BLD AUTO: 10 % (ref 3–11)
NEUTROPHILS # BLD AUTO: 3.8 10*3/UL
NEUTROPHILS NFR BLD AUTO: 52 % (ref 41–81)
PLATELET # BLD AUTO: 307 10*3/UL (ref 140–350)
PMV BLD AUTO: 7.8 FL (ref 6.9–10.8)
POTASSIUM SERPL-SCNC: 4.7 MMOL/L (ref 3.5–5.1)
PROT SERPL-MCNC: 6.7 G/DL (ref 6–8.3)
RBC # BLD AUTO: 4.83 10*6/ΜL (ref 3.7–5.3)
SODIUM SERPL-SCNC: 141 MMOL/L (ref 135–145)
WBC # BLD AUTO: 7.3 10*3/UL (ref 4.5–10.5)

## 2019-08-01 PROCEDURE — 36415 COLL VENOUS BLD VENIPUNCTURE: CPT | Mod: PO | Performed by: FAMILY MEDICINE

## 2019-08-01 PROCEDURE — 85025 COMPLETE CBC W/AUTO DIFF WBC: CPT | Mod: PO | Performed by: FAMILY MEDICINE

## 2019-08-01 PROCEDURE — 99214 OFFICE O/P EST MOD 30 MIN: CPT | Mod: 25 | Performed by: FAMILY MEDICINE

## 2019-08-01 PROCEDURE — G0463 HOSPITAL OUTPT CLINIC VISIT: HCPCS | Mod: PO | Performed by: FAMILY MEDICINE

## 2019-08-01 PROCEDURE — 93005 ELECTROCARDIOGRAM TRACING: CPT | Mod: PO | Performed by: FAMILY MEDICINE

## 2019-08-01 PROCEDURE — 93010 ELECTROCARDIOGRAM REPORT: CPT | Performed by: FAMILY MEDICINE

## 2019-08-01 PROCEDURE — 80053 COMPREHEN METABOLIC PANEL: CPT | Mod: PO | Performed by: FAMILY MEDICINE

## 2019-08-01 ASSESSMENT — ENCOUNTER SYMPTOMS
DIZZINESS: 0
CHEST TIGHTNESS: 0
ADENOPATHY: 0
DIFFICULTY URINATING: 0
FEVER: 0
SINUS PRESSURE: 0
POLYDIPSIA: 0
VOMITING: 0
DYSPHORIC MOOD: 0
DIARRHEA: 0
BRUISES/BLEEDS EASILY: 1
COUGH: 0
DIAPHORESIS: 0
TROUBLE SWALLOWING: 0
DYSURIA: 0
WHEEZING: 0
HEMATURIA: 0
WEAKNESS: 0
MYALGIAS: 0
VOICE CHANGE: 0
NUMBNESS: 0
FATIGUE: 0
NERVOUS/ANXIOUS: 0
PALPITATIONS: 0
UNEXPECTED WEIGHT CHANGE: 0
EYE DISCHARGE: 1
ABDOMINAL PAIN: 0
APPETITE CHANGE: 0
CONSTIPATION: 0
SORE THROAT: 0
EYE REDNESS: 0
ARTHRALGIAS: 0
HEADACHES: 0
RHINORRHEA: 0
FREQUENCY: 0
WOUND: 0
CHILLS: 0
SLEEP DISTURBANCE: 0
SHORTNESS OF BREATH: 1
JOINT SWELLING: 0
NAUSEA: 0
BACK PAIN: 1
BLOOD IN STOOL: 0
EYE PAIN: 0

## 2019-08-01 ASSESSMENT — DUKE ACTIVITY SCORE INDEX (DASI): DASI METS SCORE: 6.11

## 2019-08-01 ASSESSMENT — PAIN SCALES - GENERAL: PAINLEVEL: 0-NO PAIN

## 2019-08-01 NOTE — PROGRESS NOTES
Subjective      Chief Complaint   Patient presents with   • Pre-op Exam     Bariatric sleeve surgery with Dr. Millan on 08/14/19        Katy Stack is a 79 y.o. female and is here for a pre-operative risk assessment. Pre-op Exam (Bariatric sleeve surgery with Dr. Millan on 08/14/19 )    Pt denies any specific concerns or complaints. Pt denies dyspnea on exertion, chest pain, or palpitations.  No hx of MI, CVA, or DVT.    Patient also with history of hypertension. She is taking atenolol 25mg daily and losartan 50mg daily. Blood pressure controlled without any symptoms. She also has a hx of pulmonary hypertension and is taking furosemide 20mg daily.    Patient has a hx of COPD, she reports since starting furosemide 20mg daily her breathing ability has greatly improved.     Patient also with history of hyperlipidemia currently on atorvastatin 20mg daily. She denies any current issues. She is taking a daily 81mg aspirin.     Patient also with history of hypothyroidism and is taking levothyroxine 100mcg daily, currently well controlled.     Patient also with history obstructive sleep apnea currently using CPAP. She denies any new or worsening issues.    Review of Systems   Constitutional: Negative for appetite change, chills, diaphoresis, fatigue, fever and unexpected weight change.   HENT: Positive for postnasal drip. Negative for congestion, dental problem, ear discharge, ear pain, hearing loss, mouth sores, nosebleeds, rhinorrhea, sinus pressure, sore throat, tinnitus, trouble swallowing and voice change.    Eyes: Positive for discharge. Negative for pain, redness and visual disturbance.   Respiratory: Positive for shortness of breath (when walking). Negative for cough, chest tightness and wheezing.    Cardiovascular: Negative for chest pain, palpitations and leg swelling.   Gastrointestinal: Negative for abdominal pain, blood in stool, constipation, diarrhea, nausea and vomiting.   Endocrine: Positive for  cold intolerance. Negative for heat intolerance, polydipsia and polyuria.   Genitourinary: Negative for difficulty urinating, dysuria, frequency and hematuria.   Musculoskeletal: Positive for back pain. Negative for arthralgias, joint swelling and myalgias.   Skin: Negative for rash and wound.   Allergic/Immunologic: Negative for environmental allergies.   Neurological: Negative for dizziness, weakness, numbness and headaches.   Hematological: Negative for adenopathy. Bruises/bleeds easily.   Psychiatric/Behavioral: Negative for behavioral problems, dysphoric mood and sleep disturbance. The patient is not nervous/anxious.        The following portions of the patient's history were reviewed and updated as appropriate: allergies, current medications, past family history, past medical history, past social history, past surgical history and problem list.    Objective   /64 (BP Location: Left arm, Patient Position: Sitting, Cuff Size: Large)   Pulse 74   Temp 36.6 °C (97.8 °F) (Temporal)   Resp 16   Wt 92.5 kg (204 lb)   SpO2 96%   BMI 39.84 kg/m²     Physical Exam   Constitutional: She appears well-developed and well-nourished. No distress.   Corrected vision.  Obese.   HENT:   Head: Normocephalic and atraumatic.   Right Ear: External ear normal.   Left Ear: External ear normal.   Nose: Nose normal.   Mouth/Throat: Oropharynx is clear and moist. No oropharyngeal exudate.   Neck: Neck supple. No thyromegaly present.   Cardiovascular: Normal rate, regular rhythm and normal heart sounds. Exam reveals no gallop and no friction rub.   No murmur heard.  Pulmonary/Chest: Effort normal and breath sounds normal. No stridor. No respiratory distress. She has no wheezes. She has no rales.   Musculoskeletal: She exhibits no edema, tenderness or deformity.   Lymphadenopathy:     She has no cervical adenopathy.   Neurological: She is alert.   Skin: Skin is warm and dry. No rash noted. She is not diaphoretic. No erythema.  No pallor.   Psychiatric: She has a normal mood and affect. Her behavior is normal.   Nursing note and vitals reviewed.       Results for orders placed or performed in visit on 08/01/19   CBC w/auto differential Blood, Venous   Result Value Ref Range    WBC 7.3 4.5 - 10.5 10*3/uL    RBC 4.83 3.70 - 5.30 10*6/µL    Hemoglobin 13.8 11.5 - 15.5 g/dL    Hematocrit 41.9 34.0 - 45.0 %    MCV 86.8 81.0 - 97.0 fL    MCH 28.6 28.0 - 33.0 pg    MCHC 33.0 32.0 - 36.0 g/dL    RDW 15.8 (H) 11.5 - 14.0 %    Platelets 307 140 - 350 10*3/uL    MPV 7.8 6.9 - 10.8 fL    Neutrophils% 52 41 - 81 %    Lymphocytes% 34 11 - 47 %    Monocytes% 10 3 - 11 %    Eosinophils% 4 (H) 0 - 3 %    Basophils% 0 0 - 2 %    Neutrophils Absolute 3.80 10*3/uL    Lymphocytes Absolute 2.50 10*3/uL    Monocytes Absolute 0.70 10*3/uL    Eosinophils Absolute 0.30 10*3/uL    Basophils Absolute 0.00 10*3/uL   Comprehensive metabolic panel Blood, Venous   Result Value Ref Range    Sodium 141 135 - 145 mmol/L    Potassium 4.7 3.5 - 5.1 mmol/L    Chloride 106 98 - 107 mmol/L    CO2 28 21 - 32 mmol/L    Anion Gap 7 3 - 11 mmol/L    BUN 28 (H) 7 - 25 mg/dL    Creatinine 1.22 (H) 0.60 - 1.20 mg/dL    Glucose 97 70 - 105 mg/dL    Calcium 9.7 8.6 - 10.3 mg/dL    AST 13 0 - 39 U/L    ALT (SGPT) 9 0 - 52 U/L    Alkaline Phosphatase 79 55 - 142 U/L    Total Protein 6.7 6.0 - 8.3 g/dL    Albumin 4.1 3.5 - 5.3 g/dL    Total Bilirubin 0.51 0.00 - 1.40 mg/dL    eGFR 42 (L) >60 mL/min/1.73m*2    Corrected Calcium 9.6 8.6 - 10.3 mg/dL        Assessment/Plan          Diagnoses and all orders for this visit:    Preop examination  Comments:  Hold 81mg aspirin and OTC supplements 1 week prior to surgery. Will call with lab and EKG results.    Hypothyroidism, unspecified type    Pulmonary hypertension (CMS/HCC) (HCC)    Essential hypertension  -     CBC w/auto differential Blood, Venous  -     Comprehensive metabolic panel Blood, Venous  -     ECG 12 lead    Hyperlipidemia,  unspecified hyperlipidemia type    Obstructive sleep apnea syndrome          1. Revised Cardiac Risk Index: 0 (3.9% risk)     2 . Post Op Respiratory Failure Risk: 1.26%    3. Duke Activity Status Index: METS: 6.11    4. EKG: Sinus rhythm, left atrial enlargement, left bundle branch block; abnormal EKG. (Similar to prior)    5. CBC/CMP: GFR 42 otherwise normal.    6. No further risk stratification needed prior to surgery. Patient's postop respiratory failure risk likely an underestimate.  Would recommend aggressive postop respiratory evaluation and treatment if needed.  She is currently in a chronic stable state.      Bronson Travis MD     Portions of this note were documented by Nneka Arita as I performed the exam and collected the information from Katy Stack. I attest that I have reviewed the information as documented, verified the accuracy of the documentation and added additional information as needed.

## 2019-08-08 ENCOUNTER — OFFICE VISIT NO LOS (OUTPATIENT)
Dept: DIABETES SERVICES | Facility: CLINIC | Age: 79
End: 2019-08-08
Payer: MEDICARE

## 2019-08-08 DIAGNOSIS — E66.9 OBESITY (BMI 35.0-39.9 WITHOUT COMORBIDITY): Primary | ICD-10-CM

## 2019-08-08 PROCEDURE — G0447 BEHAVIOR COUNSEL OBESITY 15M: HCPCS | Performed by: DIETITIAN, REGISTERED

## 2019-08-08 PROCEDURE — G0447 BEHAVIOR COUNSEL OBESITY 15M: HCPCS | Mod: PO | Performed by: DIETITIAN, REGISTERED

## 2019-08-08 RX ORDER — GABAPENTIN 300 MG/1
900 CAPSULE ORAL 3 TIMES DAILY
COMMUNITY
End: 2023-08-14 | Stop reason: ALTCHOICE

## 2019-08-08 ASSESSMENT — ACTIVITIES OF DAILY LIVING (ADL): ADEQUATE_TO_COMPLETE_ADL: YES

## 2019-08-08 NOTE — PRE-PROCEDURE INSTRUCTIONS
Pre-Surgery Instructions:   Medication Instructions   • gabapentin (NEURONTIN) 300 mg capsule Take morning of surgery/procedure   • furosemide (LASIX) 20 mg tablet Do not take morning of surgery/procedure   • potassium chloride (KLOR-CON) 10 mEq CR tablet Do not take morning of surgery/procedure   • calcium carbonate-vitamin D3 600 mg(1,500mg) -400 unit per tablet Stop taking 1 week prior to surgery/procedure   • nitroglycerin (NITROSTAT) 0.4 mg SL tablet PRN morning of surgery/procedure   • levothyroxine (SYNTHROID, LEVOTHROID) 100 mcg tablet Take morning of surgery/procedure   • atenolol (TENORMIN) 25 mg tablet Take morning of surgery/procedure   • atorvastatin (LIPITOR) 20 mg tablet Do not take morning of surgery/procedure   • losartan (COZAAR) 50 mg tablet Take morning of surgery/procedure   • vit A-vit C-vit E-zinc-copper (EYE VITAMIN AND MINERALS) 7,160-113-100 unit-mg-unit tablet Stop taking 1 week prior to surgery/procedure   • AMITIZA 24 mcg capsule Do not take morning of surgery/procedure   • PREMARIN 0.9 mg tablet Do not take morning of surgery/procedure   • aspirin (ASPIR-LOW) 81 mg EC tablet Stop taking 1 week prior to surgery/procedure   DR. BALES CLEARED PT. ON 8/1/19.  INSTRUCTED SURGERY DEPT. WILL CALL 1-2 DAYS PRIOR TO DOS WITH ARRIVAL TIME AND NPO INSTRUCTIONS.  REVIEWED AND DISCUSSED MEDS, HAS INSTRUCTIONS FROM DR. HELTON:MEDS.  WILL TAKE THYROID AND BP MEDS WITH SIP OF WATER 2 HOURS PRIOR TO ARRIVAL DOS.  ENCOURAGED TO HYDRATE WELL FOR THE 2 DAYS PRIOR TO DOS.  DISCUSSED SHOWERING AND NO SKIN PRODUCTS DOS.   WILL BE WITH DOS.  VERBALIZES UNDERSTANDING OF INSTRUCTIONS.

## 2019-08-08 NOTE — PROGRESS NOTES
"Intensive Behavior Therapy (IBT) for Obesity    Referring Provider: Dr. Travis                           Supervising Provider: Dr. Simmons      IBT Visit Number: #[id number]  Date:  8/8/2019  Beginning Time: 12: 01 PM    End Time: 12:30 PM    BMI from initial provider referral: 39.06     79 y.o. female with BMI of 40.2  Current weight: 198.8# on the [place] body comp scale with 1# clothing wt subtracted. Wt was: 202.6# on 7/18, wt was: 211.4# on 7/5,  Wt was: 203.6# on 6/6, wt was 202.2# for 5/10,  Wt was: 203# on 5/2 with initial IBT appt  Current height:4'11\"  Wt is down 4# over the past 3 weeks. Pt has lost about 13# since 7/5/10 and is down about 4.2# since starting the IBT program.  Measurements: Neck: 16\", Waist: 41.75\", Hip: 53\"  Patient reports usual body weight (UBW) of: no usual  Patient has experienced previous weight loss history: Weight Watchers, Nutri System, Atkins  Had lap band in [id number] and was removed by Dr. Martinez in Fall in 2018; highest wt was 211#, lowest wt 165#; has gained at least 15# since Nov.   ALLERGIES: STRAWBERRIES CAUSES HIVES,TONGUE SWELLING    Food recall: 2 week pre surgery diet; Beverages: decaf green tea, water  Physical activity: used the river walk for 30 minutes at least 20-30 minutes per time or used the recumbent bike when the weather is inclement    Dislikes: eggs, yogurt      Discussion:  Time spent face to face with patient:  29 minutes  Education provided:  Individual  Patient attends today:  unaccompanied    Assess:  Progress toward previously set goals:  Track food and fluid intake- goal met 100% of the time  15 gm protein per meal- goal met 100% of the time  Consume at least 48 oz water per day- goal met 100% of the time she is drinking 64oz water per day      Advise:  Pt is looking forward to having the bariatric sleeve laparoscopic surgery on 8/14/19. Pt states she is working on chewing every bite 20 times and  taking sips of fluids vs gulps. Reviewed the 3 week post " surgery diet. Discussed vit/minerals needed after surgery.  Discussed body comp measurements and questions about post surgery. Patient has weight goal of: 150#  Resources/Handouts provided: 2 week pre surgery diet, 3 week post surgery diet, Laparoscopic Bariatric workbook. Pre surgery shopping guide.    Agree:  Patient is ready for behavior change.  At this time, patient would like to cont to work on journaling food and fluid intake.    Goals:   Track food and fluid intake  60-80 gm protein per day  Consume at least 64 oz water    Specific goal weight goals: 150#    Assist:  The following behavior change tool was used today:   Monitoring by Patient, discussion, handouts    Arrange:  Follow up scheduled in 3 weeks for post surgery f/u

## 2019-08-12 ENCOUNTER — ANESTHESIA EVENT (OUTPATIENT)
Dept: GASTROENTEROLOGY | Facility: HOSPITAL | Age: 79
DRG: 621 | End: 2019-08-12
Payer: MEDICARE

## 2019-08-12 ASSESSMENT — COPD QUESTIONNAIRES: COPD: 1

## 2019-08-12 ASSESSMENT — ENCOUNTER SYMPTOMS: DYSRHYTHMIAS: 1

## 2019-08-12 NOTE — ANESTHESIA PREPROCEDURE EVALUATION
Pre-Procedure Assessment    Patient: Katy Stack, female, 79 y.o.    Ht Readings from Last 1 Encounters:   07/22/19 1.524 m (5')     Wt Readings from Last 1 Encounters:   08/01/19 92.5 kg (204 lb)       Last Vitals  BP      Temp      Pulse     Resp      SpO2      Pain Score         Problem list reviewed and Medical history reviewed           Airway   Mallampati: II  TM distance: >3 FB  Neck ROM: full      Dental      Pulmonary     breath sounds clear to auscultation  (+) COPD mild, sleep apnea,     ROS comment: O2 at night 2LPM    Pt denies KATY  Cardiovascular   Exercise tolerance: good (4-7 METS)  (+) hypertension, dysrhythmias,     ECG reviewed  Rhythm: regular  Rate: normal  ROS comment: Assessment/Plan              Diagnoses and all orders for this visit:     Preop examination  Comments:  Hold 81mg aspirin and OTC supplements 1 week prior to surgery. Will call with lab and EKG results.     Hypothyroidism, unspecified type     Pulmonary hypertension (CMS/HCC) (HCC)     Essential hypertension  -     CBC w/auto differential Blood, Venous  -     Comprehensive metabolic panel Blood, Venous  -     ECG 12 lead     Hyperlipidemia, unspecified hyperlipidemia type     Obstructive sleep apnea syndrome           1. Revised Cardiac Risk Index: 0 (3.9% risk)      2 . Post Op Respiratory Failure Risk: 1.26%     3. Duke Activity Status Index: METS: 6.11     4. EKG: Sinus rhythm, left atrial enlargement, left bundle branch block; abnormal EKG. (Similar to prior)     5. CBC/CMP: GFR 42 otherwise normal.     6. No further risk stratification needed prior to surgery. Patient's postop respiratory failure risk likely an underestimate.  Would recommend aggressive postop respiratory evaluation and treatment if needed.  She is currently in a chronic stable state.        MD JAG King    Mental Status/Neuro/Psych    Pt is alert.      (+) arthritis, back injury, peripheral neuropathy,     Comments: D deficiency,  periodic limb movement disorder    GI/Hepatic/Renal    (+) GERD well controlled,  CRI,     Endo/Other    (+) hypothyroidism,   Abdominal           Social History     Tobacco Use   • Smoking status: Former Smoker     Packs/day: 2.00     Years: 35.00     Pack years: 70.00     Last attempt to quit:      Years since quittin.6   • Smokeless tobacco: Never Used   Substance Use Topics   • Alcohol use: No      Hematology   Lab Results   Component Value Date/Time    WBC 7.3 2019 09:51 AM    RBC 4.83 2019 09:51 AM    MCV 86.8 2019 09:51 AM    HGB 13.8 2019 09:51 AM    HCT 41.9 2019 09:51 AM     2019 09:51 AM      Coagulation No results found for: PT, APTT, INR   General Chemistry   Lab Results   Component Value Date/Time    CALCIUM 9.7 2019 09:51 AM    BUN 28 (H) 2019 09:51 AM    CREATININE 1.22 (H) 2019 09:51 AM    GLUCOSE 97 2019 09:51 AM     2019 09:51 AM    K 4.7 2019 09:51 AM    MG 2.3 2019 08:18 AM    CO2 28 2019 09:51 AM     2019 09:51 AM     Anesthesia Plan    ASA 3   NPO status reviewed: > 8 hours    General            Airway Planning: oral ET tube              Anesthetic plan and risks discussed with patient.

## 2019-08-14 ENCOUNTER — HOSPITAL ENCOUNTER (INPATIENT)
Facility: HOSPITAL | Age: 79
LOS: 1 days | Discharge: HOME | DRG: 621 | End: 2019-08-15
Attending: SURGERY | Admitting: SURGERY
Payer: MEDICARE

## 2019-08-14 ENCOUNTER — ANESTHESIA (OUTPATIENT)
Dept: GASTROENTEROLOGY | Facility: HOSPITAL | Age: 79
DRG: 621 | End: 2019-08-14
Payer: MEDICARE

## 2019-08-14 DIAGNOSIS — K21.9 GASTROESOPHAGEAL REFLUX DISEASE, ESOPHAGITIS PRESENCE NOT SPECIFIED: ICD-10-CM

## 2019-08-14 DIAGNOSIS — Z98.84 S/P BARIATRIC SURGERY: Primary | ICD-10-CM

## 2019-08-14 PROCEDURE — 6360000200 HC RX 636 W HCPCS (ALT 250 FOR IP): Performed by: SURGERY

## 2019-08-14 PROCEDURE — (BLANK) HC GENERAL ANESTHESIA FACILITY CHARGE EACH ADDITIONAL MIN: Performed by: SURGERY

## 2019-08-14 PROCEDURE — 2500000200 HC RX 250 WO HCPCS: Performed by: SURGERY

## 2019-08-14 PROCEDURE — (BLANK) HC RECOVERY PHASE-1 1ST  HOUR ACUITY LEVEL 2: Performed by: SURGERY

## 2019-08-14 PROCEDURE — 2580000300 HC RX 258: Performed by: SURGERY

## 2019-08-14 PROCEDURE — 2590000100 HC RX 259: Performed by: NURSE PRACTITIONER

## 2019-08-14 PROCEDURE — 2580000300 HC RX 258: Performed by: NURSE PRACTITIONER

## 2019-08-14 PROCEDURE — (BLANK) HC ROOM PRIVATE

## 2019-08-14 PROCEDURE — 6370000100 HC RX 637 (ALT 250 FOR IP): Performed by: NURSE PRACTITIONER

## 2019-08-14 PROCEDURE — 43775 LAP SLEEVE GASTRECTOMY: CPT | Performed by: SURGERY

## 2019-08-14 PROCEDURE — (BLANK) HC GENERAL ANESTHESIA FACILITY CHARGE 1ST 15 MIN: Performed by: SURGERY

## 2019-08-14 PROCEDURE — 00797 ANES IPER UPR ABD GSTR PX MO: CPT | Performed by: NURSE ANESTHETIST, CERTIFIED REGISTERED

## 2019-08-14 PROCEDURE — 6360000200 HC RX 636 W HCPCS (ALT 250 FOR IP): Performed by: NURSE ANESTHETIST, CERTIFIED REGISTERED

## 2019-08-14 PROCEDURE — 0DB64Z3 EXCISION OF STOMACH, PERCUTANEOUS ENDOSCOPIC APPROACH, VERTICAL: ICD-10-PCS | Performed by: SURGERY

## 2019-08-14 PROCEDURE — 2580000300 HC RX 258: Performed by: NURSE ANESTHETIST, CERTIFIED REGISTERED

## 2019-08-14 PROCEDURE — 2500000200 HC RX 250 WO HCPCS: Performed by: NURSE PRACTITIONER

## 2019-08-14 PROCEDURE — 94799 UNLISTED PULMONARY SVC/PX: CPT

## 2019-08-14 PROCEDURE — 88307 TISSUE EXAM BY PATHOLOGIST: CPT

## 2019-08-14 PROCEDURE — 43775 LAP SLEEVE GASTRECTOMY: CPT | Mod: AS | Performed by: NURSE PRACTITIONER

## 2019-08-14 PROCEDURE — (BLANK) HC OR LEVEL 3 PROC EACH ADDITIONAL MIN: Performed by: SURGERY

## 2019-08-14 PROCEDURE — (BLANK) HC OR LEVEL 3 PROC 1ST 15MIN: Performed by: SURGERY

## 2019-08-14 PROCEDURE — 99100 ANES PT EXTEME AGE<1 YR&>70: CPT | Performed by: NURSE ANESTHETIST, CERTIFIED REGISTERED

## 2019-08-14 PROCEDURE — 2500000200 HC RX 250 WO HCPCS: Performed by: NURSE ANESTHETIST, CERTIFIED REGISTERED

## 2019-08-14 RX ORDER — METOPROLOL TARTRATE 1 MG/ML
5 INJECTION, SOLUTION INTRAVENOUS EVERY 6 HOURS
Status: DISCONTINUED | OUTPATIENT
Start: 2019-08-14 | End: 2019-08-14

## 2019-08-14 RX ORDER — HEPARIN SODIUM 5000 [USP'U]/ML
INJECTION, SOLUTION INTRAVENOUS; SUBCUTANEOUS AS NEEDED
Status: DISCONTINUED | OUTPATIENT
Start: 2019-08-14 | End: 2019-08-14 | Stop reason: SURG

## 2019-08-14 RX ORDER — ONDANSETRON HYDROCHLORIDE 2 MG/ML
4 INJECTION, SOLUTION INTRAVENOUS ONCE AS NEEDED
Status: DISCONTINUED | OUTPATIENT
Start: 2019-08-14 | End: 2019-08-14 | Stop reason: HOSPADM

## 2019-08-14 RX ORDER — ACETAMINOPHEN 10 MG/ML
1000 INJECTION, SOLUTION INTRAVENOUS EVERY 6 HOURS
Status: COMPLETED | OUTPATIENT
Start: 2019-08-14 | End: 2019-08-15

## 2019-08-14 RX ORDER — NITROGLYCERIN 0.4 MG/1
0.4 TABLET SUBLINGUAL EVERY 5 MIN PRN
Status: DISCONTINUED | OUTPATIENT
Start: 2019-08-14 | End: 2019-08-15 | Stop reason: HOSPADM

## 2019-08-14 RX ORDER — CEFAZOLIN SODIUM 10 G/1
2000 INJECTION, POWDER, FOR SOLUTION INTRAVENOUS ONCE
Status: COMPLETED | OUTPATIENT
Start: 2019-08-14 | End: 2019-08-14

## 2019-08-14 RX ORDER — ESOMEPRAZOLE SODIUM 40 MG/1
40 INJECTION, POWDER, LYOPHILIZED, FOR SOLUTION INTRAVENOUS
Status: DISCONTINUED | OUTPATIENT
Start: 2019-08-14 | End: 2019-08-15

## 2019-08-14 RX ORDER — FENTANYL CITRATE/PF 50 MCG/ML
PLASTIC BAG, INJECTION (ML) INTRAVENOUS AS NEEDED
Status: DISCONTINUED | OUTPATIENT
Start: 2019-08-14 | End: 2019-08-14 | Stop reason: SURG

## 2019-08-14 RX ORDER — IPRATROPIUM BROMIDE AND ALBUTEROL SULFATE 2.5; .5 MG/3ML; MG/3ML
3 SOLUTION RESPIRATORY (INHALATION) ONCE AS NEEDED
Status: DISCONTINUED | OUTPATIENT
Start: 2019-08-14 | End: 2019-08-14 | Stop reason: HOSPADM

## 2019-08-14 RX ORDER — METOCLOPRAMIDE HYDROCHLORIDE 5 MG/ML
10 INJECTION INTRAMUSCULAR; INTRAVENOUS EVERY 6 HOURS PRN
Status: DISCONTINUED | OUTPATIENT
Start: 2019-08-14 | End: 2019-08-15 | Stop reason: HOSPADM

## 2019-08-14 RX ORDER — METOPROLOL TARTRATE 1 MG/ML
5 INJECTION, SOLUTION INTRAVENOUS EVERY 6 HOURS
Status: DISCONTINUED | OUTPATIENT
Start: 2019-08-14 | End: 2019-08-15

## 2019-08-14 RX ORDER — AMOXICILLIN 250 MG
1 CAPSULE ORAL 2 TIMES DAILY
Status: DISCONTINUED | OUTPATIENT
Start: 2019-08-14 | End: 2019-08-15 | Stop reason: HOSPADM

## 2019-08-14 RX ORDER — SODIUM CHLORIDE, SODIUM LACTATE, POTASSIUM CHLORIDE, CALCIUM CHLORIDE 600; 310; 30; 20 MG/100ML; MG/100ML; MG/100ML; MG/100ML
INJECTION, SOLUTION INTRAVENOUS CONTINUOUS PRN
Status: DISCONTINUED | OUTPATIENT
Start: 2019-08-14 | End: 2019-08-14 | Stop reason: SURG

## 2019-08-14 RX ORDER — SODIUM CHLORIDE, SODIUM LACTATE, POTASSIUM CHLORIDE, CALCIUM CHLORIDE 600; 310; 30; 20 MG/100ML; MG/100ML; MG/100ML; MG/100ML
50 INJECTION, SOLUTION INTRAVENOUS CONTINUOUS
Status: DISCONTINUED | OUTPATIENT
Start: 2019-08-14 | End: 2019-08-15 | Stop reason: HOSPADM

## 2019-08-14 RX ORDER — ONDANSETRON HYDROCHLORIDE 2 MG/ML
4 INJECTION, SOLUTION INTRAVENOUS EVERY 6 HOURS PRN
Status: DISCONTINUED | OUTPATIENT
Start: 2019-08-14 | End: 2019-08-15 | Stop reason: HOSPADM

## 2019-08-14 RX ORDER — FENTANYL CITRATE/PF 50 MCG/ML
50 PLASTIC BAG, INJECTION (ML) INTRAVENOUS EVERY 5 MIN PRN
Status: DISCONTINUED | OUTPATIENT
Start: 2019-08-14 | End: 2019-08-14 | Stop reason: HOSPADM

## 2019-08-14 RX ORDER — PROPOFOL 10 MG/ML
INJECTION, EMULSION INTRAVENOUS AS NEEDED
Status: DISCONTINUED | OUTPATIENT
Start: 2019-08-14 | End: 2019-08-14 | Stop reason: SURG

## 2019-08-14 RX ORDER — DEXAMETHASONE SODIUM PHOSPHATE 4 MG/ML
INJECTION, SOLUTION INTRA-ARTICULAR; INTRALESIONAL; INTRAMUSCULAR; INTRAVENOUS; SOFT TISSUE AS NEEDED
Status: DISCONTINUED | OUTPATIENT
Start: 2019-08-14 | End: 2019-08-14 | Stop reason: SURG

## 2019-08-14 RX ORDER — OXYCODONE HYDROCHLORIDE 5 MG/1
10 TABLET ORAL EVERY 4 HOURS PRN
Status: DISCONTINUED | OUTPATIENT
Start: 2019-08-14 | End: 2019-08-15 | Stop reason: HOSPADM

## 2019-08-14 RX ORDER — ONDANSETRON HYDROCHLORIDE 2 MG/ML
INJECTION, SOLUTION INTRAVENOUS AS NEEDED
Status: DISCONTINUED | OUTPATIENT
Start: 2019-08-14 | End: 2019-08-14 | Stop reason: SURG

## 2019-08-14 RX ORDER — HYDROMORPHONE HYDROCHLORIDE 2 MG/ML
0.5 INJECTION, SOLUTION INTRAMUSCULAR; INTRAVENOUS; SUBCUTANEOUS EVERY 5 MIN PRN
Status: DISCONTINUED | OUTPATIENT
Start: 2019-08-14 | End: 2019-08-14 | Stop reason: HOSPADM

## 2019-08-14 RX ORDER — NALOXONE HYDROCHLORIDE 0.4 MG/ML
0.2 INJECTION, SOLUTION INTRAMUSCULAR; INTRAVENOUS; SUBCUTANEOUS AS NEEDED
Status: DISCONTINUED | OUTPATIENT
Start: 2019-08-14 | End: 2019-08-14 | Stop reason: HOSPADM

## 2019-08-14 RX ORDER — HEPARIN SODIUM 5000 [USP'U]/ML
5000 INJECTION, SOLUTION INTRAVENOUS; SUBCUTANEOUS EVERY 8 HOURS SCHEDULED
Status: DISCONTINUED | OUTPATIENT
Start: 2019-08-15 | End: 2019-08-15 | Stop reason: HOSPADM

## 2019-08-14 RX ORDER — SODIUM CHLORIDE, SODIUM LACTATE, POTASSIUM CHLORIDE, AND CALCIUM CHLORIDE .6; .31; .03; .02 G/100ML; G/100ML; G/100ML; G/100ML
500 INJECTION, SOLUTION INTRAVENOUS ONCE AS NEEDED
Status: DISCONTINUED | OUTPATIENT
Start: 2019-08-14 | End: 2019-08-14 | Stop reason: HOSPADM

## 2019-08-14 RX ORDER — ACETAMINOPHEN 10 MG/ML
1000 INJECTION, SOLUTION INTRAVENOUS EVERY 6 HOURS SCHEDULED
Status: DISCONTINUED | OUTPATIENT
Start: 2019-08-14 | End: 2019-08-14

## 2019-08-14 RX ORDER — DIPHENHYDRAMINE HYDROCHLORIDE 50 MG/ML
25 INJECTION INTRAMUSCULAR; INTRAVENOUS ONCE AS NEEDED
Status: DISCONTINUED | OUTPATIENT
Start: 2019-08-14 | End: 2019-08-14 | Stop reason: HOSPADM

## 2019-08-14 RX ORDER — ROCURONIUM BROMIDE 50 MG/5 ML
SYRINGE (ML) INTRAVENOUS AS NEEDED
Status: DISCONTINUED | OUTPATIENT
Start: 2019-08-14 | End: 2019-08-14 | Stop reason: SURG

## 2019-08-14 RX ORDER — HYDROMORPHONE HYDROCHLORIDE 1 MG/ML
.4-.6 INJECTION, SOLUTION INTRAMUSCULAR; INTRAVENOUS; SUBCUTANEOUS
Status: DISCONTINUED | OUTPATIENT
Start: 2019-08-14 | End: 2019-08-15 | Stop reason: HOSPADM

## 2019-08-14 RX ORDER — SODIUM CHLORIDE, SODIUM LACTATE, POTASSIUM CHLORIDE, AND CALCIUM CHLORIDE .6; .31; .03; .02 G/100ML; G/100ML; G/100ML; G/100ML
1000 INJECTION, SOLUTION INTRAVENOUS ONCE
Status: COMPLETED | OUTPATIENT
Start: 2019-08-14 | End: 2019-08-14

## 2019-08-14 RX ORDER — LANSOPRAZOLE 30 MG/1
30 CAPSULE, DELAYED RELEASE ORAL
Status: DISCONTINUED | OUTPATIENT
Start: 2019-08-14 | End: 2019-08-15 | Stop reason: HOSPADM

## 2019-08-14 RX ORDER — BUPIVACAINE HCL/EPINEPHRINE 0.25-.0005
VIAL (ML) INJECTION AS NEEDED
Status: DISCONTINUED | OUTPATIENT
Start: 2019-08-14 | End: 2019-08-14 | Stop reason: HOSPADM

## 2019-08-14 RX ORDER — ACETAMINOPHEN 10 MG/ML
1000 INJECTION, SOLUTION INTRAVENOUS
Status: COMPLETED | OUTPATIENT
Start: 2019-08-14 | End: 2019-08-14

## 2019-08-14 RX ORDER — GABAPENTIN 300 MG/1
300 CAPSULE ORAL NIGHTLY
Status: DISCONTINUED | OUTPATIENT
Start: 2019-08-14 | End: 2019-08-15

## 2019-08-14 RX ORDER — DIPHENHYDRAMINE HYDROCHLORIDE 50 MG/ML
25 INJECTION INTRAMUSCULAR; INTRAVENOUS EVERY 6 HOURS PRN
Status: DISCONTINUED | OUTPATIENT
Start: 2019-08-14 | End: 2019-08-15 | Stop reason: HOSPADM

## 2019-08-14 RX ORDER — OXYCODONE HYDROCHLORIDE 5 MG/1
5 TABLET ORAL EVERY 4 HOURS PRN
Status: DISCONTINUED | OUTPATIENT
Start: 2019-08-14 | End: 2019-08-15 | Stop reason: HOSPADM

## 2019-08-14 RX ORDER — LIDOCAINE HYDROCHLORIDE 20 MG/ML
INJECTION, SOLUTION EPIDURAL; INFILTRATION; INTRACAUDAL; PERINEURAL AS NEEDED
Status: DISCONTINUED | OUTPATIENT
Start: 2019-08-14 | End: 2019-08-14 | Stop reason: SURG

## 2019-08-14 RX ADMIN — FENTANYL CITRATE 50 MCG: 50 INJECTION, SOLUTION INTRAMUSCULAR; INTRAVENOUS at 09:47

## 2019-08-14 RX ADMIN — FENTANYL CITRATE 100 MCG: 50 INJECTION, SOLUTION INTRAMUSCULAR; INTRAVENOUS at 09:13

## 2019-08-14 RX ADMIN — CEFAZOLIN 2000 MG: 10 INJECTION, POWDER, FOR SOLUTION INTRAVENOUS at 09:18

## 2019-08-14 RX ADMIN — ONDANSETRON 4 MG: 2 INJECTION INTRAMUSCULAR; INTRAVENOUS at 09:18

## 2019-08-14 RX ADMIN — Medication 100 MCG: at 09:32

## 2019-08-14 RX ADMIN — GABAPENTIN 300 MG: 300 CAPSULE ORAL at 20:34

## 2019-08-14 RX ADMIN — LIDOCAINE HYDROCHLORIDE 100 MG: 20 INJECTION, SOLUTION EPIDURAL; INFILTRATION; INTRACAUDAL; PERINEURAL at 09:14

## 2019-08-14 RX ADMIN — Medication 10 MG: at 10:20

## 2019-08-14 RX ADMIN — Medication 100 MCG: at 09:38

## 2019-08-14 RX ADMIN — SODIUM CHLORIDE, POTASSIUM CHLORIDE, SODIUM LACTATE AND CALCIUM CHLORIDE: 600; 310; 30; 20 INJECTION, SOLUTION INTRAVENOUS at 09:10

## 2019-08-14 RX ADMIN — DEXAMETHASONE SODIUM PHOSPHATE 8 MG: 4 INJECTION, SOLUTION INTRAMUSCULAR; INTRAVENOUS at 09:18

## 2019-08-14 RX ADMIN — HEPARIN SODIUM 5000 UNITS: 5000 INJECTION, SOLUTION INTRAVENOUS; SUBCUTANEOUS at 09:53

## 2019-08-14 RX ADMIN — FENTANYL CITRATE 50 MCG: 50 INJECTION, SOLUTION INTRAMUSCULAR; INTRAVENOUS at 11:40

## 2019-08-14 RX ADMIN — ACETAMINOPHEN 1000 MG: 10 INJECTION, SOLUTION INTRAVENOUS at 07:47

## 2019-08-14 RX ADMIN — SODIUM CHLORIDE, POTASSIUM CHLORIDE, SODIUM LACTATE AND CALCIUM CHLORIDE: 600; 310; 30; 20 INJECTION, SOLUTION INTRAVENOUS at 08:50

## 2019-08-14 RX ADMIN — ACETAMINOPHEN 1000 MG: 10 INJECTION, SOLUTION INTRAVENOUS at 14:17

## 2019-08-14 RX ADMIN — METOPROLOL TARTRATE 5 MG: 5 INJECTION INTRAVENOUS at 14:12

## 2019-08-14 RX ADMIN — SODIUM CHLORIDE, POTASSIUM CHLORIDE, SODIUM LACTATE AND CALCIUM CHLORIDE 1000 ML: 600; 310; 30; 20 INJECTION, SOLUTION INTRAVENOUS at 07:47

## 2019-08-14 RX ADMIN — ACETAMINOPHEN 1000 MG: 10 INJECTION, SOLUTION INTRAVENOUS at 19:44

## 2019-08-14 RX ADMIN — FENTANYL CITRATE 50 MCG: 50 INJECTION, SOLUTION INTRAMUSCULAR; INTRAVENOUS at 11:23

## 2019-08-14 RX ADMIN — ESOMEPRAZOLE SODIUM 40 MG: 40 INJECTION, POWDER, LYOPHILIZED, FOR SOLUTION INTRAVENOUS at 16:11

## 2019-08-14 RX ADMIN — SODIUM CHLORIDE, POTASSIUM CHLORIDE, SODIUM LACTATE AND CALCIUM CHLORIDE 50 ML/HR: 600; 310; 30; 20 INJECTION, SOLUTION INTRAVENOUS at 16:31

## 2019-08-14 RX ADMIN — OXYCODONE HYDROCHLORIDE 5 MG: 5 TABLET ORAL at 14:33

## 2019-08-14 RX ADMIN — Medication 60 MG: at 09:15

## 2019-08-14 RX ADMIN — SENNOSIDES,DOCUSATE SODIUM 1 TABLET: 8.6; 5 TABLET, FILM COATED ORAL at 14:34

## 2019-08-14 RX ADMIN — Medication 100 MCG: at 09:25

## 2019-08-14 RX ADMIN — SUGAMMADEX 185 MG: 100 INJECTION, SOLUTION INTRAVENOUS at 11:37

## 2019-08-14 RX ADMIN — METOPROLOL TARTRATE 5 MG: 5 INJECTION INTRAVENOUS at 20:38

## 2019-08-14 RX ADMIN — Medication 100 MCG: at 09:42

## 2019-08-14 RX ADMIN — PROPOFOL 150 MG: 10 INJECTION, EMULSION INTRAVENOUS at 09:15

## 2019-08-14 ASSESSMENT — COPD QUESTIONNAIRES: CAT_SEVERITY: MILD

## 2019-08-14 ASSESSMENT — ENCOUNTER SYMPTOMS: EXERCISE TOLERANCE: GOOD (4-7 METS)

## 2019-08-14 NOTE — ANESTHESIA PROCEDURE NOTES
Airway  Urgency: elective    Airway not difficult    General Information and Staff    Patient location during procedure: OR  CRNA: Dejuan Cohen CRNA  Performed: CRNA     Indications and Patient Condition  Indications for airway management: anesthesia and airway protection  Spontaneous Ventilation: absent  Sedation level: deep  Preoxygenated: yes  Patient position: sniffing  MILS maintained throughout  Mask difficulty assessment: 1 - vent by mask    Final Airway Details  Final airway type: endotracheal airway      Successful airway: ETT  Cuffed: yes   Successful intubation technique: direct laryngoscopy  Facilitating devices/methods: stylet  Blade: Emelina  Blade size: #3  ETT size (mm): 7.5  Cormack-Lehane Classification: grade I - full view of glottis  Placement verified by: chest auscultation, capnometry and palpation of cuff   Measured from: lips  ETT to lips (cm): 21  Number of attempts at approach: 1

## 2019-08-14 NOTE — OP NOTE
Katy Stack  08/14/19    PREOPERATIVE DIAGNOSIS:  Morbid Obesity    POSTOPERATIVE DIAGNOSIS:  Same    PROCEDURES:    1. Laparoscopic vertical Sleeve Gastrectomy  2. Intraoperative upper endoscopy      Surgeon(s):  Chris Khalil MD      ASSISTANT:   Marii Wayne CNP      ANESTHETIST:  CHRISTIANO: Dejuan Cohen CRNA       ANESTHESIA TYPE:  General       I/O this shift:  In: 1700 [I.V.:1700]  Out: 275 [Urine:250; Blood:25]    SPECIMENS:   Order Name Source Comment Collection Info Order Time   PATHOLOGY SPECIMEN (HISTOLOGY) Stomach  Collected By: Chris Khalil MD 8/14/2019 11:04 AM         COMPLICATIONS:  None    Indications: Katy Stack is a 79 y.o.  female who has a BMI of Body mass index is 39.84 kg/m²..  She has a longstanding history of obesity with multiple failed attempts at weight loss in the past and Weight related co morbidities including hyperlipidemia, hypertension, COPD with supplemental oxygen at night only.  Denies psychological weight related co morbidities.    FINDINGS:  Negative intra operative bubble test    DESCRIPTION OF PROCEDURE:   The patient was brought to the procedural suite, placed in supine position on the operating table. A timeout confirming the patient and procedure was performed. After adequate sedation, she was given 2 g Ancef preoperatively, 5000 subcutaneous heparin and 1 liter normal saline bolus. Thromboguards were in place. Local anesthetic was infiltrated in the left upper quadrant and a Veress needle was placed and verified to be intraabdominal with low initial insufflation pressures and water drop test. The 10 mm port was then placed superior umbilically and just to the left of midline under direct visualization. Brief 4-quadrant scan of the abdomen revealed no other obvious intraabdominal pathology. A laparoscopic TAP block was then performed using liposomal bupivocaine under direct laparoscopic visualization. A liver retractor was then placed through an  epigastric position, Dayana liver retractor used to retract the liver. I then placed 4 additional ports, two in the left upper quadrant and one in the right upper quadrant, which were all 5 mm, and an additional right upper quadrant 15 mm port. Each was placed after adequate local anesthesia and under direct laparoscopic visualization. We began by measuring 6 cm from the pylorus using a suture cut to length and marking the stomach at 6 cm proximal to the pylorus. I then began taking down the greater curve using the LigaSure device and dissecting the gastrocolic ligament, freeing the greater curvature of the stomach, working our way up towards the spleen, taking the short gastric vessels again with the LigaSure device until the entire greater curve of the stomach was mobilized.     The GE junction and crural pillars were closely examined and I did not feel that there was any hiatal hernia component.    Posterior attachment of the stomach was taken down using the LigaSure device until this was again entirely mobilized. Starting from the previously marked 6 cm line proximal to the pylorus, after ensuring that no foreign bodies were placed in the stomach by anesthesia, we began with a 60 black tristaple load and the 38 Green[place]ic bougie was then placed and the sleeve gastrectomy created using sequential 45 black and then 60 purple tristaple loads, being careful not to abut the bougie too closely, lining up the heel of each staple load to ensure a smooth staple line, and leaving a small dog ear at the tip, which was then imbricated with figure-of-eight 2-0 silk suture.  This imbricated the dog ear nicely, recreating the angle of His. The divided edge of the gastro colic ligament was then re approximated to the greater curve staple line to prevent kinking of the stomach using a running 2-0 silk suture. Intraoperative endoscopy was then performed. The endoscope was placed through the oropharynx with ease, advanced down the  esophagus and into the stomach. Entire stomach examined. There were no angles or tortuosity inside of the stomach and a bubble test was performed, which was found to be negative. The stomach was then desufflated and the scope withdrawn and the excised stomach was then removed from the 15-mm port site after this was finger dilated and the port site was then closed with a single 0 Vicryl suture placed in a figure-of-eight fashion, closing the defect nicely. This was noted to be hemostatic. Again, the staple line was verified to be hemostatic. Dayana liver retractor removed. Other ports were removed and hemostasis was felt to be adequate. The abdomen was desufflated. The skin was then closed with a subcuticular 4-0 Monocryl. The patient tolerated the procedure well. There were no complications. All sponge and instrument counts were correct at the conclusion of the procedure    Due to the nature and complexity of the operation, Marii Wayne CNP was a critical part of the operation and aided in retraction and dissection of tissue through the left lateral port site.     EBL: No blood loss documented.    Chris Khalil MD  Phone Number: [phone]    DATE: 08/14/19      TIME: 5:34 PM

## 2019-08-14 NOTE — ANESTHESIA POSTPROCEDURE EVALUATION
Patient: Katy Stack    Procedure Summary     Date:  08/14/19 Room / Location:  [facility] OR 04 / [facility] OR    Anesthesia Start:  0910 Anesthesia Stop:  1154    Procedure:  Laparoscopic sleeve Gastrectomy (N/A Abdomen) Diagnosis:       Morbid obesity (CMS/HCC) (HCC)      (Morbid obesity (CMS/HCC) (HCC) [E66.01])    Surgeon:  Chris Khalil MD Responsible Provider:  Dejuan Cohen CRNA    Anesthesia Type:  general ASA Status:  3          Anesthesia Type: general    Last vitals  Vitals Value Taken Time   BP     Temp     Pulse     Resp     SpO2     Pain Score         Anesthesia Post Evaluation    Patient location during evaluation: PACU  Patient participation: complete - patient participated  Level of consciousness: awake and alert and sleepy but conscious  Pain management: adequate  Airway patency: patent  Anesthetic complications: no  Cardiovascular status: acceptable  Respiratory status: acceptable  Hydration status: acceptable  Comments: VSS  May dismiss recovered patient based on consultation with the appropriate physicians and/or meeting appropriate discharge criteria

## 2019-08-15 ENCOUNTER — APPOINTMENT (OUTPATIENT)
Dept: FLUOROSCOPY | Facility: HOSPITAL | Age: 79
DRG: 621 | End: 2019-08-15
Payer: MEDICARE

## 2019-08-15 VITALS — BODY MASS INDEX: 40.08 KG/M2 | WEIGHT: 198.8 LBS | HEIGHT: 59 IN

## 2019-08-15 VITALS
TEMPERATURE: 98.2 F | HEIGHT: 60 IN | HEART RATE: 52 BPM | BODY MASS INDEX: 39.82 KG/M2 | OXYGEN SATURATION: 92 % | WEIGHT: 202.82 LBS | SYSTOLIC BLOOD PRESSURE: 145 MMHG | RESPIRATION RATE: 16 BRPM | DIASTOLIC BLOOD PRESSURE: 42 MMHG

## 2019-08-15 PROCEDURE — 6370000100 HC RX 637 (ALT 250 FOR IP): Performed by: NURSE PRACTITIONER

## 2019-08-15 PROCEDURE — 6360000200 HC RX 636 W HCPCS (ALT 250 FOR IP): Performed by: SURGERY

## 2019-08-15 PROCEDURE — 2500000200 HC RX 250 WO HCPCS: Performed by: SURGERY

## 2019-08-15 PROCEDURE — 2580000300 HC RX 258: Performed by: NURSE PRACTITIONER

## 2019-08-15 PROCEDURE — 99024 POSTOP FOLLOW-UP VISIT: CPT | Performed by: NURSE PRACTITIONER

## 2019-08-15 PROCEDURE — 2550000100 HC RX 255: Performed by: NURSE PRACTITIONER

## 2019-08-15 PROCEDURE — 74241 FL GI SINGLE SERIES: CPT

## 2019-08-15 PROCEDURE — 6360000200 HC RX 636 W HCPCS (ALT 250 FOR IP): Performed by: NURSE PRACTITIONER

## 2019-08-15 PROCEDURE — 74241 FL GI SINGLE SERIES: CPT | Mod: 26 | Performed by: RADIOLOGY

## 2019-08-15 RX ORDER — ATENOLOL 25 MG/1
25 TABLET ORAL DAILY
Status: DISCONTINUED | OUTPATIENT
Start: 2019-08-15 | End: 2019-08-15 | Stop reason: HOSPADM

## 2019-08-15 RX ORDER — ACETAMINOPHEN 500 MG
1000 TABLET ORAL 3 TIMES DAILY
Qty: 60 TABLET | Refills: 0
Start: 2019-08-15 | End: 2019-09-05

## 2019-08-15 RX ORDER — ACETAMINOPHEN 500 MG
1000 TABLET ORAL 3 TIMES DAILY
Status: DISCONTINUED | OUTPATIENT
Start: 2019-08-15 | End: 2019-08-15 | Stop reason: HOSPADM

## 2019-08-15 RX ORDER — LOSARTAN POTASSIUM 50 MG/1
50 TABLET ORAL DAILY
Status: DISCONTINUED | OUTPATIENT
Start: 2019-08-15 | End: 2019-08-15 | Stop reason: HOSPADM

## 2019-08-15 RX ORDER — ATORVASTATIN CALCIUM 20 MG/1
20 TABLET, FILM COATED ORAL DAILY
Status: DISCONTINUED | OUTPATIENT
Start: 2019-08-15 | End: 2019-08-15 | Stop reason: HOSPADM

## 2019-08-15 RX ORDER — LEVOTHYROXINE SODIUM 100 UG/1
100 TABLET ORAL
Status: DISCONTINUED | OUTPATIENT
Start: 2019-08-15 | End: 2019-08-15 | Stop reason: HOSPADM

## 2019-08-15 RX ORDER — DOCUSATE SODIUM 100 MG/1
100 CAPSULE, LIQUID FILLED ORAL 2 TIMES DAILY PRN
Start: 2019-08-15 | End: 2019-08-25

## 2019-08-15 RX ORDER — GABAPENTIN 300 MG/1
300 CAPSULE ORAL 3 TIMES DAILY
Status: DISCONTINUED | OUTPATIENT
Start: 2019-08-15 | End: 2019-08-15 | Stop reason: HOSPADM

## 2019-08-15 RX ORDER — IOPAMIDOL 612 MG/ML
50 INJECTION, SOLUTION INTRAVASCULAR ONCE
Status: COMPLETED | OUTPATIENT
Start: 2019-08-15 | End: 2019-08-15

## 2019-08-15 RX ORDER — OMEPRAZOLE 20 MG/1
20 TABLET, DELAYED RELEASE ORAL DAILY
Qty: 30 TABLET | Refills: 5 | Status: SHIPPED | OUTPATIENT
Start: 2019-08-15 | End: 2019-08-16 | Stop reason: SDUPTHER

## 2019-08-15 RX ADMIN — LANSOPRAZOLE 30 MG: 30 CAPSULE, DELAYED RELEASE PELLETS ORAL at 16:22

## 2019-08-15 RX ADMIN — HEPARIN SODIUM 5000 UNITS: 5000 INJECTION INTRAVENOUS; SUBCUTANEOUS at 05:47

## 2019-08-15 RX ADMIN — Medication 1000 MG: at 14:36

## 2019-08-15 RX ADMIN — LOSARTAN POTASSIUM 50 MG: 50 TABLET ORAL at 12:24

## 2019-08-15 RX ADMIN — ATORVASTATIN CALCIUM 20 MG: 20 TABLET, FILM COATED ORAL at 12:24

## 2019-08-15 RX ADMIN — METOPROLOL TARTRATE 5 MG: 5 INJECTION INTRAVENOUS at 02:02

## 2019-08-15 RX ADMIN — LEVOTHYROXINE SODIUM 100 MCG: 100 TABLET ORAL at 12:24

## 2019-08-15 RX ADMIN — IOPAMIDOL 50 ML: 612 INJECTION, SOLUTION INTRAVENOUS at 08:45

## 2019-08-15 RX ADMIN — SODIUM CHLORIDE, POTASSIUM CHLORIDE, SODIUM LACTATE AND CALCIUM CHLORIDE 50 ML/HR: 600; 310; 30; 20 INJECTION, SOLUTION INTRAVENOUS at 13:17

## 2019-08-15 RX ADMIN — ACETAMINOPHEN 1000 MG: 10 INJECTION, SOLUTION INTRAVENOUS at 01:45

## 2019-08-15 RX ADMIN — GABAPENTIN 300 MG: 300 CAPSULE ORAL at 14:36

## 2019-08-15 RX ADMIN — ATENOLOL 25 MG: 25 TABLET ORAL at 14:06

## 2019-08-15 RX ADMIN — METOPROLOL TARTRATE 5 MG: 5 INJECTION INTRAVENOUS at 08:55

## 2019-08-15 RX ADMIN — HEPARIN SODIUM 5000 UNITS: 5000 INJECTION INTRAVENOUS; SUBCUTANEOUS at 14:07

## 2019-08-15 ASSESSMENT — ACTIVITIES OF DAILY LIVING (ADL)
ADEQUATE_TO_COMPLETE_ADL: YES
ADEQUATE_TO_COMPLETE_ADL: YES

## 2019-08-15 NOTE — DISCHARGE SUMMARY
Inpatient Discharge Summary    BRIEF OVERVIEW  Admitting Provider: Chris Khalil MD  Discharge Provider: Marii Wayne CNP  Primary Care Physician at Discharge: ENRRIQUE BALES -[phone]    Admission Date: 8/14/2019     Discharge Date: 8/15/2019    Primary Discharge Diagnosis  Morbid Obesity    Secondary Discharge Diagnosis  Patient Active Problem List   Diagnosis   • Gastroesophageal reflux disease   • Hypothyroidism   • Obesity   • Obstructive sleep apnea syndrome   • Periodic limb movement disorder   • Vitamin D deficiency   • Allergic rhinitis   • Benign essential hypertension   • Obesity (BMI 35.0-39.9 without comorbidity)   • LAP-BAND surgery status   • Heartburn   • Pulmonary hypertension (CMS/HCC) (HCC)   • Morbid obesity (CMS/HCC) (HCC)       Discharge Disposition  01 - Home or Self-Care  Code Status at Discharge: Full    Active Issues Requiring Follow-up  none    Outpatient Follow-Up  Future Appointments   Date Time Provider Department Center   9/5/2019 10:45 AM Marii Wayne CNP [facility]       Referrals and Follow-ups to Schedule     Weight lifting restrictions      No lifting more than 15 pounds for 2 weeks.  No lifting more than 20 pounds for 3 weeks.    Maximum Weight to Lift:  15 Pounds        Test Results Pending at Discharge   Order Current Status    Pathology specimen (Histology) In process          DETAILS OF HOSPITAL STAY    Presenting Problem/History of Present Illness  Morbid obesity (CMS/HCC) (HCC) [E66.01]  Morbid obesity (CMS/HCC) (HCC) [E66.01]  Katy Stack is a 79 y.o.  female who's Body mass index is 39.61 kg/m².  She has a longstanding history of obesity with multiple failed attempts at weight loss in the past including. She is well known to me for lap band removal in November 2018. As per my previous note, she has a longstanding history of obesity with multiple failed attempts at weight loss in the past including LAP-BAND, exclusion of carbohydrates, Weight  Watchers, [employer], and caloric restriction with most successful attempt being LAP-BAND with a loss of 50 lbs.  She is well known to me as she did experience slippage of the LAP-BAND and had this removed recently.  She has had complete resolution of reflux symptoms since LAP-BAND removal.  She had regained 20 lbs with LAP-BAND still in place, and has now regained another 16 lbs.   She reports highest adult weight 211 lbs, and lowest adult weight 140 lbs.  She has been overweight for approximately 50 years and attributes initial weight gain to childbirth.  She is most interested in revisional surgery for weight loss.  She is a former smoker, quit 25 years ago.     Her biggest motivating factor in wanting to lose weight is overall quality of life and health. She has noted slow increase in weight since having the band removed. She is meeting with [facility].      Weight related co morbidities including hyperlipidemia, hypertension, COPD with supplemental oxygen at night only.  Denies psychological weight related co morbidities.  she has completed preoperative nutrition and behavioral health counseling and now elects to undergo laparoscopic vertical sleeve gastrectomy.     Hospital Course  Katy Stack was admitted to the hospital and taken to the OR for laparoscopic vertical sleeve gastrectomy. The procedure was completed without complication and she underwent routine convalescence. She was then transferred to the floor and pain was initially controlled with IV acetaminophen, gabapentin, and oral narcotics as needed.  She was held NPO except meds until POD 1 when a swallow study was performed showing good flow of contrast through the sleeve without leak or obstruction.  FLD was then started and pain control was transitioned to oral tylenol and gabapentin.  She tolerated this well and showed the ability to maintain oral hydration and protein intake.  She was able to ambulate and void independently and  adequate oral pain control was achieved.  She was deemed stable for discharge.      Operative Procedures Performed  Procedures  Pre-Op Diagnosis Codes:     * Morbid obesity (CMS/HCC) (HCC) [E66.01]  Procedure Date: 8/14/2019  Procedure(s):  Laparoscopic sleeve Gastrectomy  Surgeon(s):  Chris Khalil MD  EBL: No blood loss documented.  Anesthesia type: General  Case length: 2 Hr 35 Min 0 Sec  Complications: * No complications entered in OR log *      Physical Exam at Discharge  Discharge Condition: good  Heart Rate: 52  Resp: 16  BP: 145/42  Temp: 36.8 °C (98.2 °F)  Weight: 92 kg (202 lb 13.2 oz)    Physical Exam   Constitutional: No distress.   Out of bed to chair   Cardiovascular: Normal rate, regular rhythm, S1 normal and S2 normal.   No murmur heard.  Pulses:       Radial pulses are 2+ on the right side and 2+ on the left side.   Pulmonary/Chest: Effort normal and breath sounds normal.   Abdominal: Soft. Normal appearance and bowel sounds are normal. She exhibits no distension. There is no tenderness. No hernia.   Band-Aids removed, the strips remain in place.  There is no erythema, induration, drainage, warmth, ecchymosis, or substantial tenderness to incision sites.  Well approximated.   Neurological: She is alert.   Skin: Skin is warm and dry.   Nursing note and vitals reviewed.      Assessment/Plan  1. S/p bariatric surgery  Discharge home  Wound care   - May remove Band-Aids 24 hours following surgery.   - May shower 24 hours following surgery.   - May remove Steri-Strips 1 week (7 days) following surgery if they do not fall off on their own.   - Do not submerge incisions in water for 1 week (7 days) following surgery.  Activity/lifting  - Ambulation highly encouraged.  - No strenuous activity for 3 weeks.  - May resume sexual activity in 1-2 weeks.  - No driving while on narcotic pain medication or gabapentin as these may make you drowsy.  - No lifting more than 15 pounds for 2 weeks.  - No lifting more  than 20 pounds for 3 weeks.  Diet  - Sugar free full liquid diet- follow 0-3 week diet discussed by RD preop, handout provided preop  - 48-64 fl oz water daily  - 60-80 g protein daily  - No straws  Follow up  - Follow up with me in 3 weeks  Pain control  - Tylenol TID x 10 days  - gabapentin TID x 10 days  - Warm packs PRN  GI prophylaxis  - PPI x 6 months s/p bariatric surgery for protection of staple line.       The above postoperative care plan was discussed with the patient prior to discharge. She voiced understanding and agreed with the plan.  All questions sought/answered to satisfaction.     Marii Wayne CNP  8/15/2019 5:15 PM     Hospital discharge time is 15 minutes.

## 2019-08-15 NOTE — PROGRESS NOTES
ANESTHESIA INPATIENT POST OP    Were there any problems with the Anesthetic that you were aware of? No    Are you satisfied with your Anesthesia care? Yes

## 2019-08-15 NOTE — DISCHARGE INSTRUCTIONS
Discharge home  Wound care   - May remove Band-Aids 24 hours following surgery.   - May shower 24 hours following surgery.   - May remove Steri-Strips 1 week (7 days) following surgery if they do not fall off on their own.   - Do not submerge incisions in water for 1 week (7 days) following surgery.    Activity/lifting  - Ambulation highly encouraged.  - No strenuous activity for 3 weeks.  - May resume sexual activity in 1-2 weeks.  - No driving while on narcotic pain medication or gabapentin as these may make you drowsy.  - No lifting more than 15 pounds for 2 weeks.  - No lifting more than 20 pounds for 3 weeks.    Diet  - Sugar free full liquid diet- follow 0-3 week diet discussed by dietitian preoperatively, handout provided preoperatively  - 64 fl oz water daily  - 70-90 g protein daily  - No straws    Follow up  - Follow up with Marii Wayne CNP in 3 weeks at [facility] in [place], [place]    Pain control  - Tylenol x 10 days  - gabapentin x 10 days  - Warm packs PRN    GI prophylaxis  - omeprazole x 6 months after bariatric surgery for protection of staple line.  - OTC colace twice daily as needed for constipation.

## 2019-08-15 NOTE — PLAN OF CARE
Problem: Knowledge Deficit  Goal: Patient/family/caregiver demonstrates understanding of disease process, treatment plan, medications, and discharge instructions  Description  INTERVENTIONS:   1. Complete learning assessment and assess knowledge base  2. Provide teaching at level of understanding   3. Provide teaching via preferred learning methods  Outcome: Progressing     Problem: Potential for Compromised Skin Integrity  Goal: Skin Integrity is Maintained or Improved  Description  INTERVENTIONS:  1. Assess and monitor skin integrity  2. Collaborate with interdisciplinary team and initiate plans and interventions as needed  3. Alternate a full bath with partial baths for elderly   4. Monitor patient's hygiene practices   5. Collaborate with wound, ostomy, and continence nurse  Outcome: Progressing  Goal: Nutritional status is improving  Description  INTERVENTIONS:  1. Monitor and assess patient for malnutrition (ex- brittle hair, bruises, dry skin, pale skin and conjunctiva, muscle wasting, smooth red tongue, and disorientation)  2. Monitor patient's weight and dietary intake as ordered or per policy  3. Determine patient's food preferences and provide high-protein, high-caloric foods as appropriate  4. Assist patient with eating   5. Allow adequate time for meals   6. Encourage patient to take dietary supplement as ordered   7. Collaborate with dietitian  8. Include patient/family/caregiver in decisions related to nutrition  Outcome: Progressing  Goal: MOBILITY IS MAINTAINED OR IMPROVED  Description  INTERVENTIONS  1. Collaborate with interdisciplinary team and initiate plan and interventions as ordered (PT/OT)  2. Encourage ambulation  3. Up to chair for meals  4. Monitor for signs of deconditioning  Outcome: Progressing     Problem: Urinary Incontinence  Goal: Perineal skin integrity is maintained or improved  Description  INTERVENTIONS:  1. Assess genitourinary system, perineal skin, labs (urinalysis), and  history of incontinence to include past management, aggravating, and alleviating factors  2. Collaborate with interdisciplinary team including wound, ostomy, and continence nurse and initiate plans and interventions as needed  4. Consider urine containment device  5. Apply skin protectant   6. Develop skin care regimen  7. Provide privacy when changing patient's incontinence device to maintain their dignity  Outcome: Progressing

## 2019-08-15 NOTE — PROGRESS NOTES
08/15/19  Surgery Progress Note  POD 1 from laparoscopic vertical sleeve gastrectomy    Subjective: Katy Stack is 31 from laparoscopic vertical sleeve gastrectomy and is doing very well overall this morning.  She is out of bed to the chair this morning, and has been out of bed ambulating in the halls frequently and utilizing incentive spirometry as ordered.  She reports mild pain at right medial incision site at the site of gastrectomy removal.  She is already passing flatus.  Denied any other abdominal pain, nausea, vomiting, diarrhea, symptoms of reflux, or abdominal bloating/distention.  ROS is negative today.    Temp:  [36.4 °C (97.5 °F)-38 °C (100.4 °F)] 36.7 °C (98.1 °F)  Heart Rate:  [52-89] 57  Resp:  [14-20] 16  BP: (133-178)/(50-98) 138/55  I/O last 3 completed shifts:  In: 2400 [I.V.:2400]  Out: 975 [Urine:950; Blood:25]  No intake/output data recorded.    Objective    Physical Exam   Constitutional: No distress.   Out of bed to chair   Cardiovascular: Normal rate, regular rhythm, S1 normal and S2 normal.   No murmur heard.  Pulses:       Radial pulses are 2+ on the right side and 2+ on the left side.   Pulmonary/Chest: Effort normal and breath sounds normal.   Abdominal: Soft. Normal appearance and bowel sounds are normal. She exhibits no distension. There is no tenderness. No hernia.   Band-Aids removed, the strips remain in place.  There is no erythema, induration, drainage, warmth, ecchymosis, or substantial tenderness to incision sites.  Well approximated.   Neurological: She is alert.   Skin: Skin is warm and dry.   Nursing note and vitals reviewed.      A/P:  #1 status post bariatric surgery  Katy is POD 1 from lap scopic vertical sleeve gastrectomy and doing very well overall.  The following plan was reviewed with the patient this morning.  -Swallow study this morning, if good flow of contrast through the sleeve without leak or obstruction initiate clear liquid diet and advance to full's  as tolerated.  -Restart essential home meds  -Transition Tylenol to oral, acetaminophen 1000 mill grams p.o. 3 times daily  -continue maintenance IV fluid at 50 cc/h until tolerating oral intake without nausea and vomiting.  -Transition PPI to oral form for GI prophylaxis  -Subcu heparin, SCDs, and frequent ambulation for DVT prophylaxis  -Continue to monitor throughout today and if doing well discharge home this afternoon/evening.    Patient voiced understanding and agreed with the hospital plan.    Hospital is 15 minutes.    Marii Wayne CNP  8/15/2019  8:37 AM    A voice recognition program was used to aid in medical record documentation. Sometimes words are printed not exactly as they were spoken. While efforts were made to carefully edit and correct any inaccuracies, some errors may be present and should be taken within the context of the discussion.  Please contact our office if you need assistance interpreting this medical record or notice any mistakes.

## 2019-08-16 DIAGNOSIS — Z98.84 S/P BARIATRIC SURGERY: ICD-10-CM

## 2019-08-16 DIAGNOSIS — K21.9 GASTROESOPHAGEAL REFLUX DISEASE, ESOPHAGITIS PRESENCE NOT SPECIFIED: ICD-10-CM

## 2019-08-19 RX ORDER — OMEPRAZOLE 20 MG/1
20 TABLET, DELAYED RELEASE ORAL DAILY
Qty: 30 TABLET | Refills: 4 | Status: SHIPPED | OUTPATIENT
Start: 2019-08-19 | End: 2020-01-16

## 2019-08-20 DIAGNOSIS — Z98.84 S/P BARIATRIC SURGERY: ICD-10-CM

## 2019-08-20 DIAGNOSIS — K21.9 GASTROESOPHAGEAL REFLUX DISEASE, ESOPHAGITIS PRESENCE NOT SPECIFIED: ICD-10-CM

## 2019-08-20 RX ORDER — OMEPRAZOLE 20 MG/1
20 TABLET, DELAYED RELEASE ORAL DAILY
OUTPATIENT
Start: 2019-08-20 | End: 2020-01-17

## 2019-08-29 ENCOUNTER — OFFICE VISIT NO LOS (OUTPATIENT)
Dept: DIABETES SERVICES | Facility: CLINIC | Age: 79
End: 2019-08-29
Payer: MEDICARE

## 2019-08-29 VITALS — WEIGHT: 188.4 LBS | BODY MASS INDEX: 36.79 KG/M2

## 2019-08-29 DIAGNOSIS — E66.9 OBESITY (BMI 35.0-39.9 WITHOUT COMORBIDITY): Primary | ICD-10-CM

## 2019-08-29 PROCEDURE — 99999 PR OFFICE/OUTPT VISIT,PROCEDURE ONLY: CPT | Performed by: DIETITIAN, REGISTERED

## 2019-08-29 PROCEDURE — 0358T BIA WHOLE BODY: CPT | Mod: NC | Performed by: DIETITIAN, REGISTERED

## 2019-08-29 NOTE — PROGRESS NOTES
"Medical Nutrition Therapy (MNT) General Bariatric Post harjeet #1 no charge, biometric no charge  8/29/19  Beginning Time: 8:32 AM     End Time: 9: 15 AM  MNT Provider Order:2 week post bariatric surgery      Others Present: Alone  Procedure Completed: Sleeve on 8/13/19    Nutrition Assessment  Food/Nutrition - Related History- Pt states she feels so much better since surgery, hard to describe, \"well  being\" maybe  Previous MNT/Date: 8/8/19 and 4 previous IBT appt  Pertinent Medications:   Current Outpatient Medications   Medication Sig Dispense Refill   • omeprazole OTC (PriLOSEC OTC) 20 mg EC tablet Take 1 tablet (20 mg total) by mouth daily 30 tablet 4   • gabapentin (NEURONTIN) 300 mg capsule Take 300 mg by mouth 3 (three) times a day     • calcium carbonate-vitamin D3 600 mg(1,500mg) -400 unit per tablet Take 1 tablet by mouth daily     • nitroglycerin (NITROSTAT) 0.4 mg SL tablet Place 1 tablet (0.4 mg total) under the tongue every 5 (five) minutes as needed for chest pain 25 tablet 1   • levothyroxine (SYNTHROID, LEVOTHROID) 100 mcg tablet Take 1 tablet (100 mcg total) by mouth daily 90 tablet 3   • atenolol (TENORMIN) 25 mg tablet Take 1 tablet (25 mg total) by mouth daily 90 tablet 3   • atorvastatin (LIPITOR) 20 mg tablet Take 1 tablet (20 mg total) by mouth daily 90 tablet 3   • losartan (COZAAR) 50 mg tablet Take 1 tablet (50 mg total) by mouth daily 90 tablet 3   • vit A-vit C-vit E-zinc-copper (EYE VITAMIN AND MINERALS) 7,160-113-100 unit-mg-unit tablet Take 1 tab/cap by mouth 2 (two) times a day.     • AMITIZA 24 mcg capsule      • PREMARIN 0.9 mg tablet TAKE 1 TABLET BY MOUTH  EVERY DAY (Patient taking differently: TAKE 1 TABLET BY MOUTH EVERY WEEK) 90 tablet 1     No current facility-administered medications for this visit.      Supplements/Herbals: None yet  Alcohol Use:   Social History     Substance and Sexual Activity   Alcohol Use No      Voodoo / Cultural / Ethnic Food Practices:  " "None  Physical Activity: Walks 1-2 times per day or uses [employer], plans to start       Food Recall: 3 week post surgery sugar free full liquids-60 gm protein per day; Breakfast: protein shake; noon: protein soup, protein shake, evening: yogurt, protein shake; 64 oz water, 1 cup decaf coffee and 1 cup decaf green tea, core water, Powerade Zero      Client History  Medical History:  KATY, Pulmonary HTN, GERD, Obesity, Vit D def, Hypothyroidism    Occupation: back to work over the past week  Socioeconomic Concerns:  No  Shops/Cooks for Self: No    Anthropometrics  Ht Readings from Last 1 Encounters:   08/15/19 1.524 m (5')    Wt. 188.4# on 8/29 with 1# clothing wt subtracted on the [place] Body Comp scale down from 198.8# on 8/8 on the body comp scale  Wt Readings from Last 1 Encounters:   08/15/19 92 kg (202 lb 13.2 oz)    BMI: 38.1  BMI Readings from Last 1 Encounters:   08/15/19 39.61 kg/m²      BP Readings from Last 1 Encounters:   08/15/19 145/42              Pre-op Post 3wk Post 6mo Post 12mo   Waist Measurement:  8/8/19  41.75\"  8/29/19  41\"        Hip Measurement:  53\"  51.75\"        Neck Measurement:  16\"  15.75\"         Patient reports usual body weight (UBW) of: Patient reports usual body weight (UBW) of: no usual  Patient has experienced previous weight loss history: Weight Watchers, [employer]  Had lap band in 1000-3503 and was removed by Dr. Martinez in Fall in 2018; highest wt was 211#, lowest wt 165#; has gained at least 15# since Nov.   Body Composition: Refer to attached body comp results  Weight History:Wt was: 202.6# on 7/18, wt was: 211.4# on 7/5,  Wt was: 203.6# on 6/6, wt was 202.2# for 5/10,  Wt was: 203# on 5/2 with initial IBT appt          Biochemical Data, Medical Tests, and Procedures  Pertinent Labs:   Sodium   Date Value Ref Range Status   08/01/2019 141 135 - 145 mmol/L Final     Potassium   Date Value Ref Range Status   08/01/2019 4.7 3.5 - 5.1 mmol/L Final     Chloride   Date " Value Ref Range Status   08/01/2019 106 98 - 107 mmol/L Final     CO2   Date Value Ref Range Status   08/01/2019 28 21 - 32 mmol/L Final     BUN   Date Value Ref Range Status   08/01/2019 28 (H) 7 - 25 mg/dL Final     Creatinine   Date Value Ref Range Status   08/01/2019 1.22 (H) 0.60 - 1.20 mg/dL Final     Glucose   Date Value Ref Range Status   08/01/2019 97 70 - 105 mg/dL Final     Calcium   Date Value Ref Range Status   08/01/2019 9.7 8.6 - 10.3 mg/dL Final     Cholesterol   Date Value Ref Range Status   07/11/2019 110 0 - 199 mg/dL Final     Triglycerides   Date Value Ref Range Status   07/11/2019 149 <=149 mg/dL Final     HDL   Date Value Ref Range Status   07/11/2019 38 (L) >=40 mg/dL Final       Social / Diet History  Current Diet Stage: 2 week post surgery  Allergies/Intolerances: ALLERGIES: STRAWBERRIES CAUSES HIVES,TONGUE SWELLING; dislikes eggs and yogurt    Allergies   Allergen Reactions   • Williams Angioedema and Hives     hives, tongue swells  Swelling of tongue   • Adhesive      welts         Nutrition Prescription  2-4 week Post op Bariatric Sleeve surgery Diet: Smaller portion, High protein, reduced fat, reduced sugar diet with micronutrient supplementation        Nutrition Diagnosis   Problem #1: Altered GI function  Related to: recent bariatric surgery  As evidenced by: inability to consume a regular diet- need for increased protein, soft moist foods and small portions and increased nutrition supplementation of micronutrients     Problem #2: Obesity  Related to: hx of energy consumption exceeding energy expenditure  As evidenced by: BMI of 36.79    Pain/GI Concerns/Bowel Problems:  Pain: 2 in legs and back  GI Concerns: No problems  Bowel Problems: no problems    Intervention/Plan:    Bariatric Diet Discussion: Discussed the 2-3 post op diet. Emphasized protein sources. Discussed advancing diet when approved by the bariatric team, emphasized importance of staying on sugar free full liquid diet  for another week and adding prescribed vit/minerals over the next week or two  Handouts provided: 3 week post surgery, Foods Not tolerated  Goals  Post Op Phase  Attends all post-op visits through 18 months?    Date:  8/29/19 Date:   Date:     3wks xx 3mos  6mos    Date:   Date:   Date:     9mos  12mos  18mos              Reports 60-80g protein per day? Yes  Reports 64 oz zero calorie fluid per day? Yes  Reports taking supplements? No  Reports routinely spending 150mins per week in physical   activity process? Yes     Evaluation  Receptivity to education: good    Patient states understanding of the information presented.    Follow-up Plan  F/U scheduled in 3 weeks with the IBT program for f/u    Time Spent with Patient: 45 minutes

## 2019-08-30 ENCOUNTER — TELEPHONE (OUTPATIENT)
Dept: SURGERY | Facility: CLINIC | Age: 79
End: 2019-08-30

## 2019-09-05 ENCOUNTER — OFFICE VISIT (OUTPATIENT)
Dept: SURGERY | Facility: CLINIC | Age: 79
End: 2019-09-05
Payer: MEDICARE

## 2019-09-05 VITALS
TEMPERATURE: 98.4 F | BODY MASS INDEX: 37.89 KG/M2 | HEART RATE: 60 BPM | SYSTOLIC BLOOD PRESSURE: 120 MMHG | OXYGEN SATURATION: 94 % | DIASTOLIC BLOOD PRESSURE: 60 MMHG | HEIGHT: 60 IN | WEIGHT: 193 LBS

## 2019-09-05 DIAGNOSIS — E66.812 CLASS 2 SEVERE OBESITY DUE TO EXCESS CALORIES WITH SERIOUS COMORBIDITY AND BODY MASS INDEX (BMI) OF 39.0 TO 39.9 IN ADULT (CMS/HCC): ICD-10-CM

## 2019-09-05 DIAGNOSIS — I10 ESSENTIAL HYPERTENSION: ICD-10-CM

## 2019-09-05 DIAGNOSIS — Z09 POSTOPERATIVE FOLLOW-UP: Primary | ICD-10-CM

## 2019-09-05 DIAGNOSIS — E66.01 CLASS 2 SEVERE OBESITY DUE TO EXCESS CALORIES WITH SERIOUS COMORBIDITY AND BODY MASS INDEX (BMI) OF 39.0 TO 39.9 IN ADULT (CMS/HCC): ICD-10-CM

## 2019-09-05 DIAGNOSIS — Z98.84 S/P BARIATRIC SURGERY: ICD-10-CM

## 2019-09-05 DIAGNOSIS — E78.5 HYPERLIPIDEMIA, UNSPECIFIED HYPERLIPIDEMIA TYPE: ICD-10-CM

## 2019-09-05 PROCEDURE — 99024 POSTOP FOLLOW-UP VISIT: CPT | Performed by: NURSE PRACTITIONER

## 2019-09-05 RX ORDER — OMEPRAZOLE 20 MG/1
CAPSULE, DELAYED RELEASE ORAL
COMMUNITY
Start: 2019-08-30 | End: 2019-09-05 | Stop reason: SDUPTHER

## 2019-09-05 NOTE — PROGRESS NOTES
3-week postop follow-up  Status post laparoscopic vertical sleeve gastrectomy  Date of service: 9/5/2019    Subjective      Patient ID: Katy Stack is a 79 y.o. female.    Chief Complaint   Patient presents with   • Post-op     SP Lap Sleeve Gastrectomy 8-14-19 LAp Band removal 3-27-14 Wt 205lb 7-22-19     HPI  Katy presents to the weight management and bariatric surgery clinic today, unaccompanied, for follow-up 3 weeks status laparoscopic vertical sleeve gastrectomy performed 8/14/2019 by Dr. Millan.  Since discharge from the hospital she is without any complaints outside of weight loss.  She reports that she had hoped she'd lose more weight by now.  She is down 12 lbs from preop clinic weight.  She denies any symptoms of dysphagia or recurrent reflux.  She denies any symptoms of postop complication such as postop pain, systemic or surgical site infection, VTE, or complicated wound healing.  She she has followed up with the registered dietitian at 2 weeks postop.  She has maintained protein intake of 40-50 gm/day by drinking 45 gm through protein shake, 12 gm yogurt.  She has had cottage cheese and some hamburger meat this past week, and denies any trouble with these consistencies.  She has maintained hydration with 48-64 fl oz water.  She is walking for exercise and walked around her building 3 times with assist device (walker) and has restarted riding the Nu[employer] at the [facility].  Land-walking is difficult for her with chronic back pain and she is eager to get back into the pool for water walking and asks about this today.    Body comp analysis (preop versus 2 week postop):  Weight: -10.4 pounds  Fat %: -0.4%  Fat mass: -6 pounds  Fat free mass: -4.4 pounds  Muscle mass: -4.2 pounds  TBW: -3.6 pounds  TBW %: no change  Bone mass: -0.2 pounds  BMR: -61 kcal  Visceral fat rating: -1    Review of Systems   Constitutional: Negative for chills, diaphoresis, fatigue, fever and unexpected weight change.    HENT: Negative for trouble swallowing.    Respiratory: Negative for cough and shortness of breath.         Denies symptoms of postop PE/pneumonia   Cardiovascular: Negative for chest pain, palpitations and leg swelling.        Denies symptoms of postop VTE   Gastrointestinal: Negative for abdominal distention, abdominal pain, constipation, diarrhea, nausea and vomiting.   Skin: Negative for color change, pallor, rash and wound (denies symptoms of complicated wound healing).   Neurological: Negative for dizziness, syncope, weakness and light-headedness.       I have personally reviewed the pertinent aspects of the patient's chart and updated the computerized patient record. Pertinent positives are noted above.  Items reviewed including: Tobacco  Allergies  Meds  Problems  Med Hx  Surg Hx  Fam Hx           Objective      Vital Signs  /60   Pulse 60   Temp 36.9 °C (98.4 °F) (Temporal)   Ht 1.524 m (5')   Wt 87.5 kg (193 lb)   SpO2 94%   BMI 37.69 kg/m²       Physical Exam  Vitals signs and nursing note reviewed.   Constitutional:       General: She is awake. She is not in acute distress.     Appearance: Normal appearance. She is well-developed and well-groomed. She is obese.      Comments: 12 lb weight loss   Cardiovascular:      Rate and Rhythm: Normal rate and regular rhythm.      Pulses: Normal pulses.      Heart sounds: S1 normal and S2 normal.   Pulmonary:      Effort: Pulmonary effort is normal.      Breath sounds: Normal breath sounds.   Abdominal:      General: Abdomen is protuberant. Bowel sounds are normal.      Palpations: Abdomen is soft.      Tenderness: There is no tenderness.      Hernia: No hernia is present.      Comments: Laparoscopic incisions healing well without erythema, induration, drainage, warmth, tenderness. Well approximated.    Skin:     General: Skin is warm and dry.   Neurological:      Mental Status: She is alert. Mental status is at baseline.   Psychiatric:          Attention and Perception: Attention normal.         Mood and Affect: Mood normal.         Behavior: Behavior is cooperative.           Assessment and Plan:  1. Postoperative follow-up    2. S/P bariatric surgery    3. Class 2 severe obesity due to excess calories with serious comorbidity and body mass index (BMI) of 39.0 to 39.9 in adult (CMS/[facility]) ([facility])  Katy Lezama is a 79 y.o. female who is 3 weeks status post laparoscopic vertical sleeve gastrectomy and is progressing well with a 12 lb total weight loss since surgery 3 weeks ago.  Advised that she may advance activity as tolerated at this point using pain as her guide.  She may use OTC topicals over laparoscopic incisions to aid in healing process.      The following postop recommendations were discussed and mutually agreed upon with Katy:   Activity/lifting  -Advance activity using pain as guide. May return to water walking. Highly encouraged strengthening as well with light upper body seated exercises (dumb bells, resistance bands, etc). Reviewed importance of building/maintaining lean muscle mass during rapid weight loss phase.    Diet  -Advance to 3 week to 3 month postop nutrition per RD recommendations/handout.  -Aim for 64 fl oz water/sugar-free fluid daily  -Increase protein to achieve at least 60-80 gm daily  -No straws  -Initiate postop vitamins - MVI daily, D3 daily, B12 daily, and Viactiv chews TID  -Follow up with RD every 3 months postop for nutrition counseling and body comp testing.     GI prophylaxis  - PPI x 6 months s/p bariatric surgery for protection of staple line.    Will check labs at 1 year postop, sooner as needed.     4. Essential hypertension  Continues atenolol and losartan, BP well controlled today. Advised her to monitor BP at home as need for anti-hypertensive therapy may be reduced s/p bariatric surgery. She will follow with her PCP for possible wean vs discontinuation of therapy as she continues to lose weight. Reviewed  cardioprotective benefit of exercise in addition to bariatric surgery.    5. Hyperlipidemia, unspecified hyperlipidemia type  Will check lipids with postop labs, sooner per PCP recommendations. With improvement in lipid profile, may be able to stop statin therapy. Reviewed cardioprotective benefit of exercise in addition to bariatric surgery.      Katy Stack will RTC in 2 months for ongoing postop follow up.  She participated in the decision making process and agreed with the above plan. All questions were sought and answered to her satisfaction.       Marii Wayne, CNP

## 2019-09-12 ENCOUNTER — ANCILLARY PROCEDURE (OUTPATIENT)
Dept: RADIOLOGY | Facility: HOSPITAL | Age: 79
End: 2019-09-12
Attending: PHYSICAL MEDICINE & REHABILITATION
Payer: MEDICARE

## 2019-09-12 ENCOUNTER — PROCEDURE VISIT (OUTPATIENT)
Dept: PAIN MEDICINE | Facility: HOSPITAL | Age: 79
End: 2019-09-12
Attending: PHYSICAL MEDICINE & REHABILITATION
Payer: MEDICARE

## 2019-09-12 VITALS
DIASTOLIC BLOOD PRESSURE: 79 MMHG | TEMPERATURE: 97.9 F | RESPIRATION RATE: 20 BRPM | SYSTOLIC BLOOD PRESSURE: 155 MMHG | OXYGEN SATURATION: 95 % | HEART RATE: 60 BPM

## 2019-09-12 DIAGNOSIS — M48.062 SPINAL STENOSIS, LUMBAR REGION WITH NEUROGENIC CLAUDICATION: Primary | ICD-10-CM

## 2019-09-12 DIAGNOSIS — R52 PAIN: ICD-10-CM

## 2019-09-12 PROCEDURE — 77003 FLUOROGUIDE FOR SPINE INJECT: CPT | Mod: NCB

## 2019-09-12 PROCEDURE — 64483 NJX AA&/STRD TFRM EPI L/S 1: CPT | Performed by: PHYSICAL MEDICINE & REHABILITATION

## 2019-09-12 RX ORDER — DEXAMETHASONE SODIUM PHOSPHATE 4 MG/ML
4 INJECTION, SOLUTION INTRA-ARTICULAR; INTRALESIONAL; INTRAMUSCULAR; INTRAVENOUS; SOFT TISSUE ONCE
Status: COMPLETED | OUTPATIENT
Start: 2019-09-12 | End: 2019-09-12

## 2019-09-12 RX ORDER — LIDOCAINE HYDROCHLORIDE 10 MG/ML
4 INJECTION, SOLUTION EPIDURAL; INFILTRATION; INTRACAUDAL; PERINEURAL ONCE
Status: COMPLETED | OUTPATIENT
Start: 2019-09-12 | End: 2019-09-12

## 2019-09-12 RX ORDER — IOPAMIDOL 408 MG/ML
10 INJECTION, SOLUTION INTRATHECAL ONCE
Status: COMPLETED | OUTPATIENT
Start: 2019-09-12 | End: 2019-09-12

## 2019-09-12 RX ORDER — LIDOCAINE HYDROCHLORIDE 20 MG/ML
5 INJECTION, SOLUTION INFILTRATION; PERINEURAL ONCE
Status: COMPLETED | OUTPATIENT
Start: 2019-09-12 | End: 2019-09-12

## 2019-09-12 ASSESSMENT — PAIN SCALES - GENERAL: PAINLEVEL: 3

## 2019-09-12 ASSESSMENT — PAIN DESCRIPTION - DESCRIPTORS: DESCRIPTORS: ACHING

## 2019-09-12 NOTE — PROGRESS NOTES
Procedure    Time In  1558  Accompanied by      RN               Ambulatory                   Position:     Prone   Procedure Start:  1604  Procedure Stop:   1609  People Present:  Physician  Circulator  Kayleigh Wells; Clarissa Hernandez  C-arm    Emerson Leal    Medications used- refer to dictation  Prep   Betadine paint

## 2019-09-12 NOTE — PROGRESS NOTES
[facility] -  #3 Transforaminal Epidural Injection    HISTORY AND INDICATION  Katy Stack  is referred for consideration of epidural steroid injections.  She  has bilateral lumbar radicular pain.  She has failed conservative measures including trial of oral medication, relative rest, and physical therapy.  Despite these conservative measures, She continues to have significant pain.  She  has undergone a comprehensive evaluation and is appropriately referred here for an epidural steroid injection.  She  has provided us with a past medical history, family history, social history, occupational history, medication list, and allergies, as well as the impact of their pain on their activities of daily living and a pain rating.  I have reviewed all of this information.      DIAGNOSIS  1.  Radicular syndrome, lower limbs     2.  Lumbar degenerative disk disease       PROCEDURE   L4-5   transforaminal epidural.    PROCEDURE NOTE  Katy Stack  presented to [facility] and all questions regarding the potential risks and benefits of the procedure were asked and answered.  Signed informed consent was obtained.   Heart rate, blood pressure, temperature and respiratory rate were all within acceptable range prior to being taken to the special procedure room.  She  was then placed in a prone position on the fluoroscopy table.  She  was then prepped with a Betadine scrub and draped with a sterile drape. The  L4-5  neural foramen was then localized by fluoroscopic guidance.  Then, 2 mL of 2% Xylocaine was infused into the subcutaneous tissue overlying the neural foramen for local anesthesia.  A spinal needle was then inserted into the  L4-5  level neural foramen using fluoroscopic guidance.  After negative aspiration, 0.5 mL of Omnipaque as infused and with fluoroscopic imaging, was noted to be flowing along the  L4-5  level nerve-root and within the epidural space.  Then a mixture that contained 4 mL of preservative free 1% Xylocaine  and 2 mL of Dexamethasone was infused into the  L4-5  level neural foramen and epidural space.    She tolerated the procedure well.  She was taken to the recovery area, observed and was discharged with no unexpected neurologic changes.  She was instructed that should She experience any fever, chills, excessive redness at the injection site, or prolonged numbness or weakness, She should phone me immediately.  She was instructed that She should use ice over the area of the injection and limit activity for the rest of the day.      A follow-up phone call or appointment will be made in 14 days to assess the efficacy of the block.  Further treatment needs will be considered and discussed at that time.

## 2019-09-13 PROBLEM — Z98.84 S/P BARIATRIC SURGERY: Status: ACTIVE | Noted: 2019-09-13

## 2019-09-13 PROBLEM — Z98.84 LAP-BAND SURGERY STATUS: Status: RESOLVED | Noted: 2018-10-24 | Resolved: 2019-09-13

## 2019-09-13 PROBLEM — K21.9 GASTROESOPHAGEAL REFLUX DISEASE: Status: RESOLVED | Noted: 2017-11-10 | Resolved: 2019-09-13

## 2019-09-13 PROBLEM — E66.01 MORBID OBESITY (CMS/HCC): Status: RESOLVED | Noted: 2019-07-22 | Resolved: 2019-09-13

## 2019-09-13 ASSESSMENT — ENCOUNTER SYMPTOMS
WEAKNESS: 0
NAUSEA: 0
FATIGUE: 0
DIZZINESS: 0
UNEXPECTED WEIGHT CHANGE: 0
WOUND: 0
TROUBLE SWALLOWING: 0
VOMITING: 0
SHORTNESS OF BREATH: 0
PALPITATIONS: 0
DIARRHEA: 0
DIAPHORESIS: 0
LIGHT-HEADEDNESS: 0
CHILLS: 0
ABDOMINAL DISTENTION: 0
ABDOMINAL PAIN: 0
FEVER: 0
COLOR CHANGE: 0
CONSTIPATION: 0
COUGH: 0

## 2019-09-19 ENCOUNTER — OFFICE VISIT NO LOS (OUTPATIENT)
Dept: DIABETES SERVICES | Facility: CLINIC | Age: 79
End: 2019-09-19
Payer: MEDICARE

## 2019-09-19 VITALS — BODY MASS INDEX: 36.76 KG/M2 | WEIGHT: 188.2 LBS

## 2019-09-19 DIAGNOSIS — E66.9 OBESITY (BMI 35.0-39.9 WITHOUT COMORBIDITY): Primary | ICD-10-CM

## 2019-09-19 PROCEDURE — G0447 BEHAVIOR COUNSEL OBESITY 15M: HCPCS | Mod: PO | Performed by: DIETITIAN, REGISTERED

## 2019-09-19 PROCEDURE — G0447 BEHAVIOR COUNSEL OBESITY 15M: HCPCS | Performed by: DIETITIAN, REGISTERED

## 2019-09-19 NOTE — PROGRESS NOTES
"Intensive Behavior Therapy (IBT) for Obesity    Referring Provider: Dr. Travis                           Supervising Provider: Dr. Travis      IBT Visit Number: #7  Date:  9/19/2019  Beginning Time: 9: 01 AM    End Time: 9:30 AM    BMI from initial provider referral: 39.06     79 y.o. female with BMI of 40.2  Current weight: 188.2# on body comp scale, wt was: 198.8# on the [place] body comp scale with 1# clothing wt subtracted on 8/8/19. Wt was: 202.6# on 7/18, wt was: 211.4# on 7/5,  Wt was: 203.6# on 6/6, wt was 202.2# for 5/10,  Wt was: 203# on 5/2 with initial IBT appt  Current height:4'11\"  Wt is down 10# over the past 6 weeks. Pt has lost about 23# since 7/5/10 and is down about 15# since starting the IBT program.  Measurements: Neck: 16\", Waist: 41.75\", Hip: 53\"  Patient reports usual body weight (UBW) of: no usual  Patient has experienced previous weight loss history: Weight Watchers, Nutri System, [employer]  Had lap band in 0344-3223 and was removed by Dr. Martinez in Fall in 2018; highest wt was 211#, lowest wt 165#; has gained at least 15# since Nov.   ALLERGIES: STRAWBERRIES CAUSES HIVES,TONGUE SWELLING    Food recall:Breakfast: protein shake or eggs; noon shake: yogurt or protein, veg; snacks: string cheese; evening meal: meat, veggie  Physical activity: hasn't been exercising lately d/t back pain, had injections d/t back pain    Dislikes:  yogurt      Discussion:  Time spent face to face with patient:  29 minutes  Education provided:  Individual  Patient attends today:  unaccompanied    Assess:  Progress toward previously set goals:  Track food and fluid intake- goal met 100% of the time  15 gm protein per meal- goal met 100% of the time  Consume at least 48 oz water per day- goal met 100% of the time she is drinking 64oz water per day      Advise:  Pt is pleased with wt loss. Has had no eating problems. Is able to consume more solid foods and is consuming adequate fluids. She would like to exercise " more but has back pain. She will start exercising more as back pain subsides. Patient has weight goal of: 150#  Resources/Handouts provided:3 Weeks After Bariatric Surgery    Agree:  Patient is ready for behavior change.  At this time, patient would like to cont to work on journaling food and fluid intake.    Goals:   Track food and fluid intake  60-80 gm protein per day  Consume at least 64 oz water    Specific goal weight goals: 150#    Assist:  The following behavior change tool was used today:   Monitoring by Patient, discussion, handout    Arrange:  Follow up scheduled in 2 weeks

## 2019-10-14 ENCOUNTER — OFFICE VISIT NO LOS (OUTPATIENT)
Dept: DIABETES SERVICES | Facility: CLINIC | Age: 79
End: 2019-10-14
Payer: MEDICARE

## 2019-10-14 VITALS — BODY MASS INDEX: 36.05 KG/M2 | WEIGHT: 184.6 LBS

## 2019-10-14 DIAGNOSIS — E66.9 OBESITY (BMI 35.0-39.9 WITHOUT COMORBIDITY): Primary | ICD-10-CM

## 2019-10-14 PROCEDURE — G0447 BEHAVIOR COUNSEL OBESITY 15M: HCPCS | Performed by: DIETITIAN, REGISTERED

## 2019-10-14 NOTE — PROGRESS NOTES
"Intensive Behavior Therapy (IBT) for Obesity    Referring Provider: Dr. Travis                           Supervising Provider: Dr. Hdez      IBT Visit Number: #8  Date:  10/14/2019  Beginning Time: 9: 01 AM    End Time: 9:30 AM    BMI from initial provider referral: 39.06     79 y.o. female with BMI of 36.05  Current weight: 184.4, wt was:188.2# on body comp scale with 1# clothing wt subtracted on 9/19, wt was: 198.8# on the [place] body comp scale with 1# clothing wt subtracted on 8/8/19. Wt was: 202.6# on 7/18, wt was: 211.4# on 7/5,  Wt was: 203.6# on 6/6, wt was 202.2# for 5/10,  Wt was: 203# on 5/2 with initial IBT appt  Current height:4'11\"  Wt is down 3.8# over the past 4 weeks. Pt has lost about 27# since 7/5/10 and is down about 18.6# since starting the IBT program.  Measurements: Neck: 16\", Waist: 41.75\", Hip: 53\"  Patient reports usual body weight (UBW) of: no usual  Patient has experienced previous weight loss history: Weight Watchers, Nutri System, Atkins  Had lap band in 9166-8957 and was removed by Dr. Martinez in Fall in 2018; highest wt was 211#, lowest wt 165#; has gained at least 15# since Nov.   ALLERGIES: STRAWBERRIES CAUSES HIVES,TONGUE SWELLING    Food recall:Breakfast: protein shake or eggs; noon shake: yogurt or protein, veg; snacks: string cheese; evening meal: meat, veggie  Physical activity: hasn't been exercising lately d/t back pain, had injections d/t back pain    Dislikes:  yogurt      Discussion:  Time spent face to face with patient:  29 minutes  Education provided:  Individual  Patient attends today:  unaccompanied    Assess:  Progress toward previously set goals:  Track food and fluid intake- goal met 50% of the time  15 gm protein per meal- goal met 100% of the time  Consume at least 48 oz water per day- goal met 100% of the time she is drinking 64oz water per day      Advise:  Pt is pleased with wt loss. She is eating her protein first and then a little fruit or veggie. Has " had no eating problems. Is able to consume more solid foods and is consuming adequate fluids. She would like to exercise more but has back pain. States she is considering going to a pool. She walks with a walker in the evenings at work to get more exercise. Patient has weight goal of: 150#  Resources/Handouts provided:3 Weeks After Bariatric Surgery    Agree:  Patient is ready for behavior change.  At this time, patient would like to cont to work on journaling food and fluid intake.    Goals:   Track food and fluid intake  60-80 gm protein per day  Consume at least 64 oz water    Specific goal weight goals: 150#    Assist:  The following behavior change tool was used today:   Monitoring by Patient, discussion, handout, obstacles, problem solving    Arrange:  Follow up scheduled in 4 weeks

## 2019-11-07 ENCOUNTER — OFFICE VISIT (OUTPATIENT)
Dept: SURGERY | Facility: CLINIC | Age: 79
End: 2019-11-07
Payer: MEDICARE

## 2019-11-07 ENCOUNTER — OFFICE VISIT NO LOS (OUTPATIENT)
Dept: DIABETES SERVICES | Facility: CLINIC | Age: 79
End: 2019-11-07
Payer: MEDICARE

## 2019-11-07 VITALS
BODY MASS INDEX: 36.12 KG/M2 | OXYGEN SATURATION: 95 % | WEIGHT: 184 LBS | HEART RATE: 56 BPM | DIASTOLIC BLOOD PRESSURE: 78 MMHG | TEMPERATURE: 98.4 F | SYSTOLIC BLOOD PRESSURE: 134 MMHG | HEIGHT: 60 IN

## 2019-11-07 DIAGNOSIS — E78.5 HYPERLIPIDEMIA, UNSPECIFIED HYPERLIPIDEMIA TYPE: ICD-10-CM

## 2019-11-07 DIAGNOSIS — I10 ESSENTIAL HYPERTENSION: ICD-10-CM

## 2019-11-07 DIAGNOSIS — Z98.84 S/P BARIATRIC SURGERY: Primary | ICD-10-CM

## 2019-11-07 DIAGNOSIS — R53.83 OTHER FATIGUE: ICD-10-CM

## 2019-11-07 DIAGNOSIS — E03.8 OTHER SPECIFIED HYPOTHYROIDISM: ICD-10-CM

## 2019-11-07 DIAGNOSIS — Z98.84 S/P LAPAROSCOPIC SLEEVE GASTRECTOMY: Primary | ICD-10-CM

## 2019-11-07 DIAGNOSIS — E66.812 CLASS 2 SEVERE OBESITY DUE TO EXCESS CALORIES WITH SERIOUS COMORBIDITY AND BODY MASS INDEX (BMI) OF 35.0 TO 35.9 IN ADULT (CMS/HCC): ICD-10-CM

## 2019-11-07 DIAGNOSIS — E66.01 CLASS 2 SEVERE OBESITY DUE TO EXCESS CALORIES WITH SERIOUS COMORBIDITY AND BODY MASS INDEX (BMI) OF 35.0 TO 35.9 IN ADULT (CMS/HCC): ICD-10-CM

## 2019-11-07 PROCEDURE — 99024 POSTOP FOLLOW-UP VISIT: CPT | Performed by: NURSE PRACTITIONER

## 2019-11-07 PROCEDURE — 0358T BIA WHOLE BODY: CPT | Mod: NC | Performed by: DIETITIAN, REGISTERED

## 2019-11-07 ASSESSMENT — ENCOUNTER SYMPTOMS
WEAKNESS: 0
ABDOMINAL DISTENTION: 0
CONSTIPATION: 0
NAUSEA: 0
COUGH: 0
FEVER: 0
DIZZINESS: 0
CHILLS: 0
SHORTNESS OF BREATH: 0
UNEXPECTED WEIGHT CHANGE: 0
PALPITATIONS: 0
DIAPHORESIS: 0
ABDOMINAL PAIN: 0
COLOR CHANGE: 0
TROUBLE SWALLOWING: 0
VOMITING: 0
DIARRHEA: 0
LIGHT-HEADEDNESS: 0

## 2019-11-07 NOTE — PROGRESS NOTES
"Medical Nutrition Therapy (MNT) General Bariatric Post harjeet #2 no charge, biometric no charge  11/7/19  Beginning Time: 10:32 AM     End Time: 11:00 AM  MNT Provider Order:12 week post bariatric surgery      Others Present: Alone  Procedure Completed: Sleeve on 8/13/19    Nutrition Assessment  Food/Nutrition - Related History- Pt states she feels so much better since surgery, hard to describe, \"well  being\" maybe  Previous MNT/Date: 8/29/19, 8/8/19 and 4 previous IBT appt  Pertinent Medications:   Current Outpatient Medications   Medication Sig Dispense Refill   • omeprazole OTC (PriLOSEC OTC) 20 mg EC tablet Take 1 tablet (20 mg total) by mouth daily 30 tablet 4   • gabapentin (NEURONTIN) 300 mg capsule Take 300 mg by mouth Indications: Taking 900mg TID       • calcium carbonate-vitamin D3 600 mg(1,500mg) -400 unit per tablet Take 1 tablet by mouth daily     • nitroglycerin (NITROSTAT) 0.4 mg SL tablet Place 1 tablet (0.4 mg total) under the tongue every 5 (five) minutes as needed for chest pain 25 tablet 1   • levothyroxine (SYNTHROID, LEVOTHROID) 100 mcg tablet Take 1 tablet (100 mcg total) by mouth daily 90 tablet 3   • atenolol (TENORMIN) 25 mg tablet Take 1 tablet (25 mg total) by mouth daily 90 tablet 3   • atorvastatin (LIPITOR) 20 mg tablet Take 1 tablet (20 mg total) by mouth daily 90 tablet 3   • losartan (COZAAR) 50 mg tablet Take 1 tablet (50 mg total) by mouth daily 90 tablet 3   • vit A-vit C-vit E-zinc-copper (EYE VITAMIN AND MINERALS) 7,160-113-100 unit-mg-unit tablet Take 1 tab/cap by mouth 2 (two) times a day.     • PREMARIN 0.9 mg tablet TAKE 1 TABLET BY MOUTH  EVERY DAY (Patient taking differently: TAKE 1 TABLET BY MOUTH EVERY WEEK) 90 tablet 1     No current facility-administered medications for this visit.      Supplements/Herbals: MVI, Ca with Vit, Vit D and Vit 12  Alcohol Use:   Social History     Substance and Sexual Activity   Alcohol Use No      Pent[employer]al / Cultural / Ethnic Food " "Practices:  None  Physical Activity: Plans to restart physical activity       Food Recall: 3 month post surgery sugar free full liquids-60 gm protein per day; Breakfast: protein shake; noon: protein soup, protein shake, evening: yogurt, protein shake; 64 oz water, 1 cup decaf coffee and 1 cup decaf green tea, core water, Powerade Zero      Client History  Medical History:  KATY, Pulmonary HTN, GERD, Obesity, Vit D def, Hypothyroidism    Occupation: back to work over the past week  Socioeconomic Concerns:  No  Shops/Cooks for Self: No    Anthropometrics  Ht Readings from Last 1 Encounters:   11/07/19 1.524 m (5')   Wt. 179.8# with 2.6# clothing wt subtracted on body comp scale on 11/7/19 Wt. 188.4# on 8/29 with 1# clothing wt subtracted on the [place] Body Comp scale down from 198.8# on 8/8 on the body comp scale  Wt Readings from Last 1 Encounters:   11/07/19 83.5 kg (184 lb)    BMI: 36.3  BMI Readings from Last 1 Encounters:   11/07/19 35.94 kg/m²      BP Readings from Last 1 Encounters:   11/07/19 134/78              Pre-op Post 3wk Post 6mo Post 12mo   Waist Measurement:  8/8/19  41.75\"  8/29/19  41\" 11/7/19  40\"       Hip Measurement:  53\"  51.75\"  50.5\"      Neck Measurement:  16\"  15.75\"  15.5\"       Patient reports usual body weight (UBW) of: Patient reports usual body weight (UBW) of: no usual  Patient has experienced previous weight loss history: Weight Watchers, Nutri System, Atkins  Had lap band in [id number] and was removed by Dr. Martinez in Fall in 2018; highest wt was 211#, lowest wt 165#; has gained at least 15# since Nov.   Body Composition: Refer to attached body comp results  Weight History:Wt was: 202.6# on 7/18, wt was: 211.4# on 7/5,  Wt was: 203.6# on 6/6, wt was 202.2# for 5/10,  Wt was: 203# on 5/2 with initial IBT appt          Biochemical Data, Medical Tests, and Procedures  Pertinent Labs:   Sodium   Date Value Ref Range Status   08/01/2019 141 135 - 145 mmol/L Final     Potassium   Date " Value Ref Range Status   08/01/2019 4.7 3.5 - 5.1 mmol/L Final     Chloride   Date Value Ref Range Status   08/01/2019 106 98 - 107 mmol/L Final     CO2   Date Value Ref Range Status   08/01/2019 28 21 - 32 mmol/L Final     BUN   Date Value Ref Range Status   08/01/2019 28 (H) 7 - 25 mg/dL Final     Creatinine   Date Value Ref Range Status   08/01/2019 1.22 (H) 0.60 - 1.20 mg/dL Final     Glucose   Date Value Ref Range Status   08/01/2019 97 70 - 105 mg/dL Final     Calcium   Date Value Ref Range Status   08/01/2019 9.7 8.6 - 10.3 mg/dL Final     Cholesterol   Date Value Ref Range Status   07/11/2019 110 0 - 199 mg/dL Final     Triglycerides   Date Value Ref Range Status   07/11/2019 149 <=149 mg/dL Final     HDL   Date Value Ref Range Status   07/11/2019 38 (L) >=40 mg/dL Final       Social / Diet History  Current Diet Stage: 3 month post surgery  Allergies/Intolerances: ALLERGIES: STRAWBERRIES CAUSES HIVES,TONGUE SWELLING; dislikes eggs and yogurt    Allergies   Allergen Reactions   • [place] Angioedema and Hives     hives, tongue swells  Swelling of tongue   • Adhesive      welts         Nutrition Prescription  3-6 month Post op Bariatric Sleeve surgery Diet: Smaller portion, High protein, reduced fat, reduced sugar diet with micronutrient supplementation        Nutrition Diagnosis   Problem #1: Altered GI function  Related to: recent bariatric surgery  As evidenced by: inability to consume a regular diet- need for increased protein, soft moist foods and small portions and increased nutrition supplementation of micronutrients     Problem #2: Obesity  Related to: hx of energy consumption exceeding energy expenditure  As evidenced by: BMI of 35.11    Pain/GI Concerns/Bowel Problems:  Pain: 2 in legs and back  GI Concerns: No problems  Bowel Problems: no problems    Intervention/Plan:    Bariatric Diet Discussion: Body comp analysis shows a loss of 8.4# over the past 6 weeks. Pt has had a loss of 7.6# of fat mass  and a loss of 0.8# of lean muscle mass.  Discussed the 3-6 month post op diet. Emphasized protein sources and increasing physical activity. Handouts provided: 3 month after post surgery  Goals  Post Op Phase  Attends all post-op visits through 18 months?    Date:  8/29/19 Date: 11/7/19  Date:     3wks xx 3mos xx 6mos    Date:   Date:   Date:     9mos  12mos  18mos              Reports 60-80g protein per day? Yes  Reports 64 oz zero calorie fluid per day? Yes  Reports taking supplements? Yes  Reports routinely spending 150mins per week in physical   activity process? Yes     Evaluation  Receptivity to education: good    Patient states understanding of the information presented.    Follow-up Plan  F/U scheduled in 4 weeks with the IBT program for f/u    Time Spent with Patient: 28 minutes

## 2019-11-07 NOTE — PROGRESS NOTES
"3-month postop follow-up  Status post laparoscopic vertical sleeve gastrectomy  Date of service: 11/7/2019    Subjective      Patient ID: Katy Stack is a 79 y.o. female.    Chief Complaint   Patient presents with   • Post-op     3 month post lap Sleeve Gastrectomy 8-14-19 ....10-4-18 Wt 184lb     BROOKLYN Burns presents to the weight management and bariatric surgery clinic today, unaccompanied, for follow-up 3 months status laparoscopic vertical sleeve gastrectomy performed 8/14/2019 by Dr. Millan.  According to biometric testing, she is down 19 lbs total weight loss.  She denies any symptoms of dysphagia or recurrent reflux.  She does continue omeprazole daily as recommended s/p sleeve.  She reports taking her omeprazole in the AM with her levothyroxine.    Nutrition from 3 weeks to 3 months:   She follows up with the RD following today's clinic visit.  Denies tracking foods and fluids, but \"knows\" she is getting 60 gm protein daily and 48-64 fl oz water/sugar free fluid daily.  Menu recall includes:   -Fruits and vegetables- bananas, grapes, apples, green salads  -Protein - shake each morning, nuts, cheese, bean soup, red meat (ground beef or slow cooked roast), chicken when out to eat.   -Beverages: water, decaf coffee and tea.    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  She otherwise engages in ADLs.  She denies any structured exercise.  Her ability to engage in physical activity is limited by back pain.  She continues to see pain medicine and 6 weeks ago increased gabapentin to 900 mg po TID.  She reports feeling \"loopy and fatigued\" since dose increase.        Psychology:   She has not followed up with Dr. Bar since surgery, and reports that she does not plan to return for behavioral health follow up.    Body comp analysis (preop versus 2 week postop):  Weight: " 179.8 (-19 pounds)  Fat %: 50.5% (-0.2%)  Fat mass: 90.8 lbs (-10 pounds)  Fat free mass: 89 lbs (-9 pounds)  Muscle mass: 84.4 lbs (-8.6 pounds)  TBW: -3.6 pounds  TBW %: 35.7% (+2.2%)  Bone mass: 4.6 lbs (-0.4 pounds)  BMR: 1281 kcal (-126 kcal)  Visceral fat ratin (-1)      Review of Systems   Constitutional: Positive for fatigue (since increase in gabapentin). Negative for activity change, appetite change, chills, diaphoresis, fever and unexpected weight change.   HENT: Negative for trouble swallowing.    Eyes: Negative for visual disturbance.   Respiratory: Negative for cough and shortness of breath.    Cardiovascular: Negative for chest pain, palpitations and leg swelling.   Gastrointestinal: Negative for abdominal distention, abdominal pain, constipation, diarrhea, nausea and vomiting.   Skin: Negative for color change, pallor, rash and wound.   Neurological: Negative for dizziness, syncope, weakness and light-headedness.   Psychiatric/Behavioral: Negative.        I have personally reviewed the pertinent aspects of the patient's chart and updated the computerized patient record. Pertinent positives are noted above.  Items reviewed including: Tobacco  Allergies  Meds  Problems  Med Hx  Surg Hx  Fam Hx         Objective      Vital Signs  /78   Pulse 56   Temp 36.9 °C (98.4 °F) (Temporal)   Ht 1.524 m (5')   Wt 83.5 kg (184 lb)   SpO2 95%   BMI 35.94 kg/m²       Physical Exam  Vitals signs and nursing note reviewed.   Constitutional:       General: She is awake. She is not in acute distress.     Appearance: Normal appearance. She is well-developed and well-groomed. She is obese.      Comments: 19 lb weight loss   Cardiovascular:      Rate and Rhythm: Normal rate and regular rhythm.      Pulses: Normal pulses.      Heart sounds: S1 normal and S2 normal.   Pulmonary:      Effort: Pulmonary effort is normal.      Breath sounds: Normal breath sounds.   Abdominal:      General: Abdomen is  "protuberant. Bowel sounds are normal.      Palpations: Abdomen is soft.      Tenderness: There is no abdominal tenderness.   Skin:     General: Skin is warm and dry.   Neurological:      Mental Status: She is alert. Mental status is at baseline.   Psychiatric:         Attention and Perception: Attention normal.         Mood and Affect: Mood normal.         Behavior: Behavior is cooperative.           Assessment and Plan:  1. S/P laparoscopic sleeve gastrectomy  This is a 79 y.o. female who presents today for ongoing follow up 3 months s/p sleeve gastrectomy.  She has had a 19 lb total loss on biometric testing with the RD. She is doing well with nutrition, but has not achieved physical activity goals.  This is evident in biometric test result as loss of fat mass is essential equal to loss of FFM.  She does not plan to follow up with the psychologist despite recommendation for follow up as she feels she is adjusting well.  Feeling fatigued and \"loopy\" since dose increase on gabapentin, likely due to medication, but will check recommended labs 3 months s/p sleeve.     The following was discussed and mutually agreed upon:  1. Continue follow up with Mey Willoughby RD and follow her nutrition recommendations for 3 months to 6 months after sleeve gastrectomy.   2. Aim for 70 gm protein per day.   3. Aim for 64 fl oz water per day.   4. Initiate [employer] walking videos on [email or web address] 3 times per week. Increase frequency as tolerated to recommendation of 6 days per week.   5. Take omeprazole nightly, take levothyroxine daily in the morning 1 hour before eating.  6. Will wean off of omeprazole at 6 months postop.     - CBC Blood, Venous; Future  - Comprehensive metabolic panel Blood, Venous; Future  - Methylmalonic acid, serum Blood, Venous; Future  - Vitamin B12 Blood, Venous; Future  - Lipid panel Blood, Venous; Future    2. Class 2 severe obesity due to excess calories with serious comorbidity and body mass index " "(BMI) of 35.0 to 35.9 in adult (CMS/HCC) (HCC)  As above.     3. Hyperlipidemia, unspecified hyperlipidemia type  Continues atorvastatin 20 mg po daily.  Reviewed benefit of physical activity on lipid profile. Will check lipid profile with 3 month postop labs to determine any improvement s/p sleeve, and need for statin therapy.  - CBC Blood, Venous; Future  - Comprehensive metabolic panel Blood, Venous; Future  - Methylmalonic acid, serum Blood, Venous; Future  - Vitamin B12 Blood, Venous; Future  - Lipid panel Blood, Venous; Future    4. Essential hypertension  Continues losartan and atenolol and BP well controlled today, but no substantial reduction in BP measurement. Reviewed benefit of physical activity on BP control.  - CBC Blood, Venous; Future  - Comprehensive metabolic panel Blood, Venous; Future  - Methylmalonic acid, serum Blood, Venous; Future  - Vitamin B12 Blood, Venous; Future  - Lipid panel Blood, Venous; Future    5. Other fatigue  Feeling fatigued and \"loopy\" since dose increase on gabapentin, likely due to medication, but will check recommended labs 3 months s/p sleeve.   - CBC Blood, Venous; Future  - Comprehensive metabolic panel Blood, Venous; Future  - Methylmalonic acid, serum Blood, Venous; Future  - Vitamin B12 Blood, Venous; Future  - Lipid panel Blood, Venous; Future    6. Other specified hypothyroidism  Continues levothyroxine and is taking this in the morning with omeprazole and other vitamins. Reviewed the importance of taking levothyroxine 1-2 hours apart from other medication, particularly omeprazole and MVI, and 1 hour before food intake. Reviewed the need for proper administration of levothyroxine to maintain euthyroid and achieve optimal weight loss.       Ktay Stack will RTC in 3 months for ongoing postop follow up.  She participated in the decision making process and agreed with the above plan. All questions were sought and answered to her satisfaction.       Marii Wayne, " CNP

## 2019-11-11 VITALS — WEIGHT: 179.8 LBS | BODY MASS INDEX: 35.11 KG/M2

## 2019-11-15 ENCOUNTER — TELEPHONE (OUTPATIENT)
Dept: SURGERY | Facility: CLINIC | Age: 79
End: 2019-11-15

## 2019-11-15 NOTE — TELEPHONE ENCOUNTER
Returned call to Katy to let her know that the lab orders are in. These are not urgent labs, but she will come back to have those drawn sometime within the next week.

## 2019-11-15 NOTE — TELEPHONE ENCOUNTER
Patient came into clinic today for labwork.  She told Ale in the lab that you were ordering her labs for today.  She is upset because she came in and no orders were placed.  Please contact patient and let her know what lab work you want and when you want her to get it done.  Please place orders as well as last note not finished and we could not tell what she needs.  Thanks.

## 2019-11-17 ASSESSMENT — ENCOUNTER SYMPTOMS
ACTIVITY CHANGE: 0
APPETITE CHANGE: 0
PSYCHIATRIC NEGATIVE: 1
FATIGUE: 1
WOUND: 0

## 2019-11-25 ENCOUNTER — LAB (OUTPATIENT)
Dept: LAB | Facility: CLINIC | Age: 79
End: 2019-11-25
Payer: MEDICARE

## 2019-11-25 DIAGNOSIS — R79.89 ABNORMAL CBC: ICD-10-CM

## 2019-11-25 DIAGNOSIS — D50.8 IRON DEFICIENCY ANEMIA SECONDARY TO INADEQUATE DIETARY IRON INTAKE: Primary | ICD-10-CM

## 2019-11-25 DIAGNOSIS — Z98.84 S/P LAPAROSCOPIC SLEEVE GASTRECTOMY: ICD-10-CM

## 2019-11-25 DIAGNOSIS — R53.83 OTHER FATIGUE: ICD-10-CM

## 2019-11-25 DIAGNOSIS — E78.5 HYPERLIPIDEMIA, UNSPECIFIED HYPERLIPIDEMIA TYPE: ICD-10-CM

## 2019-11-25 DIAGNOSIS — R79.89 ABNORMAL CBC: Primary | ICD-10-CM

## 2019-11-25 DIAGNOSIS — I10 ESSENTIAL HYPERTENSION: ICD-10-CM

## 2019-11-25 LAB
ALBUMIN SERPL-MCNC: 3.7 G/DL (ref 3.5–5.3)
ALP SERPL-CCNC: 93 U/L (ref 55–142)
ALT SERPL-CCNC: 8 U/L (ref 0–52)
ANION GAP SERPL CALC-SCNC: 6 MMOL/L (ref 3–11)
AST SERPL-CCNC: 14 U/L (ref 0–39)
BILIRUB SERPL-MCNC: 0.48 MG/DL (ref 0–1.4)
BUN SERPL-MCNC: 14 MG/DL (ref 7–25)
CALCIUM ALBUM COR SERPL-MCNC: 9.3 MG/DL (ref 8.6–10.3)
CALCIUM SERPL-MCNC: 9.1 MG/DL (ref 8.6–10.3)
CHLORIDE SERPL-SCNC: 110 MMOL/L (ref 98–107)
CHOLEST SERPL-MCNC: 106 MG/DL (ref 0–199)
CO2 SERPL-SCNC: 28 MMOL/L (ref 21–32)
CREAT SERPL-MCNC: 0.94 MG/DL (ref 0.6–1.2)
ERYTHROCYTE [DISTWIDTH] IN BLOOD BY AUTOMATED COUNT: 15.3 % (ref 11.5–14)
FASTING STATUS PATIENT QL REPORTED: YES
FERRITIN SERPL-MCNC: 6.7 NG/ML (ref 11–307)
GFR SERPL CREATININE-BSD FRML MDRD: 58 ML/MIN/1.73M*2
GLUCOSE SERPL-MCNC: 97 MG/DL (ref 70–105)
HCT VFR BLD AUTO: 41.2 % (ref 34–45)
HDLC SERPL-MCNC: 37 MG/DL
HGB BLD-MCNC: 13.4 G/DL (ref 11.5–15.5)
IRON SATN MFR SERPL: 18 % (ref 13–50)
IRON SERPL-MCNC: 64 UG/DL (ref 50–175)
LDLC SERPL CALC-MCNC: 48 MG/DL (ref 20–99)
MCH RBC QN AUTO: 27.9 PG (ref 28–33)
MCHC RBC AUTO-ENTMCNC: 32.5 G/DL (ref 32–36)
MCV RBC AUTO: 85.9 FL (ref 81–97)
PLATELET # BLD AUTO: 246 10*3/UL (ref 140–350)
PMV BLD AUTO: 7.7 FL (ref 6.9–10.8)
POTASSIUM SERPL-SCNC: 4.1 MMOL/L (ref 3.5–5.1)
PROT SERPL-MCNC: 6.4 G/DL (ref 6–8.3)
RBC # BLD AUTO: 4.8 10*6/ΜL (ref 3.7–5.3)
SODIUM SERPL-SCNC: 144 MMOL/L (ref 135–145)
TIBC SERPL-MCNC: 346 UG/DL (ref 250–450)
TRIGL SERPL-MCNC: 103 MG/DL
UIBC SERPL-MCNC: 282 UG/DL (ref 155–355)
VIT B12 SERPL-MCNC: 264 PG/ML (ref 180–914)
WBC # BLD AUTO: 7.3 10*3/UL (ref 4.5–10.5)

## 2019-11-25 PROCEDURE — 82728 ASSAY OF FERRITIN: CPT

## 2019-11-25 PROCEDURE — 80053 COMPREHEN METABOLIC PANEL: CPT

## 2019-11-25 PROCEDURE — 83921 ORGANIC ACID SINGLE QUANT: CPT

## 2019-11-25 PROCEDURE — 36415 COLL VENOUS BLD VENIPUNCTURE: CPT

## 2019-11-25 PROCEDURE — 80061 LIPID PANEL: CPT

## 2019-11-25 PROCEDURE — 83550 IRON BINDING TEST: CPT

## 2019-11-25 PROCEDURE — 85027 COMPLETE CBC AUTOMATED: CPT

## 2019-11-25 PROCEDURE — 82607 VITAMIN B-12: CPT

## 2019-11-25 RX ORDER — FERROUS SULFATE 325(65) MG
325 TABLET ORAL EVERY OTHER DAY
Qty: 15 TABLET | Refills: 11 | Status: SHIPPED | OUTPATIENT
Start: 2019-11-25 | End: 2020-11-24

## 2019-11-26 NOTE — PROGRESS NOTES
Please let Katy know that her Ferritin is low at 6.7 ng/mL with normal being  ng/mL. This is diagnostic for iron deficiency anemia. Please have her add elemental iron 65 mg po every other day. I have called this into [facility], but she may also just get this over the counter if she would like.  Will recheck iron panel at 6 month follow up and if no significant improvement will give IV iron replacement at that time.    Thanks!   Marii

## 2019-11-26 NOTE — PROGRESS NOTES
Katy has been notified of her low iron. Prescription has been faxed to [facility] also can get OTC. Will repeat iron panel in 6 months per Marii Wayne, CNP

## 2019-12-02 LAB — METHYLMALONATE SERPL-SCNC: 0.21 NMOL/ML

## 2019-12-10 ENCOUNTER — OFFICE VISIT (OUTPATIENT)
Dept: URGENT CARE | Facility: CLINIC | Age: 79
End: 2019-12-10
Payer: MEDICARE

## 2019-12-10 VITALS
DIASTOLIC BLOOD PRESSURE: 50 MMHG | TEMPERATURE: 97.9 F | WEIGHT: 181 LBS | OXYGEN SATURATION: 96 % | SYSTOLIC BLOOD PRESSURE: 143 MMHG | RESPIRATION RATE: 18 BRPM | BODY MASS INDEX: 35.35 KG/M2 | HEART RATE: 70 BPM

## 2019-12-10 DIAGNOSIS — K13.70 UNSPECIFIED LESIONS OF ORAL MUCOSA: Primary | ICD-10-CM

## 2019-12-10 PROCEDURE — 99212 OFFICE O/P EST SF 10 MIN: CPT | Performed by: NURSE PRACTITIONER

## 2019-12-10 PROCEDURE — G0463 HOSPITAL OUTPT CLINIC VISIT: HCPCS | Performed by: NURSE PRACTITIONER

## 2019-12-10 ASSESSMENT — PAIN SCALES - GENERAL: PAINLEVEL: 2

## 2019-12-10 NOTE — PROGRESS NOTES
Subjective      Katy Stack is a 79 y.o. female who presents for sores of the mouth.    HPI  Pt presents to  with complaints of sores in the mouth that started yesterday. Pt rates pain 2/10. Alleviating factors include cold liquid. Denies any aggravating factors. Pt has tried salt water gargles and listerine. Pt denies fever, chills, n/v/d, chest pain, or respiratory difficulty. Appetite is unchanged. Energy level is unchanged.  Patient started iron about a week ago. She has only taken 3 tabs so far.     The following have been reviewed and updated as appropriate in this visit:    Allergies   Allergen Reactions   • [place] Angioedema and Hives     hives, tongue swells  Swelling of tongue   • Adhesive      welts     Current Outpatient Medications   Medication Sig Dispense Refill   • ferrous sulfate 325 mg (65 mg iron) tablet Take 1 tablet (325 mg total) by mouth every other day 15 tablet 11   • omeprazole OTC (PriLOSEC OTC) 20 mg EC tablet Take 1 tablet (20 mg total) by mouth daily 30 tablet 4   • gabapentin (NEURONTIN) 300 mg capsule Take 300 mg by mouth Indications: Taking 900mg TID       • calcium carbonate-vitamin D3 600 mg(1,500mg) -400 unit per tablet Take 1 tablet by mouth daily     • nitroglycerin (NITROSTAT) 0.4 mg SL tablet Place 1 tablet (0.4 mg total) under the tongue every 5 (five) minutes as needed for chest pain 25 tablet 1   • levothyroxine (SYNTHROID, LEVOTHROID) 100 mcg tablet Take 1 tablet (100 mcg total) by mouth daily 90 tablet 3   • atenolol (TENORMIN) 25 mg tablet Take 1 tablet (25 mg total) by mouth daily 90 tablet 3   • atorvastatin (LIPITOR) 20 mg tablet Take 1 tablet (20 mg total) by mouth daily 90 tablet 3   • losartan (COZAAR) 50 mg tablet Take 1 tablet (50 mg total) by mouth daily 90 tablet 3   • vit A-vit C-vit E-zinc-copper (EYE VITAMIN AND MINERALS) 7,160-113-100 unit-mg-unit tablet Take 1 tab/cap by mouth 2 (two) times a day.     • PREMARIN 0.9 mg tablet TAKE 1 TABLET BY  MOUTH  EVERY DAY (Patient taking differently: TAKE 1 TABLET BY MOUTH EVERY WEEK) 90 tablet 1   • magic mouthwash (maalox/prednisolone/diphenhydramine/lidocaine) Swish and spit 5 mL every 4 (four) hours as needed (Painful sores of the mouth) for up to 10 days 250 mL 0     No current facility-administered medications for this visit.      Past Medical History:   Diagnosis Date   • Benign essential hypertension 11/24/2017   • Cataract of left eye    • Chronic obstructive lung disease (CMS/HCC) (HCC) 11/10/2017   • Dysrhythmia     LBBB   • GERD (gastroesophageal reflux disease)     at times   • Hypertension    • Hypothyroid    • Hypothyroidism 11/10/2017   • Injury of back     BENDING INJURY   • Neurologic disorder    • Obesity 11/10/2017   • Obstructive sleep apnea syndrome 11/10/2017    Night time oxygen/2L   • Osteoarthritis    • Periodic limb movement disorder 11/10/2017   • Peripheral neuropathy     left thigh into left groin    • Rectocele    • Respiratory disease      Past Surgical History:   Procedure Laterality Date   • APPENDECTOMY  1987   • BACK SURGERY      2 surgeries [id number]   • BACK SURGERY  11/30/2016   • BACK SURGERY  05/01/2017    vetebral fusion   • CARDIAC CATHETERIZATION  08/31/2011   • CATARACT EXTRACTION W/  INTRAOCULAR LENS IMPLANT Left 7/2/2018    Procedure: OS CATARACT LEFT PHACOEMULSIFICATION OF CATARACT WITH INTRAOCULAR LENS;  Surgeon: Emmanuel Cueto MD;  Location: [facility];  Service: Ophthalmology;  Laterality: Left;   • CATARACT EXTRACTION W/  INTRAOCULAR LENS IMPLANT Right 8/6/2018    Procedure: OD CATARACT RIGHT PHACOEMULSIFICATION OF CATARACT WITH INTRAOCULAR LENS;  Surgeon: Emmanuel Cueto MD;  Location: [facility];  Service: Ophthalmology;  Laterality: Right;   • CHOLECYSTECTOMY     • COLONOSCOPY  02/19/2014    No polyps   • COLONOSCOPY  04/26/2011   • DIAGNOSTIC LAPAROSCOPY N/A 11/28/2018    Procedure: Laparoscopic band removal;  Surgeon: Chris French  MD Ayesha;  Location: [facility] OR;  Service: General;  Laterality: N/A;   • ESOPHAGOGASTRODUODENOSCOPY N/A 2018    Procedure: EGD - ESOPHAGOGASTRODUODENOSCOPY;  Surgeon: Chris Millan MD;  Location: [facility];  Service: Endoscopy;  Laterality: N/A;   • FOOT SURGERY      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 Vela- shoulder   • THYROID LOBECTOMY Left    • THYROIDECTOMY, PARTIAL  2010   • UPPER GASTROINTESTINAL ENDOSCOPY  2014    Mild chronic gastritis       ROS: See HPI    Objective   /50   Pulse 70   Temp 36.6 °C (97.9 °F) (Temporal)   Resp 18   Wt 82.1 kg (181 lb)   SpO2 96%   BMI 35.35 kg/m²     No results found for this or any previous visit (from the past 24 hour(s)).    Physical Exam  Vitals signs and nursing note reviewed.   Constitutional:       General: She is not in acute distress.     Appearance: Normal appearance. She is obese. She is not ill-appearing.   HENT:      Right Ear: Tympanic membrane and ear canal normal.      Left Ear: Tympanic membrane and ear canal normal.      Nose: Nose normal.      Mouth/Throat:      Lips: Pink. No lesions.      Mouth: Mucous membranes are moist. No injury.      Tongue: No lesions.      Pharynx: Oropharynx is clear. Uvula midline. No pharyngeal swelling, oropharyngeal exudate or posterior oropharyngeal erythema.      Tonsils: No tonsillar exudate or tonsillar abscesses. Swellin on the right. 0 on the left.      Comments: 2  midline sublingual clear/fluid filled lesions noted.  No ulcerations noted of the mouth.  Eyes:      Extraocular Movements: Extraocular movements intact.      Conjunctiva/sclera: Conjunctivae normal.      Pupils: Pupils are equal, round, and reactive to light.   Neck:      Musculoskeletal: Normal range of motion.   Cardiovascular:      Rate and Rhythm: Normal rate and regular rhythm.      Pulses: Normal pulses.      Heart sounds: Normal heart sounds.   Pulmonary:      Effort: Pulmonary effort is normal.      Breath sounds: Normal breath sounds.   Musculoskeletal: Normal range of motion.   Skin:     General: Skin is warm and dry.      Capillary Refill: Capillary refill takes less than 2 seconds.   Neurological:      Mental Status: She is alert and oriented to person, place, and time.   Psychiatric:         Behavior: Behavior normal.         Assessment/Plan     Diagnoses and all orders for this visit:    Unspecified lesions of oral mucosa  -     magic mouthwash (maalox/prednisolone/diphenhydramine/lidocaine); Swish and spit 5 mL every 4 (four) hours as needed (Painful sores of the mouth) for up to 10 days      Patient felt painful lesion on the anterior tongue which was not visible.  Patient will stop the oral iron for 1 week to see if symptoms clear.  Patient is to follow-up with PCP if symptoms persist.  Pt is to follow up with increased or worsening pain, spread of lesions, fever of 100.4 or greater, chills,  n/v/d lasting longer than 12 hours and unable to keep fluids down, lightheadedness, dizziness, lethargy, dark or bloody stools,  or respiratory difficulty. Patient verbalized understanding of plan.   Elle Dickerson, CNP

## 2020-01-15 RX ORDER — OMEPRAZOLE 20 MG/1
CAPSULE, DELAYED RELEASE ORAL
Qty: 90 CAPSULE | OUTPATIENT
Start: 2020-01-15

## 2020-01-15 NOTE — TELEPHONE ENCOUNTER
Per my last note she was not having reflux or dysphagia s/p sleeve. Staple line should be well healed at this point since she almost 6 months out. Please let her know that she may stop PPI therapy. Typically would have her take 1 every other day for a week then stop to avoid rebound hyperacidity. Thanks!     Marii

## 2020-01-21 ENCOUNTER — OFFICE VISIT (OUTPATIENT)
Dept: INTERNAL MEDICINE | Facility: CLINIC | Age: 80
End: 2020-01-21
Payer: MEDICARE

## 2020-01-21 ENCOUNTER — APPOINTMENT (OUTPATIENT)
Dept: LAB | Facility: CLINIC | Age: 80
End: 2020-01-21
Payer: MEDICARE

## 2020-01-21 VITALS
BODY MASS INDEX: 36.89 KG/M2 | SYSTOLIC BLOOD PRESSURE: 130 MMHG | TEMPERATURE: 98 F | DIASTOLIC BLOOD PRESSURE: 82 MMHG | RESPIRATION RATE: 17 BRPM | OXYGEN SATURATION: 97 % | HEIGHT: 59 IN | HEART RATE: 58 BPM | WEIGHT: 183 LBS

## 2020-01-21 DIAGNOSIS — J00 HEAD COLD: Primary | ICD-10-CM

## 2020-01-21 DIAGNOSIS — J00 HEAD COLD: ICD-10-CM

## 2020-01-21 LAB
BASOPHILS # BLD AUTO: 0 10*3/UL
BASOPHILS NFR BLD AUTO: 0 % (ref 0–2)
EOSINOPHIL # BLD AUTO: 0.3 10*3/UL
EOSINOPHIL NFR BLD AUTO: 6 % (ref 0–3)
ERYTHROCYTE [DISTWIDTH] IN BLOOD BY AUTOMATED COUNT: 15.6 % (ref 11.5–14)
HCT VFR BLD AUTO: 41.4 % (ref 34–45)
HGB BLD-MCNC: 13.5 G/DL (ref 11.5–15.5)
LYMPHOCYTES # BLD AUTO: 2.5 10*3/UL
LYMPHOCYTES NFR BLD AUTO: 43 % (ref 11–47)
MCH RBC QN AUTO: 27.5 PG (ref 28–33)
MCHC RBC AUTO-ENTMCNC: 32.5 G/DL (ref 32–36)
MCV RBC AUTO: 84.5 FL (ref 81–97)
MONOCYTES # BLD AUTO: 0.5 10*3/UL
MONOCYTES NFR BLD AUTO: 8 % (ref 3–11)
NEUTROPHILS # BLD AUTO: 2.5 10*3/UL
NEUTROPHILS NFR BLD AUTO: 43 % (ref 41–81)
PLATELET # BLD AUTO: 225 10*3/UL (ref 140–350)
PMV BLD AUTO: 7.7 FL (ref 6.9–10.8)
RBC # BLD AUTO: 4.9 10*6/ΜL (ref 3.7–5.3)
WBC # BLD AUTO: 5.9 10*3/UL (ref 4.5–10.5)

## 2020-01-21 PROCEDURE — 36415 COLL VENOUS BLD VENIPUNCTURE: CPT

## 2020-01-21 PROCEDURE — 85025 COMPLETE CBC W/AUTO DIFF WBC: CPT

## 2020-01-21 PROCEDURE — 99213 OFFICE O/P EST LOW 20 MIN: CPT | Performed by: INTERNAL MEDICINE

## 2020-01-21 PROCEDURE — G0463 HOSPITAL OUTPT CLINIC VISIT: HCPCS | Performed by: INTERNAL MEDICINE

## 2020-01-21 RX ORDER — IBUPROFEN/DIPHENHYDRAMINE CIT 200MG-38MG
TABLET ORAL
COMMUNITY
Start: 2019-12-10 | End: 2020-11-03 | Stop reason: ALTCHOICE

## 2020-01-21 RX ORDER — OMEPRAZOLE 20 MG/1
20 CAPSULE, DELAYED RELEASE ORAL DAILY
COMMUNITY
Start: 2019-11-14 | End: 2020-02-06 | Stop reason: ALTCHOICE

## 2020-01-21 ASSESSMENT — PAIN SCALES - GENERAL: PAINLEVEL: 5

## 2020-01-21 NOTE — PROGRESS NOTES
Internal Medicine Progress Note    Chief Complaint:   Chief Complaint   Patient presents with   • Sinusitis     Patient is in today with c/o sinus drainage/pressure and ear pressure x 1 week.          HPI: Patient is a 79 y.o. female comes in for same-day visit with what she calls a head cold she is having some nasal stuffiness a little bit of sinus drainage some pressure in her ears she has had no fever chills or sweats she has no cough she has had symptoms now for about 7 days.    Review of Systems      Current Outpatient Medications:   •  Antacid Regular Strength 200-200-20 mg/5 mL suspension, , Disp: , Rfl:   •  omeprazole (PriLOSEC) 20 mg capsule, Take 20 mg by mouth daily, Disp: , Rfl:   •  gabapentin (NEURONTIN) 300 mg capsule, Take 300 mg by mouth Indications: Taking 900mg TID  , Disp: , Rfl:   •  nitroglycerin (NITROSTAT) 0.4 mg SL tablet, Place 1 tablet (0.4 mg total) under the tongue every 5 (five) minutes as needed for chest pain, Disp: 25 tablet, Rfl: 1  •  levothyroxine (SYNTHROID, LEVOTHROID) 100 mcg tablet, Take 1 tablet (100 mcg total) by mouth daily, Disp: 90 tablet, Rfl: 3  •  atenolol (TENORMIN) 25 mg tablet, Take 1 tablet (25 mg total) by mouth daily, Disp: 90 tablet, Rfl: 3  •  atorvastatin (LIPITOR) 20 mg tablet, Take 1 tablet (20 mg total) by mouth daily, Disp: 90 tablet, Rfl: 3  •  losartan (COZAAR) 50 mg tablet, Take 1 tablet (50 mg total) by mouth daily, Disp: 90 tablet, Rfl: 3  •  vit A-vit C-vit E-zinc-copper (EYE VITAMIN AND MINERALS) 7,160-113-100 unit-mg-unit tablet, Take 1 tab/cap by mouth 2 (two) times a day., Disp: , Rfl:   •  PREMARIN 0.9 mg tablet, TAKE 1 TABLET BY MOUTH  EVERY DAY (Patient taking differently: TAKE 1 TABLET BY MOUTH EVERY WEEK), Disp: 90 tablet, Rfl: 1  •  ferrous sulfate 325 mg (65 mg iron) tablet, Take 1 tablet (325 mg total) by mouth every other day (Patient not taking: Reported on 1/21/2020 ), Disp: 15 tablet, Rfl: 11  •  calcium carbonate-vitamin D3 600  "mg(1,500mg) -400 unit per tablet, Take 1 tablet by mouth daily, Disp: , Rfl:     Allergies:   Allergies   Allergen Reactions   • [place] Angioedema and Hives     hives, tongue swells  Swelling of tongue   • Adhesive      welts         Labs:   No results found for this or any previous visit (from the past 336 hour(s)).      Imaging: No results found.    Vitals: Blood pressure 130/82, pulse 58, temperature 36.7 °C (98 °F), temperature source Oral, resp. rate 17, height 1.499 m (4' 11\"), weight 83 kg (183 lb), SpO2 97 %, not currently breastfeeding.      Physical Exam  Vitals signs and nursing note reviewed.   Constitutional:       Appearance: Normal appearance.   HENT:      Head:      Comments: Patient's sinuses do not have any increased warmth or tenderness to palpation     Right Ear: Tympanic membrane, ear canal and external ear normal.      Left Ear: Tympanic membrane, ear canal and external ear normal.      Nose: Nose normal.      Mouth/Throat:      Mouth: Mucous membranes are moist.      Pharynx: Oropharynx is clear.   Neck:      Musculoskeletal: Normal range of motion and neck supple.   Cardiovascular:      Rate and Rhythm: Normal rate.   Pulmonary:      Effort: Pulmonary effort is normal.   Lymphadenopathy:      Cervical: No cervical adenopathy.   Neurological:      Mental Status: She is alert.           Plan and Discussion:     Diagnosis Plan   1. Head cold  CBC w/auto differential Blood, Venous     Most likely the patient has just a viral head cold, her CBC is abnormal white count and she has an increased number of lymphocytes all consistent with a viral illness.  She is going to use conservative treatment hopefully her symptoms will resolve in the next week.    MAREK HASSAN MD    Date: 1/21/2020  Time: 8:54 AM          "

## 2020-01-23 ENCOUNTER — OFFICE VISIT (OUTPATIENT)
Dept: URGENT CARE | Facility: CLINIC | Age: 80
End: 2020-01-23
Payer: MEDICARE

## 2020-01-23 VITALS
RESPIRATION RATE: 22 BRPM | OXYGEN SATURATION: 94 % | HEART RATE: 96 BPM | SYSTOLIC BLOOD PRESSURE: 158 MMHG | TEMPERATURE: 101.3 F | DIASTOLIC BLOOD PRESSURE: 74 MMHG | WEIGHT: 179.5 LBS | BODY MASS INDEX: 36.25 KG/M2

## 2020-01-23 DIAGNOSIS — R50.9 FEVER, UNSPECIFIED FEVER CAUSE: ICD-10-CM

## 2020-01-23 DIAGNOSIS — J11.1 INFLUENZA: Primary | ICD-10-CM

## 2020-01-23 LAB
INFLUENZA A (AMB): NEGATIVE
INFLUENZA B (AMB): NEGATIVE
INTERNAL QC PASS?: YES

## 2020-01-23 PROCEDURE — 87804 INFLUENZA ASSAY W/OPTIC: CPT | Mod: QW | Performed by: PHYSICIAN ASSISTANT

## 2020-01-23 PROCEDURE — 99213 OFFICE O/P EST LOW 20 MIN: CPT | Performed by: PHYSICIAN ASSISTANT

## 2020-01-23 PROCEDURE — G0463 HOSPITAL OUTPT CLINIC VISIT: HCPCS | Performed by: PHYSICIAN ASSISTANT

## 2020-01-23 RX ORDER — IBUPROFEN 200 MG
800 TABLET ORAL ONCE
Status: COMPLETED | OUTPATIENT
Start: 2020-01-23 | End: 2020-01-23

## 2020-01-23 RX ORDER — OSELTAMIVIR PHOSPHATE 75 MG/1
75 CAPSULE ORAL 2 TIMES DAILY
Qty: 10 CAPSULE | Refills: 0 | Status: SHIPPED | OUTPATIENT
Start: 2020-01-23 | End: 2020-01-28 | Stop reason: ALTCHOICE

## 2020-01-23 RX ADMIN — Medication 800 MG: at 19:00

## 2020-01-24 NOTE — PATIENT INSTRUCTIONS
"  Patient Education     Influenza, Adult  Influenza is also called \"the flu.\" It is an infection in the lungs, nose, and throat (respiratory tract). It is caused by a virus. The flu causes symptoms that are similar to symptoms of a cold. It also causes a high fever and body aches.  The flu spreads easily from person to person (is contagious). Getting a flu shot (influenza vaccination) every year is the best way to prevent the flu.  What are the causes?  This condition is caused by the influenza virus. You can get the virus by:  · Breathing in droplets that are in the air from the cough or sneeze of a person who has the virus.  · Touching something that has the virus on it (is contaminated) and then touching your mouth, nose, or eyes.  What increases the risk?  Certain things may make you more likely to get the flu. These include:  · Not washing your hands often.  · Having close contact with many people during cold and flu season.  · Touching your mouth, eyes, or nose without first washing your hands.  · Not getting a flu shot every year.  You may have a higher risk for the flu, along with serious problems such as a lung infection (pneumonia), if you:  · Are older than 65.  · Are pregnant.  · Have a weakened disease-fighting system (immune system) because of a disease or taking certain medicines.  · Have a long-term (chronic) illness, such as:  ? Heart, kidney, or lung disease.  ? Diabetes.  ? Asthma.  · Have a liver disorder.  · Are very overweight (morbidly obese).  · Have anemia. This is a condition that affects your red blood cells.  What are the signs or symptoms?  Symptoms usually begin suddenly and last 4-14 days. They may include:  · Fever and chills.  · Headaches, body aches, or muscle aches.  · Sore throat.  · Cough.  · Runny or stuffy (congested) nose.  · Chest discomfort.  · Not wanting to eat as much as normal (poor appetite).  · Weakness or feeling tired (fatigue).  · Dizziness.  · Feeling sick to your " stomach (nauseous) or throwing up (vomiting).  How is this treated?  If the flu is found early, you can be treated with medicine that can help reduce how bad the illness is and how long it lasts (antiviral medicine). This may be given by mouth (orally) or through an IV tube.  Taking care of yourself at home can help your symptoms get better. Your doctor may suggest:  · Taking over-the-counter medicines.  · Drinking plenty of fluids.  The flu often goes away on its own. If you have very bad symptoms or other problems, you may be treated in a hospital.  Follow these instructions at home:         Activity  · Rest as needed. Get plenty of sleep.  · Stay home from work or school as told by your doctor.  ? Do not leave home until you do not have a fever for 24 hours without taking medicine.  ? Leave home only to visit your doctor.  Eating and drinking  · Take an ORS (oral rehydration solution). This is a drink that is sold at pharmacies and stores.  · Drink enough fluid to keep your pee (urine) pale yellow.  · Drink clear fluids in small amounts as you are able. Clear fluids include:  ? Water.  ? Ice chips.  ? Fruit juice that has water added (diluted fruit juice).  ? Low-calorie sports drinks.  · Eat bland, easy-to-digest foods in small amounts as you are able. These foods include:  ? Bananas.  ? Applesauce.  ? Rice.  ? Lean meats.  ? Toast.  ? Crackers.  · Do not eat or drink:  ? Fluids that have a lot of sugar or caffeine.  ? Alcohol.  ? Spicy or fatty foods.  General instructions  · Take over-the-counter and prescription medicines only as told by your doctor.  · Use a cool mist humidifier to add moisture to the air in your home. This can make it easier for you to breathe.  · Cover your mouth and nose when you cough or sneeze.  · Wash your hands with soap and water often, especially after you cough or sneeze. If you cannot use soap and water, use alcohol-based hand .  · Keep all follow-up visits as told by  "your doctor. This is important.  How is this prevented?    · Get a flu shot every year. You may get the flu shot in late summer, fall, or winter. Ask your doctor when you should get your flu shot.  · Avoid contact with people who are sick during fall and winter (cold and flu season).  Contact a doctor if:  · You get new symptoms.  · You have:  ? Chest pain.  ? Watery poop (diarrhea).  ? A fever.  · Your cough gets worse.  · You start to have more mucus.  · You feel sick to your stomach.  · You throw up.  Get help right away if you:  · Have shortness of breath.  · Have trouble breathing.  · Have skin or nails that turn a bluish color.  · Have very bad pain or stiffness in your neck.  · Get a sudden headache.  · Get sudden pain in your face or ear.  · Cannot eat or drink without throwing up.  Summary  · Influenza (\"the flu\") is an infection in the lungs, nose, and throat. It is caused by a virus.  · Take over-the-counter and prescription medicines only as told by your doctor.  · Getting a flu shot every year is the best way to avoid getting the flu.  This information is not intended to replace advice given to you by your health care provider. Make sure you discuss any questions you have with your health care provider.  Document Released: 09/26/2009 Document Revised: 06/05/2019 Document Reviewed: 06/05/2019  [employer] Interactive Patient Education © 2019 [employer]       "

## 2020-01-24 NOTE — PROGRESS NOTES
HPI: Katy Stack is a 79 y.o. female who presents today with complaint of feeling feverish as of earlier today.    Sick with a cough and was into the clinic on Tuesday but everything was okay at that time.  She states that today around 4 she started having chills.  She feels like she has been coughing pretty much all day much more so than she had in the last couple weeks.  She states the nose just started running.  She complains of right earache greater than left, short of breath at rest and with walking.  Patient states that she does not have a way to take her temperature at home but has needed a lot of blankets to keep warm.  She also complains that she does not have much of an appetite but has been drinking a little bit here and there.  Patient also did take some Tylenol prior to coming in.  She denies dizziness, sore throat, hemoptysis, chest pain, nausea, vomiting, abdominal discomfort or diarrhea.     has a past medical history of Benign essential hypertension (11/24/2017), Cataract of left eye, Chronic obstructive lung disease (CMS/HCC) (HCC) (11/10/2017), Dysrhythmia, GERD (gastroesophageal reflux disease), Hypertension, Hypothyroid, Hypothyroidism (11/10/2017), Injury of back, Neurologic disorder, Obesity (11/10/2017), Obstructive sleep apnea syndrome (11/10/2017), Osteoarthritis, Periodic limb movement disorder (11/10/2017), Peripheral neuropathy, Rectocele, and Respiratory disease. She also has no past medical history of Blood disorder, Cancer (CMS/HCC) ([facility]), Complication of anesthesia, Dental disease, Family history of anesthesia complication, or Kidney disease.     has a past surgical history that includes Back surgery; Cholecystectomy; Foot surgery; Other surgical history; Hysterectomy (1987); Back surgery (11/30/2016); Other surgical history (12/01/2014); Laparoscopic gastric banding; Colonoscopy (02/19/2014); Upper gastrointestinal endoscopy (02/19/2014); Shoulder arthroscopy (08/02/2013);  Cardiac catheterization (08/31/2011); Colonoscopy (04/26/2011); Thyroid lobectomy (Left, 18/18/2010); Thyroidectomy, partial (08/18/2010); Appendectomy (1987); Other surgical history (1987); Back surgery (05/01/2017); Cataract extraction w/  intraocular lens implant (Left, 7/2/2018); Cataract extraction w/  intraocular lens implant (Right, 8/6/2018); Esophagogastroduodenoscopy (N/A, 11/6/2018); Diagnostic laparoscopy (N/A, 11/28/2018); and Gastrectomy (N/A, 8/14/2019).      Current Outpatient Medications:   •  Antacid Regular Strength 200-200-20 mg/5 mL suspension, , Disp: , Rfl:   •  omeprazole (PriLOSEC) 20 mg capsule, Take 20 mg by mouth daily, Disp: , Rfl:   •  gabapentin (NEURONTIN) 300 mg capsule, Take 300 mg by mouth Indications: Taking 900mg TID  , Disp: , Rfl:   •  calcium carbonate-vitamin D3 600 mg(1,500mg) -400 unit per tablet, Take 1 tablet by mouth daily, Disp: , Rfl:   •  nitroglycerin (NITROSTAT) 0.4 mg SL tablet, Place 1 tablet (0.4 mg total) under the tongue every 5 (five) minutes as needed for chest pain, Disp: 25 tablet, Rfl: 1  •  levothyroxine (SYNTHROID, LEVOTHROID) 100 mcg tablet, Take 1 tablet (100 mcg total) by mouth daily, Disp: 90 tablet, Rfl: 3  •  atenolol (TENORMIN) 25 mg tablet, Take 1 tablet (25 mg total) by mouth daily, Disp: 90 tablet, Rfl: 3  •  atorvastatin (LIPITOR) 20 mg tablet, Take 1 tablet (20 mg total) by mouth daily, Disp: 90 tablet, Rfl: 3  •  losartan (COZAAR) 50 mg tablet, Take 1 tablet (50 mg total) by mouth daily, Disp: 90 tablet, Rfl: 3  •  vit A-vit C-vit E-zinc-copper (EYE VITAMIN AND MINERALS) 7,160-113-100 unit-mg-unit tablet, Take 1 tab/cap by mouth 2 (two) times a day., Disp: , Rfl:   •  PREMARIN 0.9 mg tablet, TAKE 1 TABLET BY MOUTH  EVERY DAY (Patient taking differently: TAKE 1 TABLET BY MOUTH EVERY WEEK), Disp: 90 tablet, Rfl: 1  •  oseltamivir (Tamiflu) 75 mg capsule, Take 1 capsule (75 mg total) by mouth 2 (two) times a day for 5 days, Disp: 10 capsule,  Rfl: 0  •  ferrous sulfate 325 mg (65 mg iron) tablet, Take 1 tablet (325 mg total) by mouth every other day (Patient not taking: Reported on 1/23/2020 ), Disp: 15 tablet, Rfl: 11     reports that she quit smoking about 25 years ago. She has a 70.00 pack-year smoking history. She has never used smokeless tobacco. She reports that she does not drink alcohol or use drugs.    Allergies   Allergen Reactions   • [place] Angioedema and Hives     hives, tongue swells  Swelling of tongue   • Adhesive      welts       ROS: As stated in HPI    /74 (BP Location: Left arm, Patient Position: Sitting, Cuff Size: Regular Adult)   Pulse 96   Temp (!) 38.5 °C (101.3 °F) (Temporal)   Resp 22   Wt 81.4 kg (179 lb 8 oz)   SpO2 94%   BMI 36.25 kg/m²     Physical exam:  General: Elderly, no acute distress, ill-appearing  Neuro: Alert and oriented x 3  HEENT: Normocephalic, atraumatic, sclera anicteric, conjunctiva pink, external ears without masses, lesions or tenderness, TMs gray with normal light reflex, auditory canals without erythema, nasal mucosa pink with clear nasal drainage, posterior pharynx pink without erythema or exudates  Cardiac: Regular rate and rhythm, no murmur, rub or gallop  Lungs: Clear to auscultation bilaterally, no wheezing, rales or rhonchi      Diagnoses and all orders for this visit:    Influenza  -     oseltamivir (Tamiflu) 75 mg capsule; Take 1 capsule (75 mg total) by mouth 2 (two) times a day for 5 days    Fever, unspecified fever cause  -     ibuprofen (ADVIL,MOTRIN) tablet 800 mg  -     POCT Influenza A/B        Assessment and Plan:  Rapid flu negative.    Patient had a temp in clinic of 101.3 despite having had Tylenol around 4.  She was also given 800 of ibuprofen in addition to the they Tylenol she already had on board.    Physical exam and symptoms consistent with viral upper respiratory infection.  I am suspicious that this is secondary to influenza despite negative rapid flu.  We will  go ahead and treat patient on clinical suspicion with oseltamivir.  Additionally given the patient has a history of hypertension we will go ahead and advise Coricidin cold and cough over-the-counter.  Additionally discussed pushing fluids, small meals throughout the day, rest.  Further care instructions were provided by handout and discussed with the patient in clinic.  Patient advised to follow up with the primary care physician in the next 7 to 10 days for this visit.  For new or worsening symptoms, patient advised to return to Urgent Care or the emergency department if unable to follow up with the Primary Care Physician .  Patient was in agreement with this plan of care.

## 2020-01-28 ENCOUNTER — OFFICE VISIT (OUTPATIENT)
Dept: URGENT CARE | Facility: CLINIC | Age: 80
End: 2020-01-28
Payer: MEDICARE

## 2020-01-28 ENCOUNTER — ANCILLARY PROCEDURE (OUTPATIENT)
Dept: RADIOLOGY | Facility: CLINIC | Age: 80
End: 2020-01-28
Payer: MEDICARE

## 2020-01-28 VITALS
WEIGHT: 180.5 LBS | SYSTOLIC BLOOD PRESSURE: 130 MMHG | BODY MASS INDEX: 36.46 KG/M2 | HEART RATE: 60 BPM | DIASTOLIC BLOOD PRESSURE: 70 MMHG | OXYGEN SATURATION: 96 %

## 2020-01-28 DIAGNOSIS — M54.2 NECK PAIN, ACUTE: ICD-10-CM
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
## 2020-01-28 PROCEDURE — 72040 X-RAY EXAM NECK SPINE 2-3 VW: CPT

## 2020-01-28 PROCEDURE — G0463 HOSPITAL OUTPT CLINIC VISIT: HCPCS | Performed by: PHYSICIAN ASSISTANT

## 2020-01-28 PROCEDURE — 72040 X-RAY EXAM NECK SPINE 2-3 VW: CPT | Mod: 26 | Performed by: RADIOLOGY

## 2020-01-28 PROCEDURE — 99213 OFFICE O/P EST LOW 20 MIN: CPT | Performed by: PHYSICIAN ASSISTANT

## 2020-01-28 ASSESSMENT — ENCOUNTER SYMPTOMS
CHILLS: 0
ARTHRALGIAS: 0
MYALGIAS: 0
RESPIRATORY NEGATIVE: 1
NEUROLOGICAL NEGATIVE: 1
JOINT SWELLING: 0
CARDIOVASCULAR NEGATIVE: 1
NECK STIFFNESS: 0
FEVER: 0
BACK PAIN: 0
NECK PAIN: 1

## 2020-01-28 ASSESSMENT — VISUAL ACUITY: OU: 1

## 2020-01-28 NOTE — PROGRESS NOTES
"Subjective      Katy Stack is a 79 y.o. female who presents for fall.    Patient in with her  after slipping and falling on the ice this morning at home.  She states she went outside, slipped and landed on her \"back side\", then fell onto her left side hitting the back of her head.  She is unsure if she blacked out.  She called her  who came outside and helped her up.  She denies any vision changes, headaches, N/V or weakness.  The only bruise she knows of is on top of the left hand near the 4th MCP.  She did take 400 mg of Ibuprofen afterwards and she denies any discomfort at this time. She did have fusion(s) of the neck about 3 years ago and is worried about that, but denies any pain at this time. She does complain of some minimal right knee pain, 2/10, initially, but it doesn't bother her now.                      The following have been reviewed and updated as appropriate in this visit:         Review of Systems   Constitutional: Negative for chills and fever.   HENT: Negative.    Respiratory: Negative.    Cardiovascular: Negative.    Musculoskeletal: Positive for neck pain (Initially, but not at time of visit.). Negative for arthralgias, back pain, gait problem, joint swelling, myalgias and neck stiffness.   Neurological: Negative.        Objective   /70   Pulse 60   Wt 81.9 kg (180 lb 8 oz)   SpO2 96%   BMI 36.46 kg/m²     Physical Exam  Vitals signs and nursing note reviewed.   Constitutional:       General: She is not in acute distress.     Appearance: Normal appearance. She is not ill-appearing.   HENT:      Head: Normocephalic.        Right Ear: Tympanic membrane, ear canal and external ear normal. There is no impacted cerumen. Tympanic membrane is not erythematous, retracted or bulging.      Left Ear: Tympanic membrane, ear canal and external ear normal. There is no impacted cerumen. Tympanic membrane is not erythematous, retracted or bulging.      Nose: Nose normal. No " congestion or rhinorrhea.      Mouth/Throat:      Mouth: Mucous membranes are moist.      Pharynx: Oropharynx is clear. No oropharyngeal exudate or posterior oropharyngeal erythema.      Tonsils: No tonsillar exudate.   Eyes:      General: Vision grossly intact. Gaze aligned appropriately. No allergic shiner.     Extraocular Movements: Extraocular movements intact.      Conjunctiva/sclera: Conjunctivae normal.      Pupils: Pupils are equal, round, and reactive to light.      Comments: VOMS (Vestibular Ocular Motor Screening) are normal.     Neck:      Musculoskeletal: Full passive range of motion without pain, normal range of motion and neck supple.      Trachea: Trachea and phonation normal.   Cardiovascular:      Rate and Rhythm: Normal rate.      Heart sounds: No murmur. No gallop.    Pulmonary:      Effort: Pulmonary effort is normal. No respiratory distress.      Breath sounds: Normal breath sounds. No stridor. No wheezing, rhonchi or rales.   Musculoskeletal: Normal range of motion.      Right hip: Normal.      Left hip: Normal.      Right knee: Normal.      Left knee: Normal.   Lymphadenopathy:      Cervical: No cervical adenopathy.   Skin:     General: Skin is warm and dry.      Findings: No bruising or erythema.   Neurological:      General: No focal deficit present.      Mental Status: She is alert and oriented to person, place, and time.      Cranial Nerves: No cranial nerve deficit.      Sensory: No sensory deficit.      Motor: No weakness.      Coordination: Coordination normal.      Gait: Gait normal.      Deep Tendon Reflexes: Reflexes normal.      Comments: Hand grasps are strong and equal.   Psychiatric:         Mood and Affect: Mood normal.         Behavior: Behavior normal.         Thought Content: Thought content normal.         Judgment: Judgment normal.         Assessment/Plan   Diagnoses and all orders for this visit:    Fall, initial encounter    Neck pain, acute  -     X-ray cervical spine 2  or 3 views    X-rays of the Cervical spine were done and reviewed with the patient.  No acute findings noted.  Over read pending.  Vitals are good and exam is unremarkable.  Discussed use of tylenol, ice and heat.  We discussed what to watch regarding Neuros including her pupils and hand grasps.  If she has any sudden change in vision, speech, weakness or Nausea/Vomiting or sudden headache, she is to seek attention at the ER, otherwise follow up with PCP as needed.  They are in agreement.  All questions were answered.     Additional imaging declined at this time.    Handouts regarding concussion/head injury available at checkout with AV[employer].    CHAPARRO RANGEL

## 2020-01-28 NOTE — PATIENT INSTRUCTIONS
X-rays of the Cervical spine were done and reviewed with the patient.  No acute findings noted.  Over read pending.  Vitals are good and exam is unremarkable.  Discussed use of tylenol, ice and heat.  We discussed what to watch regarding Neuros including her pupils and hand grasps.  If she has any sudden change in vision, speech, weakness or Nausea/Vomiting or sudden headache, she is to seek attention at the ER, otherwise follow up with PCP as needed.  They are in agreement.  All questions were answered.           Patient Education     Concussion, Adult    A concussion is a brain injury from a hard, direct hit (trauma) to your head or body. This direct hit causes the brain to quickly shake back and forth inside the skull. A concussion may also be called a mild traumatic brain injury (TBI). Healing from this injury can take time.  What are the causes?  This condition is caused by:  · A direct hit to your head, such as:  ? Running into a player during a game.  ? Being hit in a fight.  ? Hitting your head on a hard surface.  · A quick and sudden movement (jolt) of the head or neck, such as in a car crash.  What are the signs or symptoms?  The signs of a concussion can be hard to notice. They may be missed by you, family members, and doctors. You may look fine on the outside but may not act or feel normal.  Physical symptoms  · Headaches.  · Being tired (fatigued).  · Being dizzy.  · Problems with body balance.  · Problems seeing or hearing.  · Being sensitive to light or noise.  · Feeling sick to your stomach (nausea) or throwing up (vomiting).  · Not sleeping or eating as you used to.  · Loss of feeling (numbness) or tingling in the body.  · Seizure.  Mental and emotional symptoms  · Problems remembering things.  · Trouble focusing your mind (concentrating), organizing, or making decisions.  · Being slow to think, act, react, speak, or read.  · Feeling grouchy (irritable).  · Having mood changes.  · Feeling worried or  nervous (anxious).  · Feeling sad (depressed).  How is this treated?  This condition may be treated by:  · Stopping sports or activity if you are injured. If you hit your head or have signs of concussion:  ? Do not return to sports or activities the same day.  ? Get checked by a doctor before you return to your activities.  · Resting your body and your mind.  · Being watched carefully, often at home.  · Medicines to help with symptoms such as:  ? Feeling sick to your stomach.  ? Headaches.  ? Problems with sleep.  § Avoid taking strong pain medicines (opioids) for a concussion.  · Avoiding alcohol and drugs.  · Being asked to go to a concussion clinic or a place to help you recover (rehabilitation center).  Recovery from a concussion can take time. Return to activities only:  · When you are fully healed.  · When your doctor says it is safe.  Follow these instructions at home:  Activity  · Limit activities that need a lot of thought or focus, such as:  ? Homework or work for your job.  ? Watching TV.  ? Using the computer or phone.  ? Playing memory games and puzzles.  · Rest. Rest helps your brain heal. Make sure you:  ? Get plenty of sleep. Most adults should get 7-9 hours of sleep each night.  ? Rest during the day. Take naps or breaks when you feel tired.  · Avoid activity like exercise until your doctor says its safe. Stop any activity that makes symptoms worse.  · Do not do activities that could cause a second concussion, such as riding a bike or playing sports.  · Ask your doctor when you can return to your normal activities, such as school, work, sports, and driving. Your ability to react may be slower. Do not do these activities if you are dizzy.  General instructions    · Take over-the-counter and prescription medicines only as told by your doctor.  · Do not drink alcohol until your doctor says you can.  · Watch your symptoms and tell other people to do the same. Other problems can occur after a concussion.  Older adults have a higher risk of serious problems.  · Tell your , teachers, school nurse, school counselor, , or  about your injury and symptoms. Tell them about what you can or cannot do.  · Keep all follow-up visits as told by your doctor. This is important.  How is this prevented?  · It is very important that you do not get another brain injury. In rare cases, another injury can cause brain damage that will not go away, brain swelling, or death. The risk of this is greatest in the first 7-10 days after a head injury. To avoid injuries:  ? Stop activities that could lead to a second concussion, such as contact sports, until your doctor says it is okay.  ? When you return to sports or activities:  § Do not crash into other players. This is how most concussions happen.  § Follow the rules.  § Respect other players.  ? Get regular exercise. Do strength and balance training.  ? Wear a helmet that fits you well during sports, biking, or other activities.  § Helmets can help protect you from serious skull and brain injuries, but they do not protect you from a concussion. Even when wearing a helmet, you should avoid being hit in the head.  Contact a doctor if:  · Your symptoms get worse or they do not get better.  · You have new symptoms.  · You have another injury.  Get help right away if:  · You have bad headaches or your headaches get worse.  · You feel weak or numb in any part of your body.  · You are mixed up (confused).  · Your balance gets worse.  · You keep throwing up.  · You feel more sleepy than normal.  · Your speech is not clear (is slurred).  · You cannot recognize people or places.  · You have a seizure.  · Others have trouble waking you up.  · You have behavior changes.  · You have changes in how you see (vision).  · You pass out (lose consciousness).  Summary  · A concussion is a brain injury from a hard, direct hit (trauma) to your head or body.  · This condition is  treated with rest and careful watching of symptoms.  · If you keep having symptoms, call your doctor.  This information is not intended to replace advice given to you by your health care provider. Make sure you discuss any questions you have with your health care provider.  Document Released: 12/06/2010 Document Revised: 08/08/2019 Document Reviewed: 08/08/2019  [employer] Interactive Patient Education © 2019 [employer]       Patient Education     Head Injury, Adult  There are many types of head injuries. They can be as minor as a bump. Some head injuries can be worse. Worse injuries include:  · A strong hit to the head that hurts the brain (concussion).  · A bruise of the brain (contusion). This means there is bleeding in the brain that can cause swelling.  · A cracked skull (skull fracture).  · Bleeding in the brain that gathers, gets thick (makes a clot), and forms a bump (hematoma).  Most problems from a head injury come in the first 24 hours. However, you may still have side effects up to 7-10 days after your injury. It is important to watch your condition for any changes.  Follow these instructions at home:  Activity  · Rest as much as possible.  · Avoid activities that are hard or tiring.  · Make sure you get enough sleep.  · Limit activities that need a lot of thought or attention, such as:  ? Watching TV.  ? Playing memory games and puzzles.  ? Job-related work or homework.  ? Working on the computer, social media, and texting.  · Avoid activities that could cause another head injury until your doctor says it is okay. This includes playing sports.  · Ask your doctor when it is safe for you to go back to your normal activities, such as work or school. Ask your doctor for a step-by-step plan for slowly going back to your normal activities.  · Ask your doctor when you can drive, ride a bicycle, or use heavy machinery. Never do these activities if you are dizzy.  Lifestyle  · Do not drink alcohol until your  doctor says it is okay.  · Avoid drug use.  · If it is harder than usual to remember things, write them down.  · If you are easily distracted, try to do one thing at a time.  · Talk with family members or close friends when making important decisions.  · Tell your friends, family, a trusted coworker, and  about your injury, symptoms, and limits (restrictions). Have them watch for any problems that are new or getting worse.  General instructions  · Take over-the-counter and prescription medicines only as told by your doctor.  · Have someone stay with you for 24 hours after your head injury. This person should watch you for any changes in your symptoms and be ready to get help.  · Keep all follow-up visits as told by your doctor. This is important.  How is this prevented?  · Work on your balance and strength. This can help you avoid falls.  · Wear a seatbelt when you are in a moving vehicle.  · Wear a helmet when:  ? Riding a bicycle.  ? Skiing.  ? Doing any other sport or activity that has a risk of injury.  · Drink alcohol only in moderation.  · Make your home safer by:  ? Getting rid of clutter from the floors and stairs, like things that can make you trip.  ? Using grab bars in bathrooms and handrails by stairs.  ? Placing non-slip mats on floors and in bathtubs.  ? Putting more light in dim areas.  Get help right away if:  · You have:  ? A very bad (severe) headache that is not helped by medicine.  ? Trouble walking or weakness in your arms and legs.  ? Clear or bloody fluid coming from your nose or ears.  ? Changes in your seeing (vision).  ? Jerky movements that you cannot control (seizure).  · You throw up (vomit).  · Your symptoms get worse.  · You lose balance.  · Your speech is slurred.  · You pass out.  · You are sleepier and have trouble staying awake.  · The black centers of your eyes (pupils) change in size.  These symptoms may be an emergency. Do not wait to see if the symptoms will go  away. Get medical help right away. Call your local emergency services (911 in the [place] Do not drive yourself to the hospital.  This information is not intended to replace advice given to you by your health care provider. Make sure you discuss any questions you have with your health care provider.  Document Released: 11/30/2009 Document Revised: 09/11/2019 Document Reviewed: 06/27/2017  [employer] Interactive Patient Education © 2019 [employer]

## 2020-02-03 ENCOUNTER — APPOINTMENT (OUTPATIENT)
Dept: LAB | Facility: CLINIC | Age: 80
End: 2020-02-03
Payer: MEDICARE

## 2020-02-03 ENCOUNTER — OFFICE VISIT (OUTPATIENT)
Dept: FAMILY MEDICINE | Facility: CLINIC | Age: 80
End: 2020-02-03
Payer: MEDICARE

## 2020-02-03 VITALS
WEIGHT: 180 LBS | SYSTOLIC BLOOD PRESSURE: 118 MMHG | TEMPERATURE: 98 F | HEART RATE: 68 BPM | DIASTOLIC BLOOD PRESSURE: 50 MMHG | OXYGEN SATURATION: 95 % | BODY MASS INDEX: 36.36 KG/M2

## 2020-02-03 DIAGNOSIS — E03.9 HYPOTHYROIDISM, UNSPECIFIED TYPE: ICD-10-CM

## 2020-02-03 DIAGNOSIS — Z91.81 STATUS POST FALL: ICD-10-CM

## 2020-02-03 DIAGNOSIS — M81.0 AGE-RELATED OSTEOPOROSIS WITHOUT CURRENT PATHOLOGICAL FRACTURE: ICD-10-CM

## 2020-02-03 DIAGNOSIS — R09.81 SINUS CONGESTION: Primary | ICD-10-CM

## 2020-02-03 LAB — TSH SERPL DL<=0.05 MIU/L-ACNC: 2.62 UIU/ML (ref 0.34–4.82)

## 2020-02-03 PROCEDURE — G0463 HOSPITAL OUTPT CLINIC VISIT: HCPCS | Performed by: FAMILY MEDICINE

## 2020-02-03 PROCEDURE — 99214 OFFICE O/P EST MOD 30 MIN: CPT | Performed by: FAMILY MEDICINE

## 2020-02-03 PROCEDURE — 84443 ASSAY THYROID STIM HORMONE: CPT

## 2020-02-03 PROCEDURE — 36415 COLL VENOUS BLD VENIPUNCTURE: CPT

## 2020-02-03 RX ORDER — AMOXICILLIN 500 MG/1
500 TABLET, FILM COATED ORAL 3 TIMES DAILY
Qty: 30 TABLET | Refills: 0 | Status: SHIPPED | OUTPATIENT
Start: 2020-02-03 | End: 2020-02-13

## 2020-02-03 NOTE — PROGRESS NOTES
Subjective      Katy Stack is a 79 y.o. female who presents for Follow-up (Patient is here follow up from urgent care. She had a fall. She states she is feeling fine since then. ); Sinus Problem (Patient is still having sinus issues was tested for the flu and it was negative. ); and Labs Only (Patient would like thyroid level drawn. Would like to know if she should start taking baby asprin. )  .    Patient presents for a follow up regarding urgent care visit on 1/28/2020 due to ground level fall due to ice where she slipped and landed on her back side and then fell onto her left side hitting the back of her head. She is unaware if she lost consciousness due to fall, does not believe so. She denies any vision changes, headaches, nausea, vomiting, or weakness. No neck pain.    Patient has been having sinus congestion, pressure, and drainage for 1+ month. She was recently tested for the flu but it was negative. She has tried some OTC nasal sprays with little relief.     Patient has a hx of hypothyroidism and is taking levothyroxine 100mcg daily. Her last TSH was 4.023 on 3/29/19. She denies any recent skin, hair, or bowel habit changes.     Patient's last DEXA scan was 2+ years ago, she would like to have this repeated.         The following have been reviewed and updated as appropriate in this visit:  Tobacco  Allergies  Meds  Problems  Med Hx  Surg Hx  Fam Hx         Allergies   Allergen Reactions   • [place] Angioedema and Hives     hives, tongue swells  Swelling of tongue   • Adhesive      welts     Current Outpatient Medications   Medication Sig Dispense Refill   • Antacid Regular Strength 200-200-20 mg/5 mL suspension      • omeprazole (PriLOSEC) 20 mg capsule Take 20 mg by mouth daily     • gabapentin (NEURONTIN) 300 mg capsule Take 300 mg by mouth Indications: Taking 900mg TID       • calcium carbonate-vitamin D3 600 mg(1,500mg) -400 unit per tablet Take 1 tablet by mouth daily     •  nitroglycerin (NITROSTAT) 0.4 mg SL tablet Place 1 tablet (0.4 mg total) under the tongue every 5 (five) minutes as needed for chest pain 25 tablet 1   • levothyroxine (SYNTHROID, LEVOTHROID) 100 mcg tablet Take 1 tablet (100 mcg total) by mouth daily 90 tablet 3   • atenolol (TENORMIN) 25 mg tablet Take 1 tablet (25 mg total) by mouth daily 90 tablet 3   • atorvastatin (LIPITOR) 20 mg tablet Take 1 tablet (20 mg total) by mouth daily 90 tablet 3   • losartan (COZAAR) 50 mg tablet Take 1 tablet (50 mg total) by mouth daily 90 tablet 3   • vit A-vit C-vit E-zinc-copper (EYE VITAMIN AND MINERALS) 7160-113-100 unit-mg-unit tablet Take 1 tab/cap by mouth 2 (two) times a day.     • PREMARIN 0.9 mg tablet TAKE 1 TABLET BY MOUTH  EVERY DAY (Patient taking differently: TAKE 1 TABLET BY MOUTH EVERY WEEK) 90 tablet 1   • amoxicillin (AMOXIL) 500 mg tablet Take 1 tablet (500 mg total) by mouth 3 (three) times a day for 10 days 30 tablet 0   • ferrous sulfate 325 mg (65 mg iron) tablet Take 1 tablet (325 mg total) by mouth every other day (Patient not taking: Reported on 1/23/2020 ) 15 tablet 11     No current facility-administered medications for this visit.      Past Medical History:   Diagnosis Date   • Benign essential hypertension 11/24/2017   • Cataract of left eye    • Chronic obstructive lung disease (CMS/HCC) (HCC) 11/10/2017   • Dysrhythmia     LBBB   • GERD (gastroesophageal reflux disease)     at times   • Hypertension    • Hypothyroid    • Hypothyroidism 11/10/2017   • Injury of back     BENDING INJURY   • Neurologic disorder    • Obesity 11/10/2017   • Obstructive sleep apnea syndrome 11/10/2017    Night time oxygen/2L   • Osteoarthritis    • Periodic limb movement disorder 11/10/2017   • Peripheral neuropathy     left thigh into left groin    • Rectocele    • Respiratory disease      Past Surgical History:   Procedure Laterality Date   • APPENDECTOMY  1987   • BACK SURGERY      2 surgeries [id number]   • BACK  SURGERY  11/30/2016   • BACK SURGERY  05/01/2017    vetebral fusion   • CARDIAC CATHETERIZATION  08/31/2011   • CATARACT EXTRACTION W/  INTRAOCULAR LENS IMPLANT Left 7/2/2018    Procedure: OS CATARACT LEFT PHACOEMULSIFICATION OF CATARACT WITH INTRAOCULAR LENS;  Surgeon: Emmanuel Cueto MD;  Location: [facility]"" SURGICAL SERVICES;  Service: Ophthalmology;  Laterality: Left;   • CATARACT EXTRACTION W/  INTRAOCULAR LENS IMPLANT Right 8/6/2018    Procedure: OD CATARACT RIGHT PHACOEMULSIFICATION OF CATARACT WITH INTRAOCULAR LENS;  Surgeon: Emmanuel Cueto MD;  Location: [facility]"" SURGICAL SERVICES;  Service: Ophthalmology;  Laterality: Right;   • CHOLECYSTECTOMY     • COLONOSCOPY  02/19/2014    No polyps   • COLONOSCOPY  04/26/2011   • DIAGNOSTIC LAPAROSCOPY N/A 11/28/2018    Procedure: Laparoscopic band removal;  Surgeon: Chris Millan MD;  Location: [facility];  Service: General;  Laterality: N/A;   • ESOPHAGOGASTRODUODENOSCOPY N/A 11/6/2018    Procedure: EGD - ESOPHAGOGASTRODUODENOSCOPY;  Surgeon: Chris Millan MD;  Location: [facility]"" Endoscopy;  Service: Endoscopy;  Laterality: N/A;   • FOOT SURGERY      11 surgeries for [place] Neuroma [id number]   • GASTRECTOMY N/A 8/14/2019    Procedure: Laparoscopic sleeve Gastrectomy;  Surgeon: Chris Khalil MD;  Location: [facility] OR;  Service: General;  Laterality: N/A;   • HYSTERECTOMY  1987    Total   • LAPAROSCOPIC GASTRIC BANDING      03/27/2014-standard Ap   • OTHER SURGICAL HISTORY      General surgery: Lt rotator cuff 2002   • OTHER SURGICAL HISTORY  12/01/2014    Right facial skin cancer   • OTHER SURGICAL HISTORY  1987    Ob gyn surgery:Bladder tuck   • SHOULDER ARTHROSCOPY  08/02/2013    Dr. Vela- shoulder   • THYROID LOBECTOMY Left 18/18/2010   • THYROIDECTOMY, PARTIAL  08/18/2010   • UPPER GASTROINTESTINAL ENDOSCOPY  02/19/2014    Mild chronic gastritis     Family History   Problem Relation Age of Onset   • Heart disease Mother    • Heart disease Father         Myocardial  infarction   • Parkinsonism Father    • Thyroid disease Father    • Alzheimer's disease Sister    • Arthritis Sister    • Osteoporosis Sister    • Lung cancer Brother    • Diabetes Maternal Grandmother      Social History     Occupational History   • Not on file   Tobacco Use   • Smoking status: Former Smoker     Packs/day: 2.00     Years: 35.00     Pack years: 70.00     Last attempt to quit:      Years since quittin.1   • Smokeless tobacco: Never Used   Substance and Sexual Activity   • Alcohol use: No   • Drug use: No   • Sexual activity: Not on file   Social History Narrative   • Not on file       Review of Systems    Objective     Vitals  /50 (BP Location: Left arm, Patient Position: Sitting)   Pulse 68   Temp 36.7 °C (98 °F) (Temporal)   Wt 81.6 kg (180 lb)   SpO2 95%   BMI 36.36 kg/m²     Physical Exam  Vitals signs and nursing note reviewed.   Constitutional:       General: She is not in acute distress.     Appearance: Normal appearance. She is obese. She is not ill-appearing, toxic-appearing or diaphoretic.   HENT:      Nose: Nose normal. No congestion or rhinorrhea.      Mouth/Throat:      Mouth: Mucous membranes are moist.      Pharynx: Oropharynx is clear. No oropharyngeal exudate or posterior oropharyngeal erythema.   Neck:      Musculoskeletal: Neck supple. No muscular tenderness.   Cardiovascular:      Rate and Rhythm: Normal rate and regular rhythm.      Pulses: Normal pulses.      Heart sounds: Normal heart sounds. No murmur. No friction rub. No gallop.    Pulmonary:      Effort: Pulmonary effort is normal. No respiratory distress.      Breath sounds: Normal breath sounds. No wheezing, rhonchi or rales.   Chest:      Chest wall: No tenderness.   Musculoskeletal:         General: No tenderness or signs of injury.      Right lower leg: No edema.      Left lower leg: No edema.   Lymphadenopathy:      Cervical: No cervical adenopathy.   Skin:     General: Skin is warm and dry.       Coloration: Skin is not pale.      Findings: No erythema or rash.   Neurological:      Mental Status: She is alert and oriented to person, place, and time.         Procedures    Assessment/Plan   Diagnoses and all orders for this visit:    Sinus congestion  Comments:  Start course of amoxicillin, if no improvement will refer to ENT. Return precautions given.  Orders:  -     amoxicillin (AMOXIL) 500 mg tablet; Take 1 tablet (500 mg total) by mouth 3 (three) times a day for 10 days    Hypothyroidism, unspecified type  Comments:  Will call with lab result.  Orders:  -     Thyroid Stimulating Hormone, Ultrasensitive Blood, Venous; Future    Age-related osteoporosis without current pathological fracture  Comments:  Order placed for DEXA scan, will call with results.  Orders:  -     DXA bone density; Future    Status post fall  Comments:  Return and safety precautions given. F/u as needed.        Return if symptoms worsen or fail to improve.    Bronson Travis MD    Portions of this note were documented by Nneka Arita as I performed the exam and collected the information from Katy Stack. I attest that I have reviewed the information as documented, verified the accuracy of the documentation and added additional information as needed.

## 2020-02-06 ENCOUNTER — OFFICE VISIT (OUTPATIENT)
Dept: SURGERY | Facility: CLINIC | Age: 80
End: 2020-02-06
Payer: MEDICARE

## 2020-02-06 ENCOUNTER — ANCILLARY PROCEDURE (OUTPATIENT)
Dept: BONE DENSITY | Facility: CLINIC | Age: 80
End: 2020-02-06
Payer: MEDICARE

## 2020-02-06 VITALS
TEMPERATURE: 97.8 F | HEART RATE: 62 BPM | SYSTOLIC BLOOD PRESSURE: 134 MMHG | DIASTOLIC BLOOD PRESSURE: 68 MMHG | BODY MASS INDEX: 35.34 KG/M2 | HEIGHT: 60 IN | WEIGHT: 180 LBS | OXYGEN SATURATION: 96 %

## 2020-02-06 DIAGNOSIS — E03.8 OTHER SPECIFIED HYPOTHYROIDISM: ICD-10-CM

## 2020-02-06 DIAGNOSIS — I10 ESSENTIAL HYPERTENSION: ICD-10-CM

## 2020-02-06 DIAGNOSIS — Z98.84 S/P LAPAROSCOPIC SLEEVE GASTRECTOMY: Primary | ICD-10-CM

## 2020-02-06 DIAGNOSIS — M81.0 AGE-RELATED OSTEOPOROSIS WITHOUT CURRENT PATHOLOGICAL FRACTURE: ICD-10-CM

## 2020-02-06 DIAGNOSIS — E66.01 CLASS 2 SEVERE OBESITY DUE TO EXCESS CALORIES WITH SERIOUS COMORBIDITY AND BODY MASS INDEX (BMI) OF 35.0 TO 35.9 IN ADULT (CMS/HCC): ICD-10-CM

## 2020-02-06 DIAGNOSIS — E78.5 HYPERLIPIDEMIA, UNSPECIFIED HYPERLIPIDEMIA TYPE: ICD-10-CM

## 2020-02-06 DIAGNOSIS — E66.812 CLASS 2 SEVERE OBESITY DUE TO EXCESS CALORIES WITH SERIOUS COMORBIDITY AND BODY MASS INDEX (BMI) OF 35.0 TO 35.9 IN ADULT (CMS/HCC): ICD-10-CM

## 2020-02-06 PROCEDURE — 99214 OFFICE O/P EST MOD 30 MIN: CPT | Performed by: NURSE PRACTITIONER

## 2020-02-06 PROCEDURE — 77080 DXA BONE DENSITY AXIAL: CPT | Mod: 26 | Performed by: INTERNAL MEDICINE

## 2020-02-06 PROCEDURE — G0463 HOSPITAL OUTPT CLINIC VISIT: HCPCS | Performed by: NURSE PRACTITIONER

## 2020-02-06 PROCEDURE — 77080 DXA BONE DENSITY AXIAL: CPT

## 2020-02-06 ASSESSMENT — ENCOUNTER SYMPTOMS
PSYCHIATRIC NEGATIVE: 1
NAUSEA: 0
PALPITATIONS: 0
ACTIVITY CHANGE: 0
TROUBLE SWALLOWING: 0
LIGHT-HEADEDNESS: 0
UNEXPECTED WEIGHT CHANGE: 0
COUGH: 0
SHORTNESS OF BREATH: 0
APPETITE CHANGE: 0
COLOR CHANGE: 0
DIARRHEA: 0
WEAKNESS: 0
DIAPHORESIS: 0
FEVER: 0
VOMITING: 0
CHILLS: 0
CONSTIPATION: 0
ABDOMINAL DISTENTION: 0
ABDOMINAL PAIN: 0
DIZZINESS: 0
WOUND: 0

## 2020-02-06 NOTE — PROGRESS NOTES
"6-month postop follow-up  Status post laparoscopic vertical sleeve gastrectomy  Date of service: 2/6/2020    Subjective      Patient ID: Katy Stack is a 79 y.o. female.    Chief Complaint   Patient presents with   • Post-op     SP Lap Sleeve 8-14-19  3 month wt was 184lb Surgery wt 202lb     HPI  Katy presents to the weight management and bariatric surgery clinic today, unaccompanied, for follow-up 3 months status laparoscopic vertical sleeve gastrectomy performed 8/14/2019 by Dr. Millan.  She has lost has had a 26 lb total weight loss from her heaviest preoperative weight.  She has lost 22 lbs since surgery.  She denies any symptoms of dysphagia or recurrent reflux.  She does continue omeprazole daily as recommended s/p sleeve.      Nutrition from 3 to 6 months postop:   Denies any significant changes to her nutrition habits since her last visit and plans to follow up with the RD on 3/5/2020 for nutrition counseling and biometric testing.  She continues to deny tracking foods and fluids, but states again today that she \"knows\" she is getting 60 gm protein daily.  She is hydrating with about 48 fl oz water and green tea daily.  She reports having a Fit Bit to remind her to hydrate. Taking all postop vitamins.  Menu recall includes:   -Fruits and vegetables- bananas, grapes, apples, green salads  -Protein - shake each morning, nuts, cheese, bean soup, red meat (ground beef or slow cooked roast), chicken when out to eat.   -Beverages: water, decaf coffee, and herbal tea.    Physical activity:   Averages 3,000 steps daily.  She does work 3 days per week and averages 4,000-4,500 steps daily while at work.  Plans to get back to the outdoor pool when it reopens this summer.  She also uses a \"stepper\" and 2 lb dumb bell weights for additional activity about once weekly.  She denies any structured exercise.  Her ability to engage in physical activity continues to be limited by back pain.  She continues to see " pain medicine and has reduced gabapentin to 600-700 mg 3 times daily and tolerating this dose well.    Psychology:   Does not plan to follow up with behavioral psychologist.  Reports doing well with moods status post bariatric surgery.     Body comp analysis on 2020 (baseline comparison):  Weight: 179.8 (-19 pounds)  Fat %: 50.5% (-0.2%)  Fat mass: 90.8 lbs (-10 pounds)  Fat free mass: 89 lbs (-9 pounds)  Muscle mass: 84.4 lbs (-8.6 pounds)  TBW: -3.6 pounds  TBW %: 35.7% (+2.2%)  Bone mass: 4.6 lbs (-0.4 pounds)  BMR: 1281 kcal (-126 kcal)  Visceral fat ratin (-1)      Review of Systems   Constitutional: Negative for activity change, appetite change, chills, diaphoresis, fatigue, fever and unexpected weight change.   HENT: Negative for sore throat and trouble swallowing.    Eyes: Negative for visual disturbance.   Respiratory: Negative for cough and shortness of breath.    Cardiovascular: Negative for chest pain, palpitations and leg swelling.   Gastrointestinal: Negative for abdominal distention, abdominal pain, constipation, diarrhea, nausea and vomiting.        Denies GERD   Musculoskeletal: Positive for back pain (on gabapentin).   Skin: Negative for color change, pallor, rash and wound.   Neurological: Negative for dizziness, syncope, weakness and light-headedness.   Psychiatric/Behavioral: Negative.        I have personally reviewed the pertinent aspects of the patient's chart and updated the computerized patient record. Pertinent positives are noted above.  Items reviewed including:        Objective      Vital Signs  /68   Pulse 62   Temp 36.6 °C (97.8 °F) (Temporal)   Ht 1.524 m (5')   Wt 81.6 kg (180 lb)   SpO2 96%   BMI 35.15 kg/m²       Physical Exam  Vitals signs and nursing note reviewed.   Constitutional:       General: She is awake. She is not in acute distress.     Appearance: Normal appearance. She is well-developed and well-groomed. She is obese.      Comments: 26 lb total  weight loss from heaviest preop weight.  22 lbs since surgery.   Cardiovascular:      Rate and Rhythm: Normal rate and regular rhythm.      Pulses: Normal pulses.      Heart sounds: S1 normal and S2 normal.   Pulmonary:      Effort: Pulmonary effort is normal.      Breath sounds: Normal breath sounds.   Abdominal:      General: Abdomen is protuberant. Bowel sounds are normal.      Palpations: Abdomen is soft.      Tenderness: There is no abdominal tenderness.   Skin:     General: Skin is warm and dry.   Neurological:      Mental Status: She is alert. Mental status is at baseline.   Psychiatric:         Attention and Perception: Attention normal.         Mood and Affect: Mood normal.         Behavior: Behavior is cooperative.       LAB  Recent Results (from the past 1344 hour(s))   CBC w/auto differential Blood, Venous    Collection Time: 01/21/20  9:01 AM   Result Value Ref Range    WBC 5.9 4.5 - 10.5 10*3/uL    RBC 4.90 3.70 - 5.30 10*6/µL    Hemoglobin 13.5 11.5 - 15.5 g/dL    Hematocrit 41.4 34.0 - 45.0 %    MCV 84.5 81.0 - 97.0 fL    MCH 27.5 (L) 28.0 - 33.0 pg    MCHC 32.5 32.0 - 36.0 g/dL    RDW 15.6 (H) 11.5 - 14.0 %    Platelets 225 140 - 350 10*3/uL    MPV 7.7 6.9 - 10.8 fL    Neutrophils% 43 41 - 81 %    Lymphocytes% 43 11 - 47 %    Monocytes% 8 3 - 11 %    Eosinophils% 6 (H) 0 - 3 %    Basophils% 0 0 - 2 %    Neutrophils Absolute 2.50 10*3/uL    Lymphocytes Absolute 2.50 10*3/uL    Monocytes Absolute 0.50 10*3/uL    Eosinophils Absolute 0.30 10*3/uL    Basophils Absolute 0.00 10*3/uL   POCT Influenza A/B    Collection Time: 01/23/20  7:10 PM   Result Value Ref Range    Influenza A Negative Negative, Invalid, Equivocal, External Results Not Available, Not Reported    Influenza B Negative Negative, Invalid, Equivocal, External Results Not Available, Not Reported    Internal QC pass Yes    Thyroid Stimulating Hormone, Ultrasensitive Blood, Venous    Collection Time: 02/03/20 10:06 AM   Result Value Ref Range     TSH 2.624 0.340 - 4.820 uIU/ml       Assessment and Plan:  1. S/P laparoscopic sleeve gastrectomy  This is a 79 y.o. female who presents today for ongoing follow up 6 months s/p sleeve gastrectomy.  She has had a 26 lb total loss, and 22 lb loss since surgery.  On most recent biometric testing loss of fat mass was essentially equivalent to FFM, indicating inadequate level of exercise and intake of protein.  Will re-evaluate this with next biometric test on 3/5/2020 when she follows up with the RD for ongoing nutrition counseling.  Has not achieved physical activity goals with online walking videos 3 times per week.  Encouraged return to structured activity outside of ADLs and she plans to return to water walking this spring/summer. Now that she is 6 months out from surgery advised that she may discontinue PPI therapy since she no longer has reflux or dysphagia.    Encouraged the following continued lifestyle changes after sleeve:   1. Continue follow up with Mey Willoughby RD and follow her nutrition recommendations for 6-9 months after sleeve gastrectomy.   2. Aim for 70 gm protein per day.   3. Aim for 64 fl oz water per day.   4. Continue postop vitamins.  5. Initiate [employer] walking videos on [email or web address] 3 times per week. Return to aquatics activity when weather allows.  6. Return for annual bariatric surgery follow up with Dr. Millan in 6 months and have fasting labs drawn at this time.     - CBC Blood, Venous; Future  - Comprehensive metabolic panel Blood, Venous; Future  - Folate Blood, Venous; Future  - Lipid panel Blood, Venous; Future  - Magnesium Blood, Venous; Future  - Methylmalonic acid, serum Blood, Venous; Future  - Thyroid Stimulating Hormone, Ultrasensitive Blood, Venous; Future  - Vitamin A Blood, Venous; Future  - Vitamin B1 (Thiamin) Blood, Venous; Future  - Vitamin D 25 hydroxy Blood, Venous; Future  - Vitamin E Blood, Venous; Future  - Vitamin K Blood, Venous; Future  - Vitamin B12  Blood, Venous; Future    2. Class 2 severe obesity due to excess calories with serious comorbidity and body mass index (BMI) of 35.0 to 35.9 in adult (CMS/[facility]) ([facility])  As above.     3. Hyperlipidemia, unspecified hyperlipidemia type  Continues atorvastatin 20 mg po daily.  Has not had substantial increase in physical activity over the past 3 months.  Reviewed benefit of physical activity on lipid profile. Will recheck lipid profile annual postop labs, but medical management will continue to be managed by PCP.   - CBC Blood, Venous; Future  - Comprehensive metabolic panel Blood, Venous; Future  - Folate Blood, Venous; Future  - Lipid panel Blood, Venous; Future  - Magnesium Blood, Venous; Future  - Methylmalonic acid, serum Blood, Venous; Future  - Thyroid Stimulating Hormone, Ultrasensitive Blood, Venous; Future  - Vitamin A Blood, Venous; Future  - Vitamin B1 (Thiamin) Blood, Venous; Future  - Vitamin D 25 hydroxy Blood, Venous; Future  - Vitamin E Blood, Venous; Future  - Vitamin K Blood, Venous; Future  - Vitamin B12 Blood, Venous; Future    4. Essential hypertension  Continues losartan and atenolol and BP well controlled today, but no substantial reduction in BP measurement. Continue therapy and this will continue to be managed by PCP.  Reviewed benefit of physical activity on BP control.   - CBC Blood, Venous; Future  - Comprehensive metabolic panel Blood, Venous; Future  - Folate Blood, Venous; Future  - Lipid panel Blood, Venous; Future  - Magnesium Blood, Venous; Future  - Methylmalonic acid, serum Blood, Venous; Future  - Thyroid Stimulating Hormone, Ultrasensitive Blood, Venous; Future  - Vitamin A Blood, Venous; Future  - Vitamin B1 (Thiamin) Blood, Venous; Future  - Vitamin D 25 hydroxy Blood, Venous; Future  - Vitamin E Blood, Venous; Future  - Vitamin K Blood, Venous; Future  - Vitamin B12 Blood, Venous; Future    5. Other specified hypothyroidism  Continues levothyroxine and administering medication  appropriately.  Reviewed the need for proper administration of levothyroxine to maintain euthyroid and achieve optimal weight loss. Will check TSH with annual postop labs and ongoing levothyroxine dosing will continue to be managed by PCP.   - Thyroid Stimulating Hormone, Ultrasensitive Blood, Venous; Future    Katy Stack will RTC in 6 months for annual postop follow up, sooner with concerns.  She participated in the decision making process and agreed with the above plan.  All questions were sought and answered to her satisfaction.       Marii Wayne CNP  2/20/2020  4:47 PM    Total Time spent with the patient is 30 min with more than 50% in direct counseling and coordination of care regarding the above diagnoses.     A voice recognition program was used to aid in medical record documentation. Sometimes words are printed not exactly as they were spoken. While efforts were made to carefully edit and correct any inaccuracies, some errors may be present and should be taken within the context of the discussion.  Please contact our office if you need assistance interpreting this medical record or notice any mistakes.

## 2020-02-11 ENCOUNTER — TELEPHONE (OUTPATIENT)
Dept: FAMILY MEDICINE | Facility: CLINIC | Age: 80
End: 2020-02-11

## 2020-02-13 ENCOUNTER — TELEPHONE (OUTPATIENT)
Dept: INTERNAL MEDICINE | Facility: CLINIC | Age: 80
End: 2020-02-13

## 2020-02-13 DIAGNOSIS — E03.8 OTHER SPECIFIED HYPOTHYROIDISM: ICD-10-CM

## 2020-02-13 DIAGNOSIS — M81.0 POST-MENOPAUSAL OSTEOPOROSIS: Primary | ICD-10-CM

## 2020-02-20 ASSESSMENT — ENCOUNTER SYMPTOMS
BACK PAIN: 1
FATIGUE: 0
SORE THROAT: 0
ROS GI COMMENTS: DENIES GERD

## 2020-02-20 NOTE — PATIENT INSTRUCTIONS
Encourage the following continued lifestyle changes after sleeve:   1. Continue follow up with Mey Willoughby RD and follow her nutrition recommendations for 6-9 months after sleeve gastrectomy. Complete biometric testing at each nutrition counseling follow up.  2. Aim for 70 gm protein per day.   3. Aim for 64 fl oz water per day.   4. Continue postop vitamins.  5. Initiate Lise Bambisa walking videos on [email or web address] 3 times per week. Return to aquatics activity when weather allows.  6. Return for annual bariatric surgery follow up with Dr. Millan in 6 months and have fasting labs drawn at this time.

## 2020-02-21 ENCOUNTER — LAB (OUTPATIENT)
Dept: LAB | Facility: CLINIC | Age: 80
End: 2020-02-21
Payer: MEDICARE

## 2020-02-21 DIAGNOSIS — M81.0 POST-MENOPAUSAL OSTEOPOROSIS: ICD-10-CM

## 2020-02-21 DIAGNOSIS — E03.8 OTHER SPECIFIED HYPOTHYROIDISM: ICD-10-CM

## 2020-02-21 DIAGNOSIS — D50.8 IRON DEFICIENCY ANEMIA SECONDARY TO INADEQUATE DIETARY IRON INTAKE: ICD-10-CM

## 2020-02-21 LAB
25(OH)D3 SERPL-MCNC: 37 NG/ML (ref 30–100)
ALBUMIN SERPL-MCNC: 3.7 G/DL (ref 3.5–5.3)
ALP SERPL-CCNC: 93 U/L (ref 55–142)
ALT SERPL-CCNC: 8 U/L (ref 0–52)
ANION GAP SERPL CALC-SCNC: 6 MMOL/L (ref 3–11)
AST SERPL-CCNC: 14 U/L (ref 0–39)
BASOPHILS # BLD AUTO: 0 10*3/UL
BASOPHILS NFR BLD AUTO: 1 % (ref 0–2)
BILIRUB SERPL-MCNC: 0.47 MG/DL (ref 0–1.4)
BUN SERPL-MCNC: 13 MG/DL (ref 7–25)
CALCIUM ALBUM COR SERPL-MCNC: 9.4 MG/DL (ref 8.6–10.3)
CALCIUM SERPL-MCNC: 9.2 MG/DL (ref 8.6–10.3)
CHLORIDE SERPL-SCNC: 110 MMOL/L (ref 98–107)
CO2 SERPL-SCNC: 27 MMOL/L (ref 21–32)
CREAT SERPL-MCNC: 0.92 MG/DL (ref 0.6–1.2)
EOSINOPHIL # BLD AUTO: 0.3 10*3/UL
EOSINOPHIL NFR BLD AUTO: 4 % (ref 0–3)
ERYTHROCYTE [DISTWIDTH] IN BLOOD BY AUTOMATED COUNT: 15.8 % (ref 11.5–14)
FERRITIN SERPL-MCNC: 7.6 NG/ML (ref 11–307)
GFR SERPL CREATININE-BSD FRML MDRD: 59 ML/MIN/1.73M*2
GLUCOSE SERPL-MCNC: 93 MG/DL (ref 70–105)
HCT VFR BLD AUTO: 41.9 % (ref 34–45)
HGB BLD-MCNC: 13.7 G/DL (ref 11.5–15.5)
IRON SATN MFR SERPL: 22 % (ref 13–50)
IRON SERPL-MCNC: 80 UG/DL (ref 50–175)
LYMPHOCYTES # BLD AUTO: 2.9 10*3/UL
LYMPHOCYTES NFR BLD AUTO: 39 % (ref 11–47)
MCH RBC QN AUTO: 27.7 PG (ref 28–33)
MCHC RBC AUTO-ENTMCNC: 32.7 G/DL (ref 32–36)
MCV RBC AUTO: 84.7 FL (ref 81–97)
MONOCYTES # BLD AUTO: 0.5 10*3/UL
MONOCYTES NFR BLD AUTO: 7 % (ref 3–11)
NEUTROPHILS # BLD AUTO: 3.7 10*3/UL
NEUTROPHILS NFR BLD AUTO: 50 % (ref 41–81)
PHOSPHATE SERPL-MCNC: 3.5 MG/DL (ref 2.5–4.9)
PLATELET # BLD AUTO: 283 10*3/UL (ref 140–350)
PMV BLD AUTO: 7.8 FL (ref 6.9–10.8)
POTASSIUM SERPL-SCNC: 4.1 MMOL/L (ref 3.5–5.1)
PROT SERPL-MCNC: 6.7 G/DL (ref 6–8.3)
PTH-INTACT SERPL-MCNC: 54.6 PG/ML (ref 9–59)
RBC # BLD AUTO: 4.94 10*6/ΜL (ref 3.7–5.3)
SODIUM SERPL-SCNC: 143 MMOL/L (ref 135–145)
T4 FREE SERPL-MCNC: 0.9 NG/DL (ref 0.65–1.44)
TIBC SERPL-MCNC: 358 UG/DL (ref 250–450)
UIBC SERPL-MCNC: 278 UG/DL (ref 155–355)
WBC # BLD AUTO: 7.3 10*3/UL (ref 4.5–10.5)

## 2020-02-21 PROCEDURE — 84100 ASSAY OF PHOSPHORUS: CPT

## 2020-02-21 PROCEDURE — 80053 COMPREHEN METABOLIC PANEL: CPT

## 2020-02-21 PROCEDURE — 83970 ASSAY OF PARATHORMONE: CPT

## 2020-02-21 PROCEDURE — 83540 ASSAY OF IRON: CPT

## 2020-02-21 PROCEDURE — 36415 COLL VENOUS BLD VENIPUNCTURE: CPT

## 2020-02-21 PROCEDURE — 84439 ASSAY OF FREE THYROXINE: CPT

## 2020-02-21 PROCEDURE — 85025 COMPLETE CBC W/AUTO DIFF WBC: CPT

## 2020-02-21 PROCEDURE — 82728 ASSAY OF FERRITIN: CPT

## 2020-02-21 PROCEDURE — 82340 ASSAY OF CALCIUM IN URINE: CPT

## 2020-02-21 PROCEDURE — 82306 VITAMIN D 25 HYDROXY: CPT

## 2020-02-22 LAB
CALCIUM 24H UR-MCNC: 5 MG/DL
CALCIUM 24H UR-MRATE: 85 MG/24 H
COLLECT DURATION TIME UR: 24 H
SPECIMEN VOL 24H UR: 1700 ML

## 2020-02-26 DIAGNOSIS — E61.1 IRON DEFICIENCY: Primary | ICD-10-CM

## 2020-02-26 NOTE — PROGRESS NOTES
Katy has been notified of Ferritin results. She has not been taking the oral iron sine developing sores on her tongue. [facility] has been notified with a left message stating dates that Katy would like for the iron infusions.

## 2020-02-27 ENCOUNTER — HOSPITAL ENCOUNTER (OUTPATIENT)
Dept: INFUSION THERAPY | Facility: HOSPITAL | Age: 80
Setting detail: INFUSION SERIES
Discharge: STILL ADMITTED OR ADMINISTRATIVE | End: 2020-02-27
Payer: MEDICARE

## 2020-02-27 VITALS
RESPIRATION RATE: 18 BRPM | HEART RATE: 54 BPM | SYSTOLIC BLOOD PRESSURE: 183 MMHG | DIASTOLIC BLOOD PRESSURE: 66 MMHG | TEMPERATURE: 97.9 F

## 2020-02-27 DIAGNOSIS — Z98.84 S/P BARIATRIC SURGERY: ICD-10-CM

## 2020-02-27 DIAGNOSIS — E61.1 IRON DEFICIENCY: Primary | ICD-10-CM

## 2020-02-27 PROCEDURE — 2580000300 HC RX 258: Performed by: NURSE PRACTITIONER

## 2020-02-27 PROCEDURE — 6360000200 HC RX 636 W HCPCS (ALT 250 FOR IP): Mod: TB | Performed by: NURSE PRACTITIONER

## 2020-02-27 PROCEDURE — 96365 THER/PROPH/DIAG IV INF INIT: CPT

## 2020-02-27 RX ADMIN — FERRIC CARBOXYMALTOSE INJECTION 750 MG: 50 INJECTION, SOLUTION INTRAVENOUS at 15:39

## 2020-03-03 ENCOUNTER — CONSULT (OUTPATIENT)
Dept: FAMILY MEDICINE | Facility: CLINIC | Age: 80
End: 2020-03-03
Payer: MEDICARE

## 2020-03-03 VITALS
WEIGHT: 180.8 LBS | SYSTOLIC BLOOD PRESSURE: 120 MMHG | HEART RATE: 64 BPM | BODY MASS INDEX: 36.52 KG/M2 | DIASTOLIC BLOOD PRESSURE: 70 MMHG | OXYGEN SATURATION: 94 %

## 2020-03-03 DIAGNOSIS — M81.0 AGE-RELATED OSTEOPOROSIS WITHOUT CURRENT PATHOLOGICAL FRACTURE: Primary | ICD-10-CM

## 2020-03-03 PROCEDURE — 99214 OFFICE O/P EST MOD 30 MIN: CPT | Performed by: NURSE PRACTITIONER

## 2020-03-03 PROCEDURE — G0463 HOSPITAL OUTPT CLINIC VISIT: HCPCS | Performed by: NURSE PRACTITIONER

## 2020-03-03 RX ORDER — AMOXICILLIN 500 MG/1
500 CAPSULE ORAL 3 TIMES DAILY
COMMUNITY
Start: 2020-02-03 | End: 2020-04-03 | Stop reason: ALTCHOICE

## 2020-03-03 ASSESSMENT — ENCOUNTER SYMPTOMS
CARDIOVASCULAR NEGATIVE: 1
NEUROLOGICAL NEGATIVE: 1
RESPIRATORY NEGATIVE: 1
EYES NEGATIVE: 1

## 2020-03-03 ASSESSMENT — PAIN SCALES - GENERAL: PAINLEVEL: 0-NO PAIN

## 2020-03-03 NOTE — PROGRESS NOTES
OSTEOPOROSIS CONSULT         Subjective     Katy Stack is a 79 y.o. female presents today for an Osteoporosis consultation, PCP Dr. Travis.     Past Medical History  Past Medical History:   Diagnosis Date   • Benign essential hypertension 11/24/2017   • Cataract of left eye    • Chronic obstructive lung disease (CMS/HCC) (HCC) 11/10/2017   • Dysrhythmia     LBBB   • GERD (gastroesophageal reflux disease)     at times   • Hypertension    • Hypothyroid    • Hypothyroidism 11/10/2017   • Injury of back     BENDING INJURY   • Neurologic disorder    • Obesity 11/10/2017   • Obstructive sleep apnea syndrome 11/10/2017    Night time oxygen/2L   • Osteoarthritis    • Periodic limb movement disorder 11/10/2017   • Peripheral neuropathy     left thigh into left groin    • Rectocele    • Respiratory disease         Family History  Family History   Problem Relation Age of Onset   • Heart disease Mother    • Heart disease Father         Myocardial infarction   • Parkinsonism Father    • Thyroid disease Father    • Alzheimer's disease Sister    • Arthritis Sister    • Osteoporosis Sister    • Lung cancer Brother    • Diabetes Maternal Grandmother        Surgical History     Past Surgical History:   Procedure Laterality Date   • APPENDECTOMY  1987   • BACK SURGERY      2 surgeries [id number]   • BACK SURGERY  11/30/2016   • BACK SURGERY  05/01/2017    vetebral fusion   • CARDIAC CATHETERIZATION  08/31/2011   • CATARACT EXTRACTION W/  INTRAOCULAR LENS IMPLANT Left 7/2/2018    Procedure: OS CATARACT LEFT PHACOEMULSIFICATION OF CATARACT WITH INTRAOCULAR LENS;  Surgeon: Emmanuel Cueto MD;  Location: [facility];  Service: Ophthalmology;  Laterality: Left;   • CATARACT EXTRACTION W/  INTRAOCULAR LENS IMPLANT Right 8/6/2018    Procedure: OD CATARACT RIGHT PHACOEMULSIFICATION OF CATARACT WITH INTRAOCULAR LENS;  Surgeon: Emmanuel Cueto MD;  Location: [facility];  Service: Ophthalmology;  Laterality:  Right;   • CHOLECYSTECTOMY     • COLONOSCOPY  2014    No polyps   • COLONOSCOPY  2011   • DIAGNOSTIC LAPAROSCOPY N/A 2018    Procedure: Laparoscopic band removal;  Surgeon: Chris Millan MD;  Location: [facility] OR;  Service: General;  Laterality: N/A;   • ESOPHAGOGASTRODUODENOSCOPY N/A 2018    Procedure: EGD - ESOPHAGOGASTRODUODENOSCOPY;  Surgeon: Chris Millan MD;  Location: [facility];  Service: Endoscopy;  Laterality: N/A;   • FOOT SURGERY      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 Vela- shoulder   • THYROID LOBECTOMY Left    • THYROIDECTOMY, PARTIAL  2010   • UPPER GASTROINTESTINAL ENDOSCOPY  2014    Mild chronic gastritis       Social History  Social History     Tobacco Use   • Smoking status: Former Smoker     Packs/day: 2.00     Years: 35.00     Pack years: 70.00     Last attempt to quit:      Years since quittin.2   • Smokeless tobacco: Never Used   Substance Use Topics   • Alcohol use: No   • Drug use: No       Current Medications    Current Outpatient Medications:   •  NON FORMULARY, SWISH AND SPIT 5 ML PO Q 4 H PRN FOR PAINFUL SORES OF THE MOUTH., Disp: , Rfl:   •  Antacid Regular Strength 200-200-20 mg/5 mL suspension, , Disp: , Rfl:   •  gabapentin (NEURONTIN) 300 mg capsule, Take 300 mg by mouth Indications: Taking 900mg TID  , Disp: , Rfl:   •  calcium carbonate-vitamin D3 600 mg(1,500mg) -400 unit per tablet, Take 1 tablet by mouth daily,  Disp: , Rfl:   •  nitroglycerin (NITROSTAT) 0.4 mg SL tablet, Place 1 tablet (0.4 mg total) under the tongue every 5 (five) minutes as needed for chest pain, Disp: 25 tablet, Rfl: 1  •  levothyroxine (SYNTHROID, LEVOTHROID) 100 mcg tablet, Take 1 tablet (100 mcg total) by mouth daily, Disp: 90 tablet, Rfl: 3  •  atenolol (TENORMIN) 25 mg tablet, Take 1 tablet (25 mg total) by mouth daily, Disp: 90 tablet, Rfl: 3  •  atorvastatin (LIPITOR) 20 mg tablet, Take 1 tablet (20 mg total) by mouth daily, Disp: 90 tablet, Rfl: 3  •  losartan (COZAAR) 50 mg tablet, Take 1 tablet (50 mg total) by mouth daily, Disp: 90 tablet, Rfl: 3  •  vit A-vit C-vit E-zinc-copper (EYE VITAMIN AND MINERALS) 7,160-113-100 unit-mg-unit tablet, Take 1 tab/cap by mouth 2 (two) times a day., Disp: , Rfl:   •  PREMARIN 0.9 mg tablet, TAKE 1 TABLET BY MOUTH  EVERY DAY (Patient taking differently: TAKE 1 TABLET BY MOUTH EVERY WEEK), Disp: 90 tablet, Rfl: 1  •  amoxicillin (AMOXIL) 500 mg capsule, Take 500 mg by mouth 3 (three) times a day, Disp: , Rfl:   •  ferrous sulfate 325 mg (65 mg iron) tablet, Take 1 tablet (325 mg total) by mouth every other day (Patient not taking: Reported on 1/23/2020 ), Disp: 15 tablet, Rfl: 11    Current Facility-Administered Medications:   •  denosumab (PROLIA) 60 mg/mL syringe 60 mg, 60 mg, subcutaneous, q6 months, Haley Olvera DNP, 60 mg at 03/05/20 0812    Allergies  Allergies   Allergen Reactions   • Savanna Angioedema and Hives     hives, tongue swells  Swelling of tongue   • Adhesive      welts       Laboratory Data  Sodium   Date Value Ref Range Status   02/21/2020 143 135 - 145 mmol/L Final     Potassium   Date Value Ref Range Status   02/21/2020 4.1 3.5 - 5.1 mmol/L Final     Chloride   Date Value Ref Range Status   02/21/2020 110 (H) 98 - 107 mmol/L Final     CO2   Date Value Ref Range Status   02/21/2020 27 21 - 32 mmol/L Final     BUN   Date Value Ref Range Status   02/21/2020 13 7 - 25 mg/dL Final      Creatinine   Date Value Ref Range Status   02/21/2020 0.92 0.60 - 1.20 mg/dL Final     Glucose   Date Value Ref Range Status   02/21/2020 93 70 - 105 mg/dL Final     Calcium   Date Value Ref Range Status   02/21/2020 9.2 8.6 - 10.3 mg/dL Final     WBC   Date Value Ref Range Status   02/21/2020 7.3 4.5 - 10.5 10*3/uL Final     Hemoglobin   Date Value Ref Range Status   02/21/2020 13.7 11.5 - 15.5 g/dL Final     Hematocrit   Date Value Ref Range Status   02/21/2020 41.9 34.0 - 45.0 % Final     MCV   Date Value Ref Range Status   02/21/2020 84.7 81.0 - 97.0 fL Final     Platelets   Date Value Ref Range Status   02/21/2020 283 140 - 350 10*3/uL Final     TSH   Date Value Ref Range Status   02/03/2020 2.624 0.340 - 4.820 uIU/ml Final   FT4  0.90  Vit D, 25-Hydroxy   Date Value Ref Range Status   02/21/2020 37 30 - 100 ng/mL Final     Latest 24 hour urine for total calcium excretion: 85  PTH   Date Value Ref Range Status   02/21/2020 54.6 9.0 - 59.0 pg/mL Final   Phosphorus 3.5    DXA Results  1. Date of DXA: 2/6/2020  2. Current lowest T-Score: -2.6 in the left femoral neck  3. Elevated FRAX score: Hip 6.3% (MOP 18.5%)    Prior Fractures: None    Fall History  1. Number of falls in the last 12 months: x1 slipped on ice 1/28/2020, no fragility fractures    Review of Systems   Constitutional: Negative.    HENT: Negative.    Eyes: Negative.         Glasses - progressive lens   Respiratory: Negative.    Cardiovascular: Negative.    Musculoskeletal: Positive for arthralgias and myalgias.        General    Neurological: Negative.        Objective     Vitals  /70 (BP Location: Right arm, Patient Position: Sitting, Cuff Size: Regular Adult)   Pulse 64   Wt 82 kg (180 lb 12.8 oz)   SpO2 94%   BMI 36.52 kg/m²     Physical Exam   Constitutional: She is oriented to person, place, and time. She appears well-developed and well-nourished. No distress.   Neck: Normal range of motion. Neck supple.   Cardiovascular: Normal  rate, regular rhythm and normal heart sounds.   No murmur heard.  Pulmonary/Chest: Effort normal and breath sounds normal. No respiratory distress. She has no wheezes. She has no rales. She exhibits no tenderness.   Abdominal: Soft.   Neurological: She is alert and oriented to person, place, and time.   Skin: Skin is warm and dry. She is not diaphoretic.   Psychiatric: She has a normal mood and affect. Her behavior is normal.       Assessment/Plan         Diagnosis Plan   1. Age-related osteoporosis without current pathological fracture  Vitamin D 25 hydroxy Blood, Venous    DXA bone density     Osteoporosis consult: Labs were reviewed.  Essentially normal and no secondary etiologies found for her osteoporosis, thus consider her diagnosis age-related osteoporosis.  Education provided and information given on: Nutrition for strong bones, risk modifications, fall prevention, physical therapy and/or exercise for strength, balance and posture.  Medications for osteoporosis were reviewed along with fracture prevention.  Her personalized osteoporosis treatment plan was reviewed and provided.    Impressions: Age-related osteoporosis by BMD and elevated FRAX (6.3% hip) with the current lowest T-score -2.6 in the left femoral neck.       Recommendations: Start calcium citrate, 1,200 mg daily in divided doses and due to her low vitamin D level (37)  recommend  Increasing vitamin D to 3,000 IU daily. Will recheck with lab in 2 months with a goal result above 40.     Osteoporosis Medication: Due to her hx of gastric sleeve and PPI use she is a poor candidate for restarting fosamax. Discussed medications, recommend Prolia, approved with insurance and patient agrees, Bobbi will call with appointment.       Follow-up  1. Lab: Vitamin D [place] level in 2 months (ordered)  2. DXA: repeat in 1 year after 3rd Prolia injection  3. Osteoporosis visit follow up: 2-3 weeks after DXA completed

## 2020-03-03 NOTE — LETTER
03/11/20  Thank you for referring Katy for an osteoporosis consult.      Diagnosis Plan   1. Age-related osteoporosis without current pathological fracture  Vitamin D 25 hydroxy Blood, Venous     Osteoporosis consult: Labs were reviewed.  Essentially normal and no secondary etiologies found for her osteoporosis, thus consider her diagnosis age-related osteoporosis.  Education provided and information given on: Nutrition for strong bones, risk modifications, fall prevention, physical therapy and/or exercise for strength, balance and posture.  Medications for osteoporosis were reviewed along with fracture prevention.  Her personalized osteoporosis treatment plan was reviewed and provided.    Impressions: Age-related osteoporosis by BMD and elevated FRAX (6.3% hip) with the current lowest T-score -2.6 in the left femoral neck.     Recommendations: Start calcium citrate, 1,200 mg daily in divided doses and due to her low vitamin D level (37)  recommend  Increasing vitamin D to 3,000 IU daily. Will recheck with lab in 2 months with a goal result above 40.     Osteoporosis Medication: Due to her hx of gastric sleeve and PPI use she is a poor candidate for restarting fosamax. Discussed medications, recommend Prolia, approved with insurance and patient agrees, Bobbi will call with appointment.       Follow-up  1. Lab: Vitamin D [place] level in 2 months (ordered)  2. DXA: repeat in 1 year after 3rd Prolia injection  3. Osteoporosis visit follow up: 2-3 weeks after DXA completed

## 2020-03-05 ENCOUNTER — OFFICE VISIT NO LOS (OUTPATIENT)
Dept: DIABETES SERVICES | Facility: CLINIC | Age: 80
End: 2020-03-05
Payer: MEDICARE

## 2020-03-05 ENCOUNTER — PROCEDURE VISIT (OUTPATIENT)
Dept: FAMILY MEDICINE | Facility: CLINIC | Age: 80
End: 2020-03-05
Payer: MEDICARE

## 2020-03-05 ENCOUNTER — HOSPITAL ENCOUNTER (OUTPATIENT)
Dept: INFUSION THERAPY | Facility: HOSPITAL | Age: 80
Setting detail: INFUSION SERIES
Discharge: STILL ADMITTED OR ADMINISTRATIVE | End: 2020-03-05
Payer: MEDICARE

## 2020-03-05 VITALS — WEIGHT: 176.2 LBS | BODY MASS INDEX: 35.59 KG/M2

## 2020-03-05 VITALS
SYSTOLIC BLOOD PRESSURE: 138 MMHG | HEART RATE: 56 BPM | OXYGEN SATURATION: 96 % | WEIGHT: 179 LBS | RESPIRATION RATE: 14 BRPM | DIASTOLIC BLOOD PRESSURE: 62 MMHG | BODY MASS INDEX: 36.15 KG/M2

## 2020-03-05 VITALS
SYSTOLIC BLOOD PRESSURE: 144 MMHG | OXYGEN SATURATION: 98 % | HEART RATE: 80 BPM | DIASTOLIC BLOOD PRESSURE: 66 MMHG | RESPIRATION RATE: 20 BRPM

## 2020-03-05 DIAGNOSIS — Z98.84 S/P BARIATRIC SURGERY: Primary | ICD-10-CM

## 2020-03-05 DIAGNOSIS — M81.0 AGE-RELATED OSTEOPOROSIS WITHOUT CURRENT PATHOLOGICAL FRACTURE: Primary | ICD-10-CM

## 2020-03-05 PROCEDURE — 96365 THER/PROPH/DIAG IV INF INIT: CPT

## 2020-03-05 PROCEDURE — 2580000300 HC RX 258: Performed by: NURSE PRACTITIONER

## 2020-03-05 PROCEDURE — 6360000200 HC RX 636 W HCPCS (ALT 250 FOR IP): Mod: TB | Performed by: NURSE PRACTITIONER

## 2020-03-05 PROCEDURE — 96372 THER/PROPH/DIAG INJ SC/IM: CPT | Mod: 59 | Performed by: NURSE PRACTITIONER

## 2020-03-05 PROCEDURE — 0358T BIA WHOLE BODY: CPT | Mod: NC | Performed by: DIETITIAN, REGISTERED

## 2020-03-05 RX ADMIN — DENOSUMAB 60 MG: 60 INJECTION SUBCUTANEOUS at 08:12

## 2020-03-05 RX ADMIN — FERRIC CARBOXYMALTOSE INJECTION 750 MG: 50 INJECTION, SOLUTION INTRAVENOUS at 10:35

## 2020-03-05 NOTE — PROGRESS NOTES
"Medical Nutrition Therapy (MNT) General Bariatric Post harjeet #2 no charge, biometric no charge  3/5/20  Beginning Time: 9:00 AM     End Time: 10:00 AM  MNT Provider Order:12 week post bariatric surgery      Others Present: Alone  Procedure Completed: Sleeve on 8/13/19    Nutrition Assessment  Food/Nutrition - Related History- Pt states she feels so much better since surgery, hard to describe, \"well  being\" maybe  Previous MNT/Date: 11/7/19, 8/29/19, 8/8/19 and 4 previous IBT appt  Pertinent Medications:   Current Outpatient Medications   Medication Sig Dispense Refill   • amoxicillin (AMOXIL) 500 mg capsule Take 500 mg by mouth 3 (three) times a day     • NON FORMULARY SWISH AND SPIT 5 ML PO Q 4 H PRN FOR PAINFUL SORES OF THE MOUTH.     • Antacid Regular Strength 200-200-20 mg/5 mL suspension      • ferrous sulfate 325 mg (65 mg iron) tablet Take 1 tablet (325 mg total) by mouth every other day (Patient not taking: Reported on 1/23/2020 ) 15 tablet 11   • gabapentin (NEURONTIN) 300 mg capsule Take 300 mg by mouth Indications: Taking 900mg TID       • calcium carbonate-vitamin D3 600 mg(1,500mg) -400 unit per tablet Take 1 tablet by mouth daily     • nitroglycerin (NITROSTAT) 0.4 mg SL tablet Place 1 tablet (0.4 mg total) under the tongue every 5 (five) minutes as needed for chest pain 25 tablet 1   • levothyroxine (SYNTHROID, LEVOTHROID) 100 mcg tablet Take 1 tablet (100 mcg total) by mouth daily 90 tablet 3   • atenolol (TENORMIN) 25 mg tablet Take 1 tablet (25 mg total) by mouth daily 90 tablet 3   • atorvastatin (LIPITOR) 20 mg tablet Take 1 tablet (20 mg total) by mouth daily 90 tablet 3   • losartan (COZAAR) 50 mg tablet Take 1 tablet (50 mg total) by mouth daily 90 tablet 3   • vit A-vit C-vit E-zinc-copper (EYE VITAMIN AND MINERALS) 7,160-113-100 unit-mg-unit tablet Take 1 tab/cap by mouth 2 (two) times a day.     • PREMARIN 0.9 mg tablet TAKE 1 TABLET BY MOUTH  EVERY DAY (Patient taking differently: TAKE 1 " "TABLET BY MOUTH EVERY WEEK) 90 tablet 1     Current Facility-Administered Medications   Medication Dose Route Frequency Provider Last Rate Last Dose   • denosumab (PROLIA) 60 mg/mL syringe 60 mg  60 mg subcutaneous q6 months Haley Olvera DNP   60 mg at 03/05/20 0812     Supplements/Herbals: MVI, Ca with Vit, Vit D and Vit 12  Alcohol Use:   Social History     Substance and Sexual Activity   Alcohol Use No      Mandaen / Cultural / Ethnic Food Practices:  None  Physical Activity: Pt is walking 0445-8075 steps per day using walker, walking around [place] visit       Food Recall: Breakfast: Breakfast: Yogurt or 1 egg w/veggies; am snack:1 orange, a few protein chips; Noon: beef; snack: chips or candy; Evening: soups    Client History  Medical History:  KATY, Pulmonary HTN, GERD, Obesity, Vit D def, Hypothyroidism    Occupation: back to work over the past week  Socioeconomic Concerns:  No  Shops/Cooks for Self: No    Anthropometrics  Ht Readings from Last 1 Encounters:   02/06/20 1.524 m (5')   Wt.176.2# on body comp scale with 2.6# clothing wt subtracted, wt was: 179.8# with 2.6# clothing wt subtracted on body comp scale on 11/7/19 Wt. 188.4# on 8/29 with 1# clothing wt subtracted on the [place] Body Comp scale down from 198.8# on 8/8 on the body comp scale  Wt Readings from Last 1 Encounters:   03/05/20 79.9 kg (176 lb 3.2 oz)    BMI: 36.3  BMI Readings from Last 1 Encounters:   03/05/20 35.59 kg/m²      BP Readings from Last 1 Encounters:   03/05/20 138/62              Pre-op Post 3wk Post 6mo Post 12mo   Waist Measurement:  8/8/19  41.75\"  8/29/19  41\" 11/7/19  40\"       Hip Measurement:  53\"  51.75\"  50.5\"      Neck Measurement:  16\"  15.75\"  15.5\"       Patient reports usual body weight (UBW) of: Patient reports usual body weight (UBW) of: no usual  Patient has experienced previous weight loss history: Weight Watchers, Nutri System, Atkins  Had lap band in [id number] and was removed by Dr. Martinez in Fall in " 2018; highest wt was 211#, lowest wt 165#; has gained at least 15# since Nov.   Body Composition: Refer to attached body comp results  Weight History:Wt was: 202.6# on 7/18, wt was: 211.4# on 7/5,  Wt was: 203.6# on 6/6, wt was 202.2# for 5/10,  Wt was: 203# on 5/2 with initial IBT appt          Biochemical Data, Medical Tests, and Procedures  Pertinent Labs:   Sodium   Date Value Ref Range Status   02/21/2020 143 135 - 145 mmol/L Final     Potassium   Date Value Ref Range Status   02/21/2020 4.1 3.5 - 5.1 mmol/L Final     Chloride   Date Value Ref Range Status   02/21/2020 110 (H) 98 - 107 mmol/L Final     CO2   Date Value Ref Range Status   02/21/2020 27 21 - 32 mmol/L Final     BUN   Date Value Ref Range Status   02/21/2020 13 7 - 25 mg/dL Final     Creatinine   Date Value Ref Range Status   02/21/2020 0.92 0.60 - 1.20 mg/dL Final     Glucose   Date Value Ref Range Status   02/21/2020 93 70 - 105 mg/dL Final     Calcium   Date Value Ref Range Status   02/21/2020 9.2 8.6 - 10.3 mg/dL Final     Cholesterol   Date Value Ref Range Status   11/25/2019 106 0 - 199 mg/dL Final     Triglycerides   Date Value Ref Range Status   11/25/2019 103 <=149 mg/dL Final     HDL   Date Value Ref Range Status   11/25/2019 37 (L) >=40 mg/dL Final       Social / Diet History  Current Diet Stage: 6 month post surgery  Allergies/Intolerances: ALLERGIES: STRAWBERRIES CAUSES HIVES,TONGUE SWELLING; dislikes eggs and yogurt    Allergies   Allergen Reactions   • [place] Angioedema and Hives     hives, tongue swells  Swelling of tongue   • Adhesive      welts         Nutrition Prescription  6 -9 month Post op Bariatric Sleeve surgery Diet: Smaller portion, High protein, reduced fat, reduced sugar diet with micronutrient supplementation        Nutrition Diagnosis   Problem #1: Altered GI function  Related to: recent bariatric surgery  As evidenced by: inability to consume a regular diet- need for increased protein, soft moist foods and small  portions and increased nutrition supplementation of micronutrients     Problem #2: Obesity  Related to: hx of energy consumption exceeding energy expenditure  As evidenced by: BMI of 35.59    Pain/GI Concerns/Bowel Problems:  Pain: 2 in legs and back  GI Concerns: No problems  Bowel Problems: no problems    Intervention/Plan:    Bariatric Diet Discussion: Body comp analysis shows a loss of 3.6# over the past 3 months. Pt is pleased with wt loss. States she has had to have iron infusions d/t low ferritin level and inability to consume iron supplements. She is not consuming adequate calcium per discussion. She is taking calcium citrate but taking on 200 mg tid. Discussed importance of increasing dose of calcium and ways to increase iron intake.  Discussed the 6-9month post op diet. Emphasized protein sources and increasing physical activity. Handouts provided: 6 month after post surgery. Iron content of foods.  Goals  Post Op Phase  Attends all post-op visits through 18 months?    Date:  8/29/19 Date: 11/7/19  Date:  3/5/20   3wks xx 3mos xx 6mos xx   Date:   Date:   Date:     9mos  12mos  18mos              Reports 60-80g protein per day? Yes  Reports 64 oz zero calorie fluid per day? Yes  Reports taking supplements? Yes  Reports routinely spending 150mins per week in physical   activity process? Yes     Evaluation  Receptivity to education: good    Patient states understanding of the information presented.    Follow-up Plan  F/U scheduled in 3 months for 9 month post op visit  Time Spent with Patient: 28 minutes

## 2020-03-05 NOTE — PROGRESS NOTES
Prolia Injection   Number of injections/year injection started: 1st injection  DXA: 2/6/20  T-score: -2.6 LFN  FRAX: 6.3% Hip  OSTEO CLINIC   3/3/20      Prolia 60mg/1ml was injected subcutaneously into the right arm, every 6 months. Patient tolerated well.      Lot#: [id number]   Expiration: 2/22   NDC#: [id number]     Addition of side effects discussed yes    Compliance with calcium and vitamin D yes    Push fluids today Yes    Patient instructed to call with concerns or complaints Yes    Patient advised to have follow up DXA per provider Yes

## 2020-03-11 ASSESSMENT — ENCOUNTER SYMPTOMS
ARTHRALGIAS: 1
MYALGIAS: 1
CONSTITUTIONAL NEGATIVE: 1

## 2020-03-11 NOTE — PATIENT INSTRUCTIONS
Diagnosis Plan   1. Age-related osteoporosis without current pathological fracture  Vitamin D 25 hydroxy Blood, Venous     Osteoporosis consult: Labs were reviewed.  Essentially normal and no secondary etiologies found for her osteoporosis, thus consider her diagnosis age-related osteoporosis.  Education provided and information given on: Nutrition for strong bones, risk modifications, fall prevention, physical therapy and/or exercise for strength, balance and posture.  Medications for osteoporosis were reviewed along with fracture prevention.  Her personalized osteoporosis treatment plan was reviewed and provided.    Impressions: Age-related osteoporosis by BMD and elevated FRAX (6.3% hip) with the current lowest T-score -2.6 in the left femoral neck.       Recommendations: Start calcium citrate, 1,200 mg daily in divided doses and due to her low vitamin D level (37)  recommend  Increasing vitamin D to 3,000 IU daily. Will recheck with lab in 2 months with a goal result above 40.     Osteoporosis Medication: Due to her hx of gastric sleeve and PPI use she is a poor candidate for restarting fosamax. Discussed medications, recommend Prolia, approved with insurance and patient agrees, Bobbi will call with appointment.       Follow-up  1. Lab: Vitamin D [place] level in 2 months (ordered)  2. DXA: repeat in 1 year after 3rd Prolia injection  3. Osteoporosis visit follow up: 2-3 weeks after DXA completed  Patient Education     Osteoporosis    Osteoporosis happens when your bones get thin and weak. This can cause your bones to break (fracture) more easily. You can do things at home to make your bones stronger.  Follow these instructions at home:    Activity  · Exercise as told by your doctor. Ask your doctor what activities are safe for you. You should do:  ? Exercises that make your muscles work to hold your body weight up (weight-bearing exercises). These include yeni chi, yoga, and walking.  ? Exercises to make your  muscles stronger. One example is lifting weights.  Lifestyle  · Limit alcohol intake to no more than 1 drink a day for nonpregnant women and 2 drinks a day for men. One drink equals 12 oz of beer, 5 oz of wine, or 1½ oz of hard liquor.  · Do not use any products that have nicotine or tobacco in them. These include cigarettes and e-cigarettes. If you need help quitting, ask your doctor.  Preventing falls  · Use tools to help you move around (mobility aids) as needed. These include canes, walkers, scooters, and crutches.  · Keep rooms well-lit and free of clutter.  · Put away things that could make you trip. These include cords and rugs.  · Install safety rails on stairs. Install grab bars in bathrooms.  · Use rubber mats in slippery areas, like bathrooms.  · Wear shoes that:  ? Fit you well.  ? Support your feet.  ? Have closed toes.  ? Have rubber soles or low heels.  · Tell your doctor about all of the medicines you are taking. Some medicines can make you more likely to fall.  General instructions  · Eat plenty of calcium and vitamin D. These nutrients are good for your bones. Good sources of calcium and vitamin D include:  ? Some fatty fish, such as salmon and tuna.  ? Foods that have calcium and vitamin D added to them (fortified foods). For example, some breakfast cereals are fortified with calcium and vitamin D.  ? Egg yolks.  ? Cheese.  ? Liver.  · Take over-the-counter and prescription medicines only as told by your doctor.  · Keep all follow-up visits as told by your doctor. This is important.  Contact a doctor if:  · You have not been tested (screened) for osteoporosis and you are:  ? A woman who is age 65 or older.  ? A man who is age 70 or older.  Get help right away if:  · You fall.  · You get hurt.  Summary  · Osteoporosis happens when your bones get thin and weak.  · Weak bones can break (fracture) more easily.  · Eat plenty of calcium and vitamin D. These nutrients are good for your bones.  · Tell  your doctor about all of the medicines that you take.  This information is not intended to replace advice given to you by your health care provider. Make sure you discuss any questions you have with your health care provider.  Document Released: 03/11/2013 Document Revised: 10/12/2018 Document Reviewed: 10/12/2018  Elsevier Interactive Patient Education © 2020 [employer]

## 2020-03-19 ENCOUNTER — TELEPHONE (OUTPATIENT)
Dept: FAMILY MEDICINE | Facility: CLINIC | Age: 80
End: 2020-03-19

## 2020-03-19 DIAGNOSIS — E78.5 HYPERLIPIDEMIA, UNSPECIFIED HYPERLIPIDEMIA TYPE: ICD-10-CM

## 2020-03-19 DIAGNOSIS — I10 ESSENTIAL HYPERTENSION: ICD-10-CM

## 2020-03-20 RX ORDER — ATENOLOL 25 MG/1
25 TABLET ORAL DAILY
Qty: 90 TABLET | Refills: 0 | Status: SHIPPED | OUTPATIENT
Start: 2020-03-20 | End: 2020-05-19

## 2020-03-20 RX ORDER — ATORVASTATIN CALCIUM 20 MG/1
20 TABLET, FILM COATED ORAL DAILY
Qty: 90 TABLET | Refills: 0 | Status: SHIPPED | OUTPATIENT
Start: 2020-03-20 | End: 2020-05-19

## 2020-03-20 RX ORDER — LOSARTAN POTASSIUM 50 MG/1
50 TABLET ORAL DAILY
Qty: 90 TABLET | Refills: 0 | Status: SHIPPED | OUTPATIENT
Start: 2020-03-20 | End: 2020-05-19

## 2020-03-26 RX ORDER — OMEPRAZOLE 20 MG/1
CAPSULE, DELAYED RELEASE ORAL
Qty: 90 CAPSULE | OUTPATIENT
Start: 2020-03-26

## 2020-04-03 ENCOUNTER — TELEPHONE (OUTPATIENT)
Dept: FAMILY MEDICINE | Facility: CLINIC | Age: 80
End: 2020-04-03

## 2020-04-03 ENCOUNTER — APPOINTMENT (OUTPATIENT)
Dept: LAB | Facility: CLINIC | Age: 80
End: 2020-04-03
Payer: MEDICARE

## 2020-04-03 DIAGNOSIS — R30.0 BURNING WITH URINATION: Primary | ICD-10-CM

## 2020-04-03 DIAGNOSIS — R30.0 BURNING WITH URINATION: ICD-10-CM

## 2020-04-03 LAB
BACTERIA #/AREA URNS HPF: ABNORMAL /HPF
BILIRUB UR QL: NEGATIVE
CLARITY UR: ABNORMAL
COLOR UR: YELLOW
GLUCOSE UR QL: NEGATIVE MG/DL
HGB UR QL: ABNORMAL
KETONES UR-MCNC: ABNORMAL MG/DL
LEUKOCYTE ESTERASE UR QL STRIP: ABNORMAL
NITRITE UR QL: POSITIVE
PH UR: 7.5 PH
PROT UR STRIP-MCNC: 30 MG/DL
RBC #/AREA URNS HPF: ABNORMAL /HPF
SP GR UR: 1.02 (ref 1–1.03)
SQUAMOUS #/AREA URNS HPF: ABNORMAL /HPF
UROBILINOGEN UR-MCNC: 1 E.U./DL
WBC #/AREA URNS HPF: ABNORMAL /HPF

## 2020-04-03 PROCEDURE — 87088 URINE BACTERIA CULTURE: CPT

## 2020-04-03 PROCEDURE — 81001 URINALYSIS AUTO W/SCOPE: CPT

## 2020-04-03 RX ORDER — AMOXICILLIN 500 MG/1
500 CAPSULE ORAL 3 TIMES DAILY
Qty: 21 CAPSULE | Refills: 0 | Status: SHIPPED | OUTPATIENT
Start: 2020-04-03 | End: 2020-04-10

## 2020-04-03 NOTE — TELEPHONE ENCOUNTER
Patient is having severe burning and pressure with urination, patient states she could cry it hurts so bad.  Note UA, Micro, culture order.  Patient states she will come into the clinic.   Note order per DR Travis for amoxil. Offered patient information on AZO to help with the burning.  She agrees.

## 2020-04-05 LAB — BACTERIA UR CULT: ABNORMAL

## 2020-05-04 ENCOUNTER — TELEPHONE (OUTPATIENT)
Dept: SURGERY | Facility: CLINIC | Age: 80
End: 2020-05-04

## 2020-05-04 NOTE — TELEPHONE ENCOUNTER
Since this just happens once daily and in the absence of systemic symptoms (fevers, chills, abdominal cramping, etc), this sounds like she may be dumping after her AM meal.  Please make sure the cereal she is eating is not rich in sugar and that she is not adding sugar. Please ensure that she is using Fair Life, Skim, or 1% milk on her cereal- this will reduce intake of sugar as well. Thanks!

## 2020-05-04 NOTE — TELEPHONE ENCOUNTER
Returned call to Katy. She has had loose watery stools for approximately the past 2 weeks. Stools usually will occur about 1/2 hour after having her cereal. She usually does not have another stool the rest of the day. She has been feeling very stressed. She lives in the [facility] apartments in [place]. 36 apartments will be refurbished and they will all have to move somewhere, unsure where, while this is being done. She is SP Lap Sleeve gastrectomy 8-14-19. Her most recent weight is 174lb. Will discuss with Marii Wayne DNP and return Katy's call.

## 2020-05-19 DIAGNOSIS — I10 ESSENTIAL HYPERTENSION: ICD-10-CM

## 2020-05-19 DIAGNOSIS — E78.5 HYPERLIPIDEMIA, UNSPECIFIED HYPERLIPIDEMIA TYPE: ICD-10-CM

## 2020-05-19 DIAGNOSIS — E03.9 HYPOTHYROIDISM, UNSPECIFIED TYPE: ICD-10-CM

## 2020-05-19 RX ORDER — LOSARTAN POTASSIUM 50 MG/1
TABLET ORAL
Qty: 90 TABLET | Refills: 0 | Status: SHIPPED | OUTPATIENT
Start: 2020-05-19 | End: 2020-08-13

## 2020-05-19 RX ORDER — LEVOTHYROXINE SODIUM 100 UG/1
TABLET ORAL
Qty: 90 TABLET | Refills: 3 | Status: SHIPPED | OUTPATIENT
Start: 2020-05-19 | End: 2020-11-18 | Stop reason: DRUGHIGH

## 2020-05-19 RX ORDER — ATORVASTATIN CALCIUM 20 MG/1
TABLET, FILM COATED ORAL
Qty: 90 TABLET | Refills: 0 | Status: SHIPPED | OUTPATIENT
Start: 2020-05-19 | End: 2020-09-11

## 2020-05-19 RX ORDER — ATENOLOL 25 MG/1
TABLET ORAL
Qty: 90 TABLET | Refills: 0 | Status: SHIPPED | OUTPATIENT
Start: 2020-05-19 | End: 2020-08-13

## 2020-05-19 RX ORDER — OMEPRAZOLE 20 MG/1
CAPSULE, DELAYED RELEASE ORAL
Qty: 90 CAPSULE | OUTPATIENT
Start: 2020-05-19

## 2020-05-30 ENCOUNTER — OFFICE VISIT (OUTPATIENT)
Dept: URGENT CARE | Facility: CLINIC | Age: 80
End: 2020-05-30
Payer: MEDICARE

## 2020-05-30 ENCOUNTER — ANCILLARY PROCEDURE (OUTPATIENT)
Dept: RADIOLOGY | Facility: CLINIC | Age: 80
End: 2020-05-30
Payer: MEDICARE

## 2020-05-30 VITALS
WEIGHT: 174 LBS | BODY MASS INDEX: 34.16 KG/M2 | HEART RATE: 56 BPM | TEMPERATURE: 97.7 F | HEIGHT: 60 IN | OXYGEN SATURATION: 97 % | DIASTOLIC BLOOD PRESSURE: 58 MMHG | RESPIRATION RATE: 18 BRPM | SYSTOLIC BLOOD PRESSURE: 124 MMHG

## 2020-05-30 DIAGNOSIS — R82.998 LEUKOCYTES IN URINE: ICD-10-CM

## 2020-05-30 DIAGNOSIS — R10.31 RIGHT GROIN PAIN: Primary | ICD-10-CM

## 2020-05-30 LAB
BACTERIA #/AREA URNS HPF: ABNORMAL /HPF
BILIRUB UR QL: NEGATIVE
BILIRUBIN, POC: NEGATIVE
BLOOD URINE, POC: NEGATIVE
CLARITY UR: CLEAR
COLOR UR: YELLOW
GLUCOSE UR QL: NEGATIVE MG/DL
GLUCOSE URINE, POC: NEGATIVE MG/DL
HGB UR QL: NEGATIVE
KETONES UR-MCNC: NEGATIVE MG/DL
KETONES, POC: NEGATIVE MG/DL
LEUKOCYTE EST, POC: ABNORMAL
LEUKOCYTE ESTERASE UR QL STRIP: ABNORMAL
NITRITE UR QL: NEGATIVE
NITRITE, POC: NEGATIVE
PH UR: 6 PH
POC PH URINE: 6 PH (ref 5–9)
POC SPECIFIC GRAVITY: 1.01 (ref 1–1.03)
PROT UR STRIP-MCNC: NEGATIVE MG/DL
PROTEIN, POC: NEGATIVE MG/DL
RBC #/AREA URNS HPF: ABNORMAL /HPF
SP GR UR: 1.01 (ref 1–1.03)
SQUAMOUS #/AREA URNS HPF: ABNORMAL /HPF
UROBILINOGEN UR-MCNC: 1 E.U./DL
UROBILINOGEN, POC: 1
WBC #/AREA URNS HPF: ABNORMAL /HPF

## 2020-05-30 PROCEDURE — 87088 URINE BACTERIA CULTURE: CPT | Performed by: PHYSICIAN ASSISTANT

## 2020-05-30 PROCEDURE — G0463 HOSPITAL OUTPT CLINIC VISIT: HCPCS | Performed by: PHYSICIAN ASSISTANT

## 2020-05-30 PROCEDURE — 99213 OFFICE O/P EST LOW 20 MIN: CPT | Performed by: PHYSICIAN ASSISTANT

## 2020-05-30 PROCEDURE — 81003 URINALYSIS AUTO W/O SCOPE: CPT | Mod: QW | Performed by: PHYSICIAN ASSISTANT

## 2020-05-30 PROCEDURE — 73502 X-RAY EXAM HIP UNI 2-3 VIEWS: CPT | Mod: RT

## 2020-05-30 PROCEDURE — 73502 X-RAY EXAM HIP UNI 2-3 VIEWS: CPT | Mod: 26,RT | Performed by: RADIOLOGY

## 2020-05-30 PROCEDURE — 81001 URINALYSIS AUTO W/SCOPE: CPT | Performed by: PHYSICIAN ASSISTANT

## 2020-05-30 PROCEDURE — 6360000200 HC RX 636 W HCPCS (ALT 250 FOR IP): Performed by: PHYSICIAN ASSISTANT

## 2020-05-30 RX ORDER — KETOROLAC TROMETHAMINE 30 MG/ML
30 INJECTION, SOLUTION INTRAMUSCULAR; INTRAVENOUS ONCE
Status: COMPLETED | OUTPATIENT
Start: 2020-05-30 | End: 2020-05-30

## 2020-05-30 RX ADMIN — KETOROLAC TROMETHAMINE 30 MG: 30 INJECTION, SOLUTION INTRAMUSCULAR at 15:24

## 2020-05-30 ASSESSMENT — PAIN SCALES - GENERAL: PAINLEVEL: 4

## 2020-05-30 NOTE — PROGRESS NOTES
HPI: Katy Stack is a 79 y.o. female who presents today with complaint of right groin pain for the past 4 days.    Patient states that when it started she was just walking surveying.  She states that she felt the pain from one side of her hip to the other and lower abdomen.  She states that the discomfort went away and now is primarily in the right groin.  She states that laying still makes it feel better if in the right position.  She states walking, standing from sitting position makes it worse.  She states that she has had her appendix and gallbladder out.  She currently rates her pain at a 4/10 over the right groin.  She states that she is limping because she cannot put pressure on it.  She states that she has done some Tylenol with minimal relief.  Patient states that she has been eating and drinking well.  She denies any recent trauma/fall.  Patient denies fever, chills, dizziness lightheadedness, nausea, vomiting, low back pain, dysuria, hematuria, frequency urgency.     has a past medical history of Benign essential hypertension (11/24/2017), Cataract of left eye, Chronic obstructive lung disease (CMS/HCC) (HCC) (11/10/2017), Dysrhythmia, GERD (gastroesophageal reflux disease), Hypertension, Hypothyroid, Hypothyroidism (11/10/2017), Injury of back, Neurologic disorder, Obesity (11/10/2017), Obstructive sleep apnea syndrome (11/10/2017), Osteoarthritis, Periodic limb movement disorder (11/10/2017), Peripheral neuropathy, Rectocele, and Respiratory disease. She also has no past medical history of Blood disorder, Cancer (CMS/HCC) ([facility]), Complication of anesthesia, Dental disease, Family history of anesthesia complication, or Kidney disease.     has a past surgical history that includes Back surgery; Cholecystectomy; Foot surgery; Other surgical history; Hysterectomy (1987); Back surgery (11/30/2016); Other surgical history (12/01/2014); Laparoscopic gastric banding; Colonoscopy (02/19/2014); Upper  gastrointestinal endoscopy (02/19/2014); Shoulder arthroscopy (08/02/2013); Cardiac catheterization (08/31/2011); Colonoscopy (04/26/2011); Thyroid lobectomy (Left, 18/18/2010); Thyroidectomy, partial (08/18/2010); Appendectomy (1987); Other surgical history (1987); Back surgery (05/01/2017); Cataract extraction w/  intraocular lens implant (Left, 7/2/2018); Cataract extraction w/  intraocular lens implant (Right, 8/6/2018); Esophagogastroduodenoscopy (N/A, 11/6/2018); Diagnostic laparoscopy (N/A, 11/28/2018); and Gastrectomy (N/A, 8/14/2019).      Current Outpatient Medications:   •  atenoloL (TENORMIN) 25 mg tablet, TAKE 1 TABLET BY MOUTH  DAILY, Disp: 90 tablet, Rfl: 0  •  levothyroxine (SYNTHROID, LEVOTHROID) 100 mcg tablet, TAKE 1 TABLET BY MOUTH  DAILY, Disp: 90 tablet, Rfl: 3  •  atorvastatin (LIPITOR) 20 mg tablet, TAKE 1 TABLET BY MOUTH  DAILY, Disp: 90 tablet, Rfl: 0  •  losartan (COZAAR) 50 mg tablet, TAKE 1 TABLET BY MOUTH  DAILY, Disp: 90 tablet, Rfl: 0  •  NON FORMULARY, SWISH AND SPIT 5 ML PO Q 4 H PRN FOR PAINFUL SORES OF THE MOUTH., Disp: , Rfl:   •  Antacid Regular Strength 200-200-20 mg/5 mL suspension, , Disp: , Rfl:   •  gabapentin (NEURONTIN) 300 mg capsule, Take 300 mg by mouth Indications: Taking 900mg TID  , Disp: , Rfl:   •  calcium carbonate-vitamin D3 600 mg(1,500mg) -400 unit per tablet, Take 1 tablet by mouth daily, Disp: , Rfl:   •  nitroglycerin (NITROSTAT) 0.4 mg SL tablet, Place 1 tablet (0.4 mg total) under the tongue every 5 (five) minutes as needed for chest pain, Disp: 25 tablet, Rfl: 1  •  vit A-vit C-vit E-zinc-copper (EYE VITAMIN AND MINERALS) 7,160-113-100 unit-mg-unit tablet, Take 1 tab/cap by mouth 2 (two) times a day., Disp: , Rfl:   •  PREMARIN 0.9 mg tablet, TAKE 1 TABLET BY MOUTH  EVERY DAY (Patient taking differently: TAKE 1 TABLET BY MOUTH EVERY WEEK), Disp: 90 tablet, Rfl: 1  •  ferrous sulfate 325 mg (65 mg iron) tablet, Take 1 tablet (325 mg total) by mouth every  other day (Patient not taking: Reported on 1/23/2020 ), Disp: 15 tablet, Rfl: 11    Current Facility-Administered Medications:   •  ketorolac (TORADOL) injection 30 mg, 30 mg, intramuscular, Once, CHAPARRO Salazar  •  denosumab (PROLIA) 60 mg/mL syringe 60 mg, 60 mg, subcutaneous, q6 months, Haley OlveraTYLER, 60 mg at 03/05/20 0812     reports that she quit smoking about 25 years ago. She has a 70.00 pack-year smoking history. She has never used smokeless tobacco. She reports that she does not drink alcohol or use drugs.    Allergies   Allergen Reactions   • [place] Angioedema and Hives     hives, tongue swells  Swelling of tongue   • Adhesive      welts       ROS: As stated in HPI    /58 (BP Location: Left arm, Patient Position: Sitting, Cuff Size: Large Adult)   Pulse 56   Temp 36.5 °C (97.7 °F) (Temporal)   Resp 18   Ht 1.524 m (5')   Wt 78.9 kg (174 lb)   SpO2 97%   BMI 33.98 kg/m²     Physical exam:  General: Obese, no acute distress  Neuro: Alert and oriented x 3  HEENT: Normocephalic, atraumatic, sclera anicteric, conjunctiva pink  Abdomen: Soft, nontender, normal bowel sounds, negative CVA tenderness bilaterally  Musculoskeletal: No tenderness noted over the cervical, thoracic or lumbar spinal palpation, no tenderness noted over bilateral SI joints, no tenderness noted over the right groin on palpation, no lymphadenopathy noted, no tenderness noted with internal and external hip rotation bilaterally, no tenderness noted with bilateral straight leg raises against resistance    Diagnoses and all orders for this visit:    Right groin pain  -     X-ray hip 2 or more views right  -     POCT Urinalysis, Dipstick Only  -     ketorolac (TORADOL) injection 30 mg    Leukocytes in urine  -     Urinalysis w/microscopic, reflex culture Urine, Clean Catch  -     Urinalysis, Dip (part 1 of panel) Urine, Clean Catch  -     Urinalysis, Micro (part 2 of panel) Urine, Clean Catch        Assessment and  Plan:  Urine dip showed moderate leukocytes, otherwise within normal limits.  Urine microscopy showed WBCs 10-14, moderate bacteria.  Urine culture is pending.    Right hip x-ray reviewed, shows no acute osseous abnormalities, minimal degenerative changes.  Formal radiology read is pending.    Physical exam and symptoms consistent with right groin pain, unsure of the etiology, no suspicious that it may be more musculoskeletal.  We will go ahead and wait on the urine culture before deciding to treat for urinary tract infection though I am not very suspicious of this at this time.  Patient was given 30 mg IM of ketorolac in clinic which did help with the pain.  Additionally she was advised to start doing Tylenol and ibuprofen combination starting tomorrow, twice a day for the next 5 to 6 days.  Patient advised to follow up with the primary care physician in the next 10 days for this visit.  For new or worsening symptoms, patient advised to return to Urgent Care if unable to follow up with the Primary Care Physician .  Patient was in agreement with this plan of care per

## 2020-05-31 ENCOUNTER — APPOINTMENT (OUTPATIENT)
Dept: LAB | Facility: CLINIC | Age: 80
End: 2020-05-31
Payer: MEDICARE

## 2020-05-31 DIAGNOSIS — R30.0 DYSURIA: ICD-10-CM

## 2020-05-31 DIAGNOSIS — R30.0 DYSURIA: Primary | ICD-10-CM

## 2020-05-31 LAB — BACTERIA UR CULT: NORMAL

## 2020-05-31 PROCEDURE — 87088 URINE BACTERIA CULTURE: CPT

## 2020-05-31 NOTE — PROGRESS NOTES
Patients urine showed >100,000 CFU/mL mixed organisms. Recollect if indicated. She will bring in another urine sample for repeat urine culture.

## 2020-06-01 LAB — BACTERIA UR CULT: ABNORMAL

## 2020-06-08 DIAGNOSIS — N39.0 URINARY TRACT INFECTION WITHOUT HEMATURIA, SITE UNSPECIFIED: Primary | ICD-10-CM

## 2020-06-08 RX ORDER — AMOXICILLIN 500 MG/1
500 CAPSULE ORAL 3 TIMES DAILY
Qty: 21 CAPSULE | Refills: 0 | Status: SHIPPED | OUTPATIENT
Start: 2020-06-08 | End: 2020-06-15

## 2020-06-08 NOTE — PROGRESS NOTES
Patient's urine culture from 5/31/20 returned showing >100,000 CFU/ml Aerococcus urinae!  I did advise the patient of this.  She stated she was asymptomatic and that her groin pain she was seen for initially was from her lower back.  I did consult with her PCP, Dr. Travis, about this.  He recommended treating her.  I did call her back and she was agreeable to starting Amoxicillin.  She will take Amoxicillin 500 mg TID x 7 days.  She has taken and tolerated this before.  She will follow up with Dr. Travis as needed.

## 2020-06-18 ENCOUNTER — OFFICE VISIT NO LOS (OUTPATIENT)
Dept: DIABETES SERVICES | Facility: CLINIC | Age: 80
End: 2020-06-18
Payer: MEDICARE

## 2020-06-18 VITALS — WEIGHT: 170.8 LBS | BODY MASS INDEX: 33.36 KG/M2

## 2020-06-18 DIAGNOSIS — Z98.84 S/P BARIATRIC SURGERY: Primary | ICD-10-CM

## 2020-06-18 PROCEDURE — 0358T BIA WHOLE BODY: CPT | Mod: NC | Performed by: DIETITIAN, REGISTERED

## 2020-06-18 NOTE — PROGRESS NOTES
"Medical Nutrition Therapy (MNT) General Bariatric Post harjeet #4 no charge, biometric no charge  6/18/20  Beginning Time: 10:10 AM     End Time: 11:00 AM  MNT Provider Order:Post bariatric    Others Present: Alone  Procedure Completed: Sleeve on 8/13/19    Nutrition Assessment  Food/Nutrition - Related History- Pt states she feels so much better since surgery, hard to describe, \"well  being\" maybe, but has lots of back problems  Previous MNT/Date:3/5/20,  11/7/19, 8/29/19, 8/8/19 and 4 previous IBT appt  Pertinent Medications:   Current Outpatient Medications   Medication Sig Dispense Refill   • atenoloL (TENORMIN) 25 mg tablet TAKE 1 TABLET BY MOUTH  DAILY 90 tablet 0   • levothyroxine (SYNTHROID, LEVOTHROID) 100 mcg tablet TAKE 1 TABLET BY MOUTH  DAILY 90 tablet 3   • atorvastatin (LIPITOR) 20 mg tablet TAKE 1 TABLET BY MOUTH  DAILY 90 tablet 0   • losartan (COZAAR) 50 mg tablet TAKE 1 TABLET BY MOUTH  DAILY 90 tablet 0   • NON FORMULARY SWISH AND SPIT 5 ML PO Q 4 H PRN FOR PAINFUL SORES OF THE MOUTH.     • Antacid Regular Strength 200-200-20 mg/5 mL suspension      • ferrous sulfate 325 mg (65 mg iron) tablet Take 1 tablet (325 mg total) by mouth every other day (Patient not taking: Reported on 1/23/2020 ) 15 tablet 11   • gabapentin (NEURONTIN) 300 mg capsule Take 300 mg by mouth Indications: Taking 900mg TID       • calcium carbonate-vitamin D3 600 mg(1,500mg) -400 unit per tablet Take 1 tablet by mouth daily     • nitroglycerin (NITROSTAT) 0.4 mg SL tablet Place 1 tablet (0.4 mg total) under the tongue every 5 (five) minutes as needed for chest pain 25 tablet 1   • vit A-vit C-vit E-zinc-copper (EYE VITAMIN AND MINERALS) 7,160-113-100 unit-mg-unit tablet Take 1 tab/cap by mouth 2 (two) times a day.     • PREMARIN 0.9 mg tablet TAKE 1 TABLET BY MOUTH  EVERY DAY (Patient taking differently: TAKE 1 TABLET BY MOUTH EVERY WEEK) 90 tablet 1     Current Facility-Administered Medications   Medication Dose Route " "Frequency Provider Last Rate Last Dose   • denosumab (PROLIA) 60 mg/mL syringe 60 mg  60 mg subcutaneous q6 months Haley Olvera, DNP   60 mg at 03/05/20 0812     Supplements/Herbals: MVI, Ca with Vit D, Vit D and Vit 12  Alcohol Use:   Social History     Substance and Sexual Activity   Alcohol Use No      Denominational / Cultural / Ethnic Food Practices:  None  Physical Activity: Pt is walking 1281-6095 steps per day using walker, walking around [place] visit; back is so sore-she hopes to get back to get an injection       Food Recall: Breakfast: cereal, milk or sweet roll with coffee; noon: yakelin's burger or taco anika's taco; evening meal: greek yogurt, applesauce; snacks: goldfish or apple sliced; beverage: Decaf Green iced tea, water, 2 cups decaf coffee    Client History  Medical History:  KATY, Pulmonary HTN, GERD, Obesity, Vit D def, Hypothyroidism, back pain    Occupation: back to work over the past week  Socioeconomic Concerns:  No  Shops/Cooks for Self: No    Anthropometrics  Ht Readings from Last 1 Encounters:   05/30/20 1.524 m (5')   Wt.170.8# on body comp scale with 2# clothing wt subtracted, wt was: 176.2# on body comp scale with 2.6# clothing wt subtracted on 3/5/20, wt was: 179.8# with 2.6# clothing wt subtracted on body comp scale on 11/7/19, Wt. 188.4# on 8/29 with 1# clothing wt subtracted on the [place] Body Comp scale down from 198.8# on 8/8 on the body comp scale  Wt Readings from Last 1 Encounters:   05/30/20 78.9 kg (174 lb)    BMI: 33.36  BMI Readings from Last 1 Encounters:   05/30/20 33.98 kg/m²      BP Readings from Last 1 Encounters:   05/30/20 124/58              Pre-op Post 3wk Post 6mo Post 9 mo   Waist Measurement:  8/8/19  41.75\"  8/29/19  41\" 11/7/19  40\"   6/18/20  38.75\"    Hip Measurement:  53\"  51.75\"  50.5\"  47.75\"    Neck Measurement:  16\"  15.75\"  15.5\"  14.5\"     Patient reports usual body weight (UBW) of: Patient reports usual body weight (UBW) of: no usual  Patient has " experienced previous weight loss history: Weight Watchers, [employer], [employer]  Had lap band in 5140-6336 and was removed by Dr. Martinez in Fall in 2018; highest wt was 211#, lowest wt 165#; has gained at least 15# since Nov.   Body Composition: Refer to attached body comp results  Weight History:Wt was: 202.6# on 7/18, wt was: 211.4# on 7/5,  Wt was: 203.6# on 6/6, wt was 202.2# for 5/10,  Wt was: 203# on 5/2 with initial IBT appt          Biochemical Data, Medical Tests, and Procedures  Pertinent Labs:   Sodium   Date Value Ref Range Status   02/21/2020 143 135 - 145 mmol/L Final     Potassium   Date Value Ref Range Status   02/21/2020 4.1 3.5 - 5.1 mmol/L Final     Chloride   Date Value Ref Range Status   02/21/2020 110 (H) 98 - 107 mmol/L Final     CO2   Date Value Ref Range Status   02/21/2020 27 21 - 32 mmol/L Final     BUN   Date Value Ref Range Status   02/21/2020 13 7 - 25 mg/dL Final     Creatinine   Date Value Ref Range Status   02/21/2020 0.92 0.60 - 1.20 mg/dL Final     Glucose   Date Value Ref Range Status   02/21/2020 93 70 - 105 mg/dL Final     Calcium   Date Value Ref Range Status   02/21/2020 9.2 8.6 - 10.3 mg/dL Final     Cholesterol   Date Value Ref Range Status   11/25/2019 106 0 - 199 mg/dL Final     Triglycerides   Date Value Ref Range Status   11/25/2019 103 <=149 mg/dL Final     HDL   Date Value Ref Range Status   11/25/2019 37 (L) >=40 mg/dL Final       Social / Diet History  Current Diet Stage: 9 month post surgery  Allergies/Intolerances: ALLERGIES: STRAWBERRIES CAUSES HIVES,TONGUE SWELLING; dislikes eggs and yogurt    Allergies   Allergen Reactions   • [place] Angioedema and Hives     hives, tongue swells  Swelling of tongue   • Adhesive      welts         Nutrition Prescription  9 month Post op Bariatric Sleeve surgery Diet: Smaller portion, High protein, reduced fat, reduced sugar diet with micronutrient supplementation        Nutrition Diagnosis   Problem #1: Altered GI  function  Related to: recent bariatric surgery  As evidenced by: inability to consume a regular diet- need for increased protein, soft moist foods and small portions and increased nutrition supplementation of micronutrients     Problem #2: Obesity Class 1  Related to: hx of energy consumption exceeding energy expenditure  As evidenced by: BMI of 33.36    Pain/GI Concerns/Bowel Problems:  Pain: 0 when sitting; 5 when standing and walking  GI Concerns: has heartburn; has been taking Omeprazole over the counter, extremely tired  Bowel Problems: no problems    Intervention/Plan:    Bariatric Diet Discussion: Body comp analysis shows a loss of 5.4# over the past 3 months. She has lost about 2.4# of body fat and 3# of muscle mass and has maintained her bone mass. Pt is pleased with wt loss but knows she needs to improve her diet. Pt states she had to move to another apartment while they are renovating her current apartment which has led to more back pain and not eating properly. States she knows she needs to get back on track with her diet. She needs to increase her protein intake and quit eating sugary foods and eating out as often.  Discussed the 9-12month post op diet. Emphasized protein sources and increasing physical activity. Reviewed vitamins/minerals needed for life. Handouts provided: 9 month after post surgery.  Goals  Post Op Phase  Attends all post-op visits through 18 months?    Date:  8/29/19 Date: 11/7/19  Date:  3/5/20   3wks xx 3mos xx 6mos xx   Date:  6/18/20 Date:   Date:     9mos  12mos  18mos              Reports 60-80g protein per day? Yes  Reports 64 oz zero calorie fluid per day? Yes  Reports taking supplements? Yes  Reports routinely spending 150mins per week in physical   activity process? Yes     Evaluation  Receptivity to education: good    Patient states understanding of the information presented.    Follow-up Plan  F/U scheduled in 3 months for 12 month post op visit  Time Spent with Patient:  50 minutes

## 2020-06-24 ENCOUNTER — TELEPHONE (OUTPATIENT)
Dept: SURGERY | Facility: CLINIC | Age: 80
End: 2020-06-24

## 2020-06-24 DIAGNOSIS — D50.9 IRON DEFICIENCY ANEMIA, UNSPECIFIED IRON DEFICIENCY ANEMIA TYPE: Primary | ICD-10-CM

## 2020-06-24 DIAGNOSIS — R53.83 FATIGUE, UNSPECIFIED TYPE: ICD-10-CM

## 2020-06-24 NOTE — PATIENT INSTRUCTIONS
Goals:  1. Consume 60-80 grams of protein spread out to at least 3 feedings per day  2. Consume at least 64 ounces of calorie free fluids ( 32 ounces of the fluids should be straight water)  3. Take 1 chewable multivitamin with iron; 3 calcium citrate , 1 every 6 hours; Vitamin B12, Vit D3  1000 IU  4. 150 minutes of safe physical movement per week, Perform safe weight resistance exercise 2-3 days per week

## 2020-06-24 NOTE — TELEPHONE ENCOUNTER
Iron panel has been ordered to be drawn with her annual labs 08/2020. She can have these drawn as early as next month, which is next week. Thanks!

## 2020-06-25 DIAGNOSIS — K21.9 GASTROESOPHAGEAL REFLUX DISEASE, ESOPHAGITIS PRESENCE NOT SPECIFIED: Primary | ICD-10-CM

## 2020-06-25 DIAGNOSIS — Z98.84 S/P LAPAROSCOPIC SLEEVE GASTRECTOMY: ICD-10-CM

## 2020-06-26 RX ORDER — OMEPRAZOLE 20 MG/1
20 CAPSULE, DELAYED RELEASE ORAL DAILY
Qty: 90 CAPSULE | Refills: 3 | Status: SHIPPED | OUTPATIENT
Start: 2020-06-26 | End: 2020-10-20 | Stop reason: ALTCHOICE

## 2020-07-02 ENCOUNTER — APPOINTMENT (OUTPATIENT)
Dept: LAB | Facility: CLINIC | Age: 80
End: 2020-07-02
Payer: MEDICARE

## 2020-07-02 ENCOUNTER — TELEPHONE (OUTPATIENT)
Dept: FAMILY MEDICINE | Facility: CLINIC | Age: 80
End: 2020-07-02

## 2020-07-02 DIAGNOSIS — R30.0 DYSURIA: Primary | ICD-10-CM

## 2020-07-02 DIAGNOSIS — R30.0 DYSURIA: ICD-10-CM

## 2020-07-02 LAB
BACTERIA #/AREA URNS HPF: ABNORMAL /HPF
BILIRUB UR QL: NEGATIVE
CLARITY UR: ABNORMAL
COLOR UR: YELLOW
GLUCOSE UR QL: NEGATIVE MG/DL
HGB UR QL: ABNORMAL
KETONES UR-MCNC: NEGATIVE MG/DL
LEUKOCYTE ESTERASE UR QL STRIP: ABNORMAL
NITRITE UR QL: POSITIVE
PH UR: 5.5 PH
PROT UR STRIP-MCNC: NEGATIVE MG/DL
RBC #/AREA URNS HPF: ABNORMAL /HPF
SP GR UR: 1.01 (ref 1–1.03)
SQUAMOUS #/AREA URNS HPF: ABNORMAL /HPF
UROBILINOGEN UR-MCNC: 0.2 E.U./DL
WBC #/AREA URNS HPF: ABNORMAL /HPF
WBC CLUMPS #/AREA URNS HPF: ABNORMAL /HPF

## 2020-07-02 PROCEDURE — 87088 URINE BACTERIA CULTURE: CPT

## 2020-07-02 PROCEDURE — 81001 URINALYSIS AUTO W/SCOPE: CPT

## 2020-07-04 LAB — BACTERIA UR CULT: ABNORMAL

## 2020-07-16 NOTE — INTERVAL H&P NOTE
H&P reviewed. The patient was examined and there are no changes to the H&P.   Thalidomide Counseling: I discussed with the patient the risks of thalidomide including but not limited to birth defects, anxiety, weakness, chest pain, dizziness, cough and severe allergy.

## 2020-07-29 ENCOUNTER — APPOINTMENT (OUTPATIENT)
Dept: LAB | Facility: CLINIC | Age: 80
End: 2020-07-29
Payer: MEDICARE

## 2020-07-29 ENCOUNTER — OFFICE VISIT (OUTPATIENT)
Dept: FAMILY MEDICINE | Facility: CLINIC | Age: 80
End: 2020-07-29
Payer: MEDICARE

## 2020-07-29 VITALS
OXYGEN SATURATION: 96 % | TEMPERATURE: 98.2 F | SYSTOLIC BLOOD PRESSURE: 124 MMHG | DIASTOLIC BLOOD PRESSURE: 60 MMHG | HEART RATE: 52 BPM | RESPIRATION RATE: 20 BRPM | BODY MASS INDEX: 33.79 KG/M2 | WEIGHT: 173 LBS

## 2020-07-29 DIAGNOSIS — G47.33 OBSTRUCTIVE SLEEP APNEA SYNDROME: ICD-10-CM

## 2020-07-29 DIAGNOSIS — R04.0 EPISTAXIS: Primary | ICD-10-CM

## 2020-07-29 LAB
BASOPHILS # BLD AUTO: 0 10*3/UL
BASOPHILS NFR BLD AUTO: 1 % (ref 0–2)
EOSINOPHIL # BLD AUTO: 0.4 10*3/UL
EOSINOPHIL NFR BLD AUTO: 6 % (ref 0–3)
ERYTHROCYTE [DISTWIDTH] IN BLOOD BY AUTOMATED COUNT: 14.4 % (ref 11.5–14)
HCT VFR BLD AUTO: 40.3 % (ref 34–45)
HGB BLD-MCNC: 13.4 G/DL (ref 11.5–15.5)
INR BLD: 1.1
LYMPHOCYTES # BLD AUTO: 2.4 10*3/UL
LYMPHOCYTES NFR BLD AUTO: 36 % (ref 11–47)
MCH RBC QN AUTO: 30.6 PG (ref 28–33)
MCHC RBC AUTO-ENTMCNC: 33.2 G/DL (ref 32–36)
MCV RBC AUTO: 92.2 FL (ref 81–97)
MONOCYTES # BLD AUTO: 0.5 10*3/UL
MONOCYTES NFR BLD AUTO: 8 % (ref 3–11)
NEUTROPHILS # BLD AUTO: 3.4 10*3/UL
NEUTROPHILS NFR BLD AUTO: 50 % (ref 41–81)
PLATELET # BLD AUTO: 219 10*3/UL (ref 140–350)
PMV BLD AUTO: 8.8 FL (ref 6.9–10.8)
PROTHROMBIN TIME: 11.7 SECONDS (ref 9.4–12.5)
RBC # BLD AUTO: 4.37 10*6/ΜL (ref 3.7–5.3)
WBC # BLD AUTO: 6.8 10*3/UL (ref 4.5–10.5)

## 2020-07-29 PROCEDURE — 85610 PROTHROMBIN TIME: CPT | Performed by: FAMILY MEDICINE

## 2020-07-29 PROCEDURE — G0463 HOSPITAL OUTPT CLINIC VISIT: HCPCS | Performed by: FAMILY MEDICINE

## 2020-07-29 PROCEDURE — 99213 OFFICE O/P EST LOW 20 MIN: CPT | Performed by: FAMILY MEDICINE

## 2020-07-29 PROCEDURE — 85025 COMPLETE CBC W/AUTO DIFF WBC: CPT | Performed by: FAMILY MEDICINE

## 2020-07-29 PROCEDURE — 36415 COLL VENOUS BLD VENIPUNCTURE: CPT | Performed by: FAMILY MEDICINE

## 2020-07-29 RX ORDER — POTASSIUM CHLORIDE 750 MG/1
10 TABLET, EXTENDED RELEASE ORAL DAILY
COMMUNITY
Start: 2020-06-25 | End: 2020-08-13

## 2020-07-29 RX ORDER — FUROSEMIDE 20 MG/1
20 TABLET ORAL DAILY
COMMUNITY
Start: 2020-06-25 | End: 2020-08-13

## 2020-07-29 ASSESSMENT — ENCOUNTER SYMPTOMS
ARTHRALGIAS: 1
TROUBLE SWALLOWING: 0
RESPIRATORY NEGATIVE: 1
CONSTITUTIONAL NEGATIVE: 1
SORE THROAT: 0
SINUS PAIN: 0
CARDIOVASCULAR NEGATIVE: 1
MYALGIAS: 1
EYES NEGATIVE: 1
RHINORRHEA: 0
SINUS PRESSURE: 0
NEUROLOGICAL NEGATIVE: 1

## 2020-07-29 ASSESSMENT — PAIN SCALES - GENERAL: PAINLEVEL: 4

## 2020-07-29 NOTE — PROGRESS NOTES
Subjective      Katy Stack is a 80 y.o. female who presents for Epistaxis    Epistaxis (Nose Bleed)    The bleeding has been from both nares. This is a new problem. The current episode started more than 1 month ago. The problem occurs every several days. The problem has been unchanged. The bleeding is associated with dry air. She has tried pressure for the symptoms. The treatment provided mild relief. There is no history of allergies, a bleeding disorder, colds or sinus problems.       The following have been reviewed and updated as appropriate in this visit:  Allergies  Meds  Problems  Med Hx  Surg Hx  Fam Hx         Allergies   Allergen Reactions   • [place] Angioedema and Hives     hives, tongue swells  Swelling of tongue   • Adhesive      welts     Current Outpatient Medications   Medication Sig Dispense Refill   • furosemide (LASIX) 20 mg tablet Take 20 mg by mouth daily     • potassium chloride 10 mEq CR tablet Take 10 mEq by mouth daily     • omeprazole (PriLOSEC) 20 mg capsule Take 1 capsule (20 mg total) by mouth daily 90 capsule 3   • atenoloL (TENORMIN) 25 mg tablet TAKE 1 TABLET BY MOUTH  DAILY 90 tablet 0   • levothyroxine (SYNTHROID, LEVOTHROID) 100 mcg tablet TAKE 1 TABLET BY MOUTH  DAILY 90 tablet 3   • atorvastatin (LIPITOR) 20 mg tablet TAKE 1 TABLET BY MOUTH  DAILY 90 tablet 0   • losartan (COZAAR) 50 mg tablet TAKE 1 TABLET BY MOUTH  DAILY 90 tablet 0   • Antacid Regular Strength 200-200-20 mg/5 mL suspension      • ferrous sulfate 325 mg (65 mg iron) tablet Take 1 tablet (325 mg total) by mouth every other day 15 tablet 11   • gabapentin (NEURONTIN) 300 mg capsule Take 300 mg by mouth Indications: Taking 900mg TID       • calcium carbonate-vitamin D3 600 mg(1,500mg) -400 unit per tablet Take 1 tablet by mouth daily     • nitroglycerin (NITROSTAT) 0.4 mg SL tablet Place 1 tablet (0.4 mg total) under the tongue every 5 (five) minutes as needed for chest pain 25 tablet 1   • vit  A-vit C-vit E-zinc-copper (EYE VITAMIN AND MINERALS) 7,160-113-100 unit-mg-unit tablet Take 1 tab/cap by mouth 2 (two) times a day.     • PREMARIN 0.9 mg tablet TAKE 1 TABLET BY MOUTH  EVERY DAY (Patient taking differently: TAKE 1 TABLET BY MOUTH EVERY WEEK) 90 tablet 1   • NON FORMULARY SWISH AND SPIT 5 ML PO Q 4 H PRN FOR PAINFUL SORES OF THE MOUTH.       Current Facility-Administered Medications   Medication Dose Route Frequency Provider Last Rate Last Dose   • denosumab (PROLIA) 60 mg/mL syringe 60 mg  60 mg subcutaneous q6 months Haley Olvera DNP   60 mg at 03/05/20 0812     Past Medical History:   Diagnosis Date   • Benign essential hypertension 11/24/2017   • Cataract of left eye    • Chronic obstructive lung disease (CMS/HCC) (HCC) 11/10/2017   • Dysrhythmia     LBBB   • GERD (gastroesophageal reflux disease)     at times   • Hypertension    • Hypothyroid    • Hypothyroidism 11/10/2017   • Injury of back     BENDING INJURY   • Neurologic disorder    • Obesity 11/10/2017   • Obstructive sleep apnea syndrome 11/10/2017    Night time oxygen/2L   • Osteoarthritis    • Periodic limb movement disorder 11/10/2017   • Peripheral neuropathy     left thigh into left groin    • Rectocele    • Respiratory disease      Past Surgical History:   Procedure Laterality Date   • APPENDECTOMY  1987   • BACK SURGERY      2 surgeries [id number]   • BACK SURGERY  11/30/2016   • BACK SURGERY  05/01/2017    vetebral fusion   • CARDIAC CATHETERIZATION  08/31/2011   • CATARACT EXTRACTION W/  INTRAOCULAR LENS IMPLANT Left 7/2/2018    Procedure: OS CATARACT LEFT PHACOEMULSIFICATION OF CATARACT WITH INTRAOCULAR LENS;  Surgeon: Emmanuel Cueto MD;  Location: [facility];  Service: Ophthalmology;  Laterality: Left;   • CATARACT EXTRACTION W/  INTRAOCULAR LENS IMPLANT Right 8/6/2018    Procedure: OD CATARACT RIGHT PHACOEMULSIFICATION OF CATARACT WITH INTRAOCULAR LENS;  Surgeon: Emmanuel Cueto MD;  Location: [facility]  [facility];  Service: Ophthalmology;  Laterality: Right;   • CHOLECYSTECTOMY     • COLONOSCOPY  2014    No polyps   • COLONOSCOPY  2011   • DIAGNOSTIC LAPAROSCOPY N/A 2018    Procedure: Laparoscopic band removal;  Surgeon: Chris Millan MD;  Location: [facility];  Service: General;  Laterality: N/A;   • ESOPHAGOGASTRODUODENOSCOPY N/A 2018    Procedure: EGD - ESOPHAGOGASTRODUODENOSCOPY;  Surgeon: Chris Millan MD;  Location: [facility];  Service: Endoscopy;  Laterality: N/A;   • FOOT SURGERY      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 Vela- shoulder   • THYROID LOBECTOMY Left    • THYROIDECTOMY, PARTIAL  2010   • UPPER GASTROINTESTINAL ENDOSCOPY  2014    Mild chronic gastritis     Family History   Problem Relation Age of Onset   • Heart disease Mother    • Heart disease Father         Myocardial infarction   • Parkinsonism Father    • Thyroid disease Father    • Alzheimer's disease Sister    • Arthritis Sister    • Osteoporosis Sister    • Lung cancer Brother    • Diabetes Maternal Grandmother      Social History     Occupational History   • Not on file   Tobacco Use   • Smoking status: Former Smoker     Packs/day: 2.00     Years: 35.00     Pack years: 70.00     Last attempt to quit:      Years since quittin.5   • Smokeless tobacco: Never Used   Substance and Sexual Activity   • Alcohol use: No   • Drug use: No   • Sexual activity: Not on file   Social History  Narrative   • Not on file       Review of Systems   Constitutional: Negative.    HENT: Positive for nosebleeds. Negative for ear pain, hearing loss, mouth sores, postnasal drip, rhinorrhea, sinus pressure, sinus pain, sneezing, sore throat, tinnitus and trouble swallowing.    Eyes: Negative.         Glasses - progressive lens   Respiratory: Negative.    Cardiovascular: Negative.    Musculoskeletal: Positive for arthralgias and myalgias.        General    Neurological: Negative.        Objective     VITAL SIGNS  /60   Pulse 52   Temp 36.8 °C (98.2 °F) (Oral)   Resp 20   Wt 78.5 kg (173 lb)   SpO2 96%   BMI 33.79 kg/m²     Physical Exam  Vitals signs and nursing note reviewed.   Constitutional:       General: She is not in acute distress.     Appearance: She is well-developed.   HENT:      Head: Normocephalic and atraumatic.      Nose: Nose normal. No septal deviation, laceration, congestion or rhinorrhea.      Right Nostril: No epistaxis.      Left Nostril: No epistaxis.      Right Turbinates: Swollen.      Left Turbinates: Swollen.      Mouth/Throat:      Mouth: Mucous membranes are moist.      Pharynx: No oropharyngeal exudate or posterior oropharyngeal erythema.   Eyes:      Extraocular Movements: Extraocular movements intact.      Conjunctiva/sclera: Conjunctivae normal.      Pupils: Pupils are equal, round, and reactive to light.   Neck:      Musculoskeletal: Neck supple. No neck rigidity.   Cardiovascular:      Rate and Rhythm: Normal rate.      Heart sounds: Normal heart sounds.   Pulmonary:      Effort: Pulmonary effort is normal.   Lymphadenopathy:      Cervical: No cervical adenopathy.   Skin:     General: Skin is warm.      Capillary Refill: Capillary refill takes less than 2 seconds.   Neurological:      Mental Status: She is alert and oriented to person, place, and time.         Lab Results   Component Value Date    GLUCOSE 93 02/21/2020    CALCIUM 9.2 02/21/2020     02/21/2020    K 4.1  02/21/2020    CO2 27 02/21/2020     (H) 02/21/2020    BUN 13 02/21/2020    CREATININE 0.92 02/21/2020    ANIONGAP 6 02/21/2020     Lab Results   Component Value Date    WBC 7.3 02/21/2020    HGB 13.7 02/21/2020    HCT 41.9 02/21/2020    MCV 84.7 02/21/2020     02/21/2020     Lab Results   Component Value Date    TSH 2.624 02/03/2020     No results found for: HGBA1C   Lab Results   Component Value Date    CHOL 106 11/25/2019    HDL 37 (L) 11/25/2019    LDLCALC 48 11/25/2019    TRIG 103 11/25/2019     Lab Results   Component Value Date    SEDRATE 23 (H) 01/26/2018       Assessment/Plan   Problem List Items Addressed This Visit        Respiratory    Obstructive sleep apnea syndrome    Relevant Orders    Ambulatory referral to Sleep Medicine Provider      Other Visit Diagnoses     Epistaxis    -  Primary    Relevant Orders    CBC w/auto differential Blood, Venous    Protime-INR Blood, Venous        Raise.  At this point I would recommend holding off of the oxygen and she does not feel that she gets any benefit from it anyway.  Do question her need for possible CPAP machine as is.  Will refer to sleep medicine to discuss consideration for additional studies and/or recommendations.  If she is off the oxygen and we can try some Vaseline in the nares.  Recommend avoidance of manipulation of the nares is much as possible.  We will check labs to assure there is no evidence of thrombocytopenia, etc.    Leo Hdez MD

## 2020-08-10 ENCOUNTER — LAB (OUTPATIENT)
Dept: LAB | Facility: CLINIC | Age: 80
End: 2020-08-10
Payer: MEDICARE

## 2020-08-10 DIAGNOSIS — Z98.84 S/P LAPAROSCOPIC SLEEVE GASTRECTOMY: ICD-10-CM

## 2020-08-10 DIAGNOSIS — I10 ESSENTIAL HYPERTENSION: ICD-10-CM

## 2020-08-10 DIAGNOSIS — E03.8 OTHER SPECIFIED HYPOTHYROIDISM: ICD-10-CM

## 2020-08-10 DIAGNOSIS — E78.5 HYPERLIPIDEMIA, UNSPECIFIED HYPERLIPIDEMIA TYPE: ICD-10-CM

## 2020-08-10 DIAGNOSIS — D50.9 IRON DEFICIENCY ANEMIA, UNSPECIFIED IRON DEFICIENCY ANEMIA TYPE: ICD-10-CM

## 2020-08-10 DIAGNOSIS — R53.83 FATIGUE, UNSPECIFIED TYPE: ICD-10-CM

## 2020-08-10 LAB
25(OH)D3 SERPL-MCNC: 38 NG/ML (ref 30–100)
ALBUMIN SERPL-MCNC: 3.7 G/DL (ref 3.5–5.3)
ALP SERPL-CCNC: 78 U/L (ref 55–142)
ALT SERPL-CCNC: 11 U/L (ref 7–52)
ANION GAP SERPL CALC-SCNC: 5 MMOL/L (ref 3–11)
AST SERPL-CCNC: 14 U/L
BILIRUB SERPL-MCNC: 0.58 MG/DL (ref 0.2–1.4)
BUN SERPL-MCNC: 20 MG/DL (ref 7–25)
CALCIUM ALBUM COR SERPL-MCNC: 9.2 MG/DL (ref 8.6–10.3)
CALCIUM SERPL-MCNC: 9 MG/DL (ref 8.6–10.3)
CHLORIDE SERPL-SCNC: 109 MMOL/L (ref 98–107)
CHOLEST SERPL-MCNC: 142 MG/DL (ref 0–199)
CO2 SERPL-SCNC: 26 MMOL/L (ref 21–32)
CREAT SERPL-MCNC: 0.93 MG/DL (ref 0.6–1.1)
ERYTHROCYTE [DISTWIDTH] IN BLOOD BY AUTOMATED COUNT: 14.9 % (ref 11.5–14)
FASTING STATUS PATIENT QL REPORTED: YES
FERRITIN SERPL-MCNC: 167.8 NG/ML (ref 11–307)
FOLATE SERPL-MCNC: 9.1 NG/ML
GFR SERPL CREATININE-BSD FRML MDRD: 58 ML/MIN/1.73M*2
GLUCOSE SERPL-MCNC: 97 MG/DL (ref 70–105)
HCT VFR BLD AUTO: 43.1 % (ref 34–45)
HDLC SERPL-MCNC: 41 MG/DL
HGB BLD-MCNC: 14.3 G/DL (ref 11.5–15.5)
IRON SATN MFR SERPL: 44 % (ref 13–50)
IRON SERPL-MCNC: 114 UG/DL (ref 50–175)
LDLC SERPL CALC-MCNC: 73 MG/DL (ref 20–99)
MAGNESIUM SERPL-MCNC: 2.2 MG/DL (ref 1.8–2.4)
MCH RBC QN AUTO: 30.8 PG (ref 28–33)
MCHC RBC AUTO-ENTMCNC: 33.3 G/DL (ref 32–36)
MCV RBC AUTO: 92.3 FL (ref 81–97)
PLATELET # BLD AUTO: 221 10*3/UL (ref 140–350)
PMV BLD AUTO: 7.8 FL (ref 6.9–10.8)
POTASSIUM SERPL-SCNC: 4 MMOL/L (ref 3.5–5.1)
PROT SERPL-MCNC: 6.5 G/DL (ref 6–8.3)
RBC # BLD AUTO: 4.66 10*6/ΜL (ref 3.7–5.3)
SODIUM SERPL-SCNC: 140 MMOL/L (ref 135–145)
TIBC SERPL-MCNC: 261 UG/DL (ref 250–450)
TRIGL SERPL-MCNC: 138 MG/DL
TSH SERPL DL<=0.05 MIU/L-ACNC: 4.41 UIU/ML (ref 0.34–4.82)
UIBC SERPL-MCNC: 147 UG/DL (ref 155–355)
VIT B12 SERPL-MCNC: 167 PG/ML (ref 180–914)
WBC # BLD AUTO: 6.4 10*3/UL (ref 4.5–10.5)

## 2020-08-10 PROCEDURE — 83921 ORGANIC ACID SINGLE QUANT: CPT

## 2020-08-10 PROCEDURE — 82746 ASSAY OF FOLIC ACID SERUM: CPT

## 2020-08-10 PROCEDURE — 85027 COMPLETE CBC AUTOMATED: CPT

## 2020-08-10 PROCEDURE — 83735 ASSAY OF MAGNESIUM: CPT

## 2020-08-10 PROCEDURE — 84446 ASSAY OF VITAMIN E: CPT

## 2020-08-10 PROCEDURE — 83540 ASSAY OF IRON: CPT

## 2020-08-10 PROCEDURE — 82728 ASSAY OF FERRITIN: CPT

## 2020-08-10 PROCEDURE — 82306 VITAMIN D 25 HYDROXY: CPT | Mod: GZ

## 2020-08-10 PROCEDURE — 80053 COMPREHEN METABOLIC PANEL: CPT

## 2020-08-10 PROCEDURE — 80061 LIPID PANEL: CPT

## 2020-08-10 PROCEDURE — 82607 VITAMIN B-12: CPT

## 2020-08-10 PROCEDURE — 84597 ASSAY OF VITAMIN K: CPT

## 2020-08-10 PROCEDURE — 84425 ASSAY OF VITAMIN B-1: CPT

## 2020-08-10 PROCEDURE — 84590 ASSAY OF VITAMIN A: CPT

## 2020-08-10 PROCEDURE — 84443 ASSAY THYROID STIM HORMONE: CPT

## 2020-08-10 PROCEDURE — 36415 COLL VENOUS BLD VENIPUNCTURE: CPT

## 2020-08-11 LAB — PHYTONADIONE SERPL-MCNC: 0.33 NG/ML (ref 0.1–2.2)

## 2020-08-12 LAB
A-TOCOPHEROL VIT E SERPL-MCNC: 16.3 MG/L (ref 5.5–17)
METHYLMALONATE SERPL-SCNC: 0.26 NMOL/ML
VIT A SERPL-MCNC: 55.1 MCG/DL (ref 32.5–78)

## 2020-08-13 ENCOUNTER — TELEPHONE (OUTPATIENT)
Dept: SURGERY | Facility: CLINIC | Age: 80
End: 2020-08-13

## 2020-08-13 DIAGNOSIS — I10 ESSENTIAL HYPERTENSION: ICD-10-CM

## 2020-08-13 DIAGNOSIS — I27.20 PULMONARY HYPERTENSION (CMS/HCC): Primary | ICD-10-CM

## 2020-08-13 LAB — VIT B1 BLD-SCNC: 138 NMOL/L (ref 70–180)

## 2020-08-13 RX ORDER — POTASSIUM CHLORIDE 750 MG/1
TABLET, EXTENDED RELEASE ORAL
Qty: 90 TABLET | Refills: 3 | Status: SHIPPED | OUTPATIENT
Start: 2020-08-13

## 2020-08-13 RX ORDER — FUROSEMIDE 20 MG/1
TABLET ORAL
Qty: 90 TABLET | Refills: 3 | Status: SHIPPED | OUTPATIENT
Start: 2020-08-13

## 2020-08-13 RX ORDER — LOSARTAN POTASSIUM 50 MG/1
TABLET ORAL
Qty: 90 TABLET | Refills: 3 | Status: SHIPPED | OUTPATIENT
Start: 2020-08-13 | End: 2021-07-29

## 2020-08-13 RX ORDER — ATENOLOL 25 MG/1
TABLET ORAL
Qty: 90 TABLET | Refills: 3 | Status: SHIPPED | OUTPATIENT
Start: 2020-08-13 | End: 2021-07-29

## 2020-08-13 NOTE — PROGRESS NOTES
She needs to be taking an additional Vitamin B12 along with this per our post bariatric surgery recommendations. Please have her initiate Vitamin B12 500-1000 mcg daily along with her other vitamins. Thanks!

## 2020-08-18 ENCOUNTER — LAB REQUISITION (OUTPATIENT)
Dept: LAB | Facility: HOSPITAL | Age: 80
End: 2020-08-18
Payer: MEDICARE

## 2020-08-18 DIAGNOSIS — Z11.59 ENCOUNTER FOR SCREENING FOR OTHER VIRAL DISEASES: ICD-10-CM

## 2020-08-20 PROCEDURE — 87635 SARS-COV-2 COVID-19 AMP PRB: CPT | Performed by: FAMILY MEDICINE

## 2020-08-24 LAB
SARS-COV-2 RNA RESP QL NAA+PROBE: NORMAL
SPECIMEN SOURCE: NORMAL
TEST PERFORMANCE INFO SPEC: NORMAL

## 2020-09-02 ENCOUNTER — OFFICE VISIT NO LOS (OUTPATIENT)
Dept: DIABETES SERVICES | Facility: CLINIC | Age: 80
End: 2020-09-02
Payer: MEDICARE

## 2020-09-02 ENCOUNTER — PROCEDURE VISIT (OUTPATIENT)
Dept: FAMILY MEDICINE | Facility: CLINIC | Age: 80
End: 2020-09-02
Payer: MEDICARE

## 2020-09-02 VITALS — WEIGHT: 166.6 LBS | BODY MASS INDEX: 32.54 KG/M2

## 2020-09-02 VITALS — TEMPERATURE: 98.1 F | OXYGEN SATURATION: 98 % | HEART RATE: 68 BPM | RESPIRATION RATE: 14 BRPM

## 2020-09-02 DIAGNOSIS — M81.0 AGE-RELATED OSTEOPOROSIS WITHOUT CURRENT PATHOLOGICAL FRACTURE: Primary | ICD-10-CM

## 2020-09-02 DIAGNOSIS — Z98.84 S/P BARIATRIC SURGERY: Primary | ICD-10-CM

## 2020-09-02 PROCEDURE — 0358T BIA WHOLE BODY: CPT | Mod: NC | Performed by: DIETITIAN, REGISTERED

## 2020-09-02 PROCEDURE — 96372 THER/PROPH/DIAG INJ SC/IM: CPT | Performed by: NURSE PRACTITIONER

## 2020-09-02 PROCEDURE — 6360000200 HC RX 636 W HCPCS (ALT 250 FOR IP): Mod: TB | Performed by: NURSE PRACTITIONER

## 2020-09-02 RX ADMIN — DENOSUMAB 60 MG: 60 INJECTION SUBCUTANEOUS at 12:01

## 2020-09-02 NOTE — PROGRESS NOTES
Prolia Injection   Number of injections/year injection started: 2ND INJECTION  DXA: 2/6/20  T-score: -2.6 LFN  FRAX: 6.3% HIP  OSTEO [facility]/FLS  3/3/20  CS    Prolia 60mg/1ml was injected subcutaneously every 6 months.     Addition of side effects discussed yes   Compliance with calcium and vitamin D yes   Push fluids today Yes   Patient instructed to call with concerns or complaints Yes   Patient advised to have follow up DXA per provider Yes    Patient voiced no complaints of pain or discomfort at this time.

## 2020-09-03 NOTE — PATIENT INSTRUCTIONS
Goals:  1. Consume 60-80 grams of protein spread out to at least 3 feedings per day  2. Consume at least 64 ounces of calorie free fluids ( 32 ounces of the fluids should be straight water)  3. Take 1 Prenatal vit with iron; 3 calcium citrate with vit D, 1 every 6 hours; Vitamin B12, Vit D3  1000 IU  4. 150 minutes of safe physical movement per week, Perform safe weight resistance exercise 2-3 days per week

## 2020-09-11 DIAGNOSIS — E78.5 HYPERLIPIDEMIA, UNSPECIFIED HYPERLIPIDEMIA TYPE: ICD-10-CM

## 2020-09-11 RX ORDER — ATORVASTATIN CALCIUM 20 MG/1
TABLET, FILM COATED ORAL
Qty: 90 TABLET | Refills: 3 | Status: SHIPPED | OUTPATIENT
Start: 2020-09-11 | End: 2021-10-08

## 2020-10-01 ENCOUNTER — OFFICE VISIT (OUTPATIENT)
Dept: SURGERY | Facility: CLINIC | Age: 80
End: 2020-10-01
Payer: MEDICARE

## 2020-10-01 VITALS
WEIGHT: 167.8 LBS | HEIGHT: 60 IN | HEART RATE: 81 BPM | SYSTOLIC BLOOD PRESSURE: 142 MMHG | DIASTOLIC BLOOD PRESSURE: 80 MMHG | BODY MASS INDEX: 32.94 KG/M2 | TEMPERATURE: 97.6 F

## 2020-10-01 DIAGNOSIS — E66.9 OBESITY (BMI 35.0-39.9 WITHOUT COMORBIDITY): Primary | ICD-10-CM

## 2020-10-01 PROCEDURE — 99214 OFFICE O/P EST MOD 30 MIN: CPT | Performed by: SURGERY

## 2020-10-01 PROCEDURE — G0463 HOSPITAL OUTPT CLINIC VISIT: HCPCS | Performed by: SURGERY

## 2020-10-01 NOTE — PROGRESS NOTES
GENERAL SURGERY ADMISSION HISTORY & PHYSICAL     10/1/2020     Katy Stack     [id number]     CHIEF COMPLAINT: 1 yr bariatric surgery follow up visit     HISTORY OF PRESENT ILLNESS:  Katy Stack is a 80 y.o.  female who has a BMI of Body mass index is 32.77 kg/m²..  She has a history of vertical sleeve gastrectomy performed on 8/14/19. Co morbidities at that time included hyperlipidemia, hypertension, COPD with supplemental oxygen at night only. BMI was 41 and weight was 210lbs.      Patient Active Problem List   Diagnosis   • Hypothyroidism   • Obesity   • Obstructive sleep apnea syndrome   • Periodic limb movement disorder   • Vitamin D deficiency   • Allergic rhinitis   • Benign essential hypertension   • Obesity (BMI 35.0-39.9 without comorbidity)   • Heartburn   • Pulmonary hypertension (CMS/HCC) (HCC)   • S/P bariatric surgery        Past Surgical History:   Procedure Laterality Date   • APPENDECTOMY  1987   • BACK SURGERY      2 surgeries [id number]   • BACK SURGERY  11/30/2016   • BACK SURGERY  05/01/2017    vetebral fusion   • CARDIAC CATHETERIZATION  08/31/2011   • CATARACT EXTRACTION W/  INTRAOCULAR LENS IMPLANT Left 7/2/2018    Procedure: OS CATARACT LEFT PHACOEMULSIFICATION OF CATARACT WITH INTRAOCULAR LENS;  Surgeon: Emmanuel Cueto MD;  [facility];  Service: Ophthalmology;  Laterality: Left;   • CATARACT EXTRACTION W/  INTRAOCULAR LENS IMPLANT Right 8/6/2018    Procedure: OD CATARACT RIGHT PHACOEMULSIFICATION OF CATARACT WITH INTRAOCULAR LENS;  Surgeon: Emmanuel Cueto MD;  Location[facility];  Service: Ophthalmology;  Laterality: Right;   • CHOLECYSTECTOMY     • COLONOSCOPY  02/19/2014    No polyps   • COLONOSCOPY  04/26/2011   • DIAGNOSTIC LAPAROSCOPY N/A 11/28/2018    Procedure: Laparoscopic band removal;  Surgeon: Chris Millan MD;  Location: [facility];  Service: General;  Laterality: N/A;   • ESOPHAGOGASTRODUODENOSCOPY N/A 11/6/2018     Procedure: EGD - ESOPHAGOGASTRODUODENOSCOPY;  Surgeon: Chris Millan MD;  Location: [facility];  Service: Endoscopy;  Laterality: N/A;   • FOOT SURGERY      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 Vela- shoulder   • THYROID LOBECTOMY Left 18/18/2010   • THYROIDECTOMY, PARTIAL  08/18/2010   • UPPER GASTROINTESTINAL ENDOSCOPY  02/19/2014    Mild chronic gastritis         Current Outpatient Medications:   •  atorvastatin (LIPITOR) 20 mg tablet, TAKE 1 TABLET BY MOUTH  DAILY, Disp: 90 tablet, Rfl: 3  •  atenoloL (TENORMIN) 25 mg tablet, TAKE 1 TABLET BY MOUTH  DAILY, Disp: 90 tablet, Rfl: 3  •  losartan (COZAAR) 50 mg tablet, TAKE 1 TABLET BY MOUTH  DAILY, Disp: 90 tablet, Rfl: 3  •  furosemide (LASIX) 20 mg tablet, TAKE 1 TABLET BY MOUTH  DAILY, Disp: 90 tablet, Rfl: 3  •  potassium chloride 10 mEq CR tablet, TAKE 1 TABLET BY MOUTH  DAILY, Disp: 90 tablet, Rfl: 3  •  omeprazole (PriLOSEC) 20 mg capsule, Take 1 capsule (20 mg total) by mouth daily, Disp: 90 capsule, Rfl: 3  •  levothyroxine (SYNTHROID, LEVOTHROID) 100 mcg tablet, TAKE 1 TABLET BY MOUTH  DAILY, Disp: 90 tablet, Rfl: 3  •  Antacid Regular Strength 200-200-20 mg/5 mL suspension, , Disp: , Rfl:   •  ferrous sulfate 325 mg (65 mg iron) tablet, Take 1 tablet (325 mg total) by mouth every other day, Disp: 15 tablet, Rfl: 11  •  gabapentin (NEURONTIN) 300 mg capsule, Take 300 mg by mouth Indications: Taking 900mg TID  , Disp: , Rfl:   •  calcium  carbonate-vitamin D3 600 mg(1,500mg) -400 unit per tablet, Take 1 tablet by mouth daily, Disp: , Rfl:   •  nitroglycerin (NITROSTAT) 0.4 mg SL tablet, Place 1 tablet (0.4 mg total) under the tongue every 5 (five) minutes as needed for chest pain, Disp: 25 tablet, Rfl: 1  •  vit A-vit C-vit E-zinc-copper (EYE VITAMIN AND MINERALS) 7,160-113-100 unit-mg-unit tablet, Take 1 tab/cap by mouth 2 (two) times a day., Disp: , Rfl:   •  PREMARIN 0.9 mg tablet, TAKE 1 TABLET BY MOUTH  EVERY DAY (Patient taking differently: TAKE 1 TABLET BY MOUTH EVERY WEEK), Disp: 90 tablet, Rfl: 1  •  NON FORMULARY, SWISH AND SPIT 5 ML PO Q 4 H PRN FOR PAINFUL SORES OF THE MOUTH., Disp: , Rfl:     Allergies   Allergen Reactions   • [place] Angioedema and Hives     hives, tongue swells  Swelling of tongue   • Adhesive      welts       Family History   Problem Relation Age of Onset   • Heart disease Mother    • Heart disease Father         Myocardial infarction   • Parkinsonism Father    • Thyroid disease Father    • Alzheimer's disease Sister    • Arthritis Sister    • Osteoporosis Sister    • Lung cancer Brother    • Diabetes Maternal Grandmother        Social History     Socioeconomic History   • Marital status:      Spouse name: Not on file   • Number of children: Not on file   • Years of education: Not on file   • Highest education level: Not on file   Occupational History   • Not on file   Social Needs   • Financial resource strain: Not on file   • Food insecurity     Worry: Not on file     Inability: Not on file   • Transportation needs     Medical: Not on file     Non-medical: Not on file   Tobacco Use   • Smoking status: Former Smoker     Packs/day: 2.00     Years: 35.00     Pack years: 70.00     Quit date:      Years since quittin.7   • Smokeless tobacco: Never Used   Substance and Sexual Activity   • Alcohol use: No   • Drug use: No   • Sexual activity: Not on file   Lifestyle   • Physical activity     Days per  week: Not on file     Minutes per session: Not on file   • Stress: Not on file   Relationships   • Social connections     Talks on phone: Not on file     Gets together: Not on file     Attends [employer] service: Not on file     Active member of club or organization: Not on file     Attends meetings of clubs or organizations: Not on file     Relationship status: Not on file   • Intimate partner violence     Fear of current or ex partner: Not on file     Emotionally abused: Not on file     Physically abused: Not on file     Forced sexual activity: Not on file   Other Topics Concern   • Not on file   Social History Narrative   • Not on file           REVIEW OF SYSTEMS:     Constitutional: Negative for hot flashes.   Negative for weakness, fever, chills, night sweats, loss of appetite, fatigue or weight loss.   HEENT: Negative for headache, eye tearing, visual problems, decreased hearing tinnitus, rhinorrhea, mouth sores, and pharyngitis.   Respiratory: Negative for cough, dyspnea, hemoptysis, wheezing, and pleuritic chest pain.   Cardiac: Negative for exertional chest pain, pedal edema, dyspnea on exertion, and palpitations.   Gastrointestinal: Negative for nausea, vomiting, reflux, constipation, diarrhea, abdominal pain, and painful stools.   Genitourinary: Negative for frequency, dysuria and hematuria.   Dermatologic: Negative for rash, pruritus, skin discoloration, and new skin lump.   Musculoskeletal: As above.   Hematologic: Negative for easy bruising, bleeding, and lymphadenopathy.   Neurologic: Negative for dizziness, paresthesias in the fingers/hands,   paresthesias of the toes, feet, and neuropathic pain.   Psychiatric: Negative for depression, anxiety.        PHYSICAL EXAMINATION:  /80 (BP Location: Right arm, Patient Position: Sitting, Cuff Size: Regular Adult)   Pulse 81   Temp 36.4 °C (97.6 °F) (Temporal)   Ht 1.524 m (5')   Wt 76.1 kg (167 lb 12.8 oz)   BMI 32.77 kg/m²    General appearance:  alert and cooperative  Head: atraumatic  Eyes: conjunctivae/corneas clear. PERRL, EOM's intact.   Ears: normal  Lungs: clear to auscultation bilaterally  Heart: regular rate and rhythm  Abdomen: morbidly obese, soft, non tender, previous incisions well healed  Extremities: extremities normal, warm and well-perfused; no cyanosis, clubbing, or edema  Neurologic: Alert and oriented X 3, normal strength and tone.          ASSESSMENT & PLAN:  This is a 80 y.o. female with a body mass index of Body mass index is 32.77 kg/m². and comorbid conditions.      She is overall doing well 1 yr post op from Sleeve and doing well.    We discussed the importance of continued exercise, nutrition counseling and behavioural health follow up. He reports good compliance with vitamin supplementation and this is reinforced with him.    At this point she will follow with the nutritionist in 6 months and again in 12 months. I encouraged her to return to my clinic though if she has any future concerns or questions. I also encouraged her to follow up with me or violet if at any point she notices weight regain.     Katy Stack participated in the decision making process and agreed with the plan of care.  All questions were sought and answered.     Chris Quinn MD         Total Time spent with the patient is 30 min with more than 50% in direct counseling and coordination of care regarding her morbid obesity and post op bariatric surgery status

## 2020-10-08 ENCOUNTER — NURSE TRIAGE (OUTPATIENT)
Dept: FAMILY MEDICINE | Facility: CLINIC | Age: 80
End: 2020-10-08

## 2020-10-08 ENCOUNTER — OFFICE VISIT (OUTPATIENT)
Dept: URGENT CARE | Facility: CLINIC | Age: 80
End: 2020-10-08
Payer: MEDICARE

## 2020-10-08 ENCOUNTER — TELEPHONE (OUTPATIENT)
Dept: FAMILY MEDICINE | Facility: CLINIC | Age: 80
End: 2020-10-08

## 2020-10-08 VITALS
SYSTOLIC BLOOD PRESSURE: 158 MMHG | DIASTOLIC BLOOD PRESSURE: 60 MMHG | TEMPERATURE: 97.9 F | RESPIRATION RATE: 18 BRPM | HEART RATE: 64 BPM | OXYGEN SATURATION: 94 %

## 2020-10-08 DIAGNOSIS — J02.9 SORE THROAT: Primary | ICD-10-CM

## 2020-10-08 DIAGNOSIS — R59.9 SWOLLEN LYMPH NODES: ICD-10-CM

## 2020-10-08 LAB — RAPID STREP A (AMB): NEGATIVE

## 2020-10-08 PROCEDURE — 99213 OFFICE O/P EST LOW 20 MIN: CPT | Performed by: FAMILY MEDICINE

## 2020-10-08 PROCEDURE — C9803 HOPD COVID-19 SPEC COLLECT: HCPCS | Mod: CS | Performed by: FAMILY MEDICINE

## 2020-10-08 PROCEDURE — G0463 HOSPITAL OUTPT CLINIC VISIT: HCPCS | Performed by: FAMILY MEDICINE

## 2020-10-08 PROCEDURE — 87070 CULTURE OTHR SPECIMN AEROBIC: CPT | Performed by: FAMILY MEDICINE

## 2020-10-08 PROCEDURE — 87880 STREP A ASSAY W/OPTIC: CPT | Mod: QW | Performed by: FAMILY MEDICINE

## 2020-10-08 PROCEDURE — 87635 SARS-COV-2 COVID-19 AMP PRB: CPT | Performed by: FAMILY MEDICINE

## 2020-10-08 ASSESSMENT — ENCOUNTER SYMPTOMS
DIARRHEA: 0
NECK PAIN: 1
SHORTNESS OF BREATH: 0
ACTIVITY CHANGE: 0
SORE THROAT: 1
FATIGUE: 1
WHEEZING: 0
FEVER: 0
LIGHT-HEADEDNESS: 0
RHINORRHEA: 1
TROUBLE SWALLOWING: 0

## 2020-10-08 ASSESSMENT — PAIN SCALES - GENERAL: PAINLEVEL: 0-NO PAIN

## 2020-10-08 NOTE — TELEPHONE ENCOUNTER
COVID-19 SCREENING ASSESSMENT     CRITERIA FOR TESTING  Yes to Any Symptoms or Asymptomatic Testing With Approved Criteria/MH Agreement  What symptoms are you experiencing? None  Asymptomatic testing group: N/A     ORDER QUESTIONS  Date of onset: na  Are you a healthcare worker,  or active  or [employer]? No  Do you live in an institutional setting (long term care, assisted living, corrections, etc)? No  Are you a dialysis or current cancer patient? No  Are you currently pregnant? N/A  Do you work in an educational setting? No     OTHER QUESTIONS  Are you a [employer] [employer] employee? No  Have you had close contact with a lab confirmed case of COVID-19 in the last 14 days? yes  Details on close contact: coworker    Reason for Disposition  • COVID-19 symptoms per CDC guidelines (with worsening symptoms or medical concerns) AND risk factors.    Protocols used: COVID-19 TRIAGE PROTOCOL [facility]

## 2020-10-08 NOTE — LETTER
[facility]  [address] [place] [address]  [place] [address]  [phone]  Dept: [phone]  October 8, 2020     Katy Stack  [address] [address]  [place] [address]      Patient: Katy Stack   YOB: 1940   Date of Visit: 10/8/2020       To Whom it May Concern:    Katy Stack was seen in my clinic on  10/8/2020 at [facility]. Please excuse Katy for her until at least 10/12/2020.           Sincerely,         MONI MANNING MD    Electronically signed by MONI MANNING MD at 3:16 PM        CC: No Recipients

## 2020-10-08 NOTE — TELEPHONE ENCOUNTER
"Patient states no s/s \"of covid, no fever/sob, cough\" feels like \"I have the munps\"    States she was exposed to someone +, explained d/t exposure she needs to call the `1-800 number-if they do not fell she meets criteria to test-she needs to still some in and have her swollen glands assessed-if she is not able to schedule with an PCP she can come to UC/Resp clinic, verbalized understanding.   "

## 2020-10-09 ENCOUNTER — TELEPHONE (OUTPATIENT)
Dept: FAMILY MEDICINE | Facility: CLINIC | Age: 80
End: 2020-10-09

## 2020-10-09 LAB — SARS-COV-2 RNA RESP QL NAA+PROBE: NEGATIVE

## 2020-10-09 NOTE — PROGRESS NOTES
Subjective      Katy Stack is a 80 y.o. female who presents for sore throat.    Sore Throat   This is a new problem. The current episode started in the past 7 days. The problem has been gradually improving. There has been no fever. The fever has been present for 3 to 4 days. The pain is mild. Associated symptoms include congestion and neck pain. Pertinent negatives include no diarrhea, shortness of breath or trouble swallowing. She has had no exposure to strep. She has tried nothing for the symptoms. The treatment provided mild relief.       The following have been reviewed and updated as appropriate in this visit:         Allergies   Allergen Reactions   • [place] Angioedema and Hives     hives, tongue swells  Swelling of tongue   • Adhesive      welts     Current Outpatient Medications   Medication Sig Dispense Refill   • atorvastatin (LIPITOR) 20 mg tablet TAKE 1 TABLET BY MOUTH  DAILY 90 tablet 3   • atenoloL (TENORMIN) 25 mg tablet TAKE 1 TABLET BY MOUTH  DAILY 90 tablet 3   • losartan (COZAAR) 50 mg tablet TAKE 1 TABLET BY MOUTH  DAILY 90 tablet 3   • furosemide (LASIX) 20 mg tablet TAKE 1 TABLET BY MOUTH  DAILY 90 tablet 3   • potassium chloride 10 mEq CR tablet TAKE 1 TABLET BY MOUTH  DAILY 90 tablet 3   • omeprazole (PriLOSEC) 20 mg capsule Take 1 capsule (20 mg total) by mouth daily 90 capsule 3   • levothyroxine (SYNTHROID, LEVOTHROID) 100 mcg tablet TAKE 1 TABLET BY MOUTH  DAILY 90 tablet 3   • NON FORMULARY SWISH AND SPIT 5 ML PO Q 4 H PRN FOR PAINFUL SORES OF THE MOUTH.     • Antacid Regular Strength 200-200-20 mg/5 mL suspension      • ferrous sulfate 325 mg (65 mg iron) tablet Take 1 tablet (325 mg total) by mouth every other day 15 tablet 11   • gabapentin (NEURONTIN) 300 mg capsule Take 300 mg by mouth Indications: Taking 900mg TID       • calcium carbonate-vitamin D3 600 mg(1,500mg) -400 unit per tablet Take 1 tablet by mouth daily     • nitroglycerin (NITROSTAT) 0.4 mg SL tablet Place 1  tablet (0.4 mg total) under the tongue every 5 (five) minutes as needed for chest pain 25 tablet 1   • vit A-vit C-vit E-zinc-copper (EYE VITAMIN AND MINERALS) 7,160-113-100 unit-mg-unit tablet Take 1 tab/cap by mouth 2 (two) times a day.     • PREMARIN 0.9 mg tablet TAKE 1 TABLET BY MOUTH  EVERY DAY (Patient taking differently: TAKE 1 TABLET BY MOUTH EVERY WEEK) 90 tablet 1   • Fluad Quad 2020-21,65y up,,PF, 60 mcg (15 mcg x 4)/0.5 mL syringe SMARTSI.5 Milliliter(s) IM As Directed       Current Facility-Administered Medications   Medication Dose Route Frequency Provider Last Rate Last Dose   • denosumab (PROLIA) 60 mg/mL syringe 60 mg  60 mg subcutaneous q6 months Haley Olvera, DNP   60 mg at 20 1201     Past Medical History:   Diagnosis Date   • Benign essential hypertension 2017   • Cataract of left eye    • Chronic obstructive lung disease (CMS/HCC) (HCC) 11/10/2017   • Dysrhythmia     LBBB   • GERD (gastroesophageal reflux disease)     at times   • Hypertension    • Hypothyroid    • Hypothyroidism 11/10/2017   • Injury of back     BENDING INJURY   • Neurologic disorder    • Obesity 11/10/2017   • Obstructive sleep apnea syndrome 11/10/2017    Night time oxygen/2L   • Osteoarthritis    • Periodic limb movement disorder 11/10/2017   • Peripheral neuropathy     left thigh into left groin    • Rectocele    • Respiratory disease      Past Surgical History:   Procedure Laterality Date   • APPENDECTOMY     • BACK SURGERY      2 surgeries [id number]   • BACK SURGERY  2016   • BACK SURGERY  2017    vetebral fusion   • CARDIAC CATHETERIZATION  2011   • CATARACT EXTRACTION W/  INTRAOCULAR LENS IMPLANT Left 2018    Procedure: OS CATARACT LEFT PHACOEMULSIFICATION OF CATARACT WITH INTRAOCULAR LENS;  Surgeon: Emmanuel Cueto MD;  Location: [facility];  Service: Ophthalmology;  Laterality: Left;   • CATARACT EXTRACTION W/  INTRAOCULAR LENS IMPLANT Right 2018     Procedure: OD CATARACT RIGHT PHACOEMULSIFICATION OF CATARACT WITH INTRAOCULAR LENS;  Surgeon: Emmanuel Cueto MD;  Location: [facility];  Service: Ophthalmology;  Laterality: Right;   • CHOLECYSTECTOMY     • COLONOSCOPY  2014    No polyps   • COLONOSCOPY  2011   • DIAGNOSTIC LAPAROSCOPY N/A 2018    Procedure: Laparoscopic band removal;  Surgeon: Chris Millan MD;  Location: 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;  Service: General;  Laterality: N/A;   • HYSTERECTOMY      Total   • LAPAROSCOPIC GASTRIC BANDING      2014-standard Ap   • OTHER SURGICAL HISTORY      General surgery: Lt rotator cuff    • OTHER SURGICAL HISTORY  2014    Right facial skin cancer   • OTHER SURGICAL HISTORY      Ob gyn surgery:Bladder tuck   • SHOULDER ARTHROSCOPY  2013    Dr. Vela- shoulder   • THYROID LOBECTOMY Left    • THYROIDECTOMY, PARTIAL  2010   • UPPER GASTROINTESTINAL ENDOSCOPY  2014    Mild chronic gastritis     Family History   Problem Relation Age of Onset   • Heart disease Mother    • Heart disease Father         Myocardial infarction   • Parkinsonism Father    • Thyroid disease Father    • Alzheimer's disease Sister    • Arthritis Sister    • Osteoporosis Sister    • Lung cancer Brother    • Diabetes Maternal Grandmother      Social History     Occupational History   • Not on file   Tobacco Use   • Smoking status: Former Smoker     Packs/day: 2.00     Years: 35.00     Pack years: 70.00     Quit date:      Years since quittin.7   • Smokeless tobacco:  Never Used   Substance and Sexual Activity   • Alcohol use: No   • Drug use: No   • Sexual activity: Not on file   Social History Narrative   • Not on file       Review of Systems   Constitutional: Positive for fatigue. Negative for activity change and fever.   HENT: Positive for congestion, postnasal drip, rhinorrhea and sore throat. Negative for trouble swallowing.    Respiratory: Negative for shortness of breath and wheezing.    Cardiovascular: Negative for chest pain.   Gastrointestinal: Negative for diarrhea.   Musculoskeletal: Positive for neck pain.   Skin: Negative for rash.   Neurological: Negative for light-headedness.       Objective   /60 (BP Location: Left arm, Patient Position: Sitting, Cuff Size: Regular Adult)   Pulse 64   Temp 36.6 °C (97.9 °F) (Temporal)   Resp 18   SpO2 94%     Physical Exam  Vitals signs and nursing note reviewed.   Constitutional:       General: She is not in acute distress.     Appearance: Normal appearance. She is well-developed. She is not ill-appearing.   HENT:      Head: Normocephalic and atraumatic.      Right Ear: Tympanic membrane and external ear normal.      Left Ear: Tympanic membrane and external ear normal.      Nose: Congestion and rhinorrhea present.      Mouth/Throat:      Mouth: Mucous membranes are moist.      Pharynx: Posterior oropharyngeal erythema present. No oropharyngeal exudate.   Eyes:      Conjunctiva/sclera: Conjunctivae normal.      Pupils: Pupils are equal, round, and reactive to light.   Cardiovascular:      Rate and Rhythm: Normal rate and regular rhythm.      Heart sounds: No murmur. No friction rub. No gallop.    Pulmonary:      Effort: Pulmonary effort is normal. No respiratory distress.      Breath sounds: Normal breath sounds. No wheezing or rales.   Lymphadenopathy:      Cervical: No cervical adenopathy.   Skin:     General: Skin is warm.      Findings: No rash.   Neurological:      Mental Status: She is alert and oriented to  person, place, and time.      Cranial Nerves: No cranial nerve deficit.   Psychiatric:         Behavior: Behavior normal.         Thought Content: Thought content normal.         Judgment: Judgment normal.         ASSESSMENT AND PLAN   Diagnoses and all orders for this visit:    Sore throat  -     POCT Rapid Strep Group A  -     COVID-19 PCR  -     Throat culture    Swollen lymph nodes  -     POCT Rapid Strep Group A  -     COVID-19 PCR  -     Throat culture    We discussed the approach to evaluation.   COVID testing will be completed.  Throat culture also completed as the patient feels it may be consistent with strep.      The patient is instructed on social distancing and self isolation.  Symptomatic treatment includes Tylenol, ibuprofen, fluids, and rest.    Typical viral course was reviewed with the patient, including resolution of fever, and resolution of cough over 7 to 10 days.    If the patient develops increasing symptomatology, biphasic illness, increasing shortness of breath, or other concerns, they are encouraged to call.  Symptoms can be assessed, the patient will be instructed on home treatment, return to clinic, or presentation to the hospital as appropriate.    MONI MANNING MD

## 2020-10-12 ENCOUNTER — TELEPHONE (OUTPATIENT)
Dept: FAMILY MEDICINE | Facility: CLINIC | Age: 80
End: 2020-10-12

## 2020-10-12 NOTE — TELEPHONE ENCOUNTER
Patient called in to set up a Follow up appt from  with Dr Travis. I let the patient know that he's out until the last week in October, she was upset that our providers are so far out. States she will just deal with what is going on by herself. Would you please call the patient

## 2020-10-16 ENCOUNTER — TELEPHONE (OUTPATIENT)
Dept: FAMILY MEDICINE | Facility: CLINIC | Age: 80
End: 2020-10-16

## 2020-10-16 NOTE — TELEPHONE ENCOUNTER
"Scheduled patient for a 15 min appt on Tuesday-SHE IS AWARE THIS IS ONLY A 15 MINUTE APPT and \"swollen glands is the only issue that can be addressed.   "

## 2020-10-20 ENCOUNTER — OFFICE VISIT (OUTPATIENT)
Dept: FAMILY MEDICINE | Facility: CLINIC | Age: 80
End: 2020-10-20
Payer: MEDICARE

## 2020-10-20 VITALS
WEIGHT: 168 LBS | OXYGEN SATURATION: 97 % | HEART RATE: 57 BPM | DIASTOLIC BLOOD PRESSURE: 58 MMHG | SYSTOLIC BLOOD PRESSURE: 110 MMHG | TEMPERATURE: 98 F | BODY MASS INDEX: 32.81 KG/M2

## 2020-10-20 DIAGNOSIS — I88.9 LYMPHADENITIS: Primary | ICD-10-CM

## 2020-10-20 PROCEDURE — 99213 OFFICE O/P EST LOW 20 MIN: CPT | Performed by: FAMILY MEDICINE

## 2020-10-20 PROCEDURE — G0463 HOSPITAL OUTPT CLINIC VISIT: HCPCS | Performed by: FAMILY MEDICINE

## 2020-10-20 RX ORDER — AMOXICILLIN 500 MG/1
500 TABLET, FILM COATED ORAL 3 TIMES DAILY
Qty: 30 TABLET | Refills: 0 | Status: SHIPPED | OUTPATIENT
Start: 2020-10-20 | End: 2020-10-30

## 2020-10-20 NOTE — PROGRESS NOTES
Subjective      Katy Stack is a 80 y.o. female who presents for swollen lymph nodes (Patient is had swollen lymph nodes along both sides of her neck. they have been present for awhile. Patient states they are painful. )  .    Patient presents due to concerns of swollen lymph nodes along both sides of neck that have been present for 3 weeks now. These areas are quite painful, right greater than left. She denies fevers or chills. No cough but she has had some sinus congestion and drainage.         The following have been reviewed and updated as appropriate in this visit:  Tobacco  Allergies  Meds  Problems  Med Hx  Surg Hx  Fam Hx         Allergies   Allergen Reactions   • [place] Angioedema and Hives     hives, tongue swells  Swelling of tongue   • Adhesive      welts     Current Outpatient Medications   Medication Sig Dispense Refill   • Fluad Quad 2020-21,65y up,,PF, 60 mcg (15 mcg x 4)/0.5 mL syringe SMARTSI.5 Milliliter(s) IM As Directed     • atorvastatin (LIPITOR) 20 mg tablet TAKE 1 TABLET BY MOUTH  DAILY 90 tablet 3   • atenoloL (TENORMIN) 25 mg tablet TAKE 1 TABLET BY MOUTH  DAILY 90 tablet 3   • losartan (COZAAR) 50 mg tablet TAKE 1 TABLET BY MOUTH  DAILY 90 tablet 3   • furosemide (LASIX) 20 mg tablet TAKE 1 TABLET BY MOUTH  DAILY 90 tablet 3   • levothyroxine (SYNTHROID, LEVOTHROID) 100 mcg tablet TAKE 1 TABLET BY MOUTH  DAILY 90 tablet 3   • ferrous sulfate 325 mg (65 mg iron) tablet Take 1 tablet (325 mg total) by mouth every other day 15 tablet 11   • gabapentin (NEURONTIN) 300 mg capsule Take 600 mg by mouth 3 (three) times a day Indications: Taking 900mg TID       • calcium carbonate-vitamin D3 600 mg(1,500mg) -400 unit per tablet Take 1 tablet by mouth daily     • nitroglycerin (NITROSTAT) 0.4 mg SL tablet Place 1 tablet (0.4 mg total) under the tongue every 5 (five) minutes as needed for chest pain 25 tablet 1   • vit A-vit C-vit E-zinc-copper (EYE VITAMIN AND MINERALS)  7,160-113-100 unit-mg-unit tablet Take 1 tab/cap by mouth 2 (two) times a day.     • PREMARIN 0.9 mg tablet TAKE 1 TABLET BY MOUTH  EVERY DAY (Patient taking differently: TAKE 1 TABLET BY MOUTH EVERY WEEK) 90 tablet 1   • amoxicillin (AMOXIL) 500 mg tablet Take 1 tablet (500 mg total) by mouth 3 (three) times a day for 10 days 30 tablet 0   • potassium chloride 10 mEq CR tablet TAKE 1 TABLET BY MOUTH  DAILY (Patient not taking: Reported on 10/20/2020) 90 tablet 3   • NON FORMULARY SWISH AND SPIT 5 ML PO Q 4 H PRN FOR PAINFUL SORES OF THE MOUTH.     • Antacid Regular Strength 200-200-20 mg/5 mL suspension        Current Facility-Administered Medications   Medication Dose Route Frequency Provider Last Rate Last Dose   • denosumab (PROLIA) 60 mg/mL syringe 60 mg  60 mg subcutaneous q6 months Haley Olvera DNP   60 mg at 09/02/20 1201     Past Medical History:   Diagnosis Date   • Benign essential hypertension 11/24/2017   • Cataract of left eye    • Chronic obstructive lung disease (CMS/HCC) (HCC) 11/10/2017   • Dysrhythmia     LBBB   • GERD (gastroesophageal reflux disease)     at times   • Hypertension    • Hypothyroid    • Hypothyroidism 11/10/2017   • Injury of back     BENDING INJURY   • Neurologic disorder    • Obesity 11/10/2017   • Obstructive sleep apnea syndrome 11/10/2017    Night time oxygen/2L   • Osteoarthritis    • Periodic limb movement disorder 11/10/2017   • Peripheral neuropathy     left thigh into left groin    • Rectocele    • Respiratory disease      Past Surgical History:   Procedure Laterality Date   • APPENDECTOMY  1987   • BACK SURGERY      2 surgeries [id number]   • BACK SURGERY  11/30/2016   • BACK SURGERY  05/01/2017    vetebral fusion   • CARDIAC CATHETERIZATION  08/31/2011   • CATARACT EXTRACTION W/  INTRAOCULAR LENS IMPLANT Left 7/2/2018    Procedure: OS CATARACT LEFT PHACOEMULSIFICATION OF CATARACT WITH INTRAOCULAR LENS;  Surgeon: Emmanuel Cueto MD;  Location: \[facility]\"" SURGICAL SERVICES;   Service: Ophthalmology;  Laterality: Left;   • CATARACT EXTRACTION W/  INTRAOCULAR LENS IMPLANT Right 8/6/2018    Procedure: OD CATARACT RIGHT PHACOEMULSIFICATION OF CATARACT WITH INTRAOCULAR LENS;  Surgeon: Emmanuel Cueto MD;  Location: [facility] SURGICAL SERVICES;  Service: Ophthalmology;  Laterality: Right;   • CHOLECYSTECTOMY     • COLONOSCOPY  02/19/2014    No polyps   • COLONOSCOPY  04/26/2011   • DIAGNOSTIC LAPAROSCOPY N/A 11/28/2018    Procedure: Laparoscopic band removal;  Surgeon: Chris Millan MD;  Location: [facility] OR;  Service: General;  Laterality: N/A;   • ESOPHAGOGASTRODUODENOSCOPY N/A 11/6/2018    Procedure: EGD - ESOPHAGOGASTRODUODENOSCOPY;  Surgeon: Chris Millan MD;  Location: [facility] Endoscopy;  Service: Endoscopy;  Laterality: N/A;   • FOOT SURGERY      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 Vela- shoulder   • THYROID LOBECTOMY Left 18/18/2010   • THYROIDECTOMY, PARTIAL  08/18/2010   • UPPER GASTROINTESTINAL ENDOSCOPY  02/19/2014    Mild chronic gastritis     Family History   Problem Relation Age of Onset   • Heart disease Mother    • Heart disease Father         Myocardial infarction   • Parkinsonism Father    • Thyroid disease Father    • Alzheimer's disease Sister    • Arthritis Sister    • Osteoporosis Sister    • Lung cancer Brother    • Diabetes Maternal Grandmother      Social History     Occupational History   • Not on file   Tobacco Use   • Smoking status: Former Smoker     Packs/day: 2.00      Years: 35.00     Pack years: 70.00     Quit date:      Years since quittin.8   • Smokeless tobacco: Never Used   Substance and Sexual Activity   • Alcohol use: No   • Drug use: No   • Sexual activity: Not on file   Social History Narrative   • Not on file       Review of Systems    Objective     Vitals  /58 (BP Location: Left arm, Patient Position: Sitting)   Pulse 57   Temp 36.7 °C (98 °F) (Temporal)   Wt 76.2 kg (168 lb)   SpO2 97%   BMI 32.81 kg/m²     Physical Exam  Vitals signs and nursing note reviewed.   Constitutional:       General: She is not in acute distress.     Appearance: Normal appearance. She is well-developed. She is not ill-appearing, toxic-appearing or diaphoretic.   HENT:      Head: Normocephalic and atraumatic.      Comments: Postnasal drip and cobble stoning.     Right Ear: Tympanic membrane, ear canal and external ear normal. There is no impacted cerumen.      Left Ear: Tympanic membrane, ear canal and external ear normal. There is no impacted cerumen.      Nose: Congestion and rhinorrhea present.      Mouth/Throat:      Mouth: Mucous membranes are moist.      Pharynx: Oropharynx is clear. No oropharyngeal exudate or posterior oropharyngeal erythema.   Eyes:      General: No scleral icterus.        Right eye: No discharge.         Left eye: No discharge.      Extraocular Movements: Extraocular movements intact.      Conjunctiva/sclera: Conjunctivae normal.      Pupils: Pupils are equal, round, and reactive to light.   Neck:      Musculoskeletal: Neck supple.      Thyroid: No thyromegaly.      Vascular: No carotid bruit.      Trachea: No tracheal deviation.      Comments:  Fullness to right anterior cervical region with no discrete nodules lymph nodes.  Cardiovascular:      Rate and Rhythm: Normal rate and regular rhythm.      Pulses: Normal pulses.      Heart sounds: Normal heart sounds. No murmur. No friction rub. No gallop.    Pulmonary:      Effort: Pulmonary effort  is normal. No respiratory distress.      Breath sounds: Normal breath sounds. No stridor. No wheezing, rhonchi or rales.   Chest:      Chest wall: No tenderness.   Abdominal:      General: Bowel sounds are normal. There is no distension.      Palpations: Abdomen is soft. There is no mass.      Tenderness: There is no abdominal tenderness. There is no guarding or rebound.      Hernia: No hernia is present.   Musculoskeletal:         General: No tenderness or signs of injury.      Right lower leg: No edema.      Left lower leg: No edema.   Lymphadenopathy:      Cervical: No cervical adenopathy.   Skin:     General: Skin is warm and dry.      Coloration: Skin is not pale.      Findings: No erythema or rash.   Neurological:      Mental Status: She is alert and oriented to person, place, and time.      Gait: Gait normal.      Deep Tendon Reflexes: Reflexes are normal and symmetric. Reflexes normal.   Psychiatric:         Mood and Affect: Mood normal.         Procedures    Assessment/Plan   Diagnoses and all orders for this visit:    Lymphadenitis  -     amoxicillin (AMOXIL) 500 mg tablet; Take 1 tablet (500 mg total) by mouth 3 (three) times a day for 10 days    I suspect the fullness is related to lymphadenopathy although I am unable to palpate a specific lymph node.  Recommended we start trial of amoxicillin for presumed lymphadenitis.  Return precautions were given.  Patient will follow-up in about 2 weeks for repeat exam and check on symptoms.    Return in about 2 weeks (around 11/3/2020) for Recheck.    Bronson Travis MD    Portions of this note were documented by Nneka Arita as I performed the exam and collected the information from Katy Stack. I attest that I have reviewed the information as documented, verified the accuracy of the documentation and added additional information as needed.

## 2020-11-03 ENCOUNTER — OFFICE VISIT (OUTPATIENT)
Dept: FAMILY MEDICINE | Facility: CLINIC | Age: 80
End: 2020-11-03
Payer: MEDICARE

## 2020-11-03 VITALS
OXYGEN SATURATION: 97 % | TEMPERATURE: 98.3 F | SYSTOLIC BLOOD PRESSURE: 132 MMHG | BODY MASS INDEX: 32.98 KG/M2 | HEIGHT: 60 IN | DIASTOLIC BLOOD PRESSURE: 70 MMHG | RESPIRATION RATE: 16 BRPM | WEIGHT: 168 LBS | HEART RATE: 58 BPM

## 2020-11-03 DIAGNOSIS — N95.1 MENOPAUSAL FLUSHING: ICD-10-CM

## 2020-11-03 DIAGNOSIS — R04.0 EPISTAXIS: Primary | ICD-10-CM

## 2020-11-03 DIAGNOSIS — G47.33 OBSTRUCTIVE SLEEP APNEA SYNDROME: ICD-10-CM

## 2020-11-03 PROCEDURE — 99214 OFFICE O/P EST MOD 30 MIN: CPT | Performed by: FAMILY MEDICINE

## 2020-11-03 PROCEDURE — G0463 HOSPITAL OUTPT CLINIC VISIT: HCPCS | Performed by: FAMILY MEDICINE

## 2020-11-03 RX ORDER — SODIUM CHLORIDE/ALOE VERA
1 GEL (GRAM) NASAL AS NEEDED
Qty: 14.1 G | Refills: 3 | Status: SHIPPED | OUTPATIENT
Start: 2020-11-03 | End: 2021-06-08 | Stop reason: ALTCHOICE

## 2020-11-03 ASSESSMENT — PAIN SCALES - GENERAL: PAINLEVEL: 0-NO PAIN

## 2020-11-03 NOTE — PROGRESS NOTES
Subjective      Katy Stack is a 80 y.o. female who presents for Follow-up (follow up regarding lymphadenitis, improved. ) and Epistaxis (Nose Bleed) (having nose bleeds 2-3 times per week on average for several months.)  .    Patient presents for a follow up regarding lymphadenitis. She has completed course of antibiotics and reports neck discomfort has resolved. No fevers or chills.    Patient reports she has been having left side nose bleeds about 2-3 times per week for several months now. She has been using some OTC nasal saline spray when she remembers but not consistently. After discussion patient is agreeable to trying AYR gel to help with dry nasal passages. Patient is interested to see if she is still needing night time oxygen, does not feel it is beneficial at this time.     Patient is requesting a refill of her Premarin 0.9mg tablet. She states she cuts this tablet into 1/4's and uses a total of 1 tablet per week for hx of menopausal flushing. No concerns at this time.        The following have been reviewed and updated as appropriate in this visit:  Tobacco  Allergies  Meds  Problems  Med Hx  Surg Hx  Fam Hx         Allergies   Allergen Reactions   • [place] Angioedema and Hives     hives, tongue swells  Swelling of tongue   • Adhesive      welts     Current Outpatient Medications   Medication Sig Dispense Refill   • estrogens, conjugated, (Premarin) 0.9 mg tablet Take 1 tablet (0.9 mg total) by mouth once a week 12 tablet 4   • atorvastatin (LIPITOR) 20 mg tablet TAKE 1 TABLET BY MOUTH  DAILY 90 tablet 3   • atenoloL (TENORMIN) 25 mg tablet TAKE 1 TABLET BY MOUTH  DAILY 90 tablet 3   • losartan (COZAAR) 50 mg tablet TAKE 1 TABLET BY MOUTH  DAILY 90 tablet 3   • furosemide (LASIX) 20 mg tablet TAKE 1 TABLET BY MOUTH  DAILY 90 tablet 3   • potassium chloride 10 mEq CR tablet TAKE 1 TABLET BY MOUTH  DAILY 90 tablet 3   • levothyroxine (SYNTHROID, LEVOTHROID) 100 mcg tablet TAKE 1 TABLET BY  MOUTH  DAILY 90 tablet 3   • ferrous sulfate 325 mg (65 mg iron) tablet Take 1 tablet (325 mg total) by mouth every other day 15 tablet 11   • gabapentin (NEURONTIN) 300 mg capsule Take 600 mg by mouth 3 (three) times a day       • calcium carbonate-vitamin D3 600 mg(1,500mg) -400 unit per tablet Take 1 tablet by mouth daily     • nitroglycerin (NITROSTAT) 0.4 mg SL tablet Place 1 tablet (0.4 mg total) under the tongue every 5 (five) minutes as needed for chest pain 25 tablet 1   • vit A-vit C-vit E-zinc-copper (EYE VITAMIN AND MINERALS) 7,160-113-100 unit-mg-unit tablet Take 1 tab/cap by mouth 2 (two) times a day.     • sodium chloride-aloe vera (Ayr Saline) gel nasal gel Apply 1 application to each nostril as needed (nose bleeds) 14.1 g 3   • Fluad Quad 2020-21,65y up,,PF, 60 mcg (15 mcg x 4)/0.5 mL syringe SMARTSI.5 Milliliter(s) IM As Directed       Current Facility-Administered Medications   Medication Dose Route Frequency Provider Last Rate Last Admin   • denosumab (PROLIA) 60 mg/mL syringe 60 mg  60 mg subcutaneous q6 months Halye Olvera DNP   60 mg at 20 1201     Past Medical History:   Diagnosis Date   • Benign essential hypertension 2017   • Cataract of left eye    • Chronic obstructive lung disease (CMS/HCC) (HCC) 11/10/2017   • Dysrhythmia     LBBB   • GERD (gastroesophageal reflux disease)     at times   • Hypertension    • Hypothyroid    • Hypothyroidism 11/10/2017   • Injury of back     BENDING INJURY   • Neurologic disorder    • Obesity 11/10/2017   • Obstructive sleep apnea syndrome 11/10/2017    Night time oxygen/2L   • Osteoarthritis    • Periodic limb movement disorder 11/10/2017   • Peripheral neuropathy     left thigh into left groin    • Rectocele    • Respiratory disease      Past Surgical History:   Procedure Laterality Date   • APPENDECTOMY     • BACK SURGERY      2 surgeries [id number]   • BACK SURGERY  2016   • BACK SURGERY  2017    vetebral fusion   •  CARDIAC CATHETERIZATION  08/31/2011   • CATARACT EXTRACTION W/  INTRAOCULAR LENS IMPLANT Left 7/2/2018    Procedure: OS CATARACT LEFT PHACOEMULSIFICATION OF CATARACT WITH INTRAOCULAR LENS;  Surgeon: Emmanuel Cueto MD;  Location: \[facility]\"" SURGICAL SERVICES;  Service: Ophthalmology;  Laterality: Left;   • CATARACT EXTRACTION W/  INTRAOCULAR LENS IMPLANT Right 8/6/2018    Procedure: OD CATARACT RIGHT PHACOEMULSIFICATION OF CATARACT WITH INTRAOCULAR LENS;  Surgeon: Emmanuel Cueto MD;  Location: \[facility]\"" SURGICAL SERVICES;  Service: Ophthalmology;  Laterality: Right;   • CHOLECYSTECTOMY     • COLONOSCOPY  02/19/2014    No polyps   • COLONOSCOPY  04/26/2011   • DIAGNOSTIC LAPAROSCOPY N/A 11/28/2018    Procedure: Laparoscopic band removal;  Surgeon: Chris Millan MD;  Location: [facility] OR;  Service: General;  Laterality: N/A;   • ESOPHAGOGASTRODUODENOSCOPY N/A 11/6/2018    Procedure: EGD - ESOPHAGOGASTRODUODENOSCOPY;  Surgeon: Chris Millan MD;  Location: \[facility]"" Endoscopy;  Service: Endoscopy;  Laterality: N/A;   • FOOT SURGERY      11 surgeries for [place]ons Neuroma [id number]   • GASTRECTOMY N/A 8/14/2019    Procedure: Laparoscopic sleeve Gastrectomy;  Surgeon: Chris Khalil MD;  Location: [facility] OR;  Service: General;  Laterality: N/A;   • HYSTERECTOMY  1987    Total   • LAPAROSCOPIC GASTRIC BANDING      03/27/2014-standard Ap   • OTHER SURGICAL HISTORY      General surgery: Lt rotator cuff 2002   • OTHER SURGICAL HISTORY  12/01/2014    Right facial skin cancer   • OTHER SURGICAL HISTORY  1987    Ob gyn surgery:Bladder tuck   • SHOULDER ARTHROSCOPY  08/02/2013    Dr. Vela- shoulder   • THYROID LOBECTOMY Left 18/18/2010   • THYROIDECTOMY, PARTIAL  08/18/2010   • UPPER GASTROINTESTINAL ENDOSCOPY  02/19/2014    Mild chronic gastritis     Family History   Problem Relation Age of Onset   • Heart disease Mother    • Heart disease Father         Myocardial infarction   • Parkinsonism Father    • Thyroid disease Father    •  Alzheimer's disease Sister    • Arthritis Sister    • Osteoporosis Sister    • Lung cancer Brother    • Diabetes Maternal Grandmother      Social History     Occupational History   • Not on file   Tobacco Use   • Smoking status: Former Smoker     Packs/day: 2.00     Years: 35.00     Pack years: 70.00     Quit date:      Years since quittin.8   • Smokeless tobacco: Never Used   Substance and Sexual Activity   • Alcohol use: No   • Drug use: No   • Sexual activity: Not on file   Social History Narrative   • Not on file       Review of Systems    Objective     Vitals  /70 (BP Location: Left arm, Patient Position: Sitting, Cuff Size: Regular Adult)   Pulse 58   Temp 36.8 °C (98.3 °F) (Temporal)   Resp 16   Ht 1.524 m (5')   Wt 76.2 kg (168 lb)   SpO2 97%   BMI 32.81 kg/m²     Physical Exam  Vitals signs and nursing note reviewed.   Constitutional:       General: She is not in acute distress.     Appearance: Normal appearance. She is not ill-appearing, toxic-appearing or diaphoretic.   HENT:      Nose: Nose normal. No congestion or rhinorrhea.      Mouth/Throat:      Mouth: Mucous membranes are dry.      Pharynx: No oropharyngeal exudate or posterior oropharyngeal erythema.   Eyes:      General:         Right eye: No discharge.         Left eye: No discharge.   Neck:      Musculoskeletal: Neck supple. No muscular tenderness.   Cardiovascular:      Rate and Rhythm: Normal rate and regular rhythm.      Heart sounds: No murmur. No friction rub. No gallop.    Pulmonary:      Effort: Pulmonary effort is normal. No respiratory distress.      Breath sounds: Normal breath sounds. No stridor. No wheezing, rhonchi or rales.   Chest:      Chest wall: No tenderness.   Musculoskeletal:         General: No tenderness or signs of injury.      Right lower leg: No edema.      Left lower leg: No edema.   Lymphadenopathy:      Cervical: No cervical adenopathy.   Skin:     General: Skin is warm and dry.       Coloration: Skin is not pale.      Findings: No erythema or rash.   Neurological:      Mental Status: She is alert and oriented to person, place, and time.         Procedures    Assessment/Plan   Diagnoses and all orders for this visit:    Epistaxis  Comments:  Start trial of AYR gel, continue nasal saline as needed.  Orders:  -     sodium chloride-aloe vera (Ayr Saline) gel nasal gel; Apply 1 application to each nostril as needed (nose bleeds)    Menopausal flushing  Comments:  Continue current medication as needed.  Orders:  -     estrogens, conjugated, (Premarin) 0.9 mg tablet; Take 1 tablet (0.9 mg total) by mouth once a week    Obstructive sleep apnea syndrome  Comments:  Will repeat home oxygen study to see if we can discontinue her nighttime oxygen.  Follow-up pending results.  Orders:  -     OP Pulse Oximetry Overnight Study        Return if symptoms worsen or fail to improve.    Bronson Travis MD    Portions of this note were documented by Nneka Arita as I performed the exam and collected the information from Katy Stack. I attest that I have reviewed the information as documented, verified the accuracy of the documentation and added additional information as needed.

## 2020-11-05 ENCOUNTER — TELEPHONE (OUTPATIENT)
Dept: FAMILY MEDICINE | Facility: CLINIC | Age: 80
End: 2020-11-05

## 2020-11-05 DIAGNOSIS — E03.8 OTHER SPECIFIED HYPOTHYROIDISM: Primary | ICD-10-CM

## 2020-11-05 DIAGNOSIS — N95.1 MENOPAUSAL FLUSHING: ICD-10-CM

## 2020-11-05 NOTE — TELEPHONE ENCOUNTER
She stated that she needs a TSH as she is suppose to have them every 6 months.    She also wants her estrogen sent to [facility].    Per Dr Travis it is ok to order the TSH and the Estrogen.

## 2020-11-13 ENCOUNTER — APPOINTMENT (OUTPATIENT)
Dept: LAB | Facility: CLINIC | Age: 80
End: 2020-11-13
Payer: MEDICARE

## 2020-11-13 DIAGNOSIS — E03.8 OTHER SPECIFIED HYPOTHYROIDISM: ICD-10-CM

## 2020-11-13 LAB — TSH SERPL DL<=0.05 MIU/L-ACNC: 7.46 UIU/ML (ref 0.34–4.82)

## 2020-11-13 PROCEDURE — 36415 COLL VENOUS BLD VENIPUNCTURE: CPT

## 2020-11-13 PROCEDURE — 84443 ASSAY THYROID STIM HORMONE: CPT

## 2020-11-18 ENCOUNTER — TELEPHONE (OUTPATIENT)
Dept: FAMILY MEDICINE | Facility: CLINIC | Age: 80
End: 2020-11-18

## 2020-11-18 DIAGNOSIS — E03.8 OTHER SPECIFIED HYPOTHYROIDISM: Primary | ICD-10-CM

## 2020-11-18 RX ORDER — LEVOTHYROXINE SODIUM 112 UG/1
112 TABLET ORAL DAILY
Qty: 90 TABLET | Refills: 0 | Status: SHIPPED | OUTPATIENT
Start: 2020-11-18 | End: 2021-01-20 | Stop reason: SDUPTHER

## 2020-11-18 NOTE — TELEPHONE ENCOUNTER
Bronson Travis MD   11/17/2020  1:52 PM MST      TSH elevated. Increase levothyroxine to 112mcg daily. Repeat 2 months. Thanks     TSH 0.340 - 4.820 uIU/ml 7.455High         New med does ordered and sent to [facility]  TSH ordered for recheck  Pt notified.

## 2020-11-24 ENCOUNTER — TELEPHONE (OUTPATIENT)
Dept: FAMILY MEDICINE | Facility: CLINIC | Age: 80
End: 2020-11-24

## 2020-11-24 NOTE — TELEPHONE ENCOUNTER
Notified Katy to call [facility] as she has a years worth of meds that were ordered on 9/11/2020.    She verbalized understanding.

## 2020-12-24 ENCOUNTER — NURSE TRIAGE (OUTPATIENT)
Dept: FAMILY MEDICINE | Facility: CLINIC | Age: 80
End: 2020-12-24

## 2020-12-24 ENCOUNTER — APPOINTMENT (OUTPATIENT)
Dept: LAB | Facility: CLINIC | Age: 80
End: 2020-12-24
Payer: MEDICARE

## 2020-12-24 DIAGNOSIS — Z11.52 ENCOUNTER FOR SCREENING LABORATORY TESTING FOR COVID-19 VIRUS: ICD-10-CM

## 2020-12-24 LAB — SARS-COV-2 RNA RESP QL NAA+PROBE: NEGATIVE

## 2020-12-24 PROCEDURE — C9803 HOPD COVID-19 SPEC COLLECT: HCPCS | Mod: CS

## 2020-12-24 PROCEDURE — 87635 SARS-COV-2 COVID-19 AMP PRB: CPT

## 2020-12-24 NOTE — TELEPHONE ENCOUNTER
COVID-19 SCREENING ASSESSMENT     CRITERIA FOR TESTING  Yes to Any Symptoms or Asymptomatic Testing With Approved Criteria/MH Agreement  What symptoms are you experiencing? Congestion and Fatigue, burning nose  Asymptomatic testing group: N/A      ORDER QUESTIONS  Date of onset: 12/10/2020  Are you a healthcare worker,  or active  or [employer]? No  Do you live in an institutional setting (long term care, assisted living, corrections, etc)? No  Are you a dialysis or current cancer patient? No  Are you currently pregnant? No  Do you work in an educational setting? No     OTHER QUESTIONS  Are you a [employer] employee? No  Have you had close contact with a lab confirmed case of COVID-19 in the last 14 days? no  Details on close contact: friend playing cards

## 2020-12-24 NOTE — TELEPHONE ENCOUNTER
Reason for Disposition  • Patient has COVID-19 symptoms per CDC guidelines.    Protocols used: COVID-19 TRIAGE PROTOCOL MONUMENT HEALTH

## 2020-12-28 ENCOUNTER — ANCILLARY PROCEDURE (OUTPATIENT)
Dept: RADIOLOGY | Facility: CLINIC | Age: 80
End: 2020-12-28
Payer: MEDICARE

## 2020-12-28 ENCOUNTER — OFFICE VISIT (OUTPATIENT)
Dept: ORTHOPEDIC SURGERY | Facility: CLINIC | Age: 80
End: 2020-12-28
Payer: MEDICARE

## 2020-12-28 VITALS — DIASTOLIC BLOOD PRESSURE: 74 MMHG | SYSTOLIC BLOOD PRESSURE: 126 MMHG

## 2020-12-28 DIAGNOSIS — M25.561 RIGHT KNEE PAIN, UNSPECIFIED CHRONICITY: Primary | ICD-10-CM

## 2020-12-28 PROCEDURE — 73564 X-RAY EXAM KNEE 4 OR MORE: CPT | Mod: RT,PO,FY

## 2020-12-28 PROCEDURE — 96372 THER/PROPH/DIAG INJ SC/IM: CPT | Mod: PO | Performed by: PHYSICIAN ASSISTANT

## 2020-12-28 PROCEDURE — G0463 HOSPITAL OUTPT CLINIC VISIT: HCPCS | Mod: PO | Performed by: PHYSICIAN ASSISTANT

## 2020-12-28 PROCEDURE — 20610 DRAIN/INJ JOINT/BURSA W/O US: CPT | Mod: PO | Performed by: PHYSICIAN ASSISTANT

## 2020-12-28 PROCEDURE — 6360000200 HC RX 636 W HCPCS (ALT 250 FOR IP): Mod: PO | Performed by: PHYSICIAN ASSISTANT

## 2020-12-28 PROCEDURE — 99213 OFFICE O/P EST LOW 20 MIN: CPT | Mod: 25 | Performed by: PHYSICIAN ASSISTANT

## 2020-12-28 PROCEDURE — 73564 X-RAY EXAM KNEE 4 OR MORE: CPT | Mod: 26,RT | Performed by: ORTHOPAEDIC SURGERY

## 2020-12-28 RX ORDER — BUPIVACAINE HYDROCHLORIDE 2.5 MG/ML
3 INJECTION, SOLUTION EPIDURAL; INFILTRATION; INTRACAUDAL
Status: COMPLETED | OUTPATIENT
Start: 2020-12-28 | End: 2020-12-28

## 2020-12-28 RX ORDER — METHYLPREDNISOLONE ACETATE 80 MG/ML
80 INJECTION, SUSPENSION INTRA-ARTICULAR; INTRALESIONAL; INTRAMUSCULAR; SOFT TISSUE
Status: COMPLETED | OUTPATIENT
Start: 2020-12-28 | End: 2020-12-28

## 2020-12-28 RX ADMIN — BUPIVACAINE HYDROCHLORIDE 3 ML: 2.5 INJECTION, SOLUTION EPIDURAL; INFILTRATION; INTRACAUDAL at 10:22

## 2020-12-28 RX ADMIN — METHYLPREDNISOLONE ACETATE 80 MG: 80 INJECTION, SUSPENSION INTRA-ARTICULAR; INTRALESIONAL; INTRAMUSCULAR; SOFT TISSUE at 10:22

## 2020-12-28 ASSESSMENT — PAIN - FUNCTIONAL ASSESSMENT: PAIN_FUNCTIONAL_ASSESSMENT: 0-10

## 2020-12-28 ASSESSMENT — ENCOUNTER SYMPTOMS
JOINT SWELLING: 1
WOUND: 0
ARTHRALGIAS: 1
ACTIVITY CHANGE: 0
FEVER: 0
NUMBNESS: 0

## 2020-12-28 NOTE — PROGRESS NOTES
"Subjective   Patient ID: Katy Stack is a 80 y.o. female.    Chief Complaint:  Chief Complaint   Patient presents with   • Right Knee - Pain     Presents with right knee pain.    • Pain     States the knee aches all the time. She has had pain for approx 2 months. Feels as though the knee will give out on her. Rates her pain today 2/10. She is on gabapentin for her back issues.        HPI  Patient comes in today for evaluation treatment of her right knee pain.  She reports her pain started to be generalized achiness and pain about 2 months ago.  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  She denies any specific injury or trauma.  She reports that over the last couple days she has noticed a little bit more of the knee feeling as though it might give out.  She denies any locking or catching.  Her pain is primarily in the posterior aspect of the knee she does report some \"noise\" in the anterior portion of the knee.  She uses gabapentin for back pain so avoids other medication generally.  She has not really been taking any NSAIDs or Tylenol.  Denies any numbness or tingling.  Social History     Occupational History   • Not on file   Tobacco Use   • Smoking status: Former Smoker     Packs/day: 2.00     Years: 35.00     Pack years: 70.00     Quit date:      Years since quittin.0   • Smokeless tobacco: Never Used   Substance and Sexual Activity   • Alcohol use: No   • Drug use: No   • Sexual activity: Not on file      Review of Systems   Constitutional: Negative for activity change and fever.   Musculoskeletal: Positive for arthralgias, gait problem and joint swelling.   Skin: Negative for wound.   Neurological: Negative for numbness.             Objective   Ortho Exam      Constitutional: Patient is well-developed well-nourished, no acute distress.  A and O x3   HEENT: Head is normocephalic and atraumatic.  Eyes: EOMs are normal  Respiratory: Normal " respiratory effort with no shortness of breath  Neurologic: Normal affect, pleasant and cooperative, A and O x3  Skin: Warm and dry with no obvious rashes, open sores, lesions  Musculoskeletal:  Gait: Patient ambulates without assistive device somewhat of an antalgic gait pattern, short stride  Right knee: No erythema, minimal swelling is noted with no joint effusion.  Normal color and temperature.  Skin is all intact.  Range of motion actively 0 to 130 degrees quite easily with no pain.  Mild tenderness to palpation the posterior aspect of the knee, none in the medial or lateral joint lines.  Minor crepitus in the patellofemoral tendon is noted.  Negative varus and valgus stress test, negative anterior and posterior drawer, negative Lachman's.  Very minimal pain with Richard's    Assessment   Right knee pain  Which started a couple months ago with no specific injury or trauma.  No significant mechanical symptoms are noted.  Symptoms most likely more of a degenerative process than acute injury.      X-ray obtained today shows minor joint space narrowing in the medial compartment and joint space narrowing noted in the patellofemoral joint.    Plan      Discussed x-ray with patient and treatment options.  Will attempt to treat fairly conservatively patient will begin using ibuprofen 400 mg twice a day with food for the next 7 to 10 days.  A cortisone injection was also administered today as per procedure note.  We will follow-up in about 3 weeks see how she does with the injection and possibly discuss viscosupplementation at that time depending on symptoms

## 2020-12-28 NOTE — PROGRESS NOTES
Injection/Arthrocentesis Joint/Tendon/Bursa  Date/Time: 12/28/2020 10:22 AM  Performed by: CHAPARRO Maurer       Consent  Verbal consent was obtained from the patient. Written consent was not obtained from the patient. Risks, benefits, and alternatives were discussed. Consent given by patient. The patient states understanding of the procedure being performed. Patient ID confirmed verbally with the patient.       Location Details  An injection was given to Katy in her knee. The injection site was the R intraarticular.    Procedure Details   Patient was prepped and draped in the usual sterile fashion. Ethyl chloride spray was used for local anesthesia.   A 25 gauge needle was inserted into the R intraarticular using a anterolateral approach . Aspirate was not obtained. R intraarticular was injected with 80 mg methylPREDNISolone acetate 80 mg/mL, 3 mL bupivacaine (PF) 0.25 % (2.5 mg/mL),  . Needle removed without complication.     Post-Procedure  Patient tolerated the procedure well with no immediate complications. A standard bandage was applied. Advised patient to avoid strenous activity for 24-48 hours. Advised patient to use ice, NSAIDs or acetaminophen for pain as needed.     Procedure Comments  80 mg Depomedrol and 3 ml of 0.25% bupivacaine injected into patient's right knee.

## 2020-12-28 NOTE — PATIENT INSTRUCTIONS
Patient Education   Knee Injection, Care After  Refer to this sheet in the next few weeks. These instructions provide you with information about caring for yourself after your procedure. Your health care provider may also give you more specific instructions. Your treatment has been planned according to current medical practices, but problems sometimes occur. Call your health care provider if you have any problems or questions after your procedure.  What can I expect after the procedure?  After the procedure, it is common to have:  · Soreness.  · Warmth.  · Swelling.    You may have more pain, swelling, and warmth than you did before the injection. This reaction may last for about one day.  Follow these instructions at home:  Bathing  · If you were given a bandage (dressing), keep it dry until your health care provider says it can be removed. Ask your health care provider when you can start showering or taking a bath.  Managing pain, stiffness, and swelling  · If directed, apply ice to the injection area:  ? Put ice in a plastic bag.  ? Place a towel between your skin and the bag.  ? Leave the ice on for 20 minutes, 2-3 times per day.  · Do not apply heat to your knee.  · Raise the injection area above the level of your heart while you are sitting or lying down.  Activity  · Avoid strenuous activities for as long as directed by your health care provider. Ask your health care provider when you can return to your normal activities.  General instructions  · Take medicines only as directed by your health care provider.  · Do not take aspirin or other over-the-counter medicines unless your health care provider says you can.  · Check your injection site every day for signs of infection. Watch for:  ? Redness, swelling, or pain.  ? Fluid, blood, or pus.  · Follow your health care provider’s instructions about dressing changes and removal.  Contact a health care provider if:  · You have symptoms at your injection site that  last longer than two days after your procedure.  · You have redness, swelling, or pain in your injection area.  · You have fluid, blood, or pus coming from your injection site.  · You have warmth in your injection area.  · You have a fever.  · Your pain is not controlled with medicine.  Get help right away if:  · Your knee turns very red.  · Your knee becomes very swollen.  · Your knee pain is severe.  This information is not intended to replace advice given to you by your health care provider. Make sure you discuss any questions you have with your health care provider.  Document Released: 01/08/2016 Document Revised: 08/23/2017 Document Reviewed: 10/28/2015  [employer] Interactive Patient Education © 2018 [employer]

## 2020-12-29 ENCOUNTER — OFFICE VISIT (OUTPATIENT)
Dept: FAMILY MEDICINE | Facility: CLINIC | Age: 80
End: 2020-12-29
Payer: MEDICARE

## 2020-12-29 VITALS
BODY MASS INDEX: 32.81 KG/M2 | OXYGEN SATURATION: 97 % | HEART RATE: 54 BPM | SYSTOLIC BLOOD PRESSURE: 120 MMHG | TEMPERATURE: 97.4 F | DIASTOLIC BLOOD PRESSURE: 58 MMHG | WEIGHT: 168 LBS

## 2020-12-29 DIAGNOSIS — G89.29 CHRONIC BACK PAIN, UNSPECIFIED BACK LOCATION, UNSPECIFIED BACK PAIN LATERALITY: ICD-10-CM

## 2020-12-29 DIAGNOSIS — M54.9 CHRONIC BACK PAIN, UNSPECIFIED BACK LOCATION, UNSPECIFIED BACK PAIN LATERALITY: ICD-10-CM

## 2020-12-29 DIAGNOSIS — J32.9 SINUSITIS, UNSPECIFIED CHRONICITY, UNSPECIFIED LOCATION: Primary | ICD-10-CM

## 2020-12-29 PROCEDURE — G0463 HOSPITAL OUTPT CLINIC VISIT: HCPCS | Performed by: FAMILY MEDICINE

## 2020-12-29 PROCEDURE — 99213 OFFICE O/P EST LOW 20 MIN: CPT | Performed by: FAMILY MEDICINE

## 2020-12-29 RX ORDER — AMOXICILLIN 500 MG/1
500 CAPSULE ORAL 3 TIMES DAILY
Qty: 30 CAPSULE | Refills: 0 | Status: SHIPPED | OUTPATIENT
Start: 2020-12-29 | End: 2021-01-08

## 2020-12-29 RX ORDER — FLUTICASONE PROPIONATE 50 MCG
1 SPRAY, SUSPENSION (ML) NASAL DAILY
Qty: 16 G | Refills: 12 | Status: SHIPPED | OUTPATIENT
Start: 2020-12-29 | End: 2021-06-08 | Stop reason: ALTCHOICE

## 2020-12-29 NOTE — PROGRESS NOTES
Subjective      Katy Stack is a 80 y.o. female who presents for Sinus Problem (Patient states that she is having alot of sinus drainage. ) and Back Pain (Patient would like to discuss a pain dr in [place].)  .    Patient presents due to concerns of sinus congestion/pressure and drainage. She denies fevers or chills. No sore throat. She was last tested for COVID-19 on 12/24/2020 with a negative result.    Patient is requesting a referral to Dr. Bernardo Gonzalez with [facility] due to chronic pain in low back and legs. She reports her sister has seen this provider for injections and felt this was beneficial.         The following have been reviewed and updated as appropriate in this visit:  Tobacco  Allergies  Meds  Problems  Med Hx  Surg Hx  Fam Hx         Allergies   Allergen Reactions   • Pender Angioedema and Hives     hives, tongue swells  Swelling of tongue   • Adhesive      welts     Current Outpatient Medications   Medication Sig Dispense Refill   • levothyroxine (SYNTHROID, LEVOTHROID) 112 mcg tablet Take 1 tablet (112 mcg total) by mouth daily 90 tablet 0   • estrogens, conjugated, (Premarin) 0.9 mg tablet Take 1 tablet (0.9 mg total) by mouth once a week 12 tablet 4   • sodium chloride-aloe vera (Ayr Saline) gel nasal gel Apply 1 application to each nostril as needed (nose bleeds) 14.1 g 3   • atorvastatin (LIPITOR) 20 mg tablet TAKE 1 TABLET BY MOUTH  DAILY 90 tablet 3   • atenoloL (TENORMIN) 25 mg tablet TAKE 1 TABLET BY MOUTH  DAILY 90 tablet 3   • losartan (COZAAR) 50 mg tablet TAKE 1 TABLET BY MOUTH  DAILY 90 tablet 3   • furosemide (LASIX) 20 mg tablet TAKE 1 TABLET BY MOUTH  DAILY 90 tablet 3   • potassium chloride 10 mEq CR tablet TAKE 1 TABLET BY MOUTH  DAILY 90 tablet 3   • gabapentin (NEURONTIN) 300 mg capsule Take 600 mg by mouth 3 (three) times a day       • calcium carbonate-vitamin D3 600 mg(1,500mg) -400 unit per tablet Take 1 tablet by  mouth daily     • nitroglycerin (NITROSTAT) 0.4 mg SL tablet Place 1 tablet (0.4 mg total) under the tongue every 5 (five) minutes as needed for chest pain 25 tablet 1   • vit A-vit C-vit E-zinc-copper (EYE VITAMIN AND MINERALS) 7,160-113-100 unit-mg-unit tablet Take 1 tab/cap by mouth 2 (two) times a day.     • amoxicillin (AMOXIL) 500 mg capsule Take 1 capsule (500 mg total) by mouth 3 (three) times a day for 10 days 30 capsule 0   • fluticasone propionate (Flonase Allergy Relief) 50 mcg/actuation nasal spray Administer 1 spray into each nostril daily 16 g 12   • Fluad Quad 2020-21,65y up,,PF, 60 mcg (15 mcg x 4)/0.5 mL syringe SMARTSI.5 Milliliter(s) IM As Directed       Current Facility-Administered Medications   Medication Dose Route Frequency Provider Last Rate Last Admin   • denosumab (PROLIA) 60 mg/mL syringe 60 mg  60 mg subcutaneous q6 months Haley Olvera, DNP   60 mg at 20 1201     Past Medical History:   Diagnosis Date   • Benign essential hypertension 2017   • Cataract of left eye    • Chronic obstructive lung disease (CMS/HCC) (HCC) 11/10/2017   • Dysrhythmia     LBBB   • GERD (gastroesophageal reflux disease)     at times   • Hypertension    • Hypothyroid    • Hypothyroidism 11/10/2017   • Injury of back     BENDING INJURY   • Neurologic disorder    • Obesity 11/10/2017   • Obstructive sleep apnea syndrome 11/10/2017    Night time oxygen/2L   • Osteoarthritis    • Periodic limb movement disorder 11/10/2017   • Peripheral neuropathy     left thigh into left groin    • Rectocele    • Respiratory disease      Past Surgical History:   Procedure Laterality Date   • APPENDECTOMY     • BACK SURGERY      2 surgeries [id number]   • BACK SURGERY  2016   • BACK SURGERY  2017    vetebral fusion   • CARDIAC CATHETERIZATION  2011   • CATARACT EXTRACTION W/  INTRAOCULAR LENS IMPLANT Left 2018    Procedure: OS CATARACT LEFT PHACOEMULSIFICATION OF CATARACT WITH INTRAOCULAR LENS;   Surgeon: Emmanuel Cueto MD;  Location: [facility]"" SURGICAL SERVICES;  Service: Ophthalmology;  Laterality: Left;   • CATARACT EXTRACTION W/  INTRAOCULAR LENS IMPLANT Right 8/6/2018    Procedure: OD CATARACT RIGHT PHACOEMULSIFICATION OF CATARACT WITH INTRAOCULAR LENS;  Surgeon: Emmanuel Cueto MD;  Location: [facility]"" SURGICAL SERVICES;  Service: Ophthalmology;  Laterality: Right;   • CHOLECYSTECTOMY     • COLONOSCOPY  02/19/2014    No polyps   • COLONOSCOPY  04/26/2011   • DIAGNOSTIC LAPAROSCOPY N/A 11/28/2018    Procedure: Laparoscopic band removal;  Surgeon: Chris Millan MD;  Location: 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;  Service: General;  Laterality: N/A;   • HYSTERECTOMY  1987    Total   • LAPAROSCOPIC GASTRIC BANDING      03/27/2014-standard Ap   • OTHER SURGICAL HISTORY      General surgery: Lt rotator cuff 2002   • OTHER SURGICAL HISTORY  12/01/2014    Right facial skin cancer   • OTHER SURGICAL HISTORY  1987    Ob gyn surgery:Bladder tuck   • SHOULDER ARTHROSCOPY  08/02/2013    Dr. Vela- shoulder   • THYROID LOBECTOMY Left 18/18/2010   • THYROIDECTOMY, PARTIAL  08/18/2010   • UPPER GASTROINTESTINAL ENDOSCOPY  02/19/2014    Mild chronic gastritis     Family History   Problem Relation Age of Onset   • Heart disease Mother    • Heart disease Father         Myocardial infarction   • Parkinsonism Father    • Thyroid disease Father    • Alzheimer's disease Sister    • Arthritis Sister    • Osteoporosis Sister    • Lung cancer Brother    • Diabetes Maternal Grandmother      Social History     Occupational History   • Not on file    Tobacco Use   • Smoking status: Former Smoker     Packs/day: 2.00     Years: 35.00     Pack years: 70.00     Quit date:      Years since quittin.0   • Smokeless tobacco: Never Used   Substance and Sexual Activity   • Alcohol use: No   • Drug use: No   • Sexual activity: Not on file   Social History Narrative   • Not on file       Review of Systems    Objective     Vitals  /58 (BP Location: Left arm, Patient Position: Sitting)   Pulse 54   Temp 36.3 °C (97.4 °F) (Temporal)   Wt 76.2 kg (168 lb)   SpO2 97%   BMI 32.81 kg/m²     Physical Exam  Vitals signs and nursing note reviewed.   Constitutional:       General: She is not in acute distress.     Appearance: Normal appearance. She is obese. She is not ill-appearing, toxic-appearing or diaphoretic.   HENT:      Right Ear: Tympanic membrane, ear canal and external ear normal. There is no impacted cerumen.      Left Ear: Tympanic membrane, ear canal and external ear normal. There is no impacted cerumen.      Nose: Rhinorrhea present. No congestion.      Comments: Maxillary sinus tenderness     Mouth/Throat:      Mouth: Mucous membranes are moist.      Pharynx: Oropharynx is clear. No oropharyngeal exudate or posterior oropharyngeal erythema.      Comments: purulent drainage  Neck:      Musculoskeletal: Neck supple. No muscular tenderness.   Cardiovascular:      Rate and Rhythm: Normal rate and regular rhythm.      Pulses: Normal pulses.      Heart sounds: Normal heart sounds. No murmur. No friction rub. No gallop.    Pulmonary:      Effort: Pulmonary effort is normal. No respiratory distress.      Breath sounds: Normal breath sounds. No wheezing, rhonchi or rales.   Chest:      Chest wall: No tenderness.   Musculoskeletal:         General: No tenderness.      Right lower leg: No edema.      Left lower leg: No edema.   Lymphadenopathy:      Cervical: No cervical adenopathy.   Skin:     General: Skin is warm and dry.      Coloration: Skin is not  pale.      Findings: No erythema or rash.   Neurological:      Mental Status: She is alert.         Procedures    Assessment/Plan   Diagnoses and all orders for this visit:    Sinusitis, unspecified chronicity, unspecified location  Comments:  Start course of amoxicillin and Flonase, f/u if symptoms worsen.  Orders:  -     amoxicillin (AMOXIL) 500 mg capsule; Take 1 capsule (500 mg total) by mouth 3 (three) times a day for 10 days  -     fluticasone propionate (Flonase Allergy Relief) 50 mcg/actuation nasal spray; Administer 1 spray into each nostril daily    Chronic back pain, unspecified back location, unspecified back pain laterality  Comments:  Referral sent to Dr. Gonzalez.  Orders:  -     Ambulatory referral to Pain Medicine; Future        Return if symptoms worsen or fail to improve.    Bronson Travis MD    Portions of this note were documented by Nneka Arita as I performed the exam and collected the information from Katy Stack. I attest that I have reviewed the information as documented, verified the accuracy of the documentation and added additional information as needed.

## 2021-01-06 ENCOUNTER — TELEPHONE (OUTPATIENT)
Dept: FAMILY MEDICINE | Facility: CLINIC | Age: 81
End: 2021-01-06

## 2021-01-06 NOTE — TELEPHONE ENCOUNTER
Patient needs a letter stating that she needs to do exercises everyday so she needs a two bedroom apartment to have her exercise equipment in, Please call patient

## 2021-01-07 ENCOUNTER — TELEPHONE (OUTPATIENT)
Dept: NEUROSURGERY | Facility: CLINIC | Age: 81
End: 2021-01-07

## 2021-01-07 NOTE — TELEPHONE ENCOUNTER
She is getting recertified for her apartment in the next month and she is concerned that they will ask her to move into 1 bedroom appt.  She wants to stay in the 2 bedroom appt she is in as she is using the 2nd bedroom for her exercise equipment.  She is asking if Dr Travis would write a letter stating she needs a 2 bedroom appt so she can exercise at home.  The letter would be addressed to who it may concern and she will pick it up at the clinic.  The appt management has not started the recertification yet or asked her to move into a 1 bedroom appt.    She did state that is she needs to she could ask the Rehab Drs because she has also been working with them.

## 2021-01-08 NOTE — TELEPHONE ENCOUNTER
Katy is scheduled to see Dr Torres and I have no imaging. I called her and she said her images are at [facility] and Dr Romo did her surgeries. She did cancel her appointment as she has to leave [facility]. She said she will r/s when she gets back into [facility].

## 2021-01-14 ENCOUNTER — HOSPITAL ENCOUNTER (OUTPATIENT)
Dept: MRI IMAGING | Facility: HOSPITAL | Age: 81
Discharge: HOME | End: 2021-01-14
Payer: MEDICARE

## 2021-01-14 DIAGNOSIS — G89.29 OTHER CHRONIC PAIN: ICD-10-CM

## 2021-01-14 PROCEDURE — G1004 CDSM NDSC: HCPCS

## 2021-01-15 PROCEDURE — G1004 CDSM NDSC: HCPCS | Performed by: RADIOLOGY

## 2021-01-15 PROCEDURE — 72146 MRI CHEST SPINE W/O DYE: CPT | Mod: 26,MG | Performed by: RADIOLOGY

## 2021-01-20 ENCOUNTER — OFFICE VISIT (OUTPATIENT)
Dept: FAMILY MEDICINE | Facility: CLINIC | Age: 81
End: 2021-01-20
Payer: MEDICARE

## 2021-01-20 VITALS
RESPIRATION RATE: 20 BRPM | WEIGHT: 167 LBS | DIASTOLIC BLOOD PRESSURE: 68 MMHG | OXYGEN SATURATION: 97 % | SYSTOLIC BLOOD PRESSURE: 122 MMHG | HEART RATE: 60 BPM | BODY MASS INDEX: 32.61 KG/M2

## 2021-01-20 DIAGNOSIS — E03.8 OTHER SPECIFIED HYPOTHYROIDISM: ICD-10-CM

## 2021-01-20 DIAGNOSIS — L03.113 CELLULITIS OF RIGHT UPPER EXTREMITY: Primary | ICD-10-CM

## 2021-01-20 PROCEDURE — 99213 OFFICE O/P EST LOW 20 MIN: CPT | Performed by: FAMILY MEDICINE

## 2021-01-20 PROCEDURE — G0463 HOSPITAL OUTPT CLINIC VISIT: HCPCS | Performed by: FAMILY MEDICINE

## 2021-01-20 RX ORDER — CEFADROXIL 500 MG/1
500 CAPSULE ORAL 2 TIMES DAILY
Qty: 20 CAPSULE | Refills: 0 | Status: SHIPPED | OUTPATIENT
Start: 2021-01-20 | End: 2021-01-30

## 2021-01-20 RX ORDER — LEVOTHYROXINE SODIUM 112 UG/1
112 TABLET ORAL DAILY
Qty: 90 TABLET | Refills: 3 | Status: SHIPPED | OUTPATIENT
Start: 2021-01-20 | End: 2021-02-15 | Stop reason: ALTCHOICE

## 2021-01-20 ASSESSMENT — PAIN SCALES - GENERAL: PAINLEVEL: 4

## 2021-01-20 ASSESSMENT — ENCOUNTER SYMPTOMS: CONSTITUTIONAL NEGATIVE: 1

## 2021-01-20 NOTE — PROGRESS NOTES
Subjective      Katy Stack is a 80 y.o. female who presents for Insect Bite     Presents today for complaints of right arm swelling and redness.  She states that 2 nights ago she woke up in the middle of the night and believes she got bit by something and she had pain.  Did notice some swelling that was delayed as well as some redness which is delayed.  This is since increased in size over the last 24 hours.  Does endorse quite a bit of pain with palpation of the affected area.  He does feel warm to the touch.  No drainage.      The following have been reviewed and updated as appropriate in this visit:         Allergies   Allergen Reactions   • [place] Angioedema and Hives     hives, tongue swells  Swelling of tongue   • Adhesive      welts     Current Outpatient Medications   Medication Sig Dispense Refill   • fluticasone propionate (Flonase Allergy Relief) 50 mcg/actuation nasal spray Administer 1 spray into each nostril daily 16 g 12   • levothyroxine (SYNTHROID, LEVOTHROID) 112 mcg tablet Take 1 tablet (112 mcg total) by mouth daily 90 tablet 0   • estrogens, conjugated, (Premarin) 0.9 mg tablet Take 1 tablet (0.9 mg total) by mouth once a week 12 tablet 4   • sodium chloride-aloe vera (Ayr Saline) gel nasal gel Apply 1 application to each nostril as needed (nose bleeds) 14.1 g 3   • Fluad Quad 2020-21,65y up,,PF, 60 mcg (15 mcg x 4)/0.5 mL syringe SMARTSI.5 Milliliter(s) IM As Directed     • atorvastatin (LIPITOR) 20 mg tablet TAKE 1 TABLET BY MOUTH  DAILY 90 tablet 3   • atenoloL (TENORMIN) 25 mg tablet TAKE 1 TABLET BY MOUTH  DAILY 90 tablet 3   • losartan (COZAAR) 50 mg tablet TAKE 1 TABLET BY MOUTH  DAILY 90 tablet 3   • furosemide (LASIX) 20 mg tablet TAKE 1 TABLET BY MOUTH  DAILY 90 tablet 3   • potassium chloride 10 mEq CR tablet TAKE 1 TABLET BY MOUTH  DAILY 90 tablet 3   • gabapentin (NEURONTIN) 300 mg capsule Take 600 mg by mouth 3 (three) times a day       • calcium carbonate-vitamin D3  600 mg(1,500mg) -400 unit per tablet Take 1 tablet by mouth daily     • nitroglycerin (NITROSTAT) 0.4 mg SL tablet Place 1 tablet (0.4 mg total) under the tongue every 5 (five) minutes as needed for chest pain 25 tablet 1   • vit A-vit C-vit E-zinc-copper (EYE VITAMIN AND MINERALS) 7,160-113-100 unit-mg-unit tablet Take 1 tab/cap by mouth 2 (two) times a day.     • cefadroxil (DURICEF) 500 mg capsule Take 1 capsule (500 mg total) by mouth 2 (two) times a day for 10 days 20 capsule 0     Current Facility-Administered Medications   Medication Dose Route Frequency Provider Last Rate Last Admin   • denosumab (PROLIA) 60 mg/mL syringe 60 mg  60 mg subcutaneous q6 months Haley Olvera DNP   60 mg at 09/02/20 1201     Past Medical History:   Diagnosis Date   • Benign essential hypertension 11/24/2017   • Cataract of left eye    • Chronic obstructive lung disease (CMS/HCC) (HCC) 11/10/2017   • Dysrhythmia     LBBB   • GERD (gastroesophageal reflux disease)     at times   • Hypertension    • Hypothyroid    • Hypothyroidism 11/10/2017   • Injury of back     BENDING INJURY   • Neurologic disorder    • Obesity 11/10/2017   • Obstructive sleep apnea syndrome 11/10/2017    Night time oxygen/2L   • Osteoarthritis    • Periodic limb movement disorder 11/10/2017   • Peripheral neuropathy     left thigh into left groin    • Rectocele    • Respiratory disease      Past Surgical History:   Procedure Laterality Date   • APPENDECTOMY  1987   • BACK SURGERY      2 surgeries [id number]   • BACK SURGERY  11/30/2016   • BACK SURGERY  05/01/2017    vetebral fusion   • CARDIAC CATHETERIZATION  08/31/2011   • CATARACT EXTRACTION W/  INTRAOCULAR LENS IMPLANT Left 7/2/2018    Procedure: OS CATARACT LEFT PHACOEMULSIFICATION OF CATARACT WITH INTRAOCULAR LENS;  Surgeon: Emmanuel Cueto MD;  Location: \[facility]\"" SURGICAL SERVICES;  Service: Ophthalmology;  Laterality: Left;   • CATARACT EXTRACTION W/  INTRAOCULAR LENS IMPLANT Right 8/6/2018    Procedure:  OD CATARACT RIGHT PHACOEMULSIFICATION OF CATARACT WITH INTRAOCULAR LENS;  Surgeon: Emmanuel Cueto MD;  Location: [facility] SURGICAL SERVICES;  Service: Ophthalmology;  Laterality: Right;   • CHOLECYSTECTOMY     • COLONOSCOPY  2014    No polyps   • COLONOSCOPY  2011   • DIAGNOSTIC LAPAROSCOPY N/A 2018    Procedure: Laparoscopic band removal;  Surgeon: Chris Millan MD;  Location: [facility];  Service: General;  Laterality: N/A;   • ESOPHAGOGASTRODUODENOSCOPY N/A 2018    Procedure: EGD - ESOPHAGOGASTRODUODENOSCOPY;  Surgeon: Chris Millan MD;  Location: 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 Vela- shoulder   • THYROID LOBECTOMY Left    • THYROIDECTOMY, PARTIAL  2010   • UPPER GASTROINTESTINAL ENDOSCOPY  2014    Mild chronic gastritis     Family History   Problem Relation Age of Onset   • Heart disease Mother    • Heart disease Father         Myocardial infarction   • Parkinsonism Father    • Thyroid disease Father    • Alzheimer's disease Sister    • Arthritis Sister    • Osteoporosis Sister    • Lung cancer Brother    • Diabetes Maternal Grandmother      Social History     Occupational History   • Not on file   Tobacco Use   • Smoking status: Former Smoker     Packs/day: 2.00     Years: 35.00     Pack years: 70.00     Quit date:      Years since quittin.0   • Smokeless tobacco: Never Used    Substance and Sexual Activity   • Alcohol use: No   • Drug use: No   • Sexual activity: Not on file   Social History Narrative   • Not on file       Review of Systems   Constitutional: Negative.    HENT: Negative.    Skin: Positive for rash.       Objective     VITAL SIGNS  /68   Pulse 60   Resp 20   Wt 75.8 kg (167 lb)   SpO2 97%   BMI 32.61 kg/m²     Physical Exam  Vitals signs and nursing note reviewed.   Constitutional:       General: She is not in acute distress.     Appearance: She is well-developed.   Cardiovascular:      Rate and Rhythm: Normal rate.   Pulmonary:      Effort: Pulmonary effort is normal.   Musculoskeletal:      Right lower leg: No edema.      Left lower leg: No edema.   Skin:     General: Skin is warm.      Capillary Refill: Capillary refill takes less than 2 seconds.      Findings: Erythema and rash present.          Neurological:      Mental Status: She is alert and oriented to person, place, and time.   Psychiatric:         Thought Content: Thought content normal.         Lab Results   Component Value Date    GLUCOSE 97 08/10/2020    CALCIUM 9.0 08/10/2020     08/10/2020    K 4.0 08/10/2020    CO2 26 08/10/2020     (H) 08/10/2020    BUN 20 08/10/2020    CREATININE 0.93 08/10/2020    ANIONGAP 5 08/10/2020     Lab Results   Component Value Date    WBC 6.4 08/10/2020    HGB 14.3 08/10/2020    HCT 43.1 08/10/2020    MCV 92.3 08/10/2020     08/10/2020     Lab Results   Component Value Date    TSH 7.455 (H) 11/13/2020     No results found for: HGBA1C   Lab Results   Component Value Date    CHOL 142 08/10/2020    HDL 41 08/10/2020    LDLCALC 73 08/10/2020    TRIG 138 08/10/2020     Lab Results   Component Value Date    SEDRATE 23 (H) 01/26/2018       Assessment/Plan   Problem List Items Addressed This Visit     None      Visit Diagnoses     Cellulitis of right upper extremity    -  Primary    Relevant Medications    cefadroxil (DURICEF) 500 mg capsule          Does  seem likely related to a insect bite.  She has a secondary cellulitis.  Would recommend cefadroxil.  She may continue ice it as needed and consider heat as well as there is an area of fluctuance.  She improve over the next 24 to 48 hours to some extent.  If not improving or she has systemic symptoms she should notify us.  Leo Hdez MD

## 2021-01-22 ENCOUNTER — TELEPHONE (OUTPATIENT)
Dept: FAMILY MEDICINE | Facility: CLINIC | Age: 81
End: 2021-01-22

## 2021-01-22 NOTE — TELEPHONE ENCOUNTER
She did see Dr Hdez on 1/20/2021 for a bite on her arm.  She is taking the antibiotic and putting on the benadryl cream.  She states that it is not itching.  She bumped it on a door knob and the scab come loose and white puss come out.  It hurt so bad it almost made her sick.    Will discus with Dr Hdez    Per Dr Hdez if she does not a have a tempeture and  the redness is not getting worse she can give it 24 hours to see if it gets better.  She should also continue the the warm packs and let it drain if possible.  If she is getting worse or is very concerned she should come to     Katy verbalized understanding.  She said she does not have a fever and she will maddie the area and if it continues to grow she will come into UC

## 2021-01-22 NOTE — TELEPHONE ENCOUNTER
Swelling has gone down but it aches very bad.  She says she bumped it scab was loose and pussy stuff coming out. Still really hot and sore

## 2021-02-01 ENCOUNTER — CLINICAL SUPPORT (OUTPATIENT)
Dept: FAMILY MEDICINE | Facility: CLINIC | Age: 81
End: 2021-02-01

## 2021-02-01 DIAGNOSIS — Z23 ENCOUNTER FOR VACCINATION: Primary | ICD-10-CM

## 2021-02-09 NOTE — PROGRESS NOTES
Subjective   Patient ID: Katy Stack is a 80 y.o. female.    Chief Complaint:  Chief Complaint   Patient presents with   • Right Knee - Pain     Pt presents with right knee pain and is here for an injection. Pt states pain began ~ 2020. Pt her injection from 2020 has not relieved much of her pain. Pt states 3/10, taking gabapentin. Pt denies swelling.       HPI  Patient comes in for evaluation of her right knee pain she does have some arthritis she had a cortisone injection on 2020.  She reports that the cortisone injection did not seem to help much at all, the best it helped for a week or so symptoms are now pretty much posterior knee pain.  She states that it feels like it will almost want to give out at times, denies catching or locking.  Patient uses gabapentin for pain states that she really does not use any anti-inflammatories or anything else.  No trauma or injury that occurred before her symptoms started back in 2020.  Denies numbness tingling pain seems to be worse with weightbearing and is pretty minimal at rest  Social History     Occupational History   • Not on file   Tobacco Use   • Smoking status: Former Smoker     Packs/day: 2.00     Years: 35.00     Pack years: 70.00     Quit date:      Years since quittin.1   • Smokeless tobacco: Never Used   Substance and Sexual Activity   • Alcohol use: No   • Drug use: No   • Sexual activity: Not on file      Review of Systems   Constitutional: Negative for activity change and fever.   Musculoskeletal: Positive for arthralgias and gait problem. Negative for joint swelling.   Neurological: Negative for numbness.             Objective   Ortho Exam    General: Patient well-developed well-nourished 80-year-old female no acute distress.  ANO x3 pleasant cooperative  Gait: Ambulates without a front wheel walker normal gait pattern  Right knee: No significant swelling no erythema.  Patient has minor tenderness to  palpation in the popliteal fossa and proximal calf, distal hamstrings.  She has minor tenderness to palpation along the IT band.  Range of motion is 0 to 125 degrees with no pain.  No tenderness to palpation along the medial or lateral joint line, patella or quadriceps tendon.  No significant patellofemoral joint crepitus.  Negative varus and valgus stress test, negative anterior and posterior drawer, negative Lachman's minimal pain with Richard's        Assessment       Right knee pain which is primarily in the posterior aspect of the knee patient does have some tenderness to palpation along the IT band symptoms seem to be most consistent with the musculotendinous versus the joint itself.  Patient did have x-rays last visit which show very minimal arthritic changes in no acute findings  Patient had a cortisone injection done on 12/28/2020 which provided at best a few days of relief but her pain is returned    Plan    Patient be referred to physical therapy if that is not helping with her symptoms then will consider obtaining an MRI for further evaluation.  Patient will call if not improving after 4 to 6 weeks of PT or sooner if worsening

## 2021-02-10 DIAGNOSIS — I10 BENIGN ESSENTIAL HYPERTENSION: ICD-10-CM

## 2021-02-10 DIAGNOSIS — Z01.818 PRE-OP TESTING: Primary | ICD-10-CM

## 2021-02-11 ENCOUNTER — OFFICE VISIT (OUTPATIENT)
Dept: ORTHOPEDIC SURGERY | Facility: CLINIC | Age: 81
End: 2021-02-11
Payer: MEDICARE

## 2021-02-11 ENCOUNTER — TELEPHONE (OUTPATIENT)
Dept: FAMILY MEDICINE | Facility: CLINIC | Age: 81
End: 2021-02-11

## 2021-02-11 VITALS — DIASTOLIC BLOOD PRESSURE: 65 MMHG | SYSTOLIC BLOOD PRESSURE: 122 MMHG | WEIGHT: 170.5 LBS | BODY MASS INDEX: 33.3 KG/M2

## 2021-02-11 DIAGNOSIS — M25.561 RIGHT KNEE PAIN, UNSPECIFIED CHRONICITY: Primary | ICD-10-CM

## 2021-02-11 PROCEDURE — G0463 HOSPITAL OUTPT CLINIC VISIT: HCPCS | Mod: PO | Performed by: PHYSICIAN ASSISTANT

## 2021-02-11 PROCEDURE — 99213 OFFICE O/P EST LOW 20 MIN: CPT | Performed by: PHYSICIAN ASSISTANT

## 2021-02-11 ASSESSMENT — ENCOUNTER SYMPTOMS
ARTHRALGIAS: 1
FEVER: 0
JOINT SWELLING: 0
ACTIVITY CHANGE: 0
NUMBNESS: 0

## 2021-02-11 ASSESSMENT — PAIN - FUNCTIONAL ASSESSMENT: PAIN_FUNCTIONAL_ASSESSMENT: 0-10

## 2021-02-12 ENCOUNTER — APPOINTMENT (OUTPATIENT)
Dept: LAB | Facility: CLINIC | Age: 81
End: 2021-02-12
Payer: MEDICARE

## 2021-02-12 ENCOUNTER — OFFICE VISIT (OUTPATIENT)
Dept: FAMILY MEDICINE | Facility: CLINIC | Age: 81
End: 2021-02-12
Payer: MEDICARE

## 2021-02-12 ENCOUNTER — ANCILLARY PROCEDURE (OUTPATIENT)
Dept: CARDIOLOGY | Facility: CLINIC | Age: 81
End: 2021-02-12
Payer: MEDICARE

## 2021-02-12 VITALS
TEMPERATURE: 97 F | HEART RATE: 55 BPM | DIASTOLIC BLOOD PRESSURE: 60 MMHG | OXYGEN SATURATION: 99 % | WEIGHT: 168 LBS | SYSTOLIC BLOOD PRESSURE: 132 MMHG | BODY MASS INDEX: 32.81 KG/M2

## 2021-02-12 DIAGNOSIS — R00.1 BRADYCARDIA: Primary | ICD-10-CM

## 2021-02-12 DIAGNOSIS — R00.1 BRADYCARDIA: ICD-10-CM

## 2021-02-12 LAB
ALBUMIN SERPL-MCNC: 3.8 G/DL (ref 3.5–5.3)
ALP SERPL-CCNC: 69 U/L (ref 55–142)
ALT SERPL-CCNC: 36 U/L (ref 7–52)
ANION GAP SERPL CALC-SCNC: 7 MMOL/L (ref 3–11)
AST SERPL-CCNC: 30 U/L
BASOPHILS # BLD AUTO: 0 10*3/UL
BASOPHILS NFR BLD AUTO: 1 % (ref 0–2)
BILIRUB SERPL-MCNC: 0.37 MG/DL (ref 0.2–1.4)
BUN SERPL-MCNC: 21 MG/DL (ref 7–25)
CALCIUM ALBUM COR SERPL-MCNC: 9.5 MG/DL (ref 8.6–10.3)
CALCIUM SERPL-MCNC: 9.3 MG/DL (ref 8.6–10.3)
CHLORIDE SERPL-SCNC: 110 MMOL/L (ref 98–107)
CO2 SERPL-SCNC: 29 MMOL/L (ref 21–32)
CREAT SERPL-MCNC: 0.91 MG/DL (ref 0.6–1.1)
EOSINOPHIL # BLD AUTO: 0.3 10*3/UL
EOSINOPHIL NFR BLD AUTO: 3 % (ref 0–3)
ERYTHROCYTE [DISTWIDTH] IN BLOOD BY AUTOMATED COUNT: 15.1 % (ref 11.5–14)
GFR SERPL CREATININE-BSD FRML MDRD: 60 ML/MIN/1.73M*2
GLUCOSE SERPL-MCNC: 92 MG/DL (ref 70–105)
HCT VFR BLD AUTO: 40.8 % (ref 34–45)
HGB BLD-MCNC: 13.6 G/DL (ref 11.5–15.5)
LYMPHOCYTES # BLD AUTO: 2.8 10*3/UL
LYMPHOCYTES NFR BLD AUTO: 33 % (ref 11–47)
MCH RBC QN AUTO: 30.8 PG (ref 28–33)
MCHC RBC AUTO-ENTMCNC: 33.3 G/DL (ref 32–36)
MCV RBC AUTO: 92.5 FL (ref 81–97)
MONOCYTES # BLD AUTO: 0.6 10*3/UL
MONOCYTES NFR BLD AUTO: 7 % (ref 3–11)
NEUTROPHILS # BLD AUTO: 4.7 10*3/UL
NEUTROPHILS NFR BLD AUTO: 57 % (ref 41–81)
PLATELET # BLD AUTO: 268 10*3/UL (ref 140–350)
PMV BLD AUTO: 8.8 FL (ref 6.9–10.8)
POTASSIUM SERPL-SCNC: 4.3 MMOL/L (ref 3.5–5.1)
PROT SERPL-MCNC: 6.2 G/DL (ref 6–8.3)
RBC # BLD AUTO: 4.41 10*6/ΜL (ref 3.7–5.3)
SODIUM SERPL-SCNC: 146 MMOL/L (ref 135–145)
TSH SERPL DL<=0.05 MIU/L-ACNC: 6.14 UIU/ML (ref 0.34–4.82)
WBC # BLD AUTO: 8.4 10*3/UL (ref 4.5–10.5)

## 2021-02-12 PROCEDURE — 93005 ELECTROCARDIOGRAM TRACING: CPT | Performed by: FAMILY MEDICINE

## 2021-02-12 PROCEDURE — 99214 OFFICE O/P EST MOD 30 MIN: CPT | Performed by: FAMILY MEDICINE

## 2021-02-12 PROCEDURE — 84443 ASSAY THYROID STIM HORMONE: CPT

## 2021-02-12 PROCEDURE — 85025 COMPLETE CBC W/AUTO DIFF WBC: CPT

## 2021-02-12 PROCEDURE — 36415 COLL VENOUS BLD VENIPUNCTURE: CPT

## 2021-02-12 PROCEDURE — G0463 HOSPITAL OUTPT CLINIC VISIT: HCPCS | Performed by: FAMILY MEDICINE

## 2021-02-12 PROCEDURE — 80053 COMPREHEN METABOLIC PANEL: CPT

## 2021-02-12 RX ORDER — DIAZEPAM 10 MG/1
TABLET ORAL
COMMUNITY
Start: 2021-02-10 | End: 2021-02-12 | Stop reason: ALTCHOICE

## 2021-02-12 RX ORDER — DOXYCYCLINE 100 MG/1
100 CAPSULE ORAL 2 TIMES DAILY
COMMUNITY
Start: 2021-01-28 | End: 2021-03-20 | Stop reason: ALTCHOICE

## 2021-02-12 RX ORDER — DULOXETIN HYDROCHLORIDE 30 MG/1
30 CAPSULE, DELAYED RELEASE ORAL DAILY
COMMUNITY
Start: 2021-02-05 | End: 2021-02-12 | Stop reason: ALTCHOICE

## 2021-02-12 NOTE — PROGRESS NOTES
"Subjective      Katy Stack is a 80 y.o. female who presents for Bradycardia (patient is following up from her visit with pain management where she had bradycardia. )  .    Patient presents for a follow up regarding pain management visit with Dr. Gonzalez on 2/10/21 where she was noted to have bradycardia. 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 She mentions her sister required a pacemaker around the age of 80 due to bradycardia. Patient's mother had a hx of enlarged heart and her father passed away of a heart attack. Per Dr. Gonzalez's note: \"She has been noticing over the last month or 2 that she has been having some dizziness when going from sitting to standing. She has not lost consciousness. She has also noticed that her heart rate has been low, in the 40s.\" Patient denies shortness of breath, sweating episodes, palpitations, or PND. She does mention recently she had some chest discomfort but was not painful, more of a pressure feeling. She also mentions recently during the evening time she had an episode of significant fatigue but thinks this was likely due to blood sugar and not eating enough protein that day.        The following have been reviewed and updated as appropriate in this visit:  Tobacco  Allergies  Meds  Problems  Med Hx  Surg Hx  Fam Hx         Allergies   Allergen Reactions   • [place] Angioedema and Hives     hives, tongue swells  Swelling of tongue   • Adhesive      welts     Current Outpatient Medications   Medication Sig Dispense Refill   • doxycycline (VIBRAMYCIN) 100 mg capsule Take 100 mg by mouth 2 (two) times a day     • levothyroxine (SYNTHROID, LEVOTHROID) 112 mcg tablet Take 1 tablet (112 mcg total) by mouth daily 90 tablet 3   • fluticasone propionate (Flonase Allergy Relief) 50 mcg/actuation nasal spray Administer 1 spray into each nostril daily 16 g 12   • estrogens, conjugated, (Premarin) 0.9 mg tablet Take 1 tablet (0.9 mg total) by mouth once a week 12 tablet " 4   • sodium chloride-aloe vera (Ayr Saline) gel nasal gel Apply 1 application to each nostril as needed (nose bleeds) 14.1 g 3   • atorvastatin (LIPITOR) 20 mg tablet TAKE 1 TABLET BY MOUTH  DAILY 90 tablet 3   • atenoloL (TENORMIN) 25 mg tablet TAKE 1 TABLET BY MOUTH  DAILY 90 tablet 3   • losartan (COZAAR) 50 mg tablet TAKE 1 TABLET BY MOUTH  DAILY 90 tablet 3   • furosemide (LASIX) 20 mg tablet TAKE 1 TABLET BY MOUTH  DAILY 90 tablet 3   • potassium chloride 10 mEq CR tablet TAKE 1 TABLET BY MOUTH  DAILY 90 tablet 3   • gabapentin (NEURONTIN) 300 mg capsule Take 600 mg by mouth 3 (three) times a day       • calcium carbonate-vitamin D3 600 mg(1,500mg) -400 unit per tablet Take 1 tablet by mouth daily     • nitroglycerin (NITROSTAT) 0.4 mg SL tablet Place 1 tablet (0.4 mg total) under the tongue every 5 (five) minutes as needed for chest pain 25 tablet 1   • vit A-vit C-vit E-zinc-copper (EYE VITAMIN AND MINERALS) 7,160-113-100 unit-mg-unit tablet Take 1 tab/cap by mouth 2 (two) times a day.     • Fluad Quad 2020-21,65y up,,PF, 60 mcg (15 mcg x 4)/0.5 mL syringe SMARTSI.5 Milliliter(s) IM As Directed       Current Facility-Administered Medications   Medication Dose Route Frequency Provider Last Rate Last Admin   • denosumab (PROLIA) 60 mg/mL syringe 60 mg  60 mg subcutaneous q6 months Haley Olvera, DNP   60 mg at 20 1201     Past Medical History:   Diagnosis Date   • Benign essential hypertension 2017   • Cataract of left eye    • Chronic obstructive lung disease (CMS/HCC) (HCC) 11/10/2017   • Dysrhythmia     LBBB   • GERD (gastroesophageal reflux disease)     at times   • Hypertension    • Hypothyroid    • Hypothyroidism 11/10/2017   • Injury of back     BENDING INJURY   • Neurologic disorder    • Obesity 11/10/2017   • Obstructive sleep apnea syndrome 11/10/2017    Night time oxygen/2L   • Osteoarthritis    • Periodic limb movement disorder 11/10/2017   • Peripheral neuropathy     left thigh into  left groin    • Rectocele    • Respiratory disease      Past Surgical History:   Procedure Laterality Date   • APPENDECTOMY  1987   • BACK SURGERY      2 surgeries [id number]   • BACK SURGERY  11/30/2016   • BACK SURGERY  05/01/2017    vetebral fusion   • CARDIAC CATHETERIZATION  08/31/2011   • CATARACT EXTRACTION W/  INTRAOCULAR LENS IMPLANT Left 7/2/2018    Procedure: OS CATARACT LEFT PHACOEMULSIFICATION OF CATARACT WITH INTRAOCULAR LENS;  Surgeon: Emmanuel Cueto MD;  Location: [facility] SURGICAL SERVICES;  Service: Ophthalmology;  Laterality: Left;   • CATARACT EXTRACTION W/  INTRAOCULAR LENS IMPLANT Right 8/6/2018    Procedure: OD CATARACT RIGHT PHACOEMULSIFICATION OF CATARACT WITH INTRAOCULAR LENS;  Surgeon: Emmanuel Cueto MD;  Location: [facility] SURGICAL SERVICES;  Service: Ophthalmology;  Laterality: Right;   • CHOLECYSTECTOMY     • COLONOSCOPY  02/19/2014    No polyps   • COLONOSCOPY  04/26/2011   • DIAGNOSTIC LAPAROSCOPY N/A 11/28/2018    Procedure: Laparoscopic band removal;  Surgeon: Chris Millan MD;  Location: [facility];  Service: General;  Laterality: N/A;   • ESOPHAGOGASTRODUODENOSCOPY N/A 11/6/2018    Procedure: EGD - ESOPHAGOGASTRODUODENOSCOPY;  Surgeon: Chris Millan MD;  Location: [facility] Endoscopy;  Service: Endoscopy;  Laterality: N/A;   • FOOT SURGERY      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 Vela- shoulder   • THYROID LOBECTOMY Left 18/18/2010   • THYROIDECTOMY, PARTIAL  08/18/2010   • UPPER  GASTROINTESTINAL ENDOSCOPY  2014    Mild chronic gastritis     Family History   Problem Relation Age of Onset   • Heart disease Mother    • Heart disease Father         Myocardial infarction   • Parkinsonism Father    • Thyroid disease Father    • Alzheimer's disease Sister    • Arthritis Sister    • Osteoporosis Sister    • Lung cancer Brother    • Diabetes Maternal Grandmother      Social History     Occupational History   • Not on file   Tobacco Use   • Smoking status: Former Smoker     Packs/day: 2.00     Years: 35.00     Pack years: 70.00     Quit date:      Years since quittin.1   • Smokeless tobacco: Never Used   Substance and Sexual Activity   • Alcohol use: No   • Drug use: No   • Sexual activity: Not on file   Social History Narrative   • Not on file       Review of Systems    Objective     Vitals  /60 (BP Location: Left arm, Patient Position: Sitting)   Pulse 55   Temp 36.1 °C (97 °F) (Temporal)   Wt 76.2 kg (168 lb)   SpO2 99%   BMI 32.81 kg/m²     Physical Exam  Constitutional:       General: She is not in acute distress.     Appearance: Normal appearance. She is not ill-appearing, toxic-appearing or diaphoretic.   Cardiovascular:      Rate and Rhythm: Normal rate and regular rhythm.      Pulses: Normal pulses.      Heart sounds: Normal heart sounds. No murmur. No friction rub. No gallop.    Pulmonary:      Effort: Pulmonary effort is normal. No respiratory distress.      Breath sounds: Normal breath sounds. No wheezing, rhonchi or rales.   Chest:      Chest wall: No tenderness.   Musculoskeletal:         General: No tenderness or signs of injury.      Cervical back: Neck supple. No tenderness.      Right lower leg: No edema.      Left lower leg: No edema.   Lymphadenopathy:      Cervical: No cervical adenopathy.   Skin:     General: Skin is warm and dry.      Coloration: Skin is not pale.      Findings: No erythema or rash.   Neurological:      Mental Status: She is alert and  oriented to person, place, and time.   Psychiatric:         Mood and Affect: Mood normal.         Procedures    Assessment/Plan   Diagnoses and all orders for this visit:    Bradycardia  -     ECG 12 lead-Future; Future  -     CBC w/auto differential Blood, Venous; Future  -     Comprehensive metabolic panel Blood, Venous; Future  -     Thyroid Stimulating Hormone, Ultrasensitive Blood, Venous; Future    Patient with asymptomatic bradycardia.  Repeat EKG and labs today.  Consider Holter monitor.  Return precaution discussed.    Return if symptoms worsen or fail to improve.    Bronson Travis MD    Portions of this note were documented by Nneka Arita as I performed the exam and collected the information from Katy Stack. I attest that I have reviewed the information as documented, verified the accuracy of the documentation and added additional information as needed.

## 2021-02-15 PROCEDURE — 93010 ELECTROCARDIOGRAM REPORT: CPT | Performed by: FAMILY MEDICINE

## 2021-02-23 ENCOUNTER — OFFICE VISIT (OUTPATIENT)
Dept: SURGERY | Facility: CLINIC | Age: 81
End: 2021-02-23
Payer: MEDICARE

## 2021-02-23 VITALS
WEIGHT: 163 LBS | OXYGEN SATURATION: 96 % | DIASTOLIC BLOOD PRESSURE: 60 MMHG | HEIGHT: 60 IN | SYSTOLIC BLOOD PRESSURE: 124 MMHG | TEMPERATURE: 97.8 F | HEART RATE: 60 BPM | BODY MASS INDEX: 32 KG/M2

## 2021-02-23 DIAGNOSIS — Z71.3 ENCOUNTER FOR WEIGHT LOSS COUNSELING: ICD-10-CM

## 2021-02-23 DIAGNOSIS — E66.811 CLASS 1 OBESITY DUE TO EXCESS CALORIES WITHOUT SERIOUS COMORBIDITY WITH BODY MASS INDEX (BMI) OF 31.0 TO 31.9 IN ADULT: ICD-10-CM

## 2021-02-23 DIAGNOSIS — E66.09 CLASS 1 OBESITY DUE TO EXCESS CALORIES WITHOUT SERIOUS COMORBIDITY WITH BODY MASS INDEX (BMI) OF 31.0 TO 31.9 IN ADULT: ICD-10-CM

## 2021-02-23 DIAGNOSIS — Z98.84 S/P LAPAROSCOPIC SLEEVE GASTRECTOMY: Primary | ICD-10-CM

## 2021-02-23 DIAGNOSIS — I10 ESSENTIAL HYPERTENSION: ICD-10-CM

## 2021-02-23 PROCEDURE — 99214 OFFICE O/P EST MOD 30 MIN: CPT | Performed by: NURSE PRACTITIONER

## 2021-02-23 PROCEDURE — G0463 HOSPITAL OUTPT CLINIC VISIT: HCPCS | Performed by: NURSE PRACTITIONER

## 2021-02-23 ASSESSMENT — ENCOUNTER SYMPTOMS
UNEXPECTED WEIGHT CHANGE: 0
ROS GI COMMENTS: DENIES GERD
PSYCHIATRIC NEGATIVE: 1
FEVER: 0
APPETITE CHANGE: 0
ACTIVITY CHANGE: 0
DIAPHORESIS: 0
FATIGUE: 0
CHILLS: 0

## 2021-02-23 NOTE — PROGRESS NOTES
18-month bariatric surgery follow-up  History of Laparoscopic vertical sleeve gastrectomy  Date of service: 2/23/2021    Subjective      Patient ID: Katy Stack is a 80 y.o. female.    Chief Complaint   Patient presents with   • Follow-up     SP Lap Sleeve 8-14-19 Lap band removal 11-28-18 Surgery wt was 203lb     HPI  Katy presents to the weight management and bariatric surgery clinic today, unaccompanied, for annual follow-up after laparoscopic vertical sleeve gastrectomy performed 8/14/2019 by Dr. Millan.  She had some minimal preoperative weight gain following her lap band removal, but has lost has had a 41 pound weight loss since sleeve gastric.      Psychology:  She did not follow-up with the behavioral psychologist postoperatively, but reports mood stability and stability in her little changes since her bariatric surgery.  She did recently lose two loved ones to COVID-19 as well as placed her  in a nursing home as he was becoming more more dependent on her for his cares.  Despite these life circumstances she reports remaining strong in her lifestyle changes with physical activity and nutrition.  She declines the need for additional help for psychological counseling at this time.    Nutrition:  Denies any significant changes to her nutrition habits since her last visit.  She is not tracking foods/fluids/macronutrient intake, but is confident in her nutrition choices and reports that her nutrition is consistent with our postoperative recommendations.  Menu recall includes:   -Fruits and vegetables- bananas, grapes, apples, green salads, cucumbers, potato.  -Protein - shake each morning, eggs, breakfast sausage, nuts, cheese, bean soup, red meat (ground beef or slow cooked roast), chicken   -Beverages: water, decaf coffee, and herbal tea.  Her last food intake is around 6 PM and she goes to bed around 9 or 9:30 PM.      Taking the following postoperative vitamin supplementation: Women's 55+  multivitamin with iron, vitamin B12, vitamin D3, and calcium + D3 twice daily.  She is unsure of the exact dosages she is taking of these supplements.    Physical activity:   Walking.  Averages 5,000 steps daily.  Quit work, but volunteers at [employer] 1 day/week and [employer] 1 day/week.  Plans to get back to the outdoor pool when it reopens this summer.  Currently engages in upper body strengthening with 2 lb dumb bell weights for additional activity about once weekly.  Her ability to engage in physical activity continues to be limited by back pain.  She is going back to pain management this week for new therapy with HF-10 for neuropathy.  She reports that if it works she will have permanent device placed which will be good for ten years. She continues to see pain medicine and continues gabapentin at a dose of 600-1200 mg 3 times daily and tolerating this well.  No unintentional weight gain noted.    Body comp analysis on 2021 (baseline comparison):  Weight: 133.0 pounds (- 35.8 pounds)  Fat %: 46.0 % (- 4.7 %)  Fat mass: 75.0 pounds (-25.8 pounds)  Fat free mass: 88.0 pounds (-10 pounds)  Muscle mass: 83.6 pounds (-9.4 pounds)  TBW %: 38.5%  Bone mass: 4.4 pounds (-0.6 pounds)  BMR: 1243 kcal (-164 kcal)  Visceral fat ratin (-4)  Desirable range fat percent: 24.0 to 35.9%  Desirable range fat mass: 27.8 to 49.2 pounds    Anthropometric measurements on 2021 (baseline comparison):  Neck: 13.5 inches (-2.5 inches)  Waist: 36.5 inches (-5.25 inches)  Hips: 46 inches (-7 inches)  Total inches lost: 14.75 inches    Review of Systems   Constitutional: Negative for activity change, appetite change, chills, diaphoresis, fatigue, fever and unexpected weight change.   HENT: Negative.    Respiratory: Negative.    Cardiovascular: Negative.    Gastrointestinal: Negative.         Denies GERD   Musculoskeletal: Positive for back pain (Chronic, continues gabapentin).   Neurological:        Chronic peripheral  neuropathy persists related to back pain, no worse than previous   Psychiatric/Behavioral: Negative.  Sleep disturbance: Reports sleeping well.       I have personally reviewed the pertinent aspects of the patient's chart and updated the computerized patient record. Pertinent positives are noted above.  Items reviewed including: Tobacco  Allergies  Meds  Problems  Med Hx  Surg Hx  Fam Hx         Objective      Vital Signs  /60   Pulse 60   Temp 36.6 °C (97.8 °F) (Temporal)   Ht 1.524 m (5')   Wt 73.9 kg (163 lb)   SpO2 96%   BMI 31.83 kg/m²       Physical Exam  Vitals and nursing note reviewed. Chaperone present: Unaccompanied.   Constitutional:       General: She is awake. She is not in acute distress.     Appearance: Normal appearance. She is well-developed and well-groomed. She is obese.      Comments: 41 pound weight loss since surgery   Cardiovascular:      Rate and Rhythm: Normal rate and regular rhythm.      Pulses: Normal pulses.      Heart sounds: S1 normal and S2 normal.   Pulmonary:      Effort: Pulmonary effort is normal.      Breath sounds: Normal breath sounds.   Abdominal:      General: Abdomen is protuberant. Bowel sounds are normal.      Palpations: Abdomen is soft.      Tenderness: There is no abdominal tenderness.      Hernia: There is no hernia in the ventral area.   Skin:     General: Skin is warm and dry.   Neurological:      Mental Status: She is alert. Mental status is at baseline.   Psychiatric:         Attention and Perception: Attention normal.         Mood and Affect: Mood normal.         Behavior: Behavior is cooperative.       LAB  Recent Results (from the past 1344 hour(s))   CBC w/auto differential Blood, Venous    Collection Time: 02/12/21 11:08 AM   Result Value Ref Range    WBC 8.4 4.5 - 10.5 10*3/uL    RBC 4.41 3.70 - 5.30 10*6/µL    Hemoglobin 13.6 11.5 - 15.5 g/dL    Hematocrit 40.8 34.0 - 45.0 %    MCV 92.5 81.0 - 97.0 fL    MCH 30.8 28.0 - 33.0 pg    MCHC  33.3 32.0 - 36.0 g/dL    RDW 15.1 (H) 11.5 - 14.0 %    Platelets 268 140 - 350 10*3/uL    MPV 8.8 6.9 - 10.8 fL    Neutrophils% 57 41 - 81 %    Lymphocytes% 33 11 - 47 %    Monocytes% 7 3 - 11 %    Eosinophils% 3 0 - 3 %    Basophils% 1 0 - 2 %    Neutrophils Absolute 4.70 10*3/uL    Lymphocytes Absolute 2.80 10*3/uL    Monocytes Absolute 0.60 10*3/uL    Eosinophils Absolute 0.30 10*3/uL    Basophils Absolute 0.00 10*3/uL   Comprehensive metabolic panel Blood, Venous    Collection Time: 02/12/21 11:08 AM   Result Value Ref Range    Sodium 146 (H) 135 - 145 mmol/L    Potassium 4.3 3.5 - 5.1 mmol/L    Chloride 110 (H) 98 - 107 mmol/L    CO2 29 21 - 32 mmol/L    Anion Gap 7 3 - 11 mmol/L    BUN 21 7 - 25 mg/dL    Creatinine 0.91 0.60 - 1.10 mg/dL    Glucose 92 70 - 105 mg/dL    Calcium 9.3 8.6 - 10.3 mg/dL    AST 30 <40 U/L    ALT (SGPT) 36 7 - 52 U/L    Alkaline Phosphatase 69 55 - 142 U/L    Total Protein 6.2 6.0 - 8.3 g/dL    Albumin 3.8 3.5 - 5.3 g/dL    Total Bilirubin 0.37 0.20 - 1.40 mg/dL    eGFR 60 (L) >60 mL/min/1.73m*2    Corrected Calcium 9.5 8.6 - 10.3 mg/dL   Thyroid Stimulating Hormone, Ultrasensitive Blood, Venous    Collection Time: 02/12/21 11:08 AM   Result Value Ref Range    TSH 6.140 (H) 0.340 - 4.820 uIU/ml   Holter monitor 24 hour    Collection Time: 03/04/21  9:48 AM   Result Value Ref Range    Ventricular beats 5 beats    Total Beats 100,673 beats    Vent % Total Beats 0.00 %    RH Supraventricular Beats 2 beats    % Supraventricular Beats 0.00 %       Assessment and Plan:  1. S/P laparoscopic sleeve gastrectomy  - CBC Blood, Venous; Future  - Comprehensive metabolic panel Blood, Venous; Future  - Folate Blood, Venous; Future  - Magnesium Blood, Venous; Future  - Methylmalonic acid, serum Blood, Venous; Future  - Vitamin A Blood, Venous; Future  - Vitamin B1 (Thiamin) Blood, Venous; Future  - Vitamin D 25 hydroxy Blood, Venous; Future  - Vitamin E Blood, Venous; Future  - Vitamin K Blood,  Venous; Future  - Phosphorus Blood, Venous; Future  - Vitamin B12 Blood, Venous; Future  - Zinc, serum Blood, Venous; Future    2. Class 1 obesity due to excess calories without serious comorbidity with body mass index (BMI) of 31.0 to 31.9 in adult  - CBC Blood, Venous; Future  - Comprehensive metabolic panel Blood, Venous; Future  - Folate Blood, Venous; Future  - Magnesium Blood, Venous; Future  - Methylmalonic acid, serum Blood, Venous; Future  - Vitamin A Blood, Venous; Future  - Vitamin B1 (Thiamin) Blood, Venous; Future  - Vitamin D 25 hydroxy Blood, Venous; Future  - Vitamin E Blood, Venous; Future  - Vitamin K Blood, Venous; Future  - Phosphorus Blood, Venous; Future  - Vitamin B12 Blood, Venous; Future  - Zinc, serum Blood, Venous; Future    3. Encounter for weight loss counseling    4. Essential hypertension  - Magnesium Blood, Venous; Future    This is a 80 y.o. female who presents today for ongoing follow up 18-months s/p sleeve gastrectomy.  She has had a 41 lb loss since surgery.  On biometric testing today loss of fat mass exceeded loss of FFM by 15.8 pounds.  Loss of FFM makes up approximately 30% of total weight loss, which is what we would expect following bariatric surgery.  She has lost 14.75 inches total on standard anthropometric measurements.  She is congratulated on her weight loss progress and lifestyle changes today.  She is engaging in proper nutrition with structured eating patterns, portion control, diet focused on protein and high intake of fruits and vegetables, eating at least 3 hours before bed and within 1 hour of waking.  She is engaging in routine physical fitness and her own abilities, and plans to get back to aquatics exercise this summer as this is easier on her back.  She is taking all recommended postoperative vitamins.  All of the above was reinforced with her today.  No bariatric labs drawn today.  Recent labs drawn by her PCP including CBC, CMP, and TSH as well as an EKG.   TSH elevated on these labs and being managed by her PCP.  Labs otherwise unremarkable.    She has had extenuating life circumstances with multiple recent deaths in her family as well as placing her  in a home.  She reports that she has maintained mood stability and healthy lifestyle changes without turning to behavioral eating patterns despite these situations.  I did go ahead and offer referral back to [facility] psychology for psychological counseling with these recent losses, but she declines this today.  She is encouraged to reach out to us or her PCP if she desires counseling to help cope with these losses.    Katy Stack will RTC in 6 months for annual bariatric surgery follow up and labs, sooner with concerns.  She participated in the decision making process and agreed with the above plan.  All questions were sought and answered to her satisfaction.       Marii Wayne CNP  3/15/2021  9:43 AM    Total Time spent with the patient is 30 min with more than 50% in direct counseling and coordination of care regarding the above diagnoses.     A voice recognition program was used to aid in medical record documentation. Sometimes words are printed not exactly as they were spoken. While efforts were made to carefully edit and correct any inaccuracies, some errors may be present and should be taken within the context of the discussion.  Please contact our office if you need assistance interpreting this medical record or notice any mistakes.

## 2021-02-23 NOTE — Clinical Note
Katy will need her 2 year bariatric follow-up in August 2021.  Labs have been ordered and she should have these drawn prior to that visit.  Thanks exclamation

## 2021-03-01 ENCOUNTER — CLINICAL SUPPORT (OUTPATIENT)
Dept: FAMILY MEDICINE | Facility: CLINIC | Age: 81
End: 2021-03-01

## 2021-03-01 DIAGNOSIS — Z23 ENCOUNTER FOR VACCINATION: Primary | ICD-10-CM

## 2021-03-04 ENCOUNTER — ANCILLARY PROCEDURE (OUTPATIENT)
Dept: CARDIOLOGY | Facility: CLINIC | Age: 81
End: 2021-03-04
Payer: MEDICARE

## 2021-03-04 ENCOUNTER — PROCEDURE VISIT (OUTPATIENT)
Dept: FAMILY MEDICINE | Facility: CLINIC | Age: 81
End: 2021-03-04
Payer: MEDICARE

## 2021-03-04 VITALS — TEMPERATURE: 96.8 F | HEART RATE: 67 BPM | OXYGEN SATURATION: 98 % | RESPIRATION RATE: 14 BRPM

## 2021-03-04 DIAGNOSIS — M81.0 AGE-RELATED OSTEOPOROSIS WITHOUT CURRENT PATHOLOGICAL FRACTURE: Primary | ICD-10-CM

## 2021-03-04 PROCEDURE — 93225 XTRNL ECG REC<48 HRS REC: CPT

## 2021-03-04 PROCEDURE — 6360000200 HC RX 636 W HCPCS (ALT 250 FOR IP): Mod: TB | Performed by: FAMILY MEDICINE

## 2021-03-04 PROCEDURE — 96372 THER/PROPH/DIAG INJ SC/IM: CPT | Performed by: FAMILY MEDICINE

## 2021-03-04 RX ADMIN — DENOSUMAB 60 MG: 60 INJECTION SUBCUTANEOUS at 10:32

## 2021-03-04 NOTE — PROGRESS NOTES
Prolia Injection   Number of injections/year injection started: 3RD INJECTION  DXA: 2/6/20  T-score: -2.6 LFN  FRAX: 6.3% HIP  [facility]/FLS  3/3/20  CS     Prolia 60mg/1ml was injected subcutaneously every 6 months.     Addition of side effects discussed yes   Compliance with calcium and vitamin D yes   Push fluids today Yes   Patient instructed to call with concerns or complaints Yes   Patient advised to have follow up DXA per provider Yes     Patient voiced no complaints of pain or discomfort at this time.

## 2021-03-05 PROCEDURE — 93227 XTRNL ECG REC<48 HR R&I: CPT | Performed by: INTERNAL MEDICINE

## 2021-03-08 ENCOUNTER — TELEPHONE (OUTPATIENT)
Dept: FAMILY MEDICINE | Facility: CLINIC | Age: 81
End: 2021-03-08

## 2021-03-15 ASSESSMENT — ENCOUNTER SYMPTOMS
RESPIRATORY NEGATIVE: 1
GASTROINTESTINAL NEGATIVE: 1
BACK PAIN: 1
CARDIOVASCULAR NEGATIVE: 1

## 2021-04-07 ENCOUNTER — ANCILLARY PROCEDURE (OUTPATIENT)
Dept: BONE DENSITY | Facility: CLINIC | Age: 81
End: 2021-04-07
Payer: MEDICARE

## 2021-04-07 DIAGNOSIS — M81.0 POST-MENOPAUSAL OSTEOPOROSIS: ICD-10-CM

## 2021-04-07 PROCEDURE — 77080 DXA BONE DENSITY AXIAL: CPT | Mod: 26 | Performed by: INTERNAL MEDICINE

## 2021-04-07 PROCEDURE — 77080 DXA BONE DENSITY AXIAL: CPT

## 2021-04-12 ENCOUNTER — TELEPHONE (OUTPATIENT)
Dept: FAMILY MEDICINE | Facility: CLINIC | Age: 81
End: 2021-04-12

## 2021-04-12 NOTE — TELEPHONE ENCOUNTER
Would like for the nurse to call her, is wanting to know if she needs to do more blood work before her Levothyroxine can be refilled.

## 2021-04-12 NOTE — TELEPHONE ENCOUNTER
Note from labs on 02/12/21 patient is to have a repeat TSH in 2 months.  Informed patient and noted order.  Patient agrees

## 2021-04-13 ENCOUNTER — APPOINTMENT (OUTPATIENT)
Dept: LAB | Facility: CLINIC | Age: 81
End: 2021-04-13
Payer: MEDICARE

## 2021-04-13 DIAGNOSIS — E03.8 OTHER SPECIFIED HYPOTHYROIDISM: ICD-10-CM

## 2021-04-13 LAB — TSH SERPL DL<=0.05 MIU/L-ACNC: 2.09 UIU/ML (ref 0.34–4.82)

## 2021-04-13 PROCEDURE — 36415 COLL VENOUS BLD VENIPUNCTURE: CPT

## 2021-04-13 PROCEDURE — 84443 ASSAY THYROID STIM HORMONE: CPT

## 2021-04-22 ENCOUNTER — TELEPHONE (OUTPATIENT)
Dept: FAMILY MEDICINE | Facility: CLINIC | Age: 81
End: 2021-04-22

## 2021-04-22 NOTE — TELEPHONE ENCOUNTER
Needs Dr. Travis to send a referral for patient to see Cardiology. Won't schedule her until receives is. Please call if any questions.

## 2021-04-23 DIAGNOSIS — R00.1 BRADYCARDIA: Primary | ICD-10-CM

## 2021-05-09 DIAGNOSIS — E03.8 OTHER SPECIFIED HYPOTHYROIDISM: ICD-10-CM

## 2021-05-10 RX ORDER — LEVOTHYROXINE SODIUM 125 UG/1
TABLET ORAL
Qty: 60 TABLET | Refills: 3 | Status: SHIPPED | OUTPATIENT
Start: 2021-05-10 | End: 2021-10-15

## 2021-05-24 ENCOUNTER — TELEPHONE (OUTPATIENT)
Dept: FAMILY MEDICINE | Facility: CLINIC | Age: 81
End: 2021-05-24

## 2021-05-24 NOTE — TELEPHONE ENCOUNTER
She needs to see about getting portable ox. She is traveling & needs it for night. Please call & advise.

## 2021-05-24 NOTE — TELEPHONE ENCOUNTER
Patient states she is traveling in June and in July.  She uses a concentrator at home @ HS @ 2L/NC  Per [place] at [facility] take concentrator or they can make arrangements 2 weeks prior.    Informed patient of this and she states neither of those options are going to work.  She will just purchase her own portable.  Explained because she does not use O2 during the day she doesn't qualify with medicare for a portable.

## 2021-05-25 ENCOUNTER — TELEPHONE (OUTPATIENT)
Dept: FAMILY MEDICINE | Facility: CLINIC | Age: 81
End: 2021-05-25

## 2021-05-25 ENCOUNTER — ANCILLARY PROCEDURE (OUTPATIENT)
Dept: CARDIOLOGY | Facility: CLINIC | Age: 81
End: 2021-05-25
Payer: MEDICARE

## 2021-05-25 VITALS — BODY MASS INDEX: 34.07 KG/M2 | WEIGHT: 169 LBS | HEIGHT: 59 IN

## 2021-05-25 DIAGNOSIS — G47.33 OBSTRUCTIVE SLEEP APNEA SYNDROME: Primary | ICD-10-CM

## 2021-05-25 DIAGNOSIS — R00.1 BRADYCARDIA: ICD-10-CM

## 2021-05-25 LAB
AV LVOT PEAK GRADIENT: 7.51 MMHG
AV MEAN GRADIENT: 13 MMHG
AV PEAK GRADIENT: 19.71 MMHG
BSA FOR ECHO PROCEDURE: 1.79 M2
DOP CALC AO PEAK VEL: 2.22 M/S
DOP CALC AO VTI: 53.4 CM
DOP CALC LVOT AREA: 2.81 CM2
DOP CALC LVOT DIAMETER: 1.89 CM
DOP CALC LVOT PEAK VEL: 137 CM/S
DOP CALC LVOT STROKE VOLUME: 90 CM3
DOP CALC MV VTI: 33.18 CM
DOP CALC RVOT PEAK VEL: 0.9 M/S
DOP CALCLVOT PEAK VEL VTI: 32.2 CM
E/A RATIO: 0.6
E/E' RATIO (AVERAGE): 11.6
E/E' RATIO: 10.8
EJECTION FRACTION: 74 %
ERAP: 5 MMHG
INTERVENTRICULAR SEPTUM: 0.8 CM (ref 0.6–1.1)
IVC PROX: 1.77 CM
LA AREA A4C SYSTOLE: 35.2 CM3
LEFT ATRIUM SIZE: 2.94 CM
LEFT ATRIUM VOLUME INDEX: 19 ML/M2
LEFT ATRIUM VOLUME: 31.9 CM3
LEFT INTERNAL DIMENSION IN SYSTOLE: 2.4 CM (ref 2.4–3.63)
LEFT VENTRICLE DIASTOLIC VOLUME: 51 CM3
LEFT VENTRICLE SYSTOLIC VOLUME: 13 CM3
LEFT VENTRICULAR INTERNAL DIMENSION IN DIASTOLE: 3.7 CM (ref 4.05–5.62)
LVAD-AP2: 20.1 CM2
LVAD-AP4: 20.1 CM2
LVAD-AP4: 22.2 CM2
LVAD-AP4: 22.2 CM2
MV DEC SLOPE: 5920 MM/S2
MV DT: 256 MS
MV E/E' LATERAL: 12.3
MV MEAN GRADIENT: 3.17 MMHG
MV PEAK A VEL: 130 CM/S
MV PEAK E VEL: 84.4 CM/S
MV PEAK GRADIENT: 7 MMHG
MV STENOSIS PRESSURE HALF TIME: 0.05 S
MV VALVE AREA P 1/2 METHOD: 4.23 CM2
MV VMAX: 132 CM/S
MVA (VTI): 2.73 CM2
P VEIN A: 61 CM/S
POSTERIOR WALL: 1 CM (ref 0.6–1.1)
PULM VEIN S/D RATIO: 2.4
PV PEAK D VEL: 31 CM/S
PV PEAK GRADIENT: 7.51 MMHG
PV PEAK S VEL: 74 CM/S
PV VMAX: 1.37 M/S
RA AREA: 14.2 CM2
RH CV ECHO AV VALVE AREA VEL: 1.7 CM2
RH CV ECHO AV VALVE AREA VTI: 1.7 CM2
RH LVOT PEAK VELOCITY REST: 1.4 M/S
RIGHT VENTRICULAR INTERNAL DIMENSION IN DIASTOLE: 2.8 CM
RV AP4 BASE: 2.5 CM
S': 11.8 CM/S
TDI: 7.8 CM/S
TDILATERAL: 6.9 CM/S
TR MAX PG: 37.95 MMHG
TRICUSPID ANNULAR PLANE SYSTOLIC EXCURSION: 2.6 CM
TRICUSPID VALVE PEAK REGURGITATION VELOCITY: 3.08 M/S
TV REST PULMONARY ARTERY PRESSURE: 43 MMHG
Z-SCORE OF LEFT VENTRICULAR DIMENSION IN END DIASTOLE: -2.54
Z-SCORE OF LEFT VENTRICULAR DIMENSION IN END SYSTOLE: -1.64

## 2021-05-25 PROCEDURE — 93306 TTE W/DOPPLER COMPLETE: CPT | Mod: 26 | Performed by: INTERNAL MEDICINE

## 2021-05-25 PROCEDURE — 93306 TTE W/DOPPLER COMPLETE: CPT

## 2021-05-25 NOTE — TELEPHONE ENCOUNTER
Per Patient is she trying to get a portable O2 concentrator from [facility].  Spoke with [address] [place] [address] medical and they are calling to verify patients O2 orders.  Note new DME order and faxed to [phone]

## 2021-05-25 NOTE — TELEPHONE ENCOUNTER
"\"[facility]\" Is wanting to verify patient is on oxygen, please call back.       (Possible scam?)  "

## 2021-06-02 ENCOUNTER — OFFICE VISIT (OUTPATIENT)
Dept: CARDIOLOGY | Facility: CLINIC | Age: 81
End: 2021-06-02
Payer: MEDICARE

## 2021-06-02 VITALS
WEIGHT: 174 LBS | DIASTOLIC BLOOD PRESSURE: 54 MMHG | OXYGEN SATURATION: 95 % | HEART RATE: 69 BPM | BODY MASS INDEX: 34.16 KG/M2 | SYSTOLIC BLOOD PRESSURE: 144 MMHG | HEIGHT: 60 IN

## 2021-06-02 DIAGNOSIS — R00.1 BRADYCARDIA: ICD-10-CM

## 2021-06-02 DIAGNOSIS — I10 BENIGN ESSENTIAL HYPERTENSION: Primary | ICD-10-CM

## 2021-06-02 PROCEDURE — 93010 ELECTROCARDIOGRAM REPORT: CPT | Performed by: INTERNAL MEDICINE

## 2021-06-02 PROCEDURE — 99214 OFFICE O/P EST MOD 30 MIN: CPT | Mod: 25 | Performed by: INTERNAL MEDICINE

## 2021-06-02 PROCEDURE — G0463 HOSPITAL OUTPT CLINIC VISIT: HCPCS | Performed by: INTERNAL MEDICINE

## 2021-06-02 PROCEDURE — 93005 ELECTROCARDIOGRAM TRACING: CPT | Performed by: INTERNAL MEDICINE

## 2021-06-02 RX ORDER — MULTIVITAMIN
1 TABLET ORAL DAILY
COMMUNITY

## 2021-06-02 ASSESSMENT — ENCOUNTER SYMPTOMS
DIZZINESS: 0
LIGHT-HEADEDNESS: 1
SNORING: 0
SHORTNESS OF BREATH: 0
PALPITATIONS: 1
IRREGULAR HEARTBEAT: 1

## 2021-06-02 NOTE — PROGRESS NOTES
CARDIOLOGY OUTPATIENT CONSULT NOTE    Subjective    Patient ID: Katy Stack is a 80 y.o. female.    Chief Complaint:   Chief Complaint   Patient presents with   • Coronary Artery Disease   • Hypertension   • Hyperlipidemia   • Dizziness       HPI  80-year-old patient presents to the office for follow-up.  She was seen in the past by Ashlee Castro physician assistant in [place].  There was a concern for obstructive sleep apnea and apparently she underwent a sleep study which was negative.  The patient had a Echocardiogram 5/25/2021:  · Normal left ventricular systolic function.  · Normal left ventricular cavity size and wall thickness.  · The left atrium is mildly dilated.  · The right atrium is normal in size.  · Normal central venous pressure (0-5 mmHg).  · No pericardial effusion.  · Mild aortic stenosis is noted. Mean gradient 13 mmHg  · Mild tricuspid regurgitation. RVSP 43 mmHg  · Grade I (mild) left ventricular diastolic abnormality.    She denies chest pain or shortness of breath.  No syncope.  Patient is concerned about her low heart rate.  Today at rest her heart rate is 64 bpm other times it is in the 40s particularly when she is undergoing procedures etc. however the patient takes atenolol which slows down her heart rate.  She has no clear-cut symptoms referable to bradycardia.  The patient had a 24-hour Holter monitor on 3/5/2021:   The predominant rhythm on this 24-hour Holter is normal sinus rhythm with a left bundle branch block pattern.  The average heart rate is 70 bpm.  2.  There is no significant bradycardia.  The lowest heart rate was sinus rhythm at 51 bpm at 10:22 AM.  3.  The fastest heart rate was sinus tachycardia at 104 bpm.  4.  Rare PVCs totaling 5.  5.  Rare PACs totaling 2.  6.  Complaints of lightheadedness correlated with sinus rhythm at 68.  Complaints of feeling unbalanced correlated with sinus rhythm at 94 bpm.    Past Medical  History:   Diagnosis Date   • Benign essential hypertension 11/24/2017   • Cataract of left eye    • Chronic obstructive lung disease (CMS/HCC) (HCC) 11/10/2017   • Dysrhythmia     LBBB   • Hypertension    • Hypothyroid    • Hypothyroidism 11/10/2017   • Injury of back     BENDING INJURY   • Neurologic disorder    • Obesity 11/10/2017   • Obstructive sleep apnea syndrome 11/10/2017    Night time oxygen/2L   • Osteoarthritis    • Periodic limb movement disorder 11/10/2017   • Peripheral neuropathy     left thigh into left groin    • Rectocele    • Respiratory disease      Past Surgical History:   Procedure Laterality Date   • APPENDECTOMY  1987   • BACK SURGERY      2 surgeries [id number]   • BACK SURGERY  11/30/2016   • BACK SURGERY  05/01/2017    vetebral fusion   • CARDIAC CATHETERIZATION  08/31/2011   • CATARACT EXTRACTION W/  INTRAOCULAR LENS IMPLANT Left 7/2/2018    Procedure: OS CATARACT LEFT PHACOEMULSIFICATION OF CATARACT WITH INTRAOCULAR LENS;  Surgeon: Emmanuel Cueto MD;  Location: [facility] SURGICAL SERVICES;  Service: Ophthalmology;  Laterality: Left;   • CATARACT EXTRACTION W/  INTRAOCULAR LENS IMPLANT Right 8/6/2018    Procedure: OD CATARACT RIGHT PHACOEMULSIFICATION OF CATARACT WITH INTRAOCULAR LENS;  Surgeon: Emmanuel Cueto MD;  Location: [facility] SURGICAL SERVICES;  Service: Ophthalmology;  Laterality: Right;   • CHOLECYSTECTOMY     • COLONOSCOPY  02/19/2014    No polyps   • COLONOSCOPY  04/26/2011   • DIAGNOSTIC LAPAROSCOPY N/A 11/28/2018    Procedure: Laparoscopic band removal;  Surgeon: Chris Millan MD;  Location: [facility];  Service: General;  Laterality: N/A;   • ESOPHAGOGASTRODUODENOSCOPY N/A 11/6/2018    Procedure: EGD - ESOPHAGOGASTRODUODENOSCOPY;  Surgeon: Chris Millan MD;  Location: [facility] Endoscopy;  Service: Endoscopy;  Laterality: N/A;   • FOOT SURGERY      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 Vela- shoulder   • SPINAL STIMULATOR IMPLANT N/A 3/23/2021    Procedure: PERMANENT SPINAL CORD STIMULATOR IMPLANTATION;  Surgeon: Bernardo Gonzalez MD;  Location: [facility] OR;  Service: Pain Management;  Laterality: N/A;   • THYROID LOBECTOMY Left 18/18/2010   • THYROIDECTOMY, PARTIAL  08/18/2010   • UPPER GASTROINTESTINAL ENDOSCOPY  02/19/2014    Mild chronic gastritis       Allergies as of 06/02/2021 - Reviewed 06/02/2021   Allergen Reaction Noted   • [place] Angioedema and Hives 06/20/2013   • Adhesive  03/27/2014     Current Outpatient Medications   Medication Sig Dispense Refill   • multivitamin (THERAGRAN) tablet tablet Take 1 tablet by mouth daily     • levothyroxine (SYNTHROID, LEVOTHROID) 125 mcg tablet TAKE 1 TABLET BY MOUTH  DAILY (Patient taking differently: Take 125 mcg by mouth daily  ) 60 tablet 3   • fluticasone propionate (Flonase Allergy Relief) 50 mcg/actuation nasal spray Administer 1 spray into each nostril daily 16 g 12   • estrogens, conjugated, (Premarin) 0.9 mg tablet Take 1 tablet (0.9 mg total) by mouth once a week 12 tablet 4   • sodium chloride-aloe vera ([place] Saline) gel nasal gel Apply 1 application to each nostril as needed (nose bleeds) 14.1 g 3   • atorvastatin (LIPITOR) 20 mg tablet TAKE 1 TABLET BY MOUTH  DAILY (Patient taking differently: Take 20 mg by mouth daily  ) 90 tablet 3   • atenoloL (TENORMIN) 25 mg tablet TAKE 1 TABLET BY MOUTH  DAILY (Patient taking differently: Take 25 mg by mouth daily  ) 90 tablet 3   • losartan (COZAAR) 50 mg tablet TAKE 1 TABLET BY MOUTH   DAILY (Patient taking differently: Take 50 mg by mouth daily  ) 90 tablet 3   • furosemide (LASIX) 20 mg tablet TAKE 1 TABLET BY MOUTH  DAILY (Patient taking differently: Take 20 mg by mouth daily as needed Indications: visible water retention  ) 90 tablet 3   • potassium chloride 10 mEq CR tablet TAKE 1 TABLET BY MOUTH  DAILY (Patient taking differently: 10 mEq daily as needed  ) 90 tablet 3   • gabapentin (NEURONTIN) 300 mg capsule Take 600 mg by mouth 3 (three) times a day       • calcium carbonate-vitamin D3 600 mg(1,500mg) -400 unit per tablet Take 1 tablet by mouth daily     • nitroglycerin (NITROSTAT) 0.4 mg SL tablet Place 1 tablet (0.4 mg total) under the tongue every 5 (five) minutes as needed for chest pain 25 tablet 1   • vit A-vit C-vit E-zinc-copper (EYE VITAMIN AND MINERALS) 7,160-113-100 unit-mg-unit tablet Take 1 tab/cap by mouth 2 (two) times a day.     • Fluad Quad -,65y up,,PF, 60 mcg (15 mcg x 4)/0.5 mL syringe SMARTSI.5 Milliliter(s) IM As Directed       Current Facility-Administered Medications   Medication Dose Route Frequency Provider Last Rate Last Admin   • denosumab (PROLIA) 60 mg/mL syringe 60 mg  60 mg subcutaneous q6 months Bronson Travis MD   60 mg at 21 1032       Family History   Problem Relation Age of Onset   • Heart disease Mother    • Heart disease Father         Myocardial infarction   • Parkinsonism Father    • Thyroid disease Father    • Alzheimer's disease Sister    • Arthritis Sister    • Osteoporosis Sister    • Lung cancer Brother    • Diabetes Maternal Grandmother      Social History     Tobacco Use   • Smoking status: Former Smoker     Packs/day: 2.00     Years: 35.00     Pack years: 70.00     Quit date:      Years since quittin.4   • Smokeless tobacco: Never Used   Vaping Use   • Vaping Use: Never used   Substance Use Topics   • Alcohol use: No   • Drug use: No       Review of Systems  Review of Systems   Cardiovascular: Positive for chest  pain, dyspnea on exertion, irregular heartbeat, leg swelling/pain and palpitations.   Respiratory: Negative for shortness of breath, snoring and sleep apnea.    Neurological: Positive for light-headedness. Negative for dizziness.         Objective   Vitals:    06/02/21 1311   BP: 144/54   Pulse: 69   SpO2: 95%   Height: 1.524 m (5')   Weight: 78.9 kg (174 lb)   Patient Position: Sitting     Weight: 78.9 kg (174 lb)  Physical Exam  Constitutional:       General: She is not in acute distress.     Appearance: She is well-developed.      Comments: Patient in no distress, BMI 34   HENT:      Head: Normocephalic.   Eyes:      General: No scleral icterus.     Pupils: Pupils are equal, round, and reactive to light.   Neck:      Thyroid: No thyromegaly.      Vascular: No JVD.      Trachea: No tracheal deviation.      Comments: No carotid bruit  Cardiovascular:      Rate and Rhythm: Normal rate and regular rhythm.      Heart sounds: Normal heart sounds. No murmur heard.   No friction rub. No gallop.    Pulmonary:      Effort: Pulmonary effort is normal. No respiratory distress.      Breath sounds: Normal breath sounds. No wheezing or rales.   Abdominal:      General: Bowel sounds are normal. There is distension.      Palpations: Abdomen is soft.      Tenderness: There is no abdominal tenderness. There is no rebound.   Musculoskeletal:         General: No tenderness. Normal range of motion.      Cervical back: Normal range of motion and neck supple.      Comments: No cyanosis, no clubbing, no edema   Skin:     General: Skin is warm and dry.      Findings: No erythema or rash.   Neurological:      Mental Status: She is alert and oriented to person, place, and time.   Psychiatric:         Behavior: Behavior normal.         Thought Content: Thought content normal.         Judgment: Judgment normal.           Data Review:   Sodium   Date Value Ref Range Status   02/12/2021 146 (H) 135 - 145 mmol/L Final     Potassium   Date Value  Ref Range Status   02/12/2021 4.3 3.5 - 5.1 mmol/L Final     Chloride   Date Value Ref Range Status   02/12/2021 110 (H) 98 - 107 mmol/L Final     CO2   Date Value Ref Range Status   02/12/2021 29 21 - 32 mmol/L Final     BUN   Date Value Ref Range Status   02/12/2021 21 7 - 25 mg/dL Final     Creatinine   Date Value Ref Range Status   02/12/2021 0.91 0.60 - 1.10 mg/dL Final     Glucose   Date Value Ref Range Status   02/12/2021 92 70 - 105 mg/dL Final     Calcium   Date Value Ref Range Status   02/12/2021 9.3 8.6 - 10.3 mg/dL Final     No results found for: CKTOTAL, CKMB, CKMBINDEX, TROPONINI, BNP, POCBNP  Cholesterol   Date Value Ref Range Status   08/10/2020 142 0 - 199 mg/dL Final     Triglycerides   Date Value Ref Range Status   08/10/2020 138 <=149 mg/dL Final     HDL   Date Value Ref Range Status   08/10/2020 41 >=40 mg/dL Final     TSH:   Lab Results   Component Value Date    TSH 2.091 04/13/2021     Magnesium:   Lab Results   Component Value Date    MG 2.2 08/10/2020     Protime-INR:   Lab Results   Component Value Date    PT 11.7 07/29/2020    INR 1.1 07/29/2020     A1c: No results found for: HGBA1C    Electrocardiogram: 6/2/2021  Sinus rhythm at 64 bpm with a left bundle branch block, left axis deviation    Assessment and Plan:  Diagnoses and all orders for this visit:    Benign essential hypertension  -     ECG 12 lead -Normal, Today    Bradycardia  -     Ambulatory referral to Cardiology  -     Holter monitor - 48 hour; Future        Bradycardia:  At rest 64 bpm today  Patient is taking atenolol  Recommend 48-hour Holter monitor to look for pauses or extreme bradycardia during daytime wake hours  If significant bradycardia is found would first recommend reducing or discontinuing atenolol  Down the line she may require pacemaker implantation  Underlying left bundle branch block  Patient recently had a monitor on 3/5/2021 which was reviewed in epic and is copied above  Reassurance was provided    Aortic  stenosis:  Mild, mean gradient 13 mmHg  Normal LV function  Continue periodic monitoring    Obesity:  BMI 34  Weight loss is recommended    Hypertension:  Blood pressure 144/59 today  Salt reduction weight loss is recommended  Follow-up with PCP for adjustment of medication for goal blood pressure 130/80 or less        Next clinic follow up in 6 months, sooner as needed    Sincerely,    Julián Valentino MD

## 2021-06-02 NOTE — LETTER
[facility]  [address] [place] [address]  [phone]  Dept: [phone]  06/02/21      Bronson Travis MD  [address] [place] [address]        To: Bronson Travis MD      Thank you for referring your patient, Katy Stack, to receive care in my office. I have enclosed a summary of the care provided to Katy on 06/02/21.    Please contact me with any questions you may have regarding the visit.    Sincerely,         Julián Valentino MD  [address] [place] [address]  [phone]    CC: No Recipients

## 2021-06-04 ENCOUNTER — TELEPHONE (OUTPATIENT)
Dept: CARDIOLOGY | Facility: CLINIC | Age: 81
End: 2021-06-04

## 2021-06-08 ENCOUNTER — OFFICE VISIT (OUTPATIENT)
Dept: FAMILY MEDICINE | Facility: CLINIC | Age: 81
End: 2021-06-08
Payer: MEDICARE

## 2021-06-08 VITALS
SYSTOLIC BLOOD PRESSURE: 130 MMHG | HEART RATE: 48 BPM | OXYGEN SATURATION: 97 % | DIASTOLIC BLOOD PRESSURE: 50 MMHG | BODY MASS INDEX: 34.1 KG/M2 | WEIGHT: 174.6 LBS | TEMPERATURE: 98.6 F

## 2021-06-08 DIAGNOSIS — I27.20 PULMONARY HYPERTENSION (CMS/HCC): Primary | ICD-10-CM

## 2021-06-08 DIAGNOSIS — R00.1 BRADYCARDIA: ICD-10-CM

## 2021-06-08 PROCEDURE — G0463 HOSPITAL OUTPT CLINIC VISIT: HCPCS | Performed by: FAMILY MEDICINE

## 2021-06-08 PROCEDURE — 99214 OFFICE O/P EST MOD 30 MIN: CPT | Performed by: FAMILY MEDICINE

## 2021-06-08 NOTE — PROGRESS NOTES
"Subjective      Katy Stack is a 81 y.o. female who presents for Results (Patient is here to discuss her echo results. She would also like to also discuss her medications. )  .    Patient presents to discuss results of ECHO done on 5/25/21. Results show: \"Normal left ventricular systolic function. Normal left ventricular cavity size and wall thickness. The left atrium is mildly dilated. The right atrium is normal in size. Normal central venous pressure (0-5 mmHg). No pericardial effusion. Mild aortic stenosis is noted. Mean gradient 13 mmHg. Mild tricuspid regurgitation. RVSP 43 mmHg. Grade I (mild) left ventricular diastolic abnormality.\" Discussed results in detail with verbalized understanding from patient. She states she has been having episodes where she feels \"woozy\" intermittently but states it is not related to her blood sugars when these episodes happen. She denies headaches, chest pain, worse shortness of breath, PND, blurred vision, or palpitations. She is scheduled to be placed on Holter monitor on 6/11/21.     Patient has had increased stress and anxiety for the last several days as her  recently had a fall and was unresponsive when he was found. He was able to be stabilized and doing better now. He is being transferred to a rehab facility.     The following have been reviewed and updated as appropriate in this visit:  Tobacco  Allergies  Meds  Problems  Med Hx  Surg Hx  Fam Hx         Allergies   Allergen Reactions   • [place] Angioedema and Hives     hives, tongue swells  Swelling of tongue   • Adhesive      welts     Current Outpatient Medications   Medication Sig Dispense Refill   • multivitamin (THERAGRAN) tablet tablet Take 1 tablet by mouth daily     • levothyroxine (SYNTHROID, LEVOTHROID) 125 mcg tablet TAKE 1 TABLET BY MOUTH  DAILY (Patient taking differently: Take 125 mcg by mouth daily  ) 60 tablet 3   • estrogens, conjugated, (Premarin) 0.9 mg tablet Take 1 tablet (0.9 " mg total) by mouth once a week 12 tablet 4   • atorvastatin (LIPITOR) 20 mg tablet TAKE 1 TABLET BY MOUTH  DAILY (Patient taking differently: Take 20 mg by mouth daily  ) 90 tablet 3   • atenoloL (TENORMIN) 25 mg tablet TAKE 1 TABLET BY MOUTH  DAILY (Patient taking differently: Take 25 mg by mouth daily  ) 90 tablet 3   • losartan (COZAAR) 50 mg tablet TAKE 1 TABLET BY MOUTH  DAILY (Patient taking differently: Take 50 mg by mouth daily  ) 90 tablet 3   • furosemide (LASIX) 20 mg tablet TAKE 1 TABLET BY MOUTH  DAILY (Patient taking differently: Take 20 mg by mouth daily as needed Indications: visible water retention  ) 90 tablet 3   • potassium chloride 10 mEq CR tablet TAKE 1 TABLET BY MOUTH  DAILY (Patient taking differently: 10 mEq daily as needed  ) 90 tablet 3   • gabapentin (NEURONTIN) 300 mg capsule Take 600 mg by mouth 3 (three) times a day       • calcium carbonate-vitamin D3 600 mg(1,500mg) -400 unit per tablet Take 1 tablet by mouth daily     • nitroglycerin (NITROSTAT) 0.4 mg SL tablet Place 1 tablet (0.4 mg total) under the tongue every 5 (five) minutes as needed for chest pain 25 tablet 1   • vit A-vit C-vit E-zinc-copper (EYE VITAMIN AND MINERALS) 7,160-113-100 unit-mg-unit tablet Take 1 tab/cap by mouth 2 (two) times a day.       Current Facility-Administered Medications   Medication Dose Route Frequency Provider Last Rate Last Admin   • denosumab (PROLIA) 60 mg/mL syringe 60 mg  60 mg subcutaneous q6 months Bronson Travis MD   60 mg at 03/04/21 1032     Past Medical History:   Diagnosis Date   • Benign essential hypertension 11/24/2017   • Cataract of left eye    • Chronic obstructive lung disease (CMS/HCC) (HCC) 11/10/2017   • Dysrhythmia     LBBB   • Hypertension    • Hypothyroid    • Hypothyroidism 11/10/2017   • Injury of back     BENDING INJURY   • Neurologic disorder    • Obesity 11/10/2017   • Obstructive sleep apnea syndrome 11/10/2017    Night time oxygen/2L   • Osteoarthritis    •  Periodic limb movement disorder 11/10/2017   • Peripheral neuropathy     left thigh into left groin    • Rectocele    • Respiratory disease      Past Surgical History:   Procedure Laterality Date   • APPENDECTOMY  1987   • BACK SURGERY      2 surgeries [id number]   • BACK SURGERY  11/30/2016   • BACK SURGERY  05/01/2017    vetebral fusion   • CARDIAC CATHETERIZATION  08/31/2011   • CATARACT EXTRACTION W/  INTRAOCULAR LENS IMPLANT Left 7/2/2018    Procedure: OS CATARACT LEFT PHACOEMULSIFICATION OF CATARACT WITH INTRAOCULAR LENS;  Surgeon: Emmanuel Cueto MD;  Location: [facility] SURGICAL SERVICES;  Service: Ophthalmology;  Laterality: Left;   • CATARACT EXTRACTION W/  INTRAOCULAR LENS IMPLANT Right 8/6/2018    Procedure: OD CATARACT RIGHT PHACOEMULSIFICATION OF CATARACT WITH INTRAOCULAR LENS;  Surgeon: Emmanuel Cueto MD;  Location: [facility] SURGICAL SERVICES;  Service: Ophthalmology;  Laterality: Right;   • CHOLECYSTECTOMY     • COLONOSCOPY  02/19/2014    No polyps   • COLONOSCOPY  04/26/2011   • DIAGNOSTIC LAPAROSCOPY N/A 11/28/2018    Procedure: Laparoscopic band removal;  Surgeon: Chris Millan MD;  Location: [facility] OR;  Service: General;  Laterality: N/A;   • ESOPHAGOGASTRODUODENOSCOPY N/A 11/6/2018    Procedure: EGD - ESOPHAGOGASTRODUODENOSCOPY;  Surgeon: Chris Millan MD;  Location: [facility] Endoscopy;  Service: Endoscopy;  Laterality: N/A;   • FOOT SURGERY      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 Vela- shoulder    • SPINAL STIMULATOR IMPLANT N/A 3/23/2021    Procedure: PERMANENT SPINAL CORD STIMULATOR IMPLANTATION;  Surgeon: Bernardo Gonzalez MD;  Location: [facility] OR;  Service: Pain Management;  Laterality: N/A;   • THYROID LOBECTOMY Left    • THYROIDECTOMY, PARTIAL  2010   • UPPER GASTROINTESTINAL ENDOSCOPY  2014    Mild chronic gastritis     Family History   Problem Relation Age of Onset   • Heart disease Mother    • Heart disease Father         Myocardial infarction   • Parkinsonism Father    • Thyroid disease Father    • Alzheimer's disease Sister    • Arthritis Sister    • Osteoporosis Sister    • Lung cancer Brother    • Diabetes Maternal Grandmother      Social History     Occupational History   • Not on file   Tobacco Use   • Smoking status: Former Smoker     Packs/day: 2.00     Years: 35.00     Pack years: 70.00     Quit date:      Years since quittin.4   • Smokeless tobacco: Never Used   Vaping Use   • Vaping Use: Never used   Substance and Sexual Activity   • Alcohol use: No   • Drug use: No   • Sexual activity: Defer   Social History Narrative   • Not on file       Review of Systems    Objective     Vitals  /50 (BP Location: Left arm, Patient Position: Sitting)   Pulse 48   Temp 37 °C (98.6 °F) (Temporal)   Wt 79.2 kg (174 lb 9.6 oz)   SpO2 97%   BMI 34.10 kg/m²     Physical Exam  Vitals and nursing note reviewed.   Constitutional:       General: She is not in acute distress.     Appearance: Normal appearance. She is not ill-appearing, toxic-appearing or diaphoretic.   Cardiovascular:      Rate and Rhythm: Normal rate and regular rhythm.      Heart sounds: Normal heart sounds. No murmur heard.   No friction rub. No gallop.    Pulmonary:      Effort: Pulmonary effort is normal. No respiratory distress.      Breath sounds: Normal breath sounds. No stridor. No wheezing, rhonchi or rales.   Skin:     General: Skin is warm and dry.   Neurological:      Mental Status: She is  alert.         Procedures    Assessment/Plan   Diagnoses and all orders for this visit:    Pulmonary hypertension (CMS/HCC) ([facility])  Comments:  Repeat ECHO 1-2 years. F/u prn    Bradycardia  Comments:  Pending holter, may consider decreasing atenolol pending that results.        Return if symptoms worsen or fail to improve.    Bronson Travis MD    Portions of this note were documented by Nneka Arita as I performed the exam and collected the information from Katy Stack. I attest that I have reviewed the information as documented, verified the accuracy of the documentation and added additional information as needed.

## 2021-06-11 ENCOUNTER — ANCILLARY PROCEDURE (OUTPATIENT)
Dept: CARDIOLOGY | Facility: CLINIC | Age: 81
End: 2021-06-11
Payer: MEDICARE

## 2021-06-11 DIAGNOSIS — R00.1 BRADYCARDIA: ICD-10-CM

## 2021-06-11 PROCEDURE — 93225 XTRNL ECG REC<48 HRS REC: CPT

## 2021-06-15 LAB
RH CV SUPRAVENT % TOTAL BEATS: 0.01 %
RH CV TOTAL BEATS: NORMAL BEATS
RH CV VENTRICULAR % TOTAL BEATS: 0.1 %
RH SUPRAVENTRICULAR BEATS: 25 BEATS
RH VENTRICULAR BEATS: 164 BEATS

## 2021-06-15 PROCEDURE — 93227 XTRNL ECG REC<48 HR R&I: CPT | Performed by: INTERNAL MEDICINE

## 2021-07-16 ENCOUNTER — TELEPHONE (OUTPATIENT)
Dept: FAMILY MEDICINE | Facility: CLINIC | Age: 81
End: 2021-07-16
Payer: MEDICARE

## 2021-07-29 DIAGNOSIS — I10 ESSENTIAL HYPERTENSION: ICD-10-CM

## 2021-07-29 RX ORDER — LOSARTAN POTASSIUM 50 MG/1
TABLET ORAL
Qty: 90 TABLET | Refills: 3 | Status: SHIPPED | OUTPATIENT
Start: 2021-07-29 | End: 2022-06-28

## 2021-07-29 RX ORDER — ATENOLOL 25 MG/1
TABLET ORAL
Qty: 90 TABLET | Refills: 3 | Status: SHIPPED | OUTPATIENT
Start: 2021-07-29 | End: 2022-06-28

## 2021-07-29 NOTE — TELEPHONE ENCOUNTER
Care Due:                  Date            Visit Type   Department     Provider  --------------------------------------------------------------------------------                              ESTABLISHED   [facility]  Last Visit: 06-      PATIENT      MEDICINE       [id number]  Next Visit: 08-      VISIT        MEDICINE       [facility]                                                            Last  Test          Frequency    Reason                     Performed    Due Date  --------------------------------------------------------------------------------  Lipid Panel.  12 months..  atorvastatin.............  08-   08-    Powered by [employer] by [employer]. Reference number: [id number]. 7/29/2021 2:12:12 AM LEILA

## 2021-08-05 ENCOUNTER — APPOINTMENT (OUTPATIENT)
Dept: LAB | Facility: CLINIC | Age: 81
End: 2021-08-05
Payer: MEDICARE

## 2021-08-05 ENCOUNTER — OFFICE VISIT (OUTPATIENT)
Dept: FAMILY MEDICINE | Facility: CLINIC | Age: 81
End: 2021-08-05
Payer: MEDICARE

## 2021-08-05 VITALS
BODY MASS INDEX: 33.88 KG/M2 | DIASTOLIC BLOOD PRESSURE: 62 MMHG | WEIGHT: 173.5 LBS | OXYGEN SATURATION: 97 % | HEART RATE: 54 BPM | SYSTOLIC BLOOD PRESSURE: 130 MMHG | TEMPERATURE: 97.6 F

## 2021-08-05 DIAGNOSIS — E03.9 HYPOTHYROIDISM, UNSPECIFIED TYPE: ICD-10-CM

## 2021-08-05 DIAGNOSIS — G47.33 OBSTRUCTIVE SLEEP APNEA SYNDROME: ICD-10-CM

## 2021-08-05 DIAGNOSIS — M81.0 AGE-RELATED OSTEOPOROSIS WITHOUT CURRENT PATHOLOGICAL FRACTURE: ICD-10-CM

## 2021-08-05 DIAGNOSIS — R42 DIZZINESS: ICD-10-CM

## 2021-08-05 DIAGNOSIS — Z00.00 MEDICARE ANNUAL WELLNESS VISIT, SUBSEQUENT: Primary | ICD-10-CM

## 2021-08-05 LAB
ALBUMIN SERPL-MCNC: 4 G/DL (ref 3.5–5.3)
ALP SERPL-CCNC: 64 U/L (ref 55–142)
ALT SERPL-CCNC: 20 U/L (ref 7–52)
ANION GAP SERPL CALC-SCNC: 4 MMOL/L (ref 3–11)
AST SERPL-CCNC: 21 U/L
BASOPHILS # BLD AUTO: 0.1 10*3/UL
BASOPHILS NFR BLD AUTO: 1 % (ref 0–2)
BILIRUB SERPL-MCNC: 0.41 MG/DL (ref 0.2–1.4)
BUN SERPL-MCNC: 24 MG/DL (ref 7–25)
CALCIUM ALBUM COR SERPL-MCNC: 9.6 MG/DL (ref 8.6–10.3)
CALCIUM SERPL-MCNC: 9.6 MG/DL (ref 8.6–10.3)
CHLORIDE SERPL-SCNC: 107 MMOL/L (ref 98–107)
CO2 SERPL-SCNC: 31 MMOL/L (ref 21–32)
CREAT SERPL-MCNC: 1.12 MG/DL (ref 0.6–1.1)
EOSINOPHIL # BLD AUTO: 0.4 10*3/UL
EOSINOPHIL NFR BLD AUTO: 4 % (ref 0–3)
ERYTHROCYTE [DISTWIDTH] IN BLOOD BY AUTOMATED COUNT: 14.6 % (ref 11.5–14)
GFR SERPL CREATININE-BSD FRML MDRD: 46 ML/MIN/1.73M*2
GLUCOSE SERPL-MCNC: 92 MG/DL (ref 70–105)
HCT VFR BLD AUTO: 44.3 % (ref 34–45)
HGB BLD-MCNC: 14.6 G/DL (ref 11.5–15.5)
LYMPHOCYTES # BLD AUTO: 3.4 10*3/UL
LYMPHOCYTES NFR BLD AUTO: 38 % (ref 11–47)
MCH RBC QN AUTO: 30 PG (ref 28–33)
MCHC RBC AUTO-ENTMCNC: 33.1 G/DL (ref 32–36)
MCV RBC AUTO: 90.6 FL (ref 81–97)
MONOCYTES # BLD AUTO: 0.7 10*3/UL
MONOCYTES NFR BLD AUTO: 8 % (ref 3–11)
NEUTROPHILS # BLD AUTO: 4.5 10*3/UL
NEUTROPHILS NFR BLD AUTO: 50 % (ref 41–81)
PLATELET # BLD AUTO: 231 10*3/UL (ref 140–350)
PMV BLD AUTO: 8 FL (ref 6.9–10.8)
POTASSIUM SERPL-SCNC: 4.9 MMOL/L (ref 3.5–5.1)
PROT SERPL-MCNC: 6.7 G/DL (ref 6–8.3)
RBC # BLD AUTO: 4.89 10*6/ΜL (ref 3.7–5.3)
SODIUM SERPL-SCNC: 142 MMOL/L (ref 135–145)
TSH SERPL DL<=0.05 MIU/L-ACNC: 1.38 UIU/ML (ref 0.34–4.82)
WBC # BLD AUTO: 9.1 10*3/UL (ref 4.5–10.5)

## 2021-08-05 PROCEDURE — 85025 COMPLETE CBC W/AUTO DIFF WBC: CPT | Performed by: FAMILY MEDICINE

## 2021-08-05 PROCEDURE — G0463 HOSPITAL OUTPT CLINIC VISIT: HCPCS | Mod: 25 | Performed by: FAMILY MEDICINE

## 2021-08-05 PROCEDURE — 84443 ASSAY THYROID STIM HORMONE: CPT | Performed by: FAMILY MEDICINE

## 2021-08-05 PROCEDURE — 36415 COLL VENOUS BLD VENIPUNCTURE: CPT | Performed by: FAMILY MEDICINE

## 2021-08-05 PROCEDURE — G0439 PPPS, SUBSEQ VISIT: HCPCS | Performed by: FAMILY MEDICINE

## 2021-08-05 PROCEDURE — 99213 OFFICE O/P EST LOW 20 MIN: CPT | Mod: 25 | Performed by: FAMILY MEDICINE

## 2021-08-05 PROCEDURE — 80053 COMPREHEN METABOLIC PANEL: CPT | Performed by: FAMILY MEDICINE

## 2021-08-05 ASSESSMENT — ENCOUNTER SYMPTOMS
JOINT SWELLING: 0
HEMATURIA: 0
EYE REDNESS: 0
SORE THROAT: 0
APPETITE CHANGE: 0
ARTHRALGIAS: 0
TROUBLE SWALLOWING: 0
WEAKNESS: 0
UNEXPECTED WEIGHT CHANGE: 0
VOMITING: 0
WOUND: 0
CONSTIPATION: 0
POLYDIPSIA: 0
BACK PAIN: 1
DIAPHORESIS: 0
EYE DISCHARGE: 1
RHINORRHEA: 0
PALPITATIONS: 0
DIFFICULTY URINATING: 0
FATIGUE: 0
SLEEP DISTURBANCE: 0
DYSPHORIC MOOD: 0
NERVOUS/ANXIOUS: 0
NAUSEA: 0
ADENOPATHY: 0
HEADACHES: 0
EYE PAIN: 0
COUGH: 0
SHORTNESS OF BREATH: 0
ABDOMINAL PAIN: 0
WHEEZING: 0
FEVER: 0
BLOOD IN STOOL: 0
SINUS PRESSURE: 0
VOICE CHANGE: 0
CHILLS: 0
CHEST TIGHTNESS: 0
DIARRHEA: 0
BRUISES/BLEEDS EASILY: 0
FREQUENCY: 0
MYALGIAS: 0
NUMBNESS: 1
DIZZINESS: 1
DYSURIA: 0

## 2021-08-05 ASSESSMENT — PAIN SCALES - GENERAL: PAINLEVEL: 3

## 2021-08-05 NOTE — PROGRESS NOTES
Subjective     Patient Care Team:  Bronson Travis MD as PCP - General (Family Medicine)  Chris Khalil MD as Consulting Physician (General Surgery)  Marii Wayne CNP as Nurse Practitioner (General Surgery)    Katy Stack is a 81 y.o. female who presents for an annual Medicare Wellness visit     Patient presents for an annual Medicare Wellness Exam. Patient expresses concerns in regards to intermittent lightheadedness and balance issues for the last few months. She states it feels like she is drunk but hasn't had anything to drink. She states primarily lightheadedness is noticed when standing up from a sitting position. These episodes have not happened while she was driving. She denies any visual changes but has had increased tinnitus during this time. She discussed this with her cardiologist with no concerning findings. She denies chest pain, palpitations, or sweating during these episodes but has felt mildly short of breath. This feeling passes once lightheadedness resolves. Episodes seem to last 2-5 minutes at a time.     Patient has a hx of hypertension and is taking atenolol 25mg daily, losartan 50mg daily, and furosemide 20mg as needed for edema. She is also taking potassium chloride 10mEq daily. Patient has a hx of obstructive sleep apnea and uses 2L O2 by NC nightly.     Patient has a hx of hyperlipidemia and is taking atorvastatin 20mg daily. No concerns at this time.    Patient has a hx of hypothyroidism and is taking levothyroxine 125mcg daily. No concerns at this time.     Patient has a hx of menopausal flushing and is taking Premarin 0.9mg once weekly. No concerns at this time.     Patient has a hx of chronic back pain and is established with Dr. Gonzalez with hx of spinal stimulator implant placed on 3/23/21. She continues taking gabapentin 600mg three times daily.     Patient's last colonoscopy was on 2/19/14 with normal results.     Patient has a hx of total hysterectomy in 1987.     Patient's  last mammogram was in 2016, no further breast cancer screening suggested at this time.     Patient's last DEXA scan was on 4/7/21 with 2 year repeat suggested.    Patient is a former smoker, quit in 1995. She denies chewing tobacco use. She receives annual flu vaccinations. She has received both Moderna COVID-19 vaccinations, both Shingrix vaccinations, both pneumonia vaccinations, and last TDAP vaccination was on 3/8/17.           Comprehensive Medical and Social History  Patient Active Problem List   Diagnosis   • Hypothyroidism   • Obesity   • Obstructive sleep apnea syndrome   • Periodic limb movement disorder   • Vitamin D deficiency   • Allergic rhinitis   • Benign essential hypertension   • Obesity (BMI 35.0-39.9 without comorbidity)   • Heartburn   • Pulmonary hypertension (CMS/HCC) (Formerly [facility])   • S/P bariatric surgery     Past Medical History:   Diagnosis Date   • Benign essential hypertension 11/24/2017   • Cataract of left eye    • Chronic obstructive lung disease (CMS/HCC) (Formerly [facility]) 11/10/2017   • Dysrhythmia     LBBB   • Hypertension    • Hypothyroid    • Hypothyroidism 11/10/2017   • Injury of back     BENDING INJURY   • Neurologic disorder    • Obesity 11/10/2017   • Obstructive sleep apnea syndrome 11/10/2017    Night time oxygen/2L   • Osteoarthritis    • Periodic limb movement disorder 11/10/2017   • Peripheral neuropathy     left thigh into left groin    • Rectocele    • Respiratory disease      Past Surgical History:   Procedure Laterality Date   • APPENDECTOMY  1987   • BACK SURGERY      2 surgeries [id number]   • BACK SURGERY  11/30/2016   • BACK SURGERY  05/01/2017    vetebral fusion   • CARDIAC CATHETERIZATION  08/31/2011   • CATARACT EXTRACTION W/  INTRAOCULAR LENS IMPLANT Left 7/2/2018    Procedure: OS CATARACT LEFT PHACOEMULSIFICATION OF CATARACT WITH INTRAOCULAR LENS;  Surgeon: Emmanuel Cueto MD;  Location: [facility];  Service: Ophthalmology;  Laterality: Left;   • CATARACT  EXTRACTION W/  INTRAOCULAR LENS IMPLANT Right 8/6/2018    Procedure: OD CATARACT RIGHT PHACOEMULSIFICATION OF CATARACT WITH INTRAOCULAR LENS;  Surgeon: Emmanuel Cueto MD;  Location: [facility] SURG[facility] SERVICES;  Service: Ophthalmology;  Laterality: Right;   • CHOLECYSTECTOMY     • COLONOSCOPY  02/19/2014    No polyps   • COLONOSCOPY  04/26/2011   • DIAGNOSTIC LAPAROSCOPY N/A 11/28/2018    Procedure: Laparoscopic band removal;  Surgeon: Chris Millan MD;  Location: [facility] OR;  Service: General;  Laterality: N/A;   • ESOPHAGOGASTRODUODENOSCOPY N/A 11/6/2018    Procedure: EGD - ESOPHAGOGASTRODUODENOSCOPY;  Surgeon: Chris Millan MD;  Location: [facility];  Service: Endoscopy;  Laterality: N/A;   • FOOT SURGERY      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 Vela- shoulder   • SPINAL STIMULATOR IMPLANT N/A 3/23/2021    Procedure: PERMANENT SPINAL CORD STIMULATOR IMPLANTATION;  Surgeon: Bernardo Gonzalez MD;  Location: [facility] OR;  Service: Pain Management;  Laterality: N/A;   • THYROID LOBECTOMY Left 18/18/2010   • THYROIDECTOMY, PARTIAL  08/18/2010   • UPPER GASTROINTESTINAL ENDOSCOPY  02/19/2014    Mild chronic gastritis     Allergies   Allergen Reactions   • [place] Angioedema and Hives     hives, tongue swells  Swelling of tongue   • Adhesive      welts     Current Outpatient Medications   Medication Sig Dispense Refill   • atenoloL (TENORMIN) 25 mg tablet TAKE 1 TABLET BY MOUTH  DAILY 90 tablet 3   • losartan  (COZAAR) 50 mg tablet TAKE 1 TABLET BY MOUTH  DAILY 90 tablet 3   • multivitamin (THERAGRAN) tablet tablet Take 1 tablet by mouth daily     • levothyroxine (SYNTHROID, LEVOTHROID) 125 mcg tablet TAKE 1 TABLET BY MOUTH  DAILY (Patient taking differently: Take 125 mcg by mouth daily  ) 60 tablet 3   • estrogens, conjugated, (Premarin) 0.9 mg tablet Take 1 tablet (0.9 mg total) by mouth once a week 12 tablet 4   • atorvastatin (LIPITOR) 20 mg tablet TAKE 1 TABLET BY MOUTH  DAILY (Patient taking differently: Take 20 mg by mouth daily  ) 90 tablet 3   • furosemide (LASIX) 20 mg tablet TAKE 1 TABLET BY MOUTH  DAILY (Patient taking differently: Take 20 mg by mouth daily as needed Indications: visible water retention  ) 90 tablet 3   • potassium chloride 10 mEq CR tablet TAKE 1 TABLET BY MOUTH  DAILY 90 tablet 3   • gabapentin (NEURONTIN) 300 mg capsule Take 600 mg by mouth 3 (three) times a day       • calcium carbonate-vitamin D3 600 mg(1,500mg) -400 unit per tablet Take 1 tablet by mouth daily     • nitroglycerin (NITROSTAT) 0.4 mg SL tablet Place 1 tablet (0.4 mg total) under the tongue every 5 (five) minutes as needed for chest pain 25 tablet 1   • vit A-vit C-vit E-zinc-copper (EYE VITAMIN AND MINERALS) 7,160-113-100 unit-mg-unit tablet Take 1 tab/cap by mouth 2 (two) times a day.       Current Facility-Administered Medications   Medication Dose Route Frequency Provider Last Rate Last Admin   • denosumab (PROLIA) 60 mg/mL syringe 60 mg  60 mg subcutaneous q6 months Bronson Travis MD   60 mg at 21 1032     Social History     Occupational History   • Not on file   Tobacco Use   • Smoking status: Former Smoker     Packs/day: 2.00     Years: 35.00     Pack years: 70.00     Quit date:      Years since quittin.6   • Smokeless tobacco: Never Used   Vaping Use   • Vaping Use: Never used   Substance and Sexual Activity   • Alcohol use: No   • Drug use: No   • Sexual activity: Defer   Social History Narrative  "  • Not on file       Activities of Daily Living  In your present state of health, do you have any difficulty performing the following activities?:   Preparing food and eating?: No  Bathing yourself: No  Getting dressed: No  Using the toilet:No  Moving around from place to place: No  Housekeeping: No  Shopping: No  Managing finances: No  Managing medications: No   In the past year have you fallen or had a near fall?:No    Depression Screen  (Note: if answer to either of the following is \"Yes\", a more complete depression screening is indicated)   Q1: Over the past two weeks, have you felt down, depressed or hopeless? yes  Q2: Over the past two weeks, have you felt little interest or pleasure in doing things? yes     Current exercise habits: Home exercise routine includes walking.   Dietary issues discussed:  No  Hearing difficulties/changes: Yes - ringing in ears  Vision problems/changes: No  Safe in current home environment: yes  Falls Risk Assessment: no frequent falls while walking, no fall since last visit and no fall(s) in the past year  Functional Ability/Safety Screen    1. Was the patient's timed Up & Go test unsteady or longer than 30 seconds?  No      2. Does your home have rugs in the hallway, lack grab bars in the bathroom, lack handrails on the stairs, or have poor lighting? No      3.  Have you noticed any hearing difficulties? No     The following have been reviewed and updated as appropriate in this visit:  Tobacco  Allergies  Meds  Problems  Med Hx  Surg Hx  Fam Hx         Review of Systems  Review of Systems   Constitutional: Negative for appetite change, chills, diaphoresis, fatigue, fever and unexpected weight change.   HENT: Positive for tinnitus. Negative for congestion, dental problem, ear discharge, ear pain, hearing loss, mouth sores, nosebleeds, rhinorrhea, sinus pressure, sore throat, trouble swallowing and voice change.    Eyes: Positive for discharge. Negative for pain, redness and " visual disturbance.   Respiratory: Negative for cough, chest tightness, shortness of breath and wheezing.    Cardiovascular: Negative for chest pain, palpitations and leg swelling.   Gastrointestinal: Negative for abdominal pain, blood in stool, constipation, diarrhea, nausea and vomiting.   Endocrine: Positive for cold intolerance. Negative for heat intolerance, polydipsia and polyuria.   Genitourinary: Negative for difficulty urinating, dysuria, frequency and hematuria.   Musculoskeletal: Positive for back pain. Negative for arthralgias, joint swelling and myalgias.   Skin: Negative for rash and wound.   Allergic/Immunologic: Positive for environmental allergies.   Neurological: Positive for dizziness and numbness. Negative for weakness and headaches.   Hematological: Negative for adenopathy. Does not bruise/bleed easily.   Psychiatric/Behavioral: Negative for behavioral problems, dysphoric mood and sleep disturbance. The patient is not nervous/anxious.        Objective     Blood pressure 130/62, pulse 54, temperature 36.4 °C (97.6 °F), temperature source Temporal, weight 78.7 kg (173 lb 8 oz), SpO2 97 %, not currently breastfeeding.  Body mass index is 33.88 kg/m².  Timed Up & Go: 5 seconds  Physical Exam  Vitals and nursing note reviewed.   Constitutional:       General: She is not in acute distress.     Appearance: Normal appearance. She is well-developed. She is obese. She is not ill-appearing, toxic-appearing or diaphoretic.   HENT:      Head: Normocephalic and atraumatic.      Right Ear: Tympanic membrane, ear canal and external ear normal. There is no impacted cerumen.      Left Ear: Tympanic membrane, ear canal and external ear normal. There is no impacted cerumen.      Nose: Nose normal. No congestion or rhinorrhea.      Mouth/Throat:      Mouth: Mucous membranes are moist.      Pharynx: Oropharynx is clear. No oropharyngeal exudate or posterior oropharyngeal erythema.   Eyes:      General: No scleral  icterus.        Right eye: No discharge.         Left eye: No discharge.      Extraocular Movements: Extraocular movements intact.      Conjunctiva/sclera: Conjunctivae normal.      Pupils: Pupils are equal, round, and reactive to light.      Comments: Corrected vision   Neck:      Thyroid: No thyromegaly.      Vascular: No carotid bruit.      Trachea: No tracheal deviation.   Cardiovascular:      Rate and Rhythm: Normal rate and regular rhythm.      Pulses: Normal pulses.      Heart sounds: Normal heart sounds. No murmur heard.   No friction rub. No gallop.    Pulmonary:      Effort: Pulmonary effort is normal. No respiratory distress.      Breath sounds: Normal breath sounds. No stridor. No wheezing, rhonchi or rales.   Chest:      Chest wall: No tenderness.   Abdominal:      General: Bowel sounds are normal. There is no distension.      Palpations: Abdomen is soft. There is no mass.      Tenderness: There is no abdominal tenderness. There is no guarding or rebound.      Hernia: No hernia is present.   Musculoskeletal:         General: No tenderness or signs of injury.      Cervical back: Neck supple. No tenderness.      Right lower leg: No edema.      Left lower leg: No edema.   Lymphadenopathy:      Cervical: No cervical adenopathy.   Skin:     General: Skin is warm and dry.      Coloration: Skin is not pale.      Findings: No erythema or rash.   Neurological:      Mental Status: She is alert and oriented to person, place, and time.      Cranial Nerves: No cranial nerve deficit.      Sensory: No sensory deficit.      Motor: No weakness.      Coordination: Coordination normal.      Gait: Gait normal.      Deep Tendon Reflexes: Reflexes are normal and symmetric. Reflexes normal.   Psychiatric:         Mood and Affect: Mood normal.         Assessment/Plan     Diagnoses and all orders for this visit:    Medicare annual wellness visit, subsequent  Comments:  No concerning findings.  Patient will follow up with any  mood related concerns.    Obstructive sleep apnea syndrome  Comments:  Continue oxygen use nightly.    Age-related osteoporosis without current pathological fracture  Comments:  Repeat DEXA in 2023.    Dizziness  Comments:  Orthostasis versus thyroid disorder versus carotid stenosis versus arrhythmia.  Will send for labs.  If normal will send for carotid and Holter.  Orders:  -     CBC w/auto differential Blood, Venous  -     Comprehensive metabolic panel Blood, Venous  -     Thyroid Stimulating Hormone, Ultrasensitive Blood, Venous    Hypothyroidism, unspecified type   Comments:  Repeat TSH today.  Orders:  -     Thyroid Stimulating Hormone, Ultrasensitive Blood, Venous        During the course of the visit the patient was educated and counseled about appropriate screening and preventive services including:   · Pneumococcal vaccine:  Reviewed and discussed with patient  · Shingles vaccine:  Reviewed and discussed with patient  · Hepatitis B vaccine:  Reviewed and discussed with patient  · Smoking cessation counseling  · Screening electrocardiogram discussed  · Cholesterol screening discussed  · Diabetes screening  · Glaucoma screening  · Nutrition counseling   · Advanced directives: has an advanced directive - a copy has been provided    Written screening schedule individualized for the patient as above based on current USPSTF, age-appropriate medicare services and ACIP as described above was given.    Mental health conditions and/or risk factors are as listed (if any) in the ROS above as specific to this patient.  Direct cognitive impairment was assessed.  Throughout the interview there were no psychosocial or behavioral risks noted.    Patient's lifestyle was evaluated to promote wellness in terms of but not limited to: fall prevention, nutrition, physical activity, tobacco/alcohol if present and weight management.  These were addressed specifically with this patient as listed within the social history,  discussion notes, and above.      Bronson Travis MD    Portions of this note were documented by Nneka Arita as I performed the exam and collected the information from Katy Stack. I attest that I have reviewed the information as documented, verified the accuracy of the documentation and added additional information as needed.

## 2021-08-26 ENCOUNTER — LAB (OUTPATIENT)
Dept: LAB | Facility: CLINIC | Age: 81
End: 2021-08-26
Payer: MEDICARE

## 2021-08-26 DIAGNOSIS — Z98.84 S/P LAPAROSCOPIC SLEEVE GASTRECTOMY: ICD-10-CM

## 2021-08-26 DIAGNOSIS — E66.09 CLASS 1 OBESITY DUE TO EXCESS CALORIES WITHOUT SERIOUS COMORBIDITY WITH BODY MASS INDEX (BMI) OF 31.0 TO 31.9 IN ADULT: ICD-10-CM

## 2021-08-26 DIAGNOSIS — E66.811 CLASS 1 OBESITY DUE TO EXCESS CALORIES WITHOUT SERIOUS COMORBIDITY WITH BODY MASS INDEX (BMI) OF 31.0 TO 31.9 IN ADULT: ICD-10-CM

## 2021-08-26 DIAGNOSIS — I10 ESSENTIAL HYPERTENSION: ICD-10-CM

## 2021-08-26 LAB
25(OH)D3 SERPL-MCNC: 45 NG/ML (ref 30–100)
ALBUMIN SERPL-MCNC: 3.6 G/DL (ref 3.5–5.3)
ALP SERPL-CCNC: 67 U/L (ref 55–142)
ALT SERPL-CCNC: 17 U/L (ref 7–52)
ANION GAP SERPL CALC-SCNC: 6 MMOL/L (ref 3–11)
AST SERPL-CCNC: 21 U/L
BILIRUB SERPL-MCNC: 0.53 MG/DL (ref 0.2–1.4)
BUN SERPL-MCNC: 25 MG/DL (ref 7–25)
CALCIUM ALBUM COR SERPL-MCNC: 9.2 MG/DL (ref 8.6–10.3)
CALCIUM SERPL-MCNC: 8.9 MG/DL (ref 8.6–10.3)
CHLORIDE SERPL-SCNC: 109 MMOL/L (ref 98–107)
CO2 SERPL-SCNC: 28 MMOL/L (ref 21–32)
CREAT SERPL-MCNC: 1.05 MG/DL (ref 0.6–1.1)
ERYTHROCYTE [DISTWIDTH] IN BLOOD BY AUTOMATED COUNT: 14.6 % (ref 11.5–14)
FOLATE SERPL-MCNC: >22.3 NG/ML
GFR SERPL CREATININE-BSD FRML MDRD: 50 ML/MIN/1.73M*2
GLUCOSE SERPL-MCNC: 92 MG/DL (ref 70–105)
HCT VFR BLD AUTO: 41.3 % (ref 34–45)
HGB BLD-MCNC: 13.7 G/DL (ref 11.5–15.5)
MAGNESIUM SERPL-MCNC: 2.1 MG/DL (ref 1.8–2.4)
MCH RBC QN AUTO: 30 PG (ref 28–33)
MCHC RBC AUTO-ENTMCNC: 33.2 G/DL (ref 32–36)
MCV RBC AUTO: 90.2 FL (ref 81–97)
PHOSPHATE SERPL-MCNC: 3.4 MG/DL (ref 2.5–4.9)
PLATELET # BLD AUTO: 236 10*3/UL (ref 140–350)
PMV BLD AUTO: 8.4 FL (ref 6.9–10.8)
POTASSIUM SERPL-SCNC: 4.4 MMOL/L (ref 3.5–5.1)
PROT SERPL-MCNC: 6.2 G/DL (ref 6–8.3)
RBC # BLD AUTO: 4.58 10*6/ΜL (ref 3.7–5.3)
SODIUM SERPL-SCNC: 143 MMOL/L (ref 135–145)
VIT B12 SERPL-MCNC: 619 PG/ML (ref 180–914)
WBC # BLD AUTO: 6.9 10*3/UL (ref 4.5–10.5)

## 2021-08-26 PROCEDURE — 84597 ASSAY OF VITAMIN K: CPT

## 2021-08-26 PROCEDURE — 84630 ASSAY OF ZINC: CPT

## 2021-08-26 PROCEDURE — 82306 VITAMIN D 25 HYDROXY: CPT

## 2021-08-26 PROCEDURE — 85027 COMPLETE CBC AUTOMATED: CPT

## 2021-08-26 PROCEDURE — 84100 ASSAY OF PHOSPHORUS: CPT

## 2021-08-26 PROCEDURE — 84590 ASSAY OF VITAMIN A: CPT

## 2021-08-26 PROCEDURE — 82746 ASSAY OF FOLIC ACID SERUM: CPT

## 2021-08-26 PROCEDURE — 84446 ASSAY OF VITAMIN E: CPT

## 2021-08-26 PROCEDURE — 83735 ASSAY OF MAGNESIUM: CPT

## 2021-08-26 PROCEDURE — 82607 VITAMIN B-12: CPT

## 2021-08-26 PROCEDURE — 84425 ASSAY OF VITAMIN B-1: CPT

## 2021-08-26 PROCEDURE — 36415 COLL VENOUS BLD VENIPUNCTURE: CPT

## 2021-08-26 PROCEDURE — 83921 ORGANIC ACID SINGLE QUANT: CPT

## 2021-08-26 PROCEDURE — 80053 COMPREHEN METABOLIC PANEL: CPT

## 2021-08-27 LAB — ZINC SERPL-MCNC: 0.82 MCG/ML (ref 0.66–1.1)

## 2021-08-28 LAB
A-TOCOPHEROL VIT E SERPL-MCNC: 14.3 MG/L (ref 5.5–17)
PHYTONADIONE SERPL-MCNC: 0.32 NG/ML (ref 0.1–2.2)
VIT B1 BLD-SCNC: 198 NMOL/L (ref 70–180)

## 2021-08-29 LAB — METHYLMALONATE SERPL-SCNC: 0.2 NMOL/ML

## 2021-08-30 ENCOUNTER — TELEPHONE (OUTPATIENT)
Dept: FAMILY MEDICINE | Facility: CLINIC | Age: 81
End: 2021-08-30

## 2021-08-30 ENCOUNTER — OFFICE VISIT (OUTPATIENT)
Dept: SURGERY | Facility: CLINIC | Age: 81
End: 2021-08-30
Payer: MEDICARE

## 2021-08-30 VITALS
DIASTOLIC BLOOD PRESSURE: 80 MMHG | WEIGHT: 170.4 LBS | HEART RATE: 80 BPM | HEIGHT: 60 IN | TEMPERATURE: 97.9 F | OXYGEN SATURATION: 97 % | BODY MASS INDEX: 33.45 KG/M2 | SYSTOLIC BLOOD PRESSURE: 138 MMHG

## 2021-08-30 DIAGNOSIS — Z71.3 ENCOUNTER FOR WEIGHT LOSS COUNSELING: ICD-10-CM

## 2021-08-30 DIAGNOSIS — Z90.3 HISTORY OF SLEEVE GASTRECTOMY: Primary | ICD-10-CM

## 2021-08-30 DIAGNOSIS — E66.811 CLASS 1 OBESITY DUE TO EXCESS CALORIES WITH BODY MASS INDEX (BMI) OF 33.0 TO 33.9 IN ADULT: ICD-10-CM

## 2021-08-30 DIAGNOSIS — E66.09 CLASS 1 OBESITY DUE TO EXCESS CALORIES WITH BODY MASS INDEX (BMI) OF 33.0 TO 33.9 IN ADULT: ICD-10-CM

## 2021-08-30 PROCEDURE — 99214 OFFICE O/P EST MOD 30 MIN: CPT | Performed by: NURSE PRACTITIONER

## 2021-08-30 PROCEDURE — G0463 HOSPITAL OUTPT CLINIC VISIT: HCPCS | Performed by: NURSE PRACTITIONER

## 2021-08-30 ASSESSMENT — ENCOUNTER SYMPTOMS
FATIGUE: 0
RESPIRATORY NEGATIVE: 1
GASTROINTESTINAL NEGATIVE: 1
UNEXPECTED WEIGHT CHANGE: 0
APPETITE CHANGE: 0
ACTIVITY CHANGE: 0
DIAPHORESIS: 0
FEVER: 0
ROS GI COMMENTS: DENIES GERD
BACK PAIN: 1
PSYCHIATRIC NEGATIVE: 1
CHILLS: 0
CARDIOVASCULAR NEGATIVE: 1

## 2021-08-30 NOTE — PROGRESS NOTES
"Bariatric surgery annual follow-up  History of Laparoscopic vertical sleeve gastrectomy  Date of service: 8/30/2021    Subjective      Patient ID: Katy Stack is a 81 y.o. female.  MRN: [id number]     Chief Complaint   Patient presents with   • Weight Management     Patient is here for S/P lap sleeve gastrectomy two years ago.        BROOKLYN Burns presents to the weight management and bariatric surgery clinic today, unaccompanied, for annual follow-up 2 years after laparoscopic vertical sleeve gastrectomy performed 8/14/2019 by Dr. Millan.  Labs drawn on 8/26/2021 and WNL.     Weight trend:  Consult weight 5/3/2018: 183 pounds, BMI 35.74 kg/m²  Surgery weight on 8/14/2019:  202 pounds, BMI 39.6 kg/m²    1 year postop on 10/1/2020: 167 pounds, BMI 32.77 kg/m²  2-year postop on 8/30/2021: 170 pounds, BMI 33.40 kg/m²  Maintains 32 pound total weight loss from highest weight.    Co morbid conditions:   - hypertension: continues atenolol, losartan  - pulmonary hypertension: continues lasix, now uses daily as needed. Uses potassium as needed with lasix  - hyperlipidemia: continues atorvastatin  - osteoporosis: continues prolia  - chronic pain: continues gabapentin, recently placed spinal cord stimulator as well.     Psychology:  Patient is struggling with social and familial issues at this point in time related to  recently transition to hospice care following a fall in the nursing home for which he was down for several hours before she found him in his room.  Her description of the situation sounds to be a very traumatic experience for her.  Furthermore, her daughter has a new diagnosis of lung cancer and is also going through a \"messy divorce\" during this time.  She reports that she has a lot of support in her [employer]  as well as a close friend who is also a .  She declines referral for psychological counseling at this time.    Nutrition:  With grief/coping she often forgets to eat and admits to " frequent snacking/grazing.  Declines referral for IBT/nutrition counseling at this time.  She is not tracking foods/fluids/macronutrient intake, general food recall not obtained today due to patient with newly received news on the way to this appointment regarding her daughter's diagnosis of lung cancer, emotional with this news.    Taking the following postoperative vitamin supplementation: Women's 55+ multivitamin with iron, vitamin B12, vitamin D3, and calcium + D3 twice daily.  She is unsure of the exact dosages she is taking of these supplements.    Physical activity:   Patient without structured fitness routine at this time.  She has recently undergone spinal cord stimulator implantation for chronic pain and looks forward to increased activity levels with improved pain control.    Body comp analysis on 8/30/2021 (baseline comparison):  Weight: 170.4 pounds   Fat %: 46.5%  Fat mass: 79.2 pounds  Fat free mass: 91.2 pounds  Muscle mass: 86.6 pounds   TBW %: 39.8%  Bone mass: 4.6 pounds   BMR: 1286 kcal   Visceral fat rating: 15  Desirable range fat percent: 24.0 to 35.9%  Desirable range fat mass: 27.8 to 49.2 pounds    Anthropometric measurements on 8/30/2021 (baseline comparison):  Neck: 14.5 inches (-1.5 inches, +1 inch from last measurement)  Waist: 36.5 inches (- 1.75 inches, +3.5 inches from last measurement)  Hips: 48 inches (-5 inches +2 inches from last measurement)  Total inches lost: 8.25 inches    Review of Systems   Constitutional: Negative for activity change, appetite change, chills, diaphoresis, fatigue, fever and unexpected weight change.   HENT: Negative.    Respiratory: Negative.    Cardiovascular: Negative.    Gastrointestinal: Negative.         Denies GERD   Musculoskeletal: Positive for back pain (Chronic, continues gabapentin).   Neurological:        Chronic peripheral neuropathy persists related to back pain, no worse than previous   Psychiatric/Behavioral: Negative.  Sleep disturbance:  Reports sleeping well.       I have personally reviewed the pertinent aspects of the patient's chart and updated the computerized patient record. Pertinent positives are noted above.  Items reviewed including: Tobacco  Allergies  Meds  Problems  Med Hx  Surg Hx  Fam Hx         Objective      Vital Signs  /80 (BP Location: Left arm, Patient Position: Sitting, Cuff Size: Regular Adult)   Pulse 80   Temp 36.6 °C (97.9 °F) (Temporal)   Ht 1.524 m (5')   Wt 77.3 kg (170 lb 6.4 oz)   SpO2 97%   BMI 33.28 kg/m²       Physical Exam  Vitals and nursing note reviewed. Chaperone present: Unaccompanied.   Constitutional:       General: She is awake. She is not in acute distress.     Appearance: Normal appearance. She is well-developed and well-groomed. She is obese.      Comments: Maintains 32 pound total weight loss since surgery   Cardiovascular:      Rate and Rhythm: Normal rate and regular rhythm.      Pulses: Normal pulses.      Heart sounds: S1 normal and S2 normal.   Pulmonary:      Effort: Pulmonary effort is normal.      Breath sounds: Normal breath sounds.   Abdominal:      General: Abdomen is protuberant. Bowel sounds are normal.      Palpations: Abdomen is soft.      Tenderness: There is no abdominal tenderness.      Hernia: There is no hernia in the ventral area.   Skin:     General: Skin is warm and dry.   Neurological:      Mental Status: She is alert. Mental status is at baseline.   Psychiatric:         Attention and Perception: Attention normal.         Mood and Affect: Mood normal.         Behavior: Behavior is cooperative.       LAB  Recent Results (from the past 1344 hour(s))   Zinc, serum Blood, Venous    Collection Time: 08/26/21  8:12 AM   Result Value Ref Range    Zinc, S 0.82 0.66 - 1.10 mcg/mL   Vitamin B12 Blood, Venous    Collection Time: 08/26/21  8:12 AM   Result Value Ref Range    Vitamin B-12 619 180 - 914 pg/mL   Phosphorus Blood, Venous    Collection Time: 08/26/21  8:12 AM    Result Value Ref Range    Phosphorus 3.4 2.5 - 4.9 mg/dL   Vitamin K Blood, Venous    Collection Time: 08/26/21  8:12 AM   Result Value Ref Range    Vitamin K1, S 0.32 0.10 - 2.20 ng/mL   Vitamin E Blood, Venous    Collection Time: 08/26/21  8:12 AM   Result Value Ref Range    A-Tocopherol, Vitamin E 14.3 5.5 - 17.0 mg/L   Vitamin D 25 hydroxy Blood, Venous    Collection Time: 08/26/21  8:12 AM   Result Value Ref Range    Vit D, 25-Hydroxy 45 30 - 100 ng/mL   Vitamin B1 (Thiamin) Blood, Venous    Collection Time: 08/26/21  8:12 AM   Result Value Ref Range    Thiamin (Vitamin B1),  (H) 70 - 180 nmol/L   Vitamin A Blood, Venous    Collection Time: 08/26/21  8:12 AM   Result Value Ref Range    Vitamin A 57.9 32.5 - 78.0 mcg/dL   Methylmalonic acid, serum Blood, Venous    Collection Time: 08/26/21  8:12 AM   Result Value Ref Range    Methylmalonic Acid, QN, S 0.20 <=0.40 nmol/mL   Magnesium Blood, Venous    Collection Time: 08/26/21  8:12 AM   Result Value Ref Range    Magnesium 2.1 1.8 - 2.4 mg/dL   Folate Blood, Venous    Collection Time: 08/26/21  8:12 AM   Result Value Ref Range    Folate >22.3 >6.6 ng/mL   Comprehensive metabolic panel Blood, Venous    Collection Time: 08/26/21  8:12 AM   Result Value Ref Range    Sodium 143 135 - 145 mmol/L    Potassium 4.4 3.5 - 5.1 mmol/L    Chloride 109 (H) 98 - 107 mmol/L    CO2 28 21 - 32 mmol/L    Anion Gap 6 3 - 11 mmol/L    BUN 25 7 - 25 mg/dL    Creatinine 1.05 0.60 - 1.10 mg/dL    Glucose 92 70 - 105 mg/dL    Calcium 8.9 8.6 - 10.3 mg/dL    AST 21 <40 U/L    ALT (SGPT) 17 7 - 52 U/L    Alkaline Phosphatase 67 55 - 142 U/L    Total Protein 6.2 6.0 - 8.3 g/dL    Albumin 3.6 3.5 - 5.3 g/dL    Total Bilirubin 0.53 0.20 - 1.40 mg/dL    eGFR 50 (L) >60 mL/min/1.73m*2    Corrected Calcium 9.2 8.6 - 10.3 mg/dL   CBC Blood, Venous    Collection Time: 08/26/21  8:12 AM   Result Value Ref Range    WBC 6.9 4.5 - 10.5 10*3/uL    RBC 4.58 3.70 - 5.30 10*6/µL    Hemoglobin 13.7  11.5 - 15.5 g/dL    Hematocrit 41.3 34.0 - 45.0 %    MCV 90.2 81.0 - 97.0 fL    MCH 30.0 28.0 - 33.0 pg    MCHC 33.2 32.0 - 36.0 g/dL    RDW 14.6 (H) 11.5 - 14.0 %    Platelets 236 140 - 350 10*3/uL    MPV 8.4 6.9 - 10.8 fL   COVID-19 MOLECULAR (PCR)    Collection Time: 09/04/21  9:19 AM    Specimen: Nasopharynx; Swab   Result Value Ref Range    COVID-19 PCR Negative Negative       Assessment and Plan:   Diagnosis Plan   1. History of sleeve gastrectomy     2. Class 1 obesity due to excess calories with body mass index (BMI) of 33.0 to 33.9 in adult     3. Encounter for weight loss counseling         This is a 81 y.o. female who presents today for ongoing follow up 2 years s/p sleeve gastrectomy.      She maintains a 32 lb weight loss since surgery.  She has regained 9 lbs since I las saw her.  On biometric testing today muscle mass outweighs fat mass by 12 lbs and BF% is 46.5% with upper limit of desired range being 35.9%. She has also regained inches since I last saw her now maintaining 8.25 inches lost on standard anthropometric measurements which is down from 14.75 inches.     She has high levels of stress in her life right now r/t her 's deteriorating health and daughter with new cancer diagnosis and simultaneously go through a divorce.  Lifestyle changes have somewhat gone to the wayside throughout this time.  Offered/recommended referral for psychological counseling and IBT for nutrition counseling, but she declines these referrals at this time. We did discuss the benefit of optimized nutrition and fitness levels from a mental health standpoint to help her through the grieving process and treating current situational stress in addition to weight management. If at any point she desires psychological or nutrition counseling she will return call to my clinic or her PCPs office.     Annual bariatric lab evaluation was completed and these are WNL or with only minor abnormalities not critical to her health.   She is taking all recommended postoperative vitamins and no changes necessary to supplementation based on these labs.      Management of Co morbid conditions by PCP, cardiology, and pain management. She is encouraged to continue follow up with these providers for management of hypertension, hyperlipidemia, chronic pain, pulmonary hypertension, osteoporosis.  Again, reviewed benefit of continued weight loss and optimized lifestyle on management of these conditions.     Katy Stack will RTC in 1 year for annual bariatric surgery follow up and labs, sooner with concerns or further weight regain.  She participated in the decision making process and agreed with the above plan.  All questions were sought and answered to her satisfaction.       Marii Wayne CNP  10/13/2021  6:02 AM    Total Time spent with the patient is 30 min with more than 50% in direct counseling and coordination of care regarding history of sleeve, weight loss counseling, and class 1 obesity.     A voice recognition program was used to aid in medical record documentation. Sometimes words are printed not exactly as they were spoken. While efforts were made to carefully edit and correct any inaccuracies, some errors may be present and should be taken within the context of the discussion.  Please contact our office if you need assistance interpreting this medical record or notice any mistakes.

## 2021-08-30 NOTE — TELEPHONE ENCOUNTER
Spoke with patient and she states that she is having a hard time. Her  is currently on hospice and just found out that her daughter has lung cancer. She denies any suicidal ideation. She has never been on any antidepressants in the past.

## 2021-09-03 ENCOUNTER — NURSE TRIAGE (OUTPATIENT)
Dept: FAMILY MEDICINE | Facility: CLINIC | Age: 81
End: 2021-09-03

## 2021-09-03 LAB — VIT A SERPL-MCNC: 57.9 MCG/DL (ref 32.5–78)

## 2021-09-03 NOTE — TELEPHONE ENCOUNTER
COVID-19 SCREENING ASSESSMENT     CRITERIA FOR TESTING  Is patient symptomatic: Yes: What symptoms are you experiencing? Fever, Chills, Headache and Fatigue     ORDER QUESTIONS  Date of onset: 9/2/21     OTHER QUESTIONS  Are you a [facility] employee? No  Have you had close contact with a lab confirmed case of COVID-19 in the last 14 days? No    Pt was in [place] at [facility]ospice house yesterday, to visit .  Pt with mild symptoms, sent for testing.        Reason for Disposition  • Patient has COVID-19 symptoms per CDC guidelines.    Protocols used: COVID-19 TRIAGE PROTOCOL MONUMENT HEALTH

## 2021-09-04 ENCOUNTER — APPOINTMENT (OUTPATIENT)
Dept: LAB | Facility: CLINIC | Age: 81
End: 2021-09-04
Payer: MEDICARE

## 2021-09-04 DIAGNOSIS — Z20.822 CONTACT WITH AND (SUSPECTED) EXPOSURE TO COVID-19: ICD-10-CM

## 2021-09-04 PROCEDURE — C9803 HOPD COVID-19 SPEC COLLECT: HCPCS | Mod: CS

## 2021-09-04 PROCEDURE — U0005 INFEC AGEN DETEC AMPLI PROBE: HCPCS

## 2021-09-05 LAB — SARS-COV-2 RNA RESP QL NAA+PROBE: NEGATIVE

## 2021-09-07 ENCOUNTER — PROCEDURE VISIT (OUTPATIENT)
Dept: FAMILY MEDICINE | Facility: CLINIC | Age: 81
End: 2021-09-07
Payer: MEDICARE

## 2021-09-07 VITALS — RESPIRATION RATE: 14 BRPM | OXYGEN SATURATION: 97 % | HEART RATE: 76 BPM | TEMPERATURE: 97.6 F

## 2021-09-07 DIAGNOSIS — M81.0 AGE-RELATED OSTEOPOROSIS WITHOUT CURRENT PATHOLOGICAL FRACTURE: Primary | ICD-10-CM

## 2021-09-07 PROCEDURE — 6360000200 HC RX 636 W HCPCS (ALT 250 FOR IP): Mod: TB | Performed by: FAMILY MEDICINE

## 2021-09-07 PROCEDURE — 96372 THER/PROPH/DIAG INJ SC/IM: CPT | Performed by: FAMILY MEDICINE

## 2021-09-07 RX ADMIN — DENOSUMAB 60 MG: 60 INJECTION SUBCUTANEOUS at 08:33

## 2021-09-07 NOTE — PROGRESS NOTES
Prolia Injection   Number of injections/year injection started: 2020  DXA: 4/7/21  T-score: -2.4 LFN (-2.6 LFN 2020)  FRAX: 5.9% HIP     Prolia 60mg/1ml was injected subcutaneously   Possible side effects discussed INCLUDING ANY DENTAL CONCERNS   Compliance with calcium and vitamin D discussed   Push fluids today   Any recent falls/fractures discussed  Patient advised to have follow up DXA per provider and scheduled if needed at this time  Patient voiced no complaints of pain or discomfort at this time.

## 2021-09-09 ENCOUNTER — OFFICE VISIT (OUTPATIENT)
Dept: FAMILY MEDICINE | Facility: CLINIC | Age: 81
End: 2021-09-09
Payer: MEDICARE

## 2021-09-09 ENCOUNTER — ANCILLARY PROCEDURE (OUTPATIENT)
Dept: RADIOLOGY | Facility: CLINIC | Age: 81
End: 2021-09-09
Payer: MEDICARE

## 2021-09-09 ENCOUNTER — TELEPHONE (OUTPATIENT)
Dept: SURGERY | Facility: CLINIC | Age: 81
End: 2021-09-09
Payer: MEDICARE

## 2021-09-09 VITALS
OXYGEN SATURATION: 95 % | BODY MASS INDEX: 33.4 KG/M2 | TEMPERATURE: 97.7 F | WEIGHT: 171 LBS | SYSTOLIC BLOOD PRESSURE: 130 MMHG | HEART RATE: 59 BPM | DIASTOLIC BLOOD PRESSURE: 68 MMHG

## 2021-09-09 DIAGNOSIS — R05.9 COUGH: Primary | ICD-10-CM

## 2021-09-09 DIAGNOSIS — R05.9 COUGH: ICD-10-CM

## 2021-09-09 PROCEDURE — 71046 X-RAY EXAM CHEST 2 VIEWS: CPT | Mod: 26,CMS | Performed by: RADIOLOGY

## 2021-09-09 PROCEDURE — 99214 OFFICE O/P EST MOD 30 MIN: CPT | Performed by: FAMILY MEDICINE

## 2021-09-09 PROCEDURE — 71046 X-RAY EXAM CHEST 2 VIEWS: CPT

## 2021-09-09 PROCEDURE — G0463 HOSPITAL OUTPT CLINIC VISIT: HCPCS | Performed by: FAMILY MEDICINE

## 2021-09-09 RX ORDER — AZITHROMYCIN 250 MG/1
TABLET, FILM COATED ORAL
Qty: 6 TABLET | Refills: 0 | Status: SHIPPED | OUTPATIENT
Start: 2021-09-09 | End: 2021-09-16 | Stop reason: ALTCHOICE

## 2021-09-09 NOTE — TELEPHONE ENCOUNTER
Patient notified that she does not need to see us unless she feels she needs to. Patient plans on making an appointment for 6 months to follow-up with us. Patient thankful for call back.

## 2021-09-10 NOTE — PROGRESS NOTES
Subjective      Katy Stack is a 81 y.o. female who presents for URI (Patient is here for URI. She has had some shortness of breath and chest tightness. She was tested for COVID last week that was negative.)  .    HPI  Patient here for some mild shortness of breath chest tightness.'s been going on for about the past week.  She has had some intermittent runny nose associated with this.  No loss of taste or smell.  No significant cough.  No increase in edema.  No nausea, vomiting or diarrhea.    The following have been reviewed and updated as appropriate in this visit:  Tobacco  Allergies  Meds  Problems  Med Hx  Surg Hx  Fam Hx         Allergies   Allergen Reactions   • [place] Angioedema and Hives     hives, tongue swells  Swelling of tongue   • Adhesive      welts     Current Outpatient Medications   Medication Sig Dispense Refill   • atenoloL (TENORMIN) 25 mg tablet TAKE 1 TABLET BY MOUTH  DAILY 90 tablet 3   • losartan (COZAAR) 50 mg tablet TAKE 1 TABLET BY MOUTH  DAILY 90 tablet 3   • multivitamin (THERAGRAN) tablet tablet Take 1 tablet by mouth daily     • levothyroxine (SYNTHROID, LEVOTHROID) 125 mcg tablet TAKE 1 TABLET BY MOUTH  DAILY (Patient taking differently: Take 125 mcg by mouth daily  ) 60 tablet 3   • estrogens, conjugated, (Premarin) 0.9 mg tablet Take 1 tablet (0.9 mg total) by mouth once a week 12 tablet 4   • atorvastatin (LIPITOR) 20 mg tablet TAKE 1 TABLET BY MOUTH  DAILY (Patient taking differently: Take 20 mg by mouth daily  ) 90 tablet 3   • furosemide (LASIX) 20 mg tablet TAKE 1 TABLET BY MOUTH  DAILY (Patient taking differently: Take 20 mg by mouth daily as needed Indications: visible water retention  ) 90 tablet 3   • potassium chloride 10 mEq CR tablet TAKE 1 TABLET BY MOUTH  DAILY 90 tablet 3   • gabapentin (NEURONTIN) 300 mg capsule Take 600 mg by mouth 3 (three) times a day       • calcium carbonate-vitamin D3 600 mg(1,500mg) -400 unit per tablet Take 1 tablet by mouth  daily     • nitroglycerin (NITROSTAT) 0.4 mg SL tablet Place 1 tablet (0.4 mg total) under the tongue every 5 (five) minutes as needed for chest pain 25 tablet 1   • vit A-vit C-vit E-zinc-copper (EYE VITAMIN AND MINERALS) 7,160-113-100 unit-mg-unit tablet Take 1 tab/cap by mouth 2 (two) times a day.     • azithromycin (ZITHROMAX) 250 mg tablet Take 2 tablets the first day, then 1 tablet daily for 4 days. 6 tablet 0     Current Facility-Administered Medications   Medication Dose Route Frequency Provider Last Rate Last Admin   • denosumab (PROLIA) 60 mg/mL syringe 60 mg  60 mg subcutaneous q6 months Bronson Travis MD   60 mg at 09/07/21 0833     Past Medical History:   Diagnosis Date   • Benign essential hypertension 11/24/2017   • Cataract of left eye    • Chronic obstructive lung disease (CMS/HCC) (HCC) 11/10/2017   • Dysrhythmia     LBBB   • Hypertension    • Hypothyroid    • Hypothyroidism 11/10/2017   • Injury of back     BENDING INJURY   • Neurologic disorder    • Obesity 11/10/2017   • Obstructive sleep apnea syndrome 11/10/2017    Night time oxygen/2L   • Osteoarthritis    • Periodic limb movement disorder 11/10/2017   • Peripheral neuropathy     left thigh into left groin    • Rectocele    • Respiratory disease      Past Surgical History:   Procedure Laterality Date   • APPENDECTOMY  1987   • BACK SURGERY      2 surgeries [id number]   • BACK SURGERY  11/30/2016   • BACK SURGERY  05/01/2017    vetebral fusion   • CARDIAC CATHETERIZATION  08/31/2011   • CATARACT EXTRACTION W/  INTRAOCULAR LENS IMPLANT Left 7/2/2018    Procedure: OS CATARACT LEFT PHACOEMULSIFICATION OF CATARACT WITH INTRAOCULAR LENS;  Surgeon: Emmanuel Cueto MD;  Location: [facility] SURGICAL SERVICES;  Service: Ophthalmology;  Laterality: Left;   • CATARACT EXTRACTION W/  INTRAOCULAR LENS IMPLANT Right 8/6/2018    Procedure: OD CATARACT RIGHT PHACOEMULSIFICATION OF CATARACT WITH INTRAOCULAR LENS;  Surgeon: Emmanuel Cueto MD;  Location: [facility]  SURGICAL SERVICES;  Service: Ophthalmology;  Laterality: Right;   • CHOLECYSTECTOMY     • COLONOSCOPY  02/19/2014    No polyps   • COLONOSCOPY  04/26/2011   • DIAGNOSTIC LAPAROSCOPY N/A 11/28/2018    Procedure: Laparoscopic band removal;  Surgeon: Chris Millan MD;  Location: [facility];  Service: General;  Laterality: N/A;   • ESOPHAGOGASTRODUODENOSCOPY N/A 11/6/2018    Procedure: EGD - ESOPHAGOGASTRODUODENOSCOPY;  Surgeon: Chris Millan MD;  Location: [facility];  Service: Endoscopy;  Laterality: N/A;   • FOOT SURGERY      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 Vela- shoulder   • SPINAL STIMULATOR IMPLANT N/A 3/23/2021    Procedure: PERMANENT SPINAL CORD STIMULATOR IMPLANTATION;  Surgeon: Bernardo Gonzalez MD;  Location: [facility] OR;  Service: Pain Management;  Laterality: N/A;   • THYROID LOBECTOMY Left 18/18/2010   • THYROIDECTOMY, PARTIAL  08/18/2010   • UPPER GASTROINTESTINAL ENDOSCOPY  02/19/2014    Mild chronic gastritis     Family History   Problem Relation Age of Onset   • Heart disease Mother    • Heart disease Father         Myocardial infarction   • Parkinsonism Father    • Thyroid disease Father    • Alzheimer's disease Sister    • Arthritis Sister    • Osteoporosis Sister    • Lung cancer Brother    • Diabetes Maternal Grandmother      Social History     Occupational History   • Not on file   Tobacco Use   • Smoking status: Former Smoker     Packs/day: 2.00     Years: 35.00     Pack years: 70.00      Quit date:      Years since quittin.7   • Smokeless tobacco: Never Used   Vaping Use   • Vaping Use: Never used   Substance and Sexual Activity   • Alcohol use: No   • Drug use: No   • Sexual activity: Defer   Social History Narrative   • Not on file       Review of Systems    Objective     Vitals  /68 (BP Location: Left arm, Patient Position: Sitting)   Pulse 59   Temp 36.5 °C (97.7 °F) (Temporal)   Wt 77.6 kg (171 lb)   SpO2 95%   BMI 33.40 kg/m²     Physical Exam  Vitals and nursing note reviewed.   Constitutional:       General: She is not in acute distress.     Appearance: Normal appearance. She is well-developed. She is not ill-appearing, toxic-appearing or diaphoretic.   HENT:      Head: Normocephalic and atraumatic.      Right Ear: Tympanic membrane, ear canal and external ear normal. There is no impacted cerumen.      Left Ear: Tympanic membrane, ear canal and external ear normal. There is no impacted cerumen.      Nose: Rhinorrhea present. No congestion.      Mouth/Throat:      Mouth: Mucous membranes are moist.      Pharynx: Oropharynx is clear. No oropharyngeal exudate or posterior oropharyngeal erythema.   Eyes:      General: No scleral icterus.        Right eye: No discharge.         Left eye: No discharge.      Conjunctiva/sclera: Conjunctivae normal.   Neck:      Thyroid: No thyromegaly.      Vascular: No carotid bruit.      Trachea: No tracheal deviation.   Cardiovascular:      Rate and Rhythm: Normal rate and regular rhythm.      Pulses: Normal pulses.      Heart sounds: Normal heart sounds. No murmur heard.   No friction rub. No gallop.    Pulmonary:      Effort: Pulmonary effort is normal. No respiratory distress.      Breath sounds: Normal breath sounds. No stridor. No wheezing, rhonchi or rales.   Abdominal:      General: Bowel sounds are normal. There is no distension.      Palpations: Abdomen is soft. There is no mass.      Tenderness: There is no abdominal tenderness.  There is no guarding or rebound.      Hernia: No hernia is present.   Musculoskeletal:      Cervical back: Neck supple.      Right lower leg: No edema.      Left lower leg: No edema.   Lymphadenopathy:      Cervical: No cervical adenopathy.   Skin:     General: Skin is warm and dry.      Coloration: Skin is not pale.      Findings: No erythema or rash.   Neurological:      Mental Status: She is alert.      Deep Tendon Reflexes: Reflexes are normal and symmetric.   Psychiatric:         Mood and Affect: Mood normal.         Procedures    Assessment/Plan   Diagnoses and all orders for this visit:    Cough  -     X-ray chest 2 views; Future  -     azithromycin (ZITHROMAX) 250 mg tablet; Take 2 tablets the first day, then 1 tablet daily for 4 days.    Suspect the patient has either bronchitis or early pneumonia.  Start trial of azithromycin.  Return precautions discussed.  Patient will follow-up as needed.    Return if symptoms worsen or fail to improve.    Bronson Travis MD    Portions of this note were documented by Nneka Arita as I performed the exam and collected the information from Katy Stack. I attest that I have reviewed the information as documented, verified the accuracy of the documentation and added additional information as needed.

## 2021-09-16 ENCOUNTER — TELEPHONE (OUTPATIENT)
Dept: FAMILY MEDICINE | Facility: CLINIC | Age: 81
End: 2021-09-16

## 2021-09-16 NOTE — TELEPHONE ENCOUNTER
Went over current medications list and clarified. Advised would print a list of medications and she may  in the front lobby area. She will have her flu shot at [email or web address], also discussed getting a covid booster shot.

## 2021-09-16 NOTE — TELEPHONE ENCOUNTER
Pt stated she isn't able to  scripts below because she needs a prior auth. Please advise.       clotrimazole-betamethasone 1-0.05 % External Cream 45 g 0 2/20/2020     Nystatin 754856 UNIT/GM External Powder 1 Bottle 0 2/20/2020 Would like to know since she had had the covid vaccine if she should get the flu vaccine as well.  Also has an appt in [place] tomorrow wondering if she can get a printout of her medications.

## 2021-10-06 DIAGNOSIS — E78.5 HYPERLIPIDEMIA, UNSPECIFIED HYPERLIPIDEMIA TYPE: ICD-10-CM

## 2021-10-06 NOTE — TELEPHONE ENCOUNTER
Care Due:                  Date            Visit Type   Department     Provider  --------------------------------------------------------------------------------                              ESTABLISHED   [facility]  Last Visit: 09-      PATIENT      MEDICINE       ENRRIQUE BALES  Next Visit: None Scheduled  None         None Found                                                            Last  Test          Frequency    Reason                     Performed    Due Date  --------------------------------------------------------------------------------  Lipid Panel.  12 months..  atorvastatin.............  08-   08-    Powered by [employer]. Reference number: [id number]. 10/06/2021 4:52:07 AM LEILA

## 2021-10-07 NOTE — TELEPHONE ENCOUNTER
Medication refill request:  Medication(s):  Lipitor not filled due to (route to Nurse Pool) Patient  does not have appointment within 3 months as per our protocol and needs labs.

## 2021-10-08 RX ORDER — ATORVASTATIN CALCIUM 20 MG/1
TABLET, FILM COATED ORAL
Qty: 90 TABLET | Refills: 3 | Status: SHIPPED | OUTPATIENT
Start: 2021-10-08 | End: 2022-09-08

## 2021-10-15 DIAGNOSIS — E03.8 OTHER SPECIFIED HYPOTHYROIDISM: ICD-10-CM

## 2021-10-15 RX ORDER — LEVOTHYROXINE SODIUM 125 UG/1
TABLET ORAL
Qty: 90 TABLET | Refills: 3 | Status: SHIPPED | OUTPATIENT
Start: 2021-10-15 | End: 2022-08-25

## 2021-10-15 NOTE — TELEPHONE ENCOUNTER
No new care gaps identified.  Powered by [employer]. Reference number: [id number]. 10/15/2021 2:11:54 AM LEILA

## 2021-10-27 ENCOUNTER — APPOINTMENT (OUTPATIENT)
Dept: CARDIOLOGY | Facility: CLINIC | Age: 81
End: 2021-10-27
Payer: MEDICARE

## 2021-10-27 ENCOUNTER — OFFICE VISIT (OUTPATIENT)
Dept: CARDIOLOGY | Facility: CLINIC | Age: 81
End: 2021-10-27
Payer: MEDICARE

## 2021-10-27 VITALS
HEIGHT: 60 IN | OXYGEN SATURATION: 94 % | WEIGHT: 169 LBS | DIASTOLIC BLOOD PRESSURE: 64 MMHG | SYSTOLIC BLOOD PRESSURE: 128 MMHG | BODY MASS INDEX: 33.18 KG/M2 | HEART RATE: 90 BPM

## 2021-10-27 DIAGNOSIS — I10 BENIGN ESSENTIAL HYPERTENSION: Primary | ICD-10-CM

## 2021-10-27 PROCEDURE — 99214 OFFICE O/P EST MOD 30 MIN: CPT | Performed by: INTERNAL MEDICINE

## 2021-10-27 PROCEDURE — G0463 HOSPITAL OUTPT CLINIC VISIT: HCPCS | Performed by: INTERNAL MEDICINE

## 2021-10-27 PROCEDURE — 93005 ELECTROCARDIOGRAM TRACING: CPT | Performed by: INTERNAL MEDICINE

## 2021-10-27 PROCEDURE — 93010 ELECTROCARDIOGRAM REPORT: CPT | Performed by: STUDENT IN AN ORGANIZED HEALTH CARE EDUCATION/TRAINING PROGRAM

## 2021-10-27 ASSESSMENT — ENCOUNTER SYMPTOMS
DIAPHORESIS: 0
SYNCOPE: 0
HEMATURIA: 0
ALTERED MENTAL STATUS: 0
HALLUCINATIONS: 0
NEAR-SYNCOPE: 0
BACK PAIN: 1
SPUTUM PRODUCTION: 0
WEAKNESS: 0
NIGHT SWEATS: 0
POLYDIPSIA: 0
VISUAL HALOS: 0
FOCAL WEAKNESS: 0
SHORTNESS OF BREATH: 0
TREMORS: 0
DIARRHEA: 0
PND: 0
ORTHOPNEA: 0

## 2021-10-27 ASSESSMENT — PAIN SCALES - GENERAL: PAINLEVEL: 0-NO PAIN

## 2021-10-27 NOTE — PROGRESS NOTES
CARDIOLOGY OUTPATIENT FOLLOW-UP NOTE    Subjective    Patient ID: Katy Stack is a 81 y.o. female.    Chief Complaint:   Chief Complaint   Patient presents with   • Irregular Heart Beat       HPI  81-year-old woman returns to the office for follow-up.  She denies rest or exertional chest pain, syncope or palpitations no dizziness.  She has nocturnal oxygen but no sleep apnea, she does not use a CPAP machine.  The patient is worried about her heart rate: She does not notice any palpitations during the day but in the evening when she checks her iPhone there is a spike of her heart rate from baseline around 50bpm to 76 bpm or so and she was worried about that.  No dizziness or lightheadedness no syncope no palpitations.  She has received COVID-19 vaccination and a flu shot.  Due to back pain she is not very active.  During the summer she went to the pool swimming.  She is trying to lose weight and has joined a gym.        Past Medical History:   Diagnosis Date   • Benign essential hypertension 11/24/2017   • Cataract of left eye    • Chronic obstructive lung disease (CMS/HCC) (HCC) 11/10/2017   • Dysrhythmia     LBBB   • Hypertension    • Hypothyroid    • Hypothyroidism 11/10/2017   • Injury of back     BENDING INJURY   • Neurologic disorder    • Obesity 11/10/2017   • Obstructive sleep apnea syndrome 11/10/2017    Night time oxygen/2L   • Osteoarthritis    • Periodic limb movement disorder 11/10/2017   • Peripheral neuropathy     left thigh into left groin    • Rectocele    • Respiratory disease      Past Surgical History:   Procedure Laterality Date   • APPENDECTOMY  1987   • BACK SURGERY      2 surgeries [id number]   • BACK SURGERY  11/30/2016   • BACK SURGERY  05/01/2017    vetebral fusion   • CARDIAC CATHETERIZATION  08/31/2011   • CATARACT EXTRACTION W/  INTRAOCULAR LENS IMPLANT Left 7/2/2018    Procedure: OS CATARACT LEFT PHACOEMULSIFICATION OF CATARACT WITH  INTRAOCULAR LENS;  Surgeon: Emmanuel Cueto MD;  Location: [facility] SUR[facility];  Service: Ophthalmology;  Laterality: Left;   • CATARACT EXTRACTION W/  INTRAOCULAR LENS IMPLANT Right 8/6/2018    Procedure: OD CATARACT RIGHT PHACOEMULSIFICATION OF CATARACT WITH INTRAOCULAR LENS;  Surgeon: Emmanuel Cueto MD;  Location: [facility] SUR[facility] SERVICES;  Service: Ophthalmology;  Laterality: Right;   • CHOLECYSTECTOMY     • COLONOSCOPY  02/19/2014    No polyps   • COLONOSCOPY  04/26/2011   • DIAGNOSTIC LAPAROSCOPY N/A 11/28/2018    Procedure: Laparoscopic band removal;  Surgeon: Chris Millan MD;  Location: [facility];  Service: General;  Laterality: N/A;   • ESOPHAGOGASTRODUODENOSCOPY N/A 11/6/2018    Procedure: EGD - ESOPHAGOGASTRODUODENOSCOPY;  Surgeon: Chris Millan MD;  Location: [facility];  Service: Endoscopy;  Laterality: N/A;   • FOOT SURGERY      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 Vela- shoulder   • SPINAL STIMULATOR IMPLANT N/A 3/23/2021    Procedure: PERMANENT SPINAL CORD STIMULATOR IMPLANTATION;  Surgeon: Bernardo Gonzalez MD;  Location: [facility] OR;  Service: Pain Management;  Laterality: N/A;   • THYROID LOBECTOMY Left 18/18/2010   • THYROIDECTOMY, PARTIAL  08/18/2010   • UPPER GASTROINTESTINAL ENDOSCOPY  02/19/2014    Mild chronic gastritis       Allergies as of 10/27/2021 - Reviewed 10/27/2021   Allergen Reaction Noted   • [place] Angioedema and Hives 06/20/2013   • Adhesive  03/27/2014     Current  Outpatient Medications   Medication Sig Dispense Refill   • levothyroxine (SYNTHROID, LEVOTHROID) 125 mcg tablet TAKE 1 TABLET BY MOUTH  DAILY 90 tablet 3   • atorvastatin (LIPITOR) 20 mg tablet TAKE 1 TABLET BY MOUTH  DAILY 90 tablet 3   • atenoloL (TENORMIN) 25 mg tablet TAKE 1 TABLET BY MOUTH  DAILY 90 tablet 3   • losartan (COZAAR) 50 mg tablet TAKE 1 TABLET BY MOUTH  DAILY 90 tablet 3   • multivitamin (THERAGRAN) tablet tablet Take 1 tablet by mouth daily     • estrogens, conjugated, (Premarin) 0.9 mg tablet Take 1 tablet (0.9 mg total) by mouth once a week 12 tablet 4   • furosemide (LASIX) 20 mg tablet TAKE 1 TABLET BY MOUTH  DAILY (Patient taking differently: Take 20 mg by mouth daily as needed Indications: visible water retention  ) 90 tablet 3   • potassium chloride 10 mEq CR tablet TAKE 1 TABLET BY MOUTH  DAILY 90 tablet 3   • gabapentin (NEURONTIN) 300 mg capsule Take 600 mg by mouth 3 (three) times a day       • calcium carbonate-vitamin D3 600 mg(1,500mg) -400 unit per tablet Take 1 tablet by mouth daily     • nitroglycerin (NITROSTAT) 0.4 mg SL tablet Place 1 tablet (0.4 mg total) under the tongue every 5 (five) minutes as needed for chest pain 25 tablet 1   • vit A-vit C-vit E-zinc-copper (EYE VITAMIN AND MINERALS) 7,160-113-100 unit-mg-unit tablet Take 1 tab/cap by mouth 2 (two) times a day.       Current Facility-Administered Medications   Medication Dose Route Frequency Provider Last Rate Last Admin   • denosumab (PROLIA) 60 mg/mL syringe 60 mg  60 mg subcutaneous q6 months Bronson Travis MD   60 mg at 09/07/21 0806       Family History   Problem Relation Age of Onset   • Heart disease Mother    • Heart disease Father         Myocardial infarction   • Parkinsonism Father    • Thyroid disease Father    • Alzheimer's disease Sister    • Arthritis Sister    • Osteoporosis Sister    • Lung cancer Brother    • Diabetes Maternal Grandmother      Social History     Tobacco Use   • Smoking status:  Former Smoker     Packs/day: 2.00     Years: 35.00     Pack years: 70.00     Quit date:      Years since quittin.8   • Smokeless tobacco: Never Used   Vaping Use   • Vaping Use: Never used   Substance Use Topics   • Alcohol use: No   • Drug use: No       Review of Systems  Review of Systems   Constitutional: Negative for diaphoresis and night sweats.   HENT: Negative for congestion and hearing loss.    Eyes: Negative for visual halos.   Cardiovascular: Negative for chest pain, near-syncope, orthopnea, PND and syncope/fainting.   Respiratory: Negative for shortness of breath and sputum production.    Endocrine: Negative for polydipsia.   Skin: Negative for rash.   Musculoskeletal: Positive for back pain and joint pain.   Gastrointestinal: Negative for diarrhea.   Genitourinary: Negative for hematuria.   Neurological: Negative for focal weakness, tremors and weakness.   Psychiatric/Behavioral: Negative for altered mental status and hallucinations.         Objective   Vitals:    10/27/21 0941   BP: 128/64   Pulse: 90   SpO2: 94%   Height: 1.524 m (5')   Weight: 76.7 kg (169 lb)   PainSc: 0-No pain   Patient Position: Sitting     Weight: 76.7 kg (169 lb)  Physical Exam  Constitutional:       General: She is not in acute distress.     Appearance: She is well-developed.      Comments: Patient in no distress, BMI 33   HENT:      Head: Normocephalic.   Eyes:      General: No scleral icterus.     Pupils: Pupils are equal, round, and reactive to light.   Neck:      Thyroid: No thyromegaly.      Vascular: No JVD.      Trachea: No tracheal deviation.      Comments: No carotid bruit  Cardiovascular:      Rate and Rhythm: Normal rate and regular rhythm.      Heart sounds: Normal heart sounds. No murmur heard.   No friction rub. No gallop.    Pulmonary:      Effort: Pulmonary effort is normal. No respiratory distress.      Breath sounds: Normal breath sounds. No wheezing or rales.      Comments: Symmetric air entry no  wheezing  Abdominal:      General: Bowel sounds are normal. There is distension.      Palpations: Abdomen is soft.      Tenderness: There is no abdominal tenderness. There is no rebound.   Musculoskeletal:         General: No tenderness. Normal range of motion.      Cervical back: Normal range of motion and neck supple.      Comments: No cyanosis, no clubbing, no edema   Skin:     General: Skin is warm and dry.      Findings: No erythema or rash.   Neurological:      Mental Status: She is alert and oriented to person, place, and time.   Psychiatric:         Behavior: Behavior normal.         Thought Content: Thought content normal.         Judgment: Judgment normal.           Data Review:   Sodium   Date Value Ref Range Status   08/26/2021 143 135 - 145 mmol/L Final     Potassium   Date Value Ref Range Status   08/26/2021 4.4 3.5 - 5.1 mmol/L Final     Chloride   Date Value Ref Range Status   08/26/2021 109 (H) 98 - 107 mmol/L Final     CO2   Date Value Ref Range Status   08/26/2021 28 21 - 32 mmol/L Final     BUN   Date Value Ref Range Status   08/26/2021 25 7 - 25 mg/dL Final     Creatinine   Date Value Ref Range Status   08/26/2021 1.05 0.60 - 1.10 mg/dL Final     Glucose   Date Value Ref Range Status   08/26/2021 92 70 - 105 mg/dL Final     Calcium   Date Value Ref Range Status   08/26/2021 8.9 8.6 - 10.3 mg/dL Final     No results found for: CKTOTAL, CKMB, CKMBINDEX, TROPONINI, BNP, POCBNP  Cholesterol   Date Value Ref Range Status   08/10/2020 142 0 - 199 mg/dL Final     Triglycerides   Date Value Ref Range Status   08/10/2020 138 <=149 mg/dL Final     HDL   Date Value Ref Range Status   08/10/2020 41 >=40 mg/dL Final     TSH:   Lab Results   Component Value Date    TSH 1.380 08/05/2021     Magnesium:   Lab Results   Component Value Date    MG 2.1 08/26/2021     Protime-INR:   Lab Results   Component Value Date    PT 11.7 07/29/2020    INR 1.1 07/29/2020     A1c: No results found for:  HGBA1C    Electrocardiogram: 10/27/2021  Sinus rhythm at 76 bpm with left axis deviation, left bundle branch block otherwise normal intervals    Assessment and Plan:    Diagnoses and all orders for this visit:    Benign essential hypertension  -     ECG 12 lead -Normal, Today         Palpitations:  Patient is really unaware of palpitations but was concerned about the heart rate recordings on her iPhone which indicated on the example she showed me a heart rate increased from 52 beats per minute to 74 bpm  I offered 30-day event monitoring but she is not interested in that  If significant palpitations occur she was asked to call the office to be set up for monitoring    Aortic stenosis:  Mild mean gradient 13 mmHg with normal LV function  Continue periodic monitoring with echo  No symptoms currently    Obesity:  BMI 33  Weight loss is recommended    Hypertension:  Continue current medication  Follow-up with PCP  Weight loss and salt reduction is recommended        Next clinic follow up in 6 months, sooner as needed.    Sincerely,      Julián Valentino MD

## 2021-11-22 ENCOUNTER — TELEPHONE (OUTPATIENT)
Dept: FAMILY MEDICINE | Facility: CLINIC | Age: 81
End: 2021-11-22
Payer: MEDICARE

## 2021-11-22 NOTE — TELEPHONE ENCOUNTER
Spoke with patient and she states since the loss of her  she is having a really hard time. Patients spouse passed away 2 weeks ago. Note appointment on 11/24 @ 10:30

## 2021-11-24 ENCOUNTER — OFFICE VISIT (OUTPATIENT)
Dept: FAMILY MEDICINE | Facility: CLINIC | Age: 81
End: 2021-11-24
Payer: MEDICARE

## 2021-11-24 VITALS
DIASTOLIC BLOOD PRESSURE: 64 MMHG | BODY MASS INDEX: 35.15 KG/M2 | OXYGEN SATURATION: 96 % | SYSTOLIC BLOOD PRESSURE: 130 MMHG | WEIGHT: 180 LBS | HEART RATE: 56 BPM | TEMPERATURE: 97.9 F

## 2021-11-24 DIAGNOSIS — F32.A DEPRESSION, UNSPECIFIED DEPRESSION TYPE: Primary | ICD-10-CM

## 2021-11-24 PROCEDURE — 99214 OFFICE O/P EST MOD 30 MIN: CPT | Performed by: FAMILY MEDICINE

## 2021-11-24 PROCEDURE — G0463 HOSPITAL OUTPT CLINIC VISIT: HCPCS | Performed by: FAMILY MEDICINE

## 2021-11-24 RX ORDER — BUPROPION HYDROCHLORIDE 75 MG/1
75 TABLET ORAL DAILY
Qty: 30 TABLET | Refills: 1 | Status: SHIPPED | OUTPATIENT
Start: 2021-11-24 | End: 2022-12-12

## 2021-11-24 NOTE — PROGRESS NOTES
Subjective      Katy Stack is a 81 y.o. female who presents for Depression (patient is here to talk about depression since the loss of her spouse. )  .    Patient presents due to increased depression since the loss of her  on 10/30/21. She reports she has been very tearful, lonely, and sad since his death. She has been trying to get out and do activities with friends but she still has overwhelming crying spells. She continues to have difficulties with her alcoholic daughter as well. Her sleep is somewhat interrupted but overall okay. She does feel somewhat fatigued but denies any anxiety. She has not previously taken medications for her mood in the past. Discussed option of bupropion in detail with verbalized understanding from patient and she would like to try this. She has not been to counseling in the past but will consider it.        The following have been reviewed and updated as appropriate in this visit:  Tobacco  Allergies  Meds  Problems  Med Hx  Surg Hx  Fam Hx        Allergies   Allergen Reactions   • [place] Angioedema and Hives     hives, tongue swells  Swelling of tongue   • Adhesive      welts     Current Outpatient Medications   Medication Sig Dispense Refill   • levothyroxine (SYNTHROID, LEVOTHROID) 125 mcg tablet TAKE 1 TABLET BY MOUTH  DAILY 90 tablet 3   • atorvastatin (LIPITOR) 20 mg tablet TAKE 1 TABLET BY MOUTH  DAILY 90 tablet 3   • atenoloL (TENORMIN) 25 mg tablet TAKE 1 TABLET BY MOUTH  DAILY 90 tablet 3   • losartan (COZAAR) 50 mg tablet TAKE 1 TABLET BY MOUTH  DAILY 90 tablet 3   • multivitamin (THERAGRAN) tablet tablet Take 1 tablet by mouth daily     • furosemide (LASIX) 20 mg tablet TAKE 1 TABLET BY MOUTH  DAILY (Patient taking differently: Take 20 mg by mouth daily as needed Indications: visible water retention  ) 90 tablet 3   • potassium chloride 10 mEq CR tablet TAKE 1 TABLET BY MOUTH  DAILY 90 tablet 3   • gabapentin (NEURONTIN) 300 mg capsule Take 600 mg by  mouth 3 (three) times a day       • calcium carbonate-vitamin D3 600 mg(1,500mg) -400 unit per tablet Take 1 tablet by mouth daily     • nitroglycerin (NITROSTAT) 0.4 mg SL tablet Place 1 tablet (0.4 mg total) under the tongue every 5 (five) minutes as needed for chest pain 25 tablet 1   • vit A-vit C-vit E-zinc-copper (EYE VITAMIN AND MINERALS) 7,160-113-100 unit-mg-unit tablet Take 1 tab/cap by mouth 2 (two) times a day.     • buPROPion (WELLBUTRIN) 75 mg tablet Take 1 tablet (75 mg total) by mouth daily 30 tablet 1     Current Facility-Administered Medications   Medication Dose Route Frequency Provider Last Rate Last Admin   • denosumab (PROLIA) 60 mg/mL syringe 60 mg  60 mg subcutaneous q6 months Bronson Travis MD   60 mg at 09/07/21 0833     Past Medical History:   Diagnosis Date   • Benign essential hypertension 11/24/2017   • Cataract of left eye    • Chronic obstructive lung disease (CMS/HCC) (HCC) 11/10/2017   • Dysrhythmia     LBBB   • Hypertension    • Hypothyroid    • Hypothyroidism 11/10/2017   • Injury of back     BENDING INJURY   • Macular degeneration    • Neurologic disorder    • Obesity 11/10/2017   • Obstructive sleep apnea syndrome 11/10/2017    Night time oxygen/2L   • Osteoarthritis    • Periodic limb movement disorder 11/10/2017   • Peripheral neuropathy     left thigh into left groin    • Rectocele    • Respiratory disease      Past Surgical History:   Procedure Laterality Date   • APPENDECTOMY  1987   • BACK SURGERY      2 surgeries [id number]   • BACK SURGERY  11/30/2016   • BACK SURGERY  05/01/2017    vetebral fusion   • CARDIAC CATHETERIZATION  08/31/2011   • CATARACT EXTRACTION W/  INTRAOCULAR LENS IMPLANT Left 7/2/2018    Procedure: OS CATARACT LEFT PHACOEMULSIFICATION OF CATARACT WITH INTRAOCULAR LENS;  Surgeon: Emmanuel Cueto MD;  Location: [facility] SURGICAL SERVICES;  Service: Ophthalmology;  Laterality: Left;   • CATARACT EXTRACTION W/  INTRAOCULAR LENS IMPLANT Right 8/6/2018     Procedure: OD CATARACT RIGHT PHACOEMULSIFICATION OF CATARACT WITH INTRAOCULAR LENS;  Surgeon: Emmanuel Cueto MD;  Location: [facility] SURG[facility] SERVICES;  Service: Ophthalmology;  Laterality: Right;   • CHOLECYSTECTOMY     • COLONOSCOPY  02/19/2014    No polyps   • COLONOSCOPY  04/26/2011   • DIAGNOSTIC LAPAROSCOPY N/A 11/28/2018    Procedure: Laparoscopic band removal;  Surgeon: Chris Millan MD;  Location: [facility] OR;  Service: General;  Laterality: N/A;   • ESOPHAGOGASTRODUODENOSCOPY N/A 11/6/2018    Procedure: EGD - ESOPHAGOGASTRODUODENOSCOPY;  Surgeon: Chris Millan MD;  Location: [facility] Endoscopy;  Service: Endoscopy;  Laterality: N/A;   • FOOT SURGERY      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 Vela- shoulder   • SPINAL STIMULATOR IMPLANT N/A 3/23/2021    Procedure: PERMANENT SPINAL CORD STIMULATOR IMPLANTATION;  Surgeon: Bernardo Gonzalez MD;  Location: [facility] OR;  Service: Pain Management;  Laterality: N/A;   • THYROID LOBECTOMY Left 18/18/2010   • THYROIDECTOMY, PARTIAL  08/18/2010   • UPPER GASTROINTESTINAL ENDOSCOPY  02/19/2014    Mild chronic gastritis     Family History   Problem Relation Age of Onset   • Heart disease Mother    • Heart disease Father         Myocardial infarction   • Parkinsonism Father    • Thyroid disease Father    • Alzheimer's disease Sister    • Arthritis Sister    • Osteoporosis Sister    • Lung cancer Brother    • Diabetes Maternal Grandmother      Social History     Occupational  History   • Not on file   Tobacco Use   • Smoking status: Former Smoker     Packs/day: 2.00     Years: 35.00     Pack years: 70.00     Quit date:      Years since quittin.9   • Smokeless tobacco: Never Used   Vaping Use   • Vaping Use: Never used   Substance and Sexual Activity   • Alcohol use: No   • Drug use: No   • Sexual activity: Defer   Social History Narrative   • Not on file       Review of Systems    Objective     Vitals  /64 (BP Location: Left arm, Patient Position: Sitting)   Pulse 56   Temp 36.6 °C (97.9 °F) (Temporal)   Wt 81.6 kg (180 lb)   SpO2 96%   BMI 35.15 kg/m²     Physical Exam  Vitals and nursing note reviewed.   Constitutional:       General: She is not in acute distress.     Appearance: Normal appearance. She is not ill-appearing, toxic-appearing or diaphoretic.   Skin:     General: Skin is warm and dry.      Coloration: Skin is not pale.      Findings: No erythema or rash.   Neurological:      Mental Status: She is alert.   Psychiatric:         Behavior: Behavior normal.         Thought Content: Thought content normal.      Comments: tearful         Procedures    Assessment/Plan   Diagnoses and all orders for this visit:    Depression, unspecified depression type  -     buPROPion (WELLBUTRIN) 75 mg tablet; Take 1 tablet (75 mg total) by mouth daily    Patient with grief reaction.  Given the severity of symptoms will start trial of Wellbutrin.  Return precaution discussed.  Patient will follow-up via phone in 2 weeks.  Would like her to consider counseling as well.    Return if symptoms worsen or fail to improve.    Bronson Travis MD    Portions of this note were documented by Nneka Arita as I performed the exam and collected the information from Katy Stack. I attest that I have reviewed the information as documented, verified the accuracy of the documentation and added additional information as needed.

## 2021-12-04 DIAGNOSIS — N95.1 MENOPAUSAL FLUSHING: ICD-10-CM

## 2021-12-04 NOTE — TELEPHONE ENCOUNTER
Care Due:                  Date            Visit Type   Department     Provider  --------------------------------------------------------------------------------                              ESTABLISHED   [facility]  Last Visit: 11-      PATIENT      MEDICINE       ENRRIQUE BALES  Next Visit: None Scheduled  None         None Found                                                            Last  Test          Frequency    Reason                     Performed    Due Date  --------------------------------------------------------------------------------  Lipid Panel.  12 months..  atorvastatin.............  Not Found    Overdue    Powered by [employer]. Reference number: [id number]. 12/04/2021 8:14:31 AM MST

## 2021-12-05 NOTE — TELEPHONE ENCOUNTER
Medication refill request:  Medication(s):  premarin not filled due to (route to Nurse Pool) Orders due: Review care due message/pended orders  Medication not on active medication list

## 2021-12-08 ENCOUNTER — TELEPHONE (OUTPATIENT)
Dept: FAMILY MEDICINE | Facility: CLINIC | Age: 81
End: 2021-12-08
Payer: MEDICARE

## 2021-12-08 NOTE — TELEPHONE ENCOUNTER
Spoke with patient. She states that she tried one dose of wellbutrin and did not like the way she felt taking it,so she does not want to take it and doesn't feel she needs it at this time. She states that he has been with a lot of family over thanksgiving and is leaving to her grandsons over lisa. She states she is feeling much better. Did instruct patient to call if anything changes and she would like to try something different. She agrees.

## 2021-12-08 NOTE — TELEPHONE ENCOUNTER
Patient has not been taking the medication for antidepressant, states she has been trying to deal with it on her own.

## 2021-12-09 RX ORDER — ESTROGENS, CONJUGATED 0.9 MG/1
TABLET, FILM COATED ORAL
Qty: 13 TABLET | Refills: 3 | Status: SHIPPED | OUTPATIENT
Start: 2021-12-09 | End: 2022-12-05

## 2021-12-28 ENCOUNTER — TELEPHONE (OUTPATIENT)
Dept: FAMILY MEDICINE | Facility: CLINIC | Age: 81
End: 2021-12-28
Payer: MEDICARE

## 2021-12-28 NOTE — TELEPHONE ENCOUNTER
She is asking for a portable oxygen tank.  She stated that [facility] does not have any portable concentrator.  I did let her know that [facility] can work with her to get a concentrator to the place she is going while on vacation.  She will think about it or she will try to get one on line.

## 2022-02-15 ENCOUNTER — OFFICE VISIT (OUTPATIENT)
Dept: URGENT CARE | Facility: CLINIC | Age: 82
End: 2022-02-15
Payer: MEDICARE

## 2022-02-15 VITALS
WEIGHT: 180 LBS | BODY MASS INDEX: 35.15 KG/M2 | RESPIRATION RATE: 18 BRPM | SYSTOLIC BLOOD PRESSURE: 124 MMHG | DIASTOLIC BLOOD PRESSURE: 54 MMHG | OXYGEN SATURATION: 93 % | HEART RATE: 71 BPM | TEMPERATURE: 98 F

## 2022-02-15 DIAGNOSIS — J01.90 ACUTE NON-RECURRENT SINUSITIS, UNSPECIFIED LOCATION: Primary | ICD-10-CM

## 2022-02-15 PROCEDURE — G0463 HOSPITAL OUTPT CLINIC VISIT: HCPCS | Performed by: NURSE PRACTITIONER

## 2022-02-15 PROCEDURE — 99213 OFFICE O/P EST LOW 20 MIN: CPT | Performed by: NURSE PRACTITIONER

## 2022-02-15 RX ORDER — FLUTICASONE PROPIONATE 50 MCG
2 SPRAY, SUSPENSION (ML) NASAL DAILY
Qty: 32 G | Refills: 2 | Status: SHIPPED | OUTPATIENT
Start: 2022-02-15 | End: 2022-09-15 | Stop reason: ALTCHOICE

## 2022-02-15 RX ORDER — AMOXICILLIN AND CLAVULANATE POTASSIUM 875; 125 MG/1; MG/1
1 TABLET, FILM COATED ORAL 2 TIMES DAILY
Qty: 20 TABLET | Refills: 0 | Status: SHIPPED | OUTPATIENT
Start: 2022-02-15 | End: 2022-02-25

## 2022-02-15 ASSESSMENT — PAIN SCALES - GENERAL: PAINLEVEL: 0-NO PAIN

## 2022-02-15 NOTE — PATIENT INSTRUCTIONS
We will treat for sinusitis.  You are advised to begin Augmentin - 1 tablet AM and PM x 10 days.  Take with food to avoid nausea.  Stay well hydrated.      You are advised to begin nasal saline spray - 2 sprays per nostril AM and Pm.  If maxillary sinus pressure, you can follow these with Fluticasone (generic Flonase) - 1 spray per nostril AM and Pm.    For sore throat, you are advised warm, salt water gargles, hot tea with honey, lozenges or you can alternate ibuprofen (2-3 tabs) with tylenol (1-2 tabs) every 4-6 hours if needed.     Follow up if symptoms worsen or do not fully resolve.

## 2022-02-15 NOTE — PROGRESS NOTES
Subjective      Katy Stack is a 81 y.o. female who presents for sinus pressure.    HPI   81-year-old female presents to urgent care in regards to nasal congestion and facial pain x3 weeks.  Patient states she has had a lot of postnasal drip and ear pain.  Patient denies any current fevers, chills, nausea, vomiting, or diarrhea.      Review of Systems    Objective   /54   Pulse 71   Temp 36.7 °C (98 °F) (Temporal)   Resp 18   Wt 81.6 kg (180 lb)   SpO2 93%   BMI 35.15 kg/m²     Physical Exam  Vitals and nursing note reviewed.   Constitutional:       General: She is not in acute distress.     Appearance: Normal appearance. She is not ill-appearing or toxic-appearing.   HENT:      Right Ear: Tympanic membrane is bulging. Tympanic membrane is not erythematous.      Left Ear: Tympanic membrane is bulging. Tympanic membrane is not erythematous.      Nose:      Right Sinus: No maxillary sinus tenderness or frontal sinus tenderness.      Left Sinus: No maxillary sinus tenderness or frontal sinus tenderness.      Mouth/Throat:      Lips: Pink.      Mouth: Mucous membranes are moist.   Cardiovascular:      Rate and Rhythm: Normal rate and regular rhythm.   Pulmonary:      Effort: Pulmonary effort is normal.      Breath sounds: Normal breath sounds.   Skin:     General: Skin is warm and dry.   Neurological:      Mental Status: She is alert and oriented to person, place, and time.   Psychiatric:         Behavior: Behavior normal.         Assessment/Plan     Problem List Items Addressed This Visit     None      Visit Diagnoses     Acute non-recurrent sinusitis, unspecified location    -  Primary    Relevant Medications    amoxicillin-pot clavulanate (AUGMENTIN) 875-125 mg per tablet    fluticasone propionate (FLONASE) 50 mcg/actuation nasal spray        Pt was given RX for augmentin; pt was instructed on dose, frequency, duration, and importance of completing antibiotics. Encouraged probiotics. Pt may use over  the counter nasal decongestant for 3-5 days.. May use Tylenol as needed for discomfort. Use humidifier or warm showers as discussed. Balance rest and activity. Follow-up with primary care provider or return to urgent care for worsening symptoms or no improvement in 5-7 days or sooner with fever of 100.4 or greater, chills, body aches, n/v/d lasting longer than 24 hours and unable to keep fluids down, respiratory difficulty, or chest pain. Pt verbalizes understanding of plan.    Elle Dickerson, CNP

## 2022-03-02 DIAGNOSIS — K21.9 GASTROESOPHAGEAL REFLUX DISEASE: ICD-10-CM

## 2022-03-02 DIAGNOSIS — Z98.84 S/P LAPAROSCOPIC SLEEVE GASTRECTOMY: ICD-10-CM

## 2022-03-02 RX ORDER — OMEPRAZOLE 20 MG/1
CAPSULE, DELAYED RELEASE ORAL
Qty: 90 CAPSULE | Refills: 3 | Status: SHIPPED | OUTPATIENT
Start: 2022-03-02 | End: 2023-02-20

## 2022-03-02 NOTE — TELEPHONE ENCOUNTER
Called and spoke with pt. She is taking omeprazole when needed; about 3 times a week. Asking for a refill.

## 2022-03-08 ENCOUNTER — PROCEDURE VISIT (OUTPATIENT)
Dept: FAMILY MEDICINE | Facility: CLINIC | Age: 82
End: 2022-03-08
Payer: MEDICARE

## 2022-03-08 VITALS — HEART RATE: 66 BPM | OXYGEN SATURATION: 94 % | RESPIRATION RATE: 14 BRPM | TEMPERATURE: 98 F

## 2022-03-08 DIAGNOSIS — M81.0 AGE-RELATED OSTEOPOROSIS WITHOUT CURRENT PATHOLOGICAL FRACTURE: Primary | ICD-10-CM

## 2022-03-08 PROCEDURE — 96372 THER/PROPH/DIAG INJ SC/IM: CPT | Performed by: FAMILY MEDICINE

## 2022-03-08 PROCEDURE — 6360000200 HC RX 636 W HCPCS (ALT 250 FOR IP): Mod: TB | Performed by: FAMILY MEDICINE

## 2022-03-08 RX ADMIN — DENOSUMAB 60 MG: 60 INJECTION SUBCUTANEOUS at 09:15

## 2022-03-08 NOTE — PROGRESS NOTES
Prolia Injection   Number of injections/year injection started: 2020  DXA: 4/7/21  T-score: -2.4 LFN (-2.6 LFN 2020)  FRAX: 5.9% HIP   There was no significant change in the BMD compared to the 2020 DEXA.    Prolia 60mg/1ml was injected subcutaneously   Possible side effects discussed INCLUDING ANY DENTAL CONCERNS   Compliance with calcium and vitamin D discussed   Push fluids today   Any recent falls/fractures discussed  Patient advised to have follow up DXA per provider and scheduled if needed at this time  Patient voiced no complaints of pain or discomfort at this time.

## 2022-06-12 ENCOUNTER — OFFICE VISIT (OUTPATIENT)
Dept: URGENT CARE | Facility: CLINIC | Age: 82
End: 2022-06-12
Payer: MEDICARE

## 2022-06-12 ENCOUNTER — ANCILLARY PROCEDURE (OUTPATIENT)
Dept: RADIOLOGY | Facility: CLINIC | Age: 82
End: 2022-06-12
Payer: MEDICARE

## 2022-06-12 VITALS
DIASTOLIC BLOOD PRESSURE: 78 MMHG | BODY MASS INDEX: 35.35 KG/M2 | SYSTOLIC BLOOD PRESSURE: 168 MMHG | HEART RATE: 63 BPM | WEIGHT: 181 LBS | OXYGEN SATURATION: 98 % | TEMPERATURE: 98.3 F

## 2022-06-12 DIAGNOSIS — R25.1 SHAKINESS: ICD-10-CM

## 2022-06-12 DIAGNOSIS — N30.90 CYSTITIS: ICD-10-CM

## 2022-06-12 DIAGNOSIS — R06.09 DYSPNEA ON EXERTION: Primary | ICD-10-CM

## 2022-06-12 LAB
ALBUMIN SERPL-MCNC: 3.9 G/DL (ref 3.5–5.3)
ALP SERPL-CCNC: 72 U/L (ref 55–142)
ALT SERPL-CCNC: 11 U/L (ref 7–52)
ANION GAP SERPL CALC-SCNC: 5 MMOL/L (ref 3–11)
AST SERPL-CCNC: 16 U/L
BACTERIA #/AREA URNS HPF: ABNORMAL /HPF
BASOPHILS # BLD AUTO: 0.1 10*3/UL
BASOPHILS NFR BLD AUTO: 1 % (ref 0–2)
BILIRUB SERPL-MCNC: 0.31 MG/DL (ref 0.2–1.4)
BILIRUB UR QL: NEGATIVE
BUN SERPL-MCNC: 22 MG/DL (ref 7–25)
CALCIUM ALBUM COR SERPL-MCNC: 9.4 MG/DL (ref 8.6–10.3)
CALCIUM SERPL-MCNC: 9.3 MG/DL (ref 8.6–10.3)
CHLORIDE SERPL-SCNC: 107 MMOL/L (ref 98–107)
CLARITY UR: CLEAR
CO2 SERPL-SCNC: 29 MMOL/L (ref 21–32)
COLOR UR: YELLOW
CREAT SERPL-MCNC: 1.04 MG/DL (ref 0.6–1.1)
EOSINOPHIL # BLD AUTO: 0.3 10*3/UL
EOSINOPHIL NFR BLD AUTO: 4 % (ref 0–3)
ERYTHROCYTE [DISTWIDTH] IN BLOOD BY AUTOMATED COUNT: 14.5 % (ref 11.5–14)
GFR SERPL CREATININE-BSD FRML MDRD: 54 ML/MIN/1.73M*2
GLUCOSE SERPL-MCNC: 102 MG/DL (ref 70–105)
GLUCOSE UR QL: NEGATIVE MG/DL
HCT VFR BLD AUTO: 44.3 % (ref 34–45)
HGB BLD-MCNC: 14.9 G/DL (ref 11.5–15.5)
HGB UR QL: NEGATIVE
KETONES UR-MCNC: NEGATIVE MG/DL
LEUKOCYTE ESTERASE UR QL STRIP: ABNORMAL
LYMPHOCYTES # BLD AUTO: 3 10*3/UL
LYMPHOCYTES NFR BLD AUTO: 32 % (ref 11–47)
MCH RBC QN AUTO: 30.3 PG (ref 28–33)
MCHC RBC AUTO-ENTMCNC: 33.7 G/DL (ref 32–36)
MCV RBC AUTO: 90.2 FL (ref 81–97)
MONOCYTES # BLD AUTO: 0.7 10*3/UL
MONOCYTES NFR BLD AUTO: 7 % (ref 3–11)
NEUTROPHILS # BLD AUTO: 5.2 10*3/UL
NEUTROPHILS NFR BLD AUTO: 56 % (ref 41–81)
NITRITE UR QL: POSITIVE
PH UR: 5 PH
PLATELET # BLD AUTO: 222 10*3/UL (ref 140–350)
PMV BLD AUTO: 8.2 FL (ref 6.9–10.8)
POTASSIUM SERPL-SCNC: 4.1 MMOL/L (ref 3.5–5.1)
PROT SERPL-MCNC: 6.8 G/DL (ref 6–8.3)
PROT UR STRIP-MCNC: NEGATIVE MG/DL
RBC # BLD AUTO: 4.92 10*6/ΜL (ref 3.7–5.3)
RBC #/AREA URNS HPF: ABNORMAL /HPF
SODIUM SERPL-SCNC: 141 MMOL/L (ref 135–145)
SP GR UR: 1.01 (ref 1–1.03)
SQUAMOUS #/AREA URNS HPF: ABNORMAL /HPF
TROPONIN I SERPL-MCNC: 3.6 PG/ML
UROBILINOGEN UR-MCNC: 0.2 E.U./DL
WBC # BLD AUTO: 9.3 10*3/UL (ref 4.5–10.5)
WBC #/AREA URNS HPF: ABNORMAL /HPF

## 2022-06-12 PROCEDURE — 71046 X-RAY EXAM CHEST 2 VIEWS: CPT

## 2022-06-12 PROCEDURE — 36415 COLL VENOUS BLD VENIPUNCTURE: CPT

## 2022-06-12 PROCEDURE — 93005 ELECTROCARDIOGRAM TRACING: CPT

## 2022-06-12 PROCEDURE — 81001 URINALYSIS AUTO W/SCOPE: CPT

## 2022-06-12 PROCEDURE — 84484 ASSAY OF TROPONIN QUANT: CPT

## 2022-06-12 PROCEDURE — 80053 COMPREHEN METABOLIC PANEL: CPT

## 2022-06-12 PROCEDURE — 87186 SC STD MICRODIL/AGAR DIL: CPT

## 2022-06-12 PROCEDURE — 85025 COMPLETE CBC W/AUTO DIFF WBC: CPT

## 2022-06-12 PROCEDURE — 71046 X-RAY EXAM CHEST 2 VIEWS: CPT | Mod: 26,CMS | Performed by: INTERNAL MEDICINE

## 2022-06-12 PROCEDURE — G0463 HOSPITAL OUTPT CLINIC VISIT: HCPCS

## 2022-06-12 PROCEDURE — 99213 OFFICE O/P EST LOW 20 MIN: CPT | Mod: 25

## 2022-06-12 RX ORDER — CEPHALEXIN 500 MG/1
500 CAPSULE ORAL 2 TIMES DAILY
Qty: 14 CAPSULE | Refills: 0 | Status: SHIPPED | OUTPATIENT
Start: 2022-06-12 | End: 2022-06-19

## 2022-06-12 ASSESSMENT — ENCOUNTER SYMPTOMS
DIARRHEA: 0
DIZZINESS: 0
WEAKNESS: 0
NAUSEA: 0
CONFUSION: 0
RHINORRHEA: 1
BACK PAIN: 1
APPETITE CHANGE: 0
SEIZURES: 0
JOINT SWELLING: 0
COUGH: 0
FATIGUE: 0
FREQUENCY: 0
EYE PAIN: 0
FEVER: 0
SORE THROAT: 0
DIAPHORESIS: 0
ABDOMINAL PAIN: 0
VOMITING: 0
NUMBNESS: 0
CHILLS: 0
SHORTNESS OF BREATH: 1
DYSURIA: 1
HEADACHES: 0

## 2022-06-12 NOTE — PROGRESS NOTES
Subjective      Katy Stack is a 82 y.o. female who presents for shakes and shortness of breath.    HPI    82-year-old female presents for shakes and shortness of breath.  She has had this on and off for many months, and guesses for less than 1 year.  Some days she has the shakes and some days she does not.  Some days she staggers when she walks and has a drift and some days she does not.  Today she feels her shakes are one of the worst days she has had.  She feels like her legs are weak and are going to give out today.  Describes that her legs and her hands just want to move and it feels like her stomach is quivering.  Denies itching.  She is not diabetic but reports that her blood sugars drop fast.  She feels well except for the shakes and her stomach quiver.  She also feels more short of breath with exertion.  This is not worse when she lays down. She does have COPD and does not use inhalers, she does use oxygen at night.  No history of congestive heart failure.    She did go to [employer] this morning. Has a normal appetite and has been eating and drinking.     The following have been reviewed and updated as appropriate in this visit:            Allergies   Allergen Reactions   • Lyons Angioedema and Hives     hives, tongue swells  Swelling of tongue   • Adhesive      welts     Current Outpatient Medications   Medication Sig Dispense Refill   • omeprazole (PriLOSEC) 20 mg capsule TAKE 1 CAPSULE BY MOUTH  DAILY 90 capsule 3   • fluticasone propionate (FLONASE) 50 mcg/actuation nasal spray Administer 2 sprays into each nostril daily 32 g 2   • Premarin 0.9 mg tablet TAKE 1 TABLET BY MOUTH  WEEKLY 13 tablet 3   • buPROPion (WELLBUTRIN) 75 mg tablet Take 1 tablet (75 mg total) by mouth daily 30 tablet 1   • levothyroxine (SYNTHROID, LEVOTHROID) 125 mcg tablet TAKE 1 TABLET BY MOUTH  DAILY 90 tablet 3   • atorvastatin (LIPITOR) 20 mg tablet TAKE 1 TABLET BY MOUTH  DAILY 90 tablet 3   • atenoloL (TENORMIN) 25 mg  tablet TAKE 1 TABLET BY MOUTH  DAILY 90 tablet 3   • losartan (COZAAR) 50 mg tablet TAKE 1 TABLET BY MOUTH  DAILY 90 tablet 3   • multivitamin (THERAGRAN) tablet tablet Take 1 tablet by mouth daily     • furosemide (LASIX) 20 mg tablet TAKE 1 TABLET BY MOUTH  DAILY (Patient taking differently: Take 20 mg by mouth daily as needed Indications: visible water retention) 90 tablet 3   • potassium chloride 10 mEq CR tablet TAKE 1 TABLET BY MOUTH  DAILY 90 tablet 3   • gabapentin (NEURONTIN) 300 mg capsule Take 600 mg by mouth 3 (three) times a day       • calcium carbonate-vitamin D3 600 mg(1,500mg) -400 unit per tablet Take 1 tablet by mouth daily     • nitroglycerin (NITROSTAT) 0.4 mg SL tablet Place 1 tablet (0.4 mg total) under the tongue every 5 (five) minutes as needed for chest pain 25 tablet 1   • vit A-vit C-vit E-zinc-copper 7,160-113-100 unit-mg-unit tablet Take 1 tab/cap by mouth 2 (two) times a day.     • cephalexin (Keflex) 500 mg capsule Take 1 capsule (500 mg total) by mouth 2 (two) times a day for 7 days 14 capsule 0     Current Facility-Administered Medications   Medication Dose Route Frequency Provider Last Rate Last Admin   • denosumab (PROLIA) 60 mg/mL syringe 60 mg  60 mg subcutaneous q6 months Bronson Travis MD   60 mg at 03/08/22 0915     Past Medical History:   Diagnosis Date   • Benign essential hypertension 11/24/2017   • Cataract of left eye    • Chronic obstructive lung disease (CMS/HCC) (HCC) 11/10/2017   • Dysrhythmia     LBBB   • Hypertension    • Hypothyroid    • Hypothyroidism 11/10/2017   • Injury of back     BENDING INJURY   • Macular degeneration    • Neurologic disorder    • Obesity 11/10/2017   • Obstructive sleep apnea syndrome 11/10/2017    Night time oxygen/2L   • Osteoarthritis    • Periodic limb movement disorder 11/10/2017   • Peripheral neuropathy     left thigh into left groin    • Rectocele    • Respiratory disease      Past Surgical History:   Procedure Laterality Date    • APPENDECTOMY  1987   • BACK SURGERY      2 surgeries [id number]   • BACK SURGERY  11/30/2016   • BACK SURGERY  05/01/2017    vetebral fusion   • CARDIAC CATHETERIZATION  08/31/2011   • CATARACT EXTRACTION W/  INTRAOCULAR LENS IMPLANT Left 7/2/2018    Procedure: OS CATARACT LEFT PHACOEMULSIFICATION OF CATARACT WITH INTRAOCULAR LENS;  Surgeon: Emmanuel Cueto MD;  Location: [facility];  Service: Ophthalmology;  Laterality: Left;   • CATARACT EXTRACTION W/  INTRAOCULAR LENS IMPLANT Right 8/6/2018    Procedure: OD CATARACT RIGHT PHACOEMULSIFICATION OF CATARACT WITH INTRAOCULAR LENS;  Surgeon: Emmanuel Cueto MD;  Location: [facility];  Service: Ophthalmology;  Laterality: Right;   • CHOLECYSTECTOMY     • COLONOSCOPY  02/19/2014    No polyps   • COLONOSCOPY  04/26/2011   • DIAGNOSTIC LAPAROSCOPY N/A 11/28/2018    Procedure: Laparoscopic band removal;  Surgeon: Chris Millan MD;  Location: [facility];  Service: General;  Laterality: N/A;   • ESOPHAGOGASTRODUODENOSCOPY N/A 11/6/2018    Procedure: EGD - ESOPHAGOGASTRODUODENOSCOPY;  Surgeon: Chris Millan MD;  Location: [facility];  Service: Endoscopy;  Laterality: N/A;   • FOOT SURGERY      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 Vela- shoulder   • SPINAL STIMULATOR IMPLANT N/A 3/23/2021    Procedure: PERMANENT SPINAL CORD STIMULATOR IMPLANTATION;  Surgeon: Bernardo Gonzalez MD;  Location: [facility] OR;  Service: Pain Management;  Laterality:  N/A;   • THYROID LOBECTOMY Left    • THYROIDECTOMY, PARTIAL  2010   • UPPER GASTROINTESTINAL ENDOSCOPY  2014    Mild chronic gastritis     Family History   Problem Relation Age of Onset   • Heart disease Mother    • Heart disease Father         Myocardial infarction   • Parkinsonism Father    • Thyroid disease Father    • Alzheimer's disease Sister    • Arthritis Sister    • Osteoporosis Sister    • Lung cancer Brother    • Diabetes Maternal Grandmother      Social History     Socioeconomic History   • Marital status:    Tobacco Use   • Smoking status: Former Smoker     Packs/day: 2.00     Years: 35.00     Pack years: 70.00     Quit date:      Years since quittin.4   • Smokeless tobacco: Never Used   Vaping Use   • Vaping Use: Never used   Substance and Sexual Activity   • Alcohol use: No   • Drug use: No   • Sexual activity: Defer     Social Determinants of Health     Tobacco Use: Medium Risk   • Smoking Tobacco Use: Former Smoker   • Smokeless Tobacco Use: Never Used       Review of Systems   Constitutional: Negative for appetite change, chills, diaphoresis, fatigue and fever.   HENT: Positive for postnasal drip and rhinorrhea. Negative for congestion, ear pain and sore throat.    Eyes: Negative for pain and visual disturbance.   Respiratory: Positive for shortness of breath (with exertion). Negative for cough.    Cardiovascular: Negative for chest pain and leg swelling.   Gastrointestinal: Negative for abdominal pain, diarrhea, nausea and vomiting.   Genitourinary: Positive for dysuria (unsure, maybe a little). Negative for frequency and urgency.   Musculoskeletal: Positive for back pain (chronic). Negative for joint swelling.   Skin: Negative for rash.   Neurological: Negative for dizziness, seizures, syncope, weakness, numbness and headaches.   Psychiatric/Behavioral: Negative for confusion.       Objective   /78 (BP Location: Right arm, Patient Position: Sitting,  Cuff Size: Regular Adult)   Pulse 63   Temp 36.8 °C (98.3 °F) (Temporal)   Wt 82.1 kg (181 lb)   SpO2 98%   BMI 35.35 kg/m²     Physical Exam  Vitals and nursing note reviewed.   Constitutional:       Appearance: Normal appearance.   HENT:      Right Ear: Tympanic membrane normal.      Left Ear: Tympanic membrane normal.      Nose: No congestion.   Eyes:      Extraocular Movements: Extraocular movements intact.      Conjunctiva/sclera: Conjunctivae normal.      Pupils: Pupils are equal, round, and reactive to light.   Cardiovascular:      Rate and Rhythm: Normal rate and regular rhythm.      Pulses: Normal pulses.      Heart sounds: Normal heart sounds.   Pulmonary:      Effort: Pulmonary effort is normal.      Breath sounds: Normal breath sounds.   Skin:     General: Skin is warm and dry.   Neurological:      Mental Status: She is alert and oriented to person, place, and time.      Cranial Nerves: No cranial nerve deficit, dysarthria or facial asymmetry.      Motor: Tremor (upper and lower extremities) present. No weakness or pronator drift.      Coordination: Coordination normal. Finger-Nose-Finger Test and Heel to Shin Test normal.      Gait: Gait is intact. Gait normal.      Deep Tendon Reflexes: Reflexes normal.      Comments: Standing with eyes closed will tip backwards.    Psychiatric:         Mood and Affect: Mood normal.         Behavior: Behavior normal.         Assessment/Plan   Diagnoses and all orders for this visit:    Dyspnea on exertion  -     HS Troponin I  -     CBC w/auto differential Blood, Venous  -     Comprehensive metabolic panel Blood, Venous  -     ECG 12 lead -Normal, Today  -     X-ray chest 2 views  -     Urinalysis w/microscopic, reflex culture Urine, Clean Catch  -     Urine culture    Shakiness  -     CBC w/auto differential Blood, Venous  -     Comprehensive metabolic panel Blood, Venous  -     ECG 12 lead -Normal, Today  -     X-ray chest 2 views  -     Urinalysis  w/microscopic, reflex culture Urine, Clean Catch  -     Urine culture    Cystitis  -     cephalexin (Keflex) 500 mg capsule; Take 1 capsule (500 mg total) by mouth 2 (two) times a day for 7 days    EKG NSR 56 with LBBB (known condition). Chest xray negative.  CMP, troponin and CBC unremarkable. Urine dip positive for nitrites and small leukocytes, urine micro positive for cystitis. Culture pending. Creatinine clearance estimated to be about 30.  Will do renal dosing of Keflex for treatment of cystitis.      Periodic limb movement disorder noted on problem list dated 2017.    She has a follow-up appointment scheduled for Tuesday with Dr. Robertson.     Myra Bonilla, CNP

## 2022-06-13 PROCEDURE — 93010 ELECTROCARDIOGRAM REPORT: CPT | Performed by: INTERNAL MEDICINE

## 2022-06-14 LAB — BACTERIA UR CULT: ABNORMAL

## 2022-06-27 ENCOUNTER — OFFICE VISIT (OUTPATIENT)
Dept: SURGERY | Facility: CLINIC | Age: 82
End: 2022-06-27
Payer: MEDICARE

## 2022-06-27 VITALS
BODY MASS INDEX: 35.15 KG/M2 | OXYGEN SATURATION: 96 % | HEART RATE: 64 BPM | TEMPERATURE: 97.9 F | DIASTOLIC BLOOD PRESSURE: 54 MMHG | SYSTOLIC BLOOD PRESSURE: 118 MMHG | WEIGHT: 180 LBS

## 2022-06-27 DIAGNOSIS — E66.811 CLASS 1 OBESITY DUE TO EXCESS CALORIES WITH SERIOUS COMORBIDITY AND BODY MASS INDEX (BMI) OF 33.0 TO 33.9 IN ADULT: ICD-10-CM

## 2022-06-27 DIAGNOSIS — I10 ESSENTIAL HYPERTENSION: ICD-10-CM

## 2022-06-27 DIAGNOSIS — Z90.3 HISTORY OF SLEEVE GASTRECTOMY: Primary | ICD-10-CM

## 2022-06-27 DIAGNOSIS — E66.09 CLASS 1 OBESITY DUE TO EXCESS CALORIES WITH SERIOUS COMORBIDITY AND BODY MASS INDEX (BMI) OF 33.0 TO 33.9 IN ADULT: ICD-10-CM

## 2022-06-27 DIAGNOSIS — Z71.3 ENCOUNTER FOR WEIGHT LOSS COUNSELING: ICD-10-CM

## 2022-06-27 PROCEDURE — G0463 HOSPITAL OUTPT CLINIC VISIT: HCPCS | Performed by: NURSE PRACTITIONER

## 2022-06-27 PROCEDURE — 99213 OFFICE O/P EST LOW 20 MIN: CPT | Performed by: NURSE PRACTITIONER

## 2022-06-27 ASSESSMENT — ENCOUNTER SYMPTOMS
CARDIOVASCULAR NEGATIVE: 1
GASTROINTESTINAL NEGATIVE: 1
CHILLS: 0
DIAPHORESIS: 0
PSYCHIATRIC NEGATIVE: 1
APPETITE CHANGE: 0
ACTIVITY CHANGE: 0
FEVER: 0
FATIGUE: 0
RESPIRATORY NEGATIVE: 1

## 2022-06-27 NOTE — PROGRESS NOTES
Bariatric surgery annual follow-up  History of Laparoscopic vertical sleeve gastrectomy  Date of service: 6/27/2022    Subjective      Patient ID: Katy Stack is a 82 y.o. female.  MRN: [id number]     Chief Complaint   Patient presents with    Weight Management     3yr post Lap. Sleeve Gastrectomy - dos 8/14/2019. Body comp not completed(**Permanent Spinal cord stimulator Implanted since 3/2021) but measurements completed. Will stop by tomorrow for Tanita after MRI tomorrow.         BROOKLYN Burns presents to the weight management and bariatric surgery clinic today, unaccompanied, for annual follow-up 2 years after laparoscopic vertical sleeve gastrectomy performed 8/14/2019 by Dr. Millan.  Annual bariatric lab evaluation not yet complete.     Weight trend:  Consult weight 5/3/2018: 183 pounds, BMI 35.74 kg/m²  Surgery weight on 8/14/2019:  202 pounds, BMI 39.6 kg/m²    1 year postop on 10/1/2020: 167 pounds, BMI 32.77 kg/m²  2-year postop on 8/30/2021: 170 pounds, BMI 33.40 kg/m²  3-year postop weight on 6/27/2022: 180 pounds, BMI 35.74 kg/m²  Maintains 22 pound total weight loss from highest weight.    Co morbid conditions - unchanged from previous:   - hypertension: continues atenolol, losartan  - pulmonary hypertension: continues lasix, now uses daily as needed. Uses potassium as needed with lasix  - hyperlipidemia: continues atorvastatin  - osteoporosis: continues prolia  - chronic pain: continues gabapentin, recently placed spinal cord stimulator as well. Working well for pain management.  - Hypothyroidism: continues levothyroxine 112 mcg daily  - GERD: She does have intermittent symptoms of GERD approximately 3 times per week status post sleeve gastrectomy and therefore continues omeprazole 20 mg p.o. daily as needed about 3 days/week.    Psychology:  Mental health is much improved from her last visit. She continues to have a lot of support in her [employer]  as well as a close friend who is also a  .  She has had effective coping with the loss of her  as well as her daughter's social situation. Daughter now lives with her. She declines referral for psychological counseling at this time.    Nutrition:  General food recall:  - Breakfast: egg, sausage or medrano, 1 piece thin sliced bread.  - Lunch: lunch meat and cheddar cheese rollup, and piece of fruit (apple)   - Dinner: meatloaf + vegetable + baked or mashed potato (meat + vegetable + small portion of starch).    Taking the following postoperative vitamin supplementation unchanged from previous - Women's 55+ multivitamin with iron, vitamin B12, vitamin D3, and calcium + D3 twice daily.  She is unsure of the exact dosages she is taking of these supplements.    Physical activity:   Walking more and more. Walks long mohr in her apartment building. Getting 4-5K steps/day average. Would like resistance bands for mobility exercise.     Body composition on 2022:  Weight: 179.8 pounds  Fat percent: 48.5%  Fat mass: 87.2 pounds  FFM: 92.6 pounds  Muscle mass: 87.8 pounds  TBW percent: 37.2%  Bone mass: 4.8 pounds  BMR: 1315 kcal  Visceral fat ratin  Desirable range fat percent: 24.0 to 35.9%  Desirable range fat mass: 29.2 to 51.8 pounds    Anthropometric measurements on 2022:  Neck: 15 inches  Waist: 39 inches  Hips: 48.75 inches  WHR: 0.8    Review of Systems   Constitutional:  Positive for unexpected weight change (gain). Negative for activity change, appetite change, chills, diaphoresis, fatigue and fever.   HENT: Negative.     Respiratory: Negative.     Cardiovascular: Negative.    Gastrointestinal: Negative.         Intermittent GERD.   Musculoskeletal:  Positive for back pain (Chronic, continues gabapentin at increased dose, MRI tomorrow, sees pain management).   Neurological:         Chronic peripheral neuropathy persists related to back pain, no worse than previous   Psychiatric/Behavioral: Negative.       I have personally reviewed  the pertinent aspects of the patient's chart and updated the computerized patient record. Pertinent positives are noted above.  Items reviewed including:  Tobacco  Allergies  Meds  Problems  Med Hx  Surg Hx  Fam Hx         Objective      Vital Signs  /54 (BP Location: Left arm, Patient Position: Sitting, Cuff Size: Long Adult)   Pulse 64   Temp 36.6 °C (97.9 °F) (Temporal)   Wt 81.6 kg (180 lb)   SpO2 96%   BMI 35.15 kg/m²       Physical Exam  Vitals and nursing note reviewed. Chaperone present: Unaccompanied.   Constitutional:       General: She is awake. She is not in acute distress.     Appearance: Normal appearance. She is well-developed and well-groomed. She is obese.      Comments: Maintains 22 pound total weight loss since surgery   Cardiovascular:      Rate and Rhythm: Normal rate.   Pulmonary:      Effort: Pulmonary effort is normal.   Abdominal:      Hernia: There is no hernia in the ventral area.   Skin:     General: Skin is warm and dry.   Neurological:      General: No focal deficit present.      Mental Status: She is alert.   Psychiatric:         Attention and Perception: Attention normal.         Mood and Affect: Mood normal.         Behavior: Behavior is cooperative.     LAB  Annual bariatric lab evaluation not yet complete.  Ordered today.    Assessment and Plan:   Diagnosis Plan   1. History of sleeve gastrectomy  Folate Blood, Venous    Lipid panel Blood, Venous    Magnesium Blood, Venous    Methylmalonic acid, serum Blood, Venous    Vitamin A Blood, Venous    Vitamin B1 (Thiamin) Blood, Venous    Vitamin D 25 hydroxy Blood, Venous    Vitamin E Blood, Venous    Vitamin K Blood, Venous    Phosphorus Blood, Venous    Vitamin B12 Blood, Venous    Zinc, serum Blood, Venous      2. Class 1 obesity due to excess calories with serious comorbidity and body mass index (BMI) of 33.0 to 33.9 in adult  Folate Blood, Venous    Lipid panel Blood, Venous    Magnesium Blood, Venous     Methylmalonic acid, serum Blood, Venous    Vitamin A Blood, Venous    Vitamin B1 (Thiamin) Blood, Venous    Vitamin D 25 hydroxy Blood, Venous    Vitamin E Blood, Venous    Vitamin K Blood, Venous    Phosphorus Blood, Venous    Vitamin B12 Blood, Venous    Zinc, serum Blood, Venous      3. Encounter for weight loss counseling        4. Essential hypertension  Magnesium Blood, Venous          This is a 82 y.o. female who presents today for ongoing follow up 3 years s/p sleeve gastrectomy.      She maintains a 22 lb weight loss since surgery.  She has regained 10 lbs since I last saw her.  She is undergoing an MRI tomorrow and I have contacted radiology to see if they could obtain biometric information on the MRI scan.  Unfortunately, they were unable to do this, and therefore she stepped into the clinic for biometric testing following her MRI scan since her implanted stimulator had been turned off.     Biometrics:  Current BF% is 48.5% (+2% since testing on 8/30/2021) with upper limits of desired range being 35.9% and current body fat mass is 87.2 pounds (+8 pounds since testing on 8/30/2021) with upper limits of desired range being 51.8 pounds. Currently, total body FFM outweighs total body adiposity by 5.4 pounds which is reduced from testing on 8/30/2021 at which point her total body FFM outweighed her total body adiposity by 12 pounds.  We reviewed the importance of continued engagement in methods to reduce loss of lean tissue during rapid weight loss and promote the loss of fat including continued high-protein intake of 60 to 100 g/day, limited added sugar intake, and initiation of strength training program in addition to aerobic exercise.  Discussed that this will also help to drive down his visceral fat rating and optimize her metabolic health.  Visceral fat rating is currently 16 (+1 since testing on 8/30/2021) and above the recommended limit of less than 13    Anthropometric's:   Neck: 15 inches- goal 12.5  inches or less  Waist: 39 inches-goal 35 inches or less  Hip: 48.75 inches- WHR 0.8 to with goal of 0.8 or less    Offered/recommended referral for psychological counseling and IBT for nutrition counseling and she declines these referrals at this time.     Annual bariatric lab evaluation not yet completed, labs reordered today to be drawn around 9/1/2022.  She is taking all recommended postoperative vitamins.     Management of Co morbid conditions by PCP, cardiology, and pain management. She is encouraged to continue follow up with these providers for management of hypertension, hyperlipidemia, chronic pain, pulmonary hypertension, osteoporosis.  Omeprazole has been refilled for her to utilize daily as needed for breakthrough GERD status post sleeve gastrectomy.  Again, reviewed benefit of continued weight loss and optimized lifestyle on management of all related health conditions.     Katy Stack will RTC in 1 year for annual bariatric surgery follow up and labs, sooner with concerns or further weight regain.  She participated in the decision making process and agreed with the above plan.  All questions were sought and answered to her satisfaction.       Marii Wayne CNP    Total Time spent with the patient is 24 min with more than 50% in direct counseling and coordination of care regarding history of sleeve, class I obesity, weight loss counseling, and associated conditions hypertension.    A voice recognition program was used to aid in medical record documentation. Sometimes words are printed not exactly as they were spoken. While efforts were made to carefully edit and correct any inaccuracies, some errors may be present and should be taken within the context of the discussion.  Please contact our office if you need assistance interpreting this medical record or notice any mistakes.

## 2022-06-28 ENCOUNTER — HOSPITAL ENCOUNTER (OUTPATIENT)
Dept: MRI IMAGING | Facility: HOSPITAL | Age: 82
Discharge: HOME | End: 2022-06-28
Payer: MEDICARE

## 2022-06-28 DIAGNOSIS — M54.16 LUMBAR RADICULITIS: ICD-10-CM

## 2022-06-28 DIAGNOSIS — I10 ESSENTIAL HYPERTENSION: ICD-10-CM

## 2022-06-28 DIAGNOSIS — G89.29 OTHER CHRONIC PAIN: ICD-10-CM

## 2022-06-28 PROCEDURE — 2550000100 HC RX 255: Performed by: PAIN MEDICINE

## 2022-06-28 PROCEDURE — A9579 GAD-BASE MR CONTRAST NOS,1ML: HCPCS | Performed by: PAIN MEDICINE

## 2022-06-28 PROCEDURE — G1004 CDSM NDSC: HCPCS

## 2022-06-28 RX ORDER — ATENOLOL 25 MG/1
25 TABLET ORAL DAILY
Qty: 90 TABLET | Refills: 1 | Status: SHIPPED | OUTPATIENT
Start: 2022-06-28 | End: 2023-02-26

## 2022-06-28 RX ORDER — LOSARTAN POTASSIUM 50 MG/1
50 TABLET ORAL DAILY
Qty: 90 TABLET | Refills: 1 | Status: SHIPPED | OUTPATIENT
Start: 2022-06-28 | End: 2023-02-26

## 2022-06-28 RX ADMIN — GADOTERIDOL 15 ML: 279.3 INJECTION, SOLUTION INTRAVENOUS at 13:43

## 2022-06-28 NOTE — TELEPHONE ENCOUNTER
Care Due:                  Date            Visit Type   Department     Provider  --------------------------------------------------------------------------------                              ESTABLISHED   [facility]  Last Visit: 11-      PATIENT      MEDICINE       ENRRIQUE BALES  Next Visit: None Scheduled  None         None Found                                                            Last  Test          Frequency    Reason                     Performed    Due Date  --------------------------------------------------------------------------------  Lipid Panel.  12 months..  atorvastatin.............  Not Found    Overdue    [employer] Embedded Care Gaps. Reference number: [id number]. 6/28/2022 2:21:59 AM LEILA

## 2022-06-30 PROCEDURE — 72158 MRI LUMBAR SPINE W/O & W/DYE: CPT | Mod: 26,MG | Performed by: RADIOLOGY

## 2022-06-30 PROCEDURE — G1004 CDSM NDSC: HCPCS | Performed by: RADIOLOGY

## 2022-07-07 ENCOUNTER — OFFICE VISIT (OUTPATIENT)
Dept: URGENT CARE | Facility: CLINIC | Age: 82
End: 2022-07-07
Payer: MEDICARE

## 2022-07-07 VITALS
TEMPERATURE: 97.2 F | OXYGEN SATURATION: 95 % | BODY MASS INDEX: 35.74 KG/M2 | WEIGHT: 183 LBS | SYSTOLIC BLOOD PRESSURE: 154 MMHG | HEART RATE: 69 BPM | DIASTOLIC BLOOD PRESSURE: 50 MMHG

## 2022-07-07 DIAGNOSIS — L30.9 DERMATITIS: Primary | ICD-10-CM

## 2022-07-07 DIAGNOSIS — N30.00 ACUTE CYSTITIS WITHOUT HEMATURIA: ICD-10-CM

## 2022-07-07 DIAGNOSIS — R30.0 DYSURIA: ICD-10-CM

## 2022-07-07 LAB
BACTERIA #/AREA URNS HPF: ABNORMAL /HPF
BILIRUB UR QL: NEGATIVE
CLARITY UR: ABNORMAL
COLOR UR: YELLOW
GLUCOSE UR QL: NEGATIVE MG/DL
HGB UR QL: ABNORMAL
KETONES UR-MCNC: NEGATIVE MG/DL
LEUKOCYTE ESTERASE UR QL STRIP: ABNORMAL
NITRITE UR QL: NEGATIVE
PH UR: 5.5 PH
PROT UR STRIP-MCNC: NEGATIVE MG/DL
RBC #/AREA URNS HPF: ABNORMAL /HPF
SP GR UR: 1.01 (ref 1–1.03)
SQUAMOUS #/AREA URNS HPF: ABNORMAL /HPF
UROBILINOGEN UR-MCNC: 1 E.U./DL
WBC #/AREA URNS HPF: ABNORMAL /HPF
WBC CLUMPS #/AREA URNS HPF: ABNORMAL /HPF

## 2022-07-07 PROCEDURE — G0463 HOSPITAL OUTPT CLINIC VISIT: HCPCS | Performed by: FAMILY MEDICINE

## 2022-07-07 PROCEDURE — 81001 URINALYSIS AUTO W/SCOPE: CPT | Performed by: NURSE PRACTITIONER

## 2022-07-07 PROCEDURE — 99213 OFFICE O/P EST LOW 20 MIN: CPT | Performed by: FAMILY MEDICINE

## 2022-07-07 PROCEDURE — 87186 SC STD MICRODIL/AGAR DIL: CPT | Performed by: FAMILY MEDICINE

## 2022-07-07 RX ORDER — TRIAMCINOLONE ACETONIDE 1 MG/G
1 CREAM TOPICAL 2 TIMES DAILY
Qty: 30 G | Refills: 0 | Status: SHIPPED | OUTPATIENT
Start: 2022-07-07 | End: 2022-09-15 | Stop reason: ALTCHOICE

## 2022-07-07 RX ORDER — AMOXICILLIN AND CLAVULANATE POTASSIUM 875; 125 MG/1; MG/1
1 TABLET, FILM COATED ORAL 2 TIMES DAILY
Qty: 14 TABLET | Refills: 0 | Status: SHIPPED | OUTPATIENT
Start: 2022-07-07 | End: 2022-07-10 | Stop reason: ALTCHOICE

## 2022-07-07 NOTE — PROGRESS NOTES
Subjective     CHIEF COMPLAINT / REASON FOR VISIT  Katy Stack is a 82 y.o. female who presents for evaluation of UTI (Started this morning at about 4. Burning and frequency. ).    HISTORY OF PRESENT ILLNESS  Recently treated for UTI with keflex.  She is not sure symptoms completely clear up.  She noticed the current symptoms about 1.5 days ago.  No fevers.  No new back pain.  Burning, frequency with urination.  Rash on back that itches.      The following portions of the patient's history were reviewed and updated as appropriate: allergies, current medications, past medical history, past social history and problem list.    REVIEW OF SYSTEMS  Review of Systems    Negative expect for what was stated in the HPI.    Current Outpatient Medications   Medication Sig Dispense Refill   • losartan (COZAAR) 50 mg tablet Take 1 tablet (50 mg total) by mouth daily 90 tablet 1   • atenoloL (TENORMIN) 25 mg tablet Take 1 tablet (25 mg total) by mouth daily 90 tablet 1   • omeprazole (PriLOSEC) 20 mg capsule TAKE 1 CAPSULE BY MOUTH  DAILY 90 capsule 3   • fluticasone propionate (FLONASE) 50 mcg/actuation nasal spray Administer 2 sprays into each nostril daily 32 g 2   • Premarin 0.9 mg tablet TAKE 1 TABLET BY MOUTH  WEEKLY 13 tablet 3   • buPROPion (WELLBUTRIN) 75 mg tablet Take 1 tablet (75 mg total) by mouth daily 30 tablet 1   • levothyroxine (SYNTHROID, LEVOTHROID) 125 mcg tablet TAKE 1 TABLET BY MOUTH  DAILY 90 tablet 3   • atorvastatin (LIPITOR) 20 mg tablet TAKE 1 TABLET BY MOUTH  DAILY 90 tablet 3   • multivitamin (THERAGRAN) tablet tablet Take 1 tablet by mouth daily     • furosemide (LASIX) 20 mg tablet TAKE 1 TABLET BY MOUTH  DAILY (Patient taking differently: Take 20 mg by mouth daily as needed Indications: visible water retention) 90 tablet 3   • potassium chloride 10 mEq CR tablet TAKE 1 TABLET BY MOUTH  DAILY 90 tablet 3   • gabapentin (NEURONTIN) 300 mg capsule Take 600 mg by mouth 3 (three) times a day        • calcium carbonate-vitamin D3 600 mg(1,500mg) -400 unit per tablet Take 1 tablet by mouth daily     • nitroglycerin (NITROSTAT) 0.4 mg SL tablet Place 1 tablet (0.4 mg total) under the tongue every 5 (five) minutes as needed for chest pain 25 tablet 1   • vit A-vit C-vit E-zinc-copper 7,160-113-100 unit-mg-unit tablet Take 1 tab/cap by mouth 2 (two) times a day.       Current Facility-Administered Medications   Medication Dose Route Frequency Provider Last Rate Last Admin   • denosumab (PROLIA) 60 mg/mL syringe 60 mg  60 mg subcutaneous q6 months Bronson Travis MD   60 mg at 03/08/22 0915       Medication changes today:  No orders of the defined types were placed in this encounter.      There are no discontinued medications.    Allergies   Allergen Reactions   • [place] Angioedema and Hives     hives, tongue swells  Swelling of tongue   • Adhesive      welts       Objective     PHYSICAL EXAM  Vitals:    07/07/22 0802   BP: 154/50   Temp: 36.2 °C (97.2 °F)   TempSrc: Temporal   Pulse: 69   SpO2: 95%   Weight: 83 kg (183 lb)     Body mass index is 35.74 kg/m².  Physical Exam  Vitals and nursing note reviewed.   Constitutional:       General: She is not in acute distress.     Appearance: Normal appearance. She is not ill-appearing.   Cardiovascular:      Rate and Rhythm: Normal rate and regular rhythm.   Pulmonary:      Effort: Pulmonary effort is normal.      Breath sounds: Normal breath sounds.   Abdominal:      Tenderness: There is no right CVA tenderness or left CVA tenderness.   Skin:     Findings: Rash (macular papular under bra strap mid back) present.   Neurological:      Mental Status: She is alert.         DIAGNOSTICS:      Assessment/Plan      ASSESSMENT/PLAN:  Problem List Items Addressed This Visit    None     Visit Diagnoses     Dermatitis    -  Primary    Relevant Medications    triamcinolone (KENALOG) 0.1 % cream    Dysuria        Relevant Orders    Urinalysis w/microscopic, reflex culture  Urine, Clean Catch (Completed)    Urine culture    Acute cystitis without hematuria        Relevant Medications    amoxicillin-pot clavulanate (AUGMENTIN) 875-125 mg per tablet        I am going to give her some try some on for her back where it does appear she has a little bit of a dermatitis underneath her bra strap.  She does have a UTI on her UA though.  She recently was treated with Keflex.  So I Patricia treat her with Augmentin based off her previous culture.  She will complete the full course.  She had no other questions.    Sarah Denson, DO

## 2022-07-10 DIAGNOSIS — N30.01 ACUTE CYSTITIS WITH HEMATURIA: Primary | ICD-10-CM

## 2022-07-10 RX ORDER — CIPROFLOXACIN 250 MG/1
250 TABLET, FILM COATED ORAL 2 TIMES DAILY
Qty: 10 TABLET | Refills: 0 | Status: SHIPPED | OUTPATIENT
Start: 2022-07-10 | End: 2022-07-15

## 2022-07-12 ENCOUNTER — OFFICE VISIT (OUTPATIENT)
Dept: FAMILY MEDICINE | Facility: CLINIC | Age: 82
End: 2022-07-12
Payer: MEDICARE

## 2022-07-12 VITALS
BODY MASS INDEX: 35.74 KG/M2 | SYSTOLIC BLOOD PRESSURE: 134 MMHG | OXYGEN SATURATION: 96 % | RESPIRATION RATE: 16 BRPM | HEART RATE: 56 BPM | WEIGHT: 183 LBS | TEMPERATURE: 98.4 F | DIASTOLIC BLOOD PRESSURE: 58 MMHG

## 2022-07-12 DIAGNOSIS — R53.1 WEAKNESS GENERALIZED: Primary | ICD-10-CM

## 2022-07-12 DIAGNOSIS — L29.9 GENERALIZED PRURITUS: ICD-10-CM

## 2022-07-12 DIAGNOSIS — F43.0 STRESS DISORDER, ACUTE: ICD-10-CM

## 2022-07-12 PROCEDURE — G0463 HOSPITAL OUTPT CLINIC VISIT: HCPCS | Performed by: FAMILY MEDICINE

## 2022-07-12 PROCEDURE — 99214 OFFICE O/P EST MOD 30 MIN: CPT | Performed by: FAMILY MEDICINE

## 2022-07-12 NOTE — PROGRESS NOTES
Chief Complaint   Patient presents with   • Blood Sugar Problem     Has been bouncing up an down. States she is not a diabetic she tests her sugar and when she gets this way her knees get weak like she is going to fall and she gets very shaky         Subjective      Patient ID: Katy Stack is a 82 y.o. female.    Katy presents to the office with 2 concerns.    First and foremost she is worried about her sugars.  She has noticed that she has been having intermittent diffuse shakiness and weak-kneed about 1x week for the last 2-3 months.  She notes that her sugars run  and when she takes glucose tabs or protein bar she feels better within minutes.  These sx have no pattern other than 90% of them are mid morning and can happen with activity or without activity but do not interfere with her day.  She denies any near syncope, just weakness.  Denies tonic clonic activity.  Denies nausea, sob, chest heaviness, dizziness, diaphoresis, abd pain, diarrhea.    She also c/o itchy back.  She has had this problem on and off for years but it has been worse than usual in the last few weeks.  She was given kenalog cream which helps short term.    Denies anxiety or trouble sleeping.  She admits to high stress because daughter is an alcoholic and moved back to [place], stayed with her for 4 months and just moved out last week.      The following have been reviewed and updated as appropriate in this visit:   Allergies  Meds  Problems  Med Hx  Surg Hx  Fam Hx         Review of Systems    Objective     /58 (BP Location: Left arm, Patient Position: Sitting, Cuff Size: Large Adult)   Pulse 56   Temp 36.9 °C (98.4 °F) (Temporal)   Resp 16   Wt 83 kg (183 lb)   SpO2 96%   BMI 35.74 kg/m²      Physical Exam  Genl: WDWN, comfortable  HEENT: NC/AT, PEERL, EOMI, TM's intact w/ sharp light reflex, nasal cavity patent, OP clear and moist, good dentition  Neck: supple, no cervical/occiptal/supraclavicular FRANK, no  TM  CV:  RRR, normal S1S2, no murmur, no pedal edema  Lungs: CTA b, normal resp effort  Abd: NABS, soft, NTND, no mass  MS: Good ROM of all large and medium joints, no deformity  Neuro: no focal deficits  Derm: no rash or suspicious lesions, minimal erythema along bra strap line mid back  Psych: normal speech and thought pattern, normal range of affect     Assessment/Plan   Diagnoses and all orders for this visit:    Weakness generalized    Generalized pruritus    Stress disorder, acute    A/P:  1.  Intermittent weakness: I told patient it is unlikely to be due to her sugar as her sugars are not considered low.  I am also not concerned for diabetes since none of her random sugars are over 200 and it would not explain her sx.  We discussed placebo nature of treatments.  She acknowledges high stress and is willing to accept that this may be playing a role so since her sx are sporadic and she has waited already 3 months, she will try managing stress with morning coffee on her porch  And see if this improves her sx.  If not, f/u in office.    2.  Pruritus: I suspect this is a stress response and asked her to keep diary of pattern of her pruritus exacerbations.  For now, I recommend moisturizing bid, using cool compress to itchy ears to avoid scratching which feeds itch-scratch cycle.      Total of 30 min spent with patient largely in reassurance and history-taking

## 2022-07-20 LAB
BACTERIA UR CULT: ABNORMAL
SENT TO REFERENCE LAB: ABNORMAL

## 2022-07-21 ENCOUNTER — TELEPHONE (OUTPATIENT)
Dept: FAMILY MEDICINE | Facility: CLINIC | Age: 82
End: 2022-07-21
Payer: MEDICARE

## 2022-07-21 NOTE — TELEPHONE ENCOUNTER
Pt just received atorvastatin in the mail, and she doesn't know which med to NOT take now.  Please call and advise.

## 2022-07-21 NOTE — TELEPHONE ENCOUNTER
Katy has always been taking atorvastatin 20 mg  She stated that [facility]i Wayne wanted Dr Travis to put her on something different.  She will call Marii to get information.

## 2022-07-27 ENCOUNTER — TELEPHONE (OUTPATIENT)
Dept: FAMILY MEDICINE | Facility: CLINIC | Age: 82
End: 2022-07-27

## 2022-07-28 NOTE — TELEPHONE ENCOUNTER
Department of health calls today about patients most recent UTI infection. They classify this as a CRE and wanted to verify if patient has been in a nursing home and recent antibiotics.

## 2022-08-17 DIAGNOSIS — M81.0 AGE-RELATED OSTEOPOROSIS WITHOUT CURRENT PATHOLOGICAL FRACTURE: Primary | ICD-10-CM

## 2022-08-17 RX ORDER — DIPHENHYDRAMINE HYDROCHLORIDE 50 MG/ML
25-50 INJECTION INTRAMUSCULAR; INTRAVENOUS AS NEEDED
Status: CANCELLED | OUTPATIENT
Start: 2022-08-17

## 2022-08-17 RX ORDER — ALBUTEROL SULFATE 0.83 MG/ML
2.5 SOLUTION RESPIRATORY (INHALATION) AS NEEDED
Status: CANCELLED | OUTPATIENT
Start: 2022-08-17

## 2022-08-17 RX ORDER — ACETAMINOPHEN 500 MG
500 TABLET ORAL AS NEEDED
Status: CANCELLED | OUTPATIENT
Start: 2022-08-17

## 2022-08-17 RX ORDER — FAMOTIDINE 10 MG/ML
20 INJECTION INTRAVENOUS AS NEEDED
Status: CANCELLED | OUTPATIENT
Start: 2022-08-17

## 2022-08-17 RX ORDER — EPINEPHRINE 1 MG/ML
0.3 INJECTION INTRAMUSCULAR; INTRAVENOUS; SUBCUTANEOUS AS NEEDED
Status: CANCELLED | OUTPATIENT
Start: 2022-08-17

## 2022-08-17 RX ORDER — SODIUM CHLORIDE 9 MG/ML
0-999 INJECTION, SOLUTION INTRAVENOUS CONTINUOUS PRN
Status: CANCELLED | OUTPATIENT
Start: 2022-08-17

## 2022-08-23 DIAGNOSIS — E03.8 OTHER SPECIFIED HYPOTHYROIDISM: ICD-10-CM

## 2022-08-24 NOTE — TELEPHONE ENCOUNTER
Care Due:                  Date            Visit Type   Department     Provider  --------------------------------------------------------------------------------                                           [facility]  Last Visit: 07-      SAME DAY     MEDICINE       Lydia Rodgers MD  Next Visit: None Scheduled  None         None Found                                                            Last  Test          Frequency    Reason                     Performed    Due Date  --------------------------------------------------------------------------------  TSH.........  12 months..  levothyroxine............  08- 08-    [facility] Embedded Care Gaps. Reference number: [id number]. 8/23/2022 9:44:58 PM LEILA

## 2022-08-24 NOTE — TELEPHONE ENCOUNTER
Medication refill request:  Medication(s):  synthroid not filled due to (route to Nurse Pool) TSH abnormal or due, based on lab/chart review

## 2022-08-25 ENCOUNTER — APPOINTMENT (OUTPATIENT)
Dept: LAB | Facility: CLINIC | Age: 82
End: 2022-08-25
Payer: MEDICARE

## 2022-08-25 DIAGNOSIS — E03.8 OTHER SPECIFIED HYPOTHYROIDISM: ICD-10-CM

## 2022-08-25 LAB — TSH SERPL DL<=0.05 MIU/L-ACNC: 0.29 UIU/ML (ref 0.34–4.82)

## 2022-08-25 PROCEDURE — 84443 ASSAY THYROID STIM HORMONE: CPT

## 2022-08-25 PROCEDURE — 36415 COLL VENOUS BLD VENIPUNCTURE: CPT

## 2022-08-25 RX ORDER — LEVOTHYROXINE SODIUM 125 UG/1
TABLET ORAL
Qty: 90 TABLET | Refills: 0 | Status: SHIPPED | OUTPATIENT
Start: 2022-08-25 | End: 2022-08-26 | Stop reason: DRUGHIGH

## 2022-09-01 ASSESSMENT — ENCOUNTER SYMPTOMS
BACK PAIN: 1
UNEXPECTED WEIGHT CHANGE: 1

## 2022-09-06 DIAGNOSIS — E78.5 HYPERLIPIDEMIA, UNSPECIFIED HYPERLIPIDEMIA TYPE: ICD-10-CM

## 2022-09-07 ENCOUNTER — TELEPHONE (OUTPATIENT)
Dept: FAMILY MEDICINE | Facility: CLINIC | Age: 82
End: 2022-09-07

## 2022-09-07 DIAGNOSIS — N95.1 MENOPAUSAL FLUSHING: ICD-10-CM

## 2022-09-07 NOTE — TELEPHONE ENCOUNTER
Medication refill request:  Medication(s):  Lipitor not filled due to (route to Nurse Pool) Lab(s) abnormal or delinquent > 90 days

## 2022-09-07 NOTE — TELEPHONE ENCOUNTER
Care Due:                  Date            Visit Type   Department     Provider  --------------------------------------------------------------------------------                                           [facility]  Last Visit: 07-      SAME DAY     MEDICINE       Lydia Rodgers MD  Next Visit: None Scheduled  None         None Found                                                            Last  Test          Frequency    Reason                     Performed    Due Date  --------------------------------------------------------------------------------  Lipid Panel.  12 months..  atorvastatin.............  Not Found    Overdue    [employer] Embedded Care Gaps. Reference number: [id number]. 9/06/2022 9:39:34 PM LEILA

## 2022-09-08 RX ORDER — ATORVASTATIN CALCIUM 20 MG/1
TABLET, FILM COATED ORAL
Qty: 90 TABLET | Refills: 3 | Status: SHIPPED | OUTPATIENT
Start: 2022-09-08 | End: 2023-08-01

## 2022-09-08 NOTE — TELEPHONE ENCOUNTER
No new care gaps identified.  [facility] Embedded Care Gaps. Reference number: [id number]. 9/07/2022 8:25:40 PM LEILA

## 2022-09-08 NOTE — TELEPHONE ENCOUNTER
Medication refill request:  Medication(s):  Premarin not filled due to (route to Nurse Pool) Orders due: Review care due message/pended orders  Manual Review message review. Medication Protocol, Mammogram within 24 months

## 2022-09-09 ENCOUNTER — TELEPHONE (OUTPATIENT)
Dept: FAMILY MEDICINE | Facility: CLINIC | Age: 82
End: 2022-09-09

## 2022-09-12 ENCOUNTER — CLINICAL SUPPORT (OUTPATIENT)
Dept: FAMILY MEDICINE | Facility: CLINIC | Age: 82
End: 2022-09-12
Payer: MEDICARE

## 2022-09-12 DIAGNOSIS — M81.0 AGE-RELATED OSTEOPOROSIS WITHOUT CURRENT PATHOLOGICAL FRACTURE: Primary | ICD-10-CM

## 2022-09-12 PROCEDURE — 96372 THER/PROPH/DIAG INJ SC/IM: CPT | Performed by: FAMILY MEDICINE

## 2022-09-12 PROCEDURE — 6360000200 HC RX 636 W HCPCS (ALT 250 FOR IP): Mod: TB | Performed by: FAMILY MEDICINE

## 2022-09-12 RX ORDER — ACETAMINOPHEN 500 MG
500 TABLET ORAL AS NEEDED
Status: CANCELLED | OUTPATIENT
Start: 2023-03-12

## 2022-09-12 RX ORDER — DIPHENHYDRAMINE HYDROCHLORIDE 50 MG/ML
25-50 INJECTION INTRAMUSCULAR; INTRAVENOUS AS NEEDED
Status: CANCELLED | OUTPATIENT
Start: 2023-03-12

## 2022-09-12 RX ORDER — SODIUM CHLORIDE 9 MG/ML
0-999 INJECTION, SOLUTION INTRAVENOUS CONTINUOUS PRN
Status: CANCELLED | OUTPATIENT
Start: 2023-03-12

## 2022-09-12 RX ORDER — EPINEPHRINE 1 MG/ML
0.3 INJECTION INTRAMUSCULAR; INTRAVENOUS; SUBCUTANEOUS AS NEEDED
Status: CANCELLED | OUTPATIENT
Start: 2023-03-12

## 2022-09-12 RX ORDER — FAMOTIDINE 10 MG/ML
20 INJECTION INTRAVENOUS AS NEEDED
Status: CANCELLED | OUTPATIENT
Start: 2023-03-12

## 2022-09-12 RX ORDER — ALBUTEROL SULFATE 0.83 MG/ML
2.5 SOLUTION RESPIRATORY (INHALATION) AS NEEDED
Status: CANCELLED | OUTPATIENT
Start: 2023-03-12

## 2022-09-12 RX ADMIN — DENOSUMAB 60 MG: 60 INJECTION SUBCUTANEOUS at 09:05

## 2022-09-12 NOTE — PROGRESS NOTES
Prolia Injection   Number of injections/year injection started: 2020  DXA: 4/7/21  T-score: -2.4 LFN (-2.6 LFN 2020)  FRAX: 5.9% HIP, 17.7% Major   There was no significant change in the BMD compared to the 2020 DEXA.     Prolia 60mg/1ml was injected subcutaneously in back of right upper arm, she tolerated injection well.   Possible side effects discussed INCLUDING ANY DENTAL CONCERNS   Compliance with calcium and vitamin D discussed   Push fluids for the next 24 hours.  Any recent falls/fractures discussed  Katy was advised to have follow up DXA per provider and scheduled if needed at this time.  Katy verbalized an understanding of the above discussions, she voiced no concerns at this time.  Next Prolia injection scheduled at checkout.

## 2022-09-13 RX ORDER — ESTROGENS, CONJUGATED 0.9 MG/1
TABLET, FILM COATED ORAL
Qty: 13 TABLET | Refills: 3 | OUTPATIENT
Start: 2022-09-13

## 2022-09-15 ENCOUNTER — TELEPHONE (OUTPATIENT)
Dept: FAMILY MEDICINE | Facility: CLINIC | Age: 82
End: 2022-09-15

## 2022-09-15 NOTE — TELEPHONE ENCOUNTER
Pt wants to speak to nurse about her medications. States that when they were going over her medications for her surgery they listed off some that she did not have. Please call and advise.

## 2022-09-15 NOTE — PRE-PROCEDURE INSTRUCTIONS
Pre-Surgery Instructions:   Medication Instructions    denosumab (PROLIA) 60 mg/mL syringe syringe Patient had her injection recently so is not due for several months    atorvastatin (LIPITOR) 20 mg tablet Take morning of surgery/procedure    levothyroxine (SYNTHROID, LEVOTHROID) 112 mcg tablet Take morning of surgery/procedure    losartan (COZAAR) 50 mg tablet Hold for 24 hours prior to surgery    atenoloL (TENORMIN) 25 mg tablet Take morning of surgery/procedure    omeprazole (PriLOSEC) 20 mg capsule Take as needed morning of surgery/procedure    Premarin 0.9 mg tablet Take morning of surgery/procedure    buPROPion (WELLBUTRIN) 75 mg tablet Take morning of surgery/procedure    multivitamin (THERAGRAN) tablet tablet Stop taking 1 week prior to surgery/procedure    furosemide (LASIX) 20 mg tablet Hold for 24 hours prior to surgery    potassium chloride 10 mEq CR tablet Hold for 24 hours prior to surgery    gabapentin (NEURONTIN) 300 mg capsule Take morning of surgery/procedure    calcium carbonate-vitamin D3 600 mg(1,500mg) -400 unit per tablet Stop taking 1 week prior to surgery/procedure    nitroglycerin (NITROSTAT) 0.4 mg SL tablet Take as needed morning of surgery/procedure    vit A-vit C-vit E-zinc-copper 7,160-113-100 unit-mg-unit tablet Stop taking 1 week prior to surgery/procedure       Diet instructions for surgery:  Nothing to eat after likely midnight    Preop questionnaire:  Preop Questionairre  Does patient have physical restrictions or limitations?: No  Has patient had an upper respiratory tract infection in the last 2 weeks?: No  Home Oxygen: Yes  Modality Used At Home: Nasal cannula  Home Oxygen Flow (L/min): 2 L/min  Oxygen Use Frequency: Nighttime only    Preop instructions:  Pre-op Phone Call  Surgery Time Verified: No  Arrival Time Verified: No  Surgery Location Verified: Yes  Instructed to shower within 12 hours: Yes  Patient has special physician orders: No  Does Patient Require Bowel Prep for  Procedure?: No  Instructed to remove/not apply makeup, petroleum products, lotion, powder, scents, or deodorant on the morning of surgery: Yes  Recommend eye glasses for day of surgery, contact lenses must be removed prior to surgery:: Yes  Instructed to bring CPAP mask: N/A  Instructed to bring Oxygen tank from home: N/A  Does the patient wear a glucose monitoring device?: N/A  Instructed to remove all jewelry, including piercings, and leave at home.: Yes  Instructed to leave all valuables at home: Yes  Instructed to leave all medications at home: Yes  Instructed to bring a valid photo ID and insurance card:: Yes  NPO Status Reinforced: Yes  Instruct patient to ensure transportation is available following surgery/procedure:: Yes (Friend - Ellie)  Instruct patient that a caregiver must stay with the patient 24 hours following anesthesia:: Yes (Ellie)

## 2022-09-20 NOTE — TELEPHONE ENCOUNTER
Attempted to contact patient, no answer, unable to leave message. Will discuss when patient returns call.

## 2022-09-21 ENCOUNTER — ANESTHESIA EVENT (OUTPATIENT)
Dept: OPERATING ROOM | Facility: HOSPITAL | Age: 82
End: 2022-09-21
Payer: MEDICARE

## 2022-09-22 ENCOUNTER — ANESTHESIA (OUTPATIENT)
Dept: OPERATING ROOM | Facility: HOSPITAL | Age: 82
End: 2022-09-22
Payer: MEDICARE

## 2022-09-22 ENCOUNTER — APPOINTMENT (OUTPATIENT)
Dept: FLUOROSCOPY | Facility: HOSPITAL | Age: 82
End: 2022-09-22
Payer: MEDICARE

## 2022-09-22 ENCOUNTER — HOSPITAL ENCOUNTER (OUTPATIENT)
Facility: HOSPITAL | Age: 82
Setting detail: OUTPATIENT SURGERY
Discharge: HOME | End: 2022-09-22
Attending: PAIN MEDICINE | Admitting: PAIN MEDICINE
Payer: MEDICARE

## 2022-09-22 VITALS
HEIGHT: 60 IN | WEIGHT: 183 LBS | SYSTOLIC BLOOD PRESSURE: 168 MMHG | DIASTOLIC BLOOD PRESSURE: 72 MMHG | OXYGEN SATURATION: 97 % | BODY MASS INDEX: 35.93 KG/M2 | HEART RATE: 65 BPM | TEMPERATURE: 97.3 F | RESPIRATION RATE: 20 BRPM

## 2022-09-22 PROCEDURE — (BLANK) HC GENERAL ANESTHESIA FACILITY CHARGE 1ST 15 MIN: Performed by: PAIN MEDICINE

## 2022-09-22 PROCEDURE — C1713 ANCHOR/SCREW BN/BN,TIS/BN: HCPCS | Performed by: PAIN MEDICINE

## 2022-09-22 PROCEDURE — 6360000200 HC RX 636 W HCPCS (ALT 250 FOR IP): Performed by: STUDENT IN AN ORGANIZED HEALTH CARE EDUCATION/TRAINING PROGRAM

## 2022-09-22 PROCEDURE — 2500000200 HC RX 250 WO HCPCS: Performed by: NURSE ANESTHETIST, CERTIFIED REGISTERED

## 2022-09-22 PROCEDURE — 6360000200 HC RX 636 W HCPCS (ALT 250 FOR IP): Performed by: NURSE ANESTHETIST, CERTIFIED REGISTERED

## 2022-09-22 PROCEDURE — (BLANK) HC GENERAL ANESTHESIA FACILITY CHARGE EACH ADDITIONAL MIN: Performed by: PAIN MEDICINE

## 2022-09-22 PROCEDURE — 76000 FLUOROSCOPY <1 HR PHYS/QHP: CPT | Mod: NCP

## 2022-09-22 PROCEDURE — C1762 CONN TISS, HUMAN(INC FASCIA): HCPCS | Performed by: PAIN MEDICINE

## 2022-09-22 PROCEDURE — (BLANK) HC OR LEVEL 3 PROC 1ST 15MIN: Performed by: PAIN MEDICINE

## 2022-09-22 PROCEDURE — (BLANK) HC RECOVERY PHASE-2 EACH ADDITIONAL 1/2 HOUR ACUITY LEVEL 2: Performed by: PAIN MEDICINE

## 2022-09-22 PROCEDURE — 99100 ANES PT EXTEME AGE<1 YR&>70: CPT | Performed by: NURSE ANESTHETIST, CERTIFIED REGISTERED

## 2022-09-22 PROCEDURE — 2580000300 HC RX 258: Performed by: PAIN MEDICINE

## 2022-09-22 PROCEDURE — (BLANK) HC OR LEVEL 3 PROC EACH ADDITIONAL MIN: Performed by: PAIN MEDICINE

## 2022-09-22 PROCEDURE — (BLANK) HC RECOVERY PHASE-2 1ST 1/2 HOUR ACUITY LEVEL 2: Performed by: PAIN MEDICINE

## 2022-09-22 PROCEDURE — 2580000300 HC RX 258: Performed by: NURSE ANESTHETIST, CERTIFIED REGISTERED

## 2022-09-22 PROCEDURE — 6360000200 HC RX 636 W HCPCS (ALT 250 FOR IP): Performed by: PAIN MEDICINE

## 2022-09-22 PROCEDURE — (BLANK) HC RECOVERY PHASE-1 1ST  HOUR ACUITY LEVEL 3: Performed by: PAIN MEDICINE

## 2022-09-22 PROCEDURE — 01160 ANES CLSD PX SYMPH PUB/SI JT: CPT | Performed by: NURSE ANESTHETIST, CERTIFIED REGISTERED

## 2022-09-22 DEVICE — IMPLANTABLE DEVICE: Type: IMPLANTABLE DEVICE | Site: BACK | Status: FUNCTIONAL

## 2022-09-22 RX ORDER — PROPOFOL 10 MG/ML
INJECTION, EMULSION INTRAVENOUS AS NEEDED
Status: DISCONTINUED | OUTPATIENT
Start: 2022-09-22 | End: 2022-09-22 | Stop reason: SURG

## 2022-09-22 RX ORDER — ROCURONIUM BROMIDE 10 MG/ML
INJECTION, SOLUTION INTRAVENOUS AS NEEDED
Status: DISCONTINUED | OUTPATIENT
Start: 2022-09-22 | End: 2022-09-22 | Stop reason: SURG

## 2022-09-22 RX ORDER — ONDANSETRON HYDROCHLORIDE 2 MG/ML
4 INJECTION, SOLUTION INTRAVENOUS ONCE AS NEEDED
Status: DISCONTINUED | OUTPATIENT
Start: 2022-09-22 | End: 2022-09-22 | Stop reason: HOSPADM

## 2022-09-22 RX ORDER — LABETALOL HCL 20 MG/4 ML
5 SYRINGE (ML) INTRAVENOUS EVERY 5 MIN PRN
Status: DISCONTINUED | OUTPATIENT
Start: 2022-09-22 | End: 2022-09-22 | Stop reason: HOSPADM

## 2022-09-22 RX ORDER — HYDROMORPHONE HYDROCHLORIDE 1 MG/ML
0.5 INJECTION, SOLUTION INTRAMUSCULAR; INTRAVENOUS; SUBCUTANEOUS EVERY 5 MIN PRN
Status: DISCONTINUED | OUTPATIENT
Start: 2022-09-22 | End: 2022-09-22 | Stop reason: HOSPADM

## 2022-09-22 RX ORDER — ONDANSETRON HYDROCHLORIDE 2 MG/ML
INJECTION, SOLUTION INTRAVENOUS AS NEEDED
Status: DISCONTINUED | OUTPATIENT
Start: 2022-09-22 | End: 2022-09-22 | Stop reason: SURG

## 2022-09-22 RX ORDER — BUPIVACAINE HYDROCHLORIDE 2.5 MG/ML
INJECTION, SOLUTION EPIDURAL; INFILTRATION; INTRACAUDAL AS NEEDED
Status: DISCONTINUED | OUTPATIENT
Start: 2022-09-22 | End: 2022-09-22 | Stop reason: HOSPADM

## 2022-09-22 RX ORDER — SODIUM CHLORIDE, SODIUM LACTATE, POTASSIUM CHLORIDE, CALCIUM CHLORIDE 600; 310; 30; 20 MG/100ML; MG/100ML; MG/100ML; MG/100ML
INJECTION, SOLUTION INTRAVENOUS CONTINUOUS PRN
Status: DISCONTINUED | OUTPATIENT
Start: 2022-09-22 | End: 2022-09-22 | Stop reason: SURG

## 2022-09-22 RX ORDER — LIDOCAINE HYDROCHLORIDE AND EPINEPHRINE 10; 10 UG/ML; MG/ML
INJECTION, SOLUTION INFILTRATION; PERINEURAL AS NEEDED
Status: DISCONTINUED | OUTPATIENT
Start: 2022-09-22 | End: 2022-09-22 | Stop reason: HOSPADM

## 2022-09-22 RX ORDER — DEXAMETHASONE SODIUM PHOSPHATE 4 MG/ML
INJECTION, SOLUTION INTRA-ARTICULAR; INTRALESIONAL; INTRAMUSCULAR; INTRAVENOUS; SOFT TISSUE AS NEEDED
Status: DISCONTINUED | OUTPATIENT
Start: 2022-09-22 | End: 2022-09-22 | Stop reason: SURG

## 2022-09-22 RX ORDER — FENTANYL CITRATE/PF 50 MCG/ML
PLASTIC BAG, INJECTION (ML) INTRAVENOUS AS NEEDED
Status: DISCONTINUED | OUTPATIENT
Start: 2022-09-22 | End: 2022-09-22 | Stop reason: SURG

## 2022-09-22 RX ORDER — CEFAZOLIN SODIUM 10 G/1
3000 INJECTION, POWDER, FOR SOLUTION INTRAVENOUS ONCE
Status: DISCONTINUED | OUTPATIENT
Start: 2022-09-22 | End: 2022-09-22

## 2022-09-22 RX ORDER — LIDOCAINE HYDROCHLORIDE 20 MG/ML
INJECTION, SOLUTION EPIDURAL; INFILTRATION; INTRACAUDAL; PERINEURAL AS NEEDED
Status: DISCONTINUED | OUTPATIENT
Start: 2022-09-22 | End: 2022-09-22 | Stop reason: SURG

## 2022-09-22 RX ORDER — DIPHENHYDRAMINE HYDROCHLORIDE 50 MG/ML
25 INJECTION INTRAMUSCULAR; INTRAVENOUS ONCE AS NEEDED
Status: DISCONTINUED | OUTPATIENT
Start: 2022-09-22 | End: 2022-09-22 | Stop reason: HOSPADM

## 2022-09-22 RX ADMIN — PROPOFOL 20 MG: 10 INJECTION, EMULSION INTRAVENOUS at 13:18

## 2022-09-22 RX ADMIN — FENTANYL CITRATE 25 MCG: 50 INJECTION, SOLUTION INTRAMUSCULAR; INTRAVENOUS at 13:12

## 2022-09-22 RX ADMIN — FENTANYL CITRATE 50 MCG: 50 INJECTION, SOLUTION INTRAMUSCULAR; INTRAVENOUS at 12:18

## 2022-09-22 RX ADMIN — PROPOFOL 20 MG: 10 INJECTION, EMULSION INTRAVENOUS at 13:24

## 2022-09-22 RX ADMIN — HYDROMORPHONE HYDROCHLORIDE 0.5 MG: 1 INJECTION, SOLUTION INTRAMUSCULAR; INTRAVENOUS; SUBCUTANEOUS at 13:58

## 2022-09-22 RX ADMIN — LIDOCAINE HYDROCHLORIDE 100 MG: 20 INJECTION, SOLUTION EPIDURAL; INFILTRATION; INTRACAUDAL; PERINEURAL at 12:18

## 2022-09-22 RX ADMIN — ONDANSETRON 4 MG: 2 INJECTION INTRAMUSCULAR; INTRAVENOUS at 12:45

## 2022-09-22 RX ADMIN — PROPOFOL 100 MG: 10 INJECTION, EMULSION INTRAVENOUS at 12:18

## 2022-09-22 RX ADMIN — ROCURONIUM BROMIDE 40 MG: 10 INJECTION INTRAVENOUS at 12:18

## 2022-09-22 RX ADMIN — PROPOFOL 20 MG: 10 INJECTION, EMULSION INTRAVENOUS at 13:09

## 2022-09-22 RX ADMIN — PROPOFOL 20 MG: 10 INJECTION, EMULSION INTRAVENOUS at 13:29

## 2022-09-22 RX ADMIN — FENTANYL CITRATE 25 MCG: 50 INJECTION, SOLUTION INTRAMUSCULAR; INTRAVENOUS at 12:39

## 2022-09-22 RX ADMIN — HYDROMORPHONE HYDROCHLORIDE 0.5 MG: 1 INJECTION, SOLUTION INTRAMUSCULAR; INTRAVENOUS; SUBCUTANEOUS at 13:50

## 2022-09-22 RX ADMIN — SODIUM CHLORIDE, POTASSIUM CHLORIDE, SODIUM LACTATE AND CALCIUM CHLORIDE: 600; 310; 30; 20 INJECTION, SOLUTION INTRAVENOUS at 12:18

## 2022-09-22 RX ADMIN — DEXAMETHASONE SODIUM PHOSPHATE 4 MG: 4 INJECTION, SOLUTION INTRAMUSCULAR; INTRAVENOUS at 12:45

## 2022-09-22 RX ADMIN — CEFAZOLIN 2000 MG: 2 INJECTION, POWDER, FOR SOLUTION INTRAMUSCULAR; INTRAVENOUS at 12:25

## 2022-09-22 RX ADMIN — SUGAMMADEX 200 MG: 100 INJECTION, SOLUTION INTRAVENOUS at 13:09

## 2022-09-22 ASSESSMENT — ENCOUNTER SYMPTOMS
DYSRHYTHMIAS: 1
EXERCISE TOLERANCE: GOOD (4-7 METS)

## 2022-09-22 ASSESSMENT — COPD QUESTIONNAIRES
CAT_SEVERITY: MILD
COPD: 1

## 2022-09-22 NOTE — PERIOPERATIVE NURSING NOTE
"Pt states \"I'm ready to go\", DC instructions provided to pt and pt's friend Ellie, both verbalize understanding, pt assisted to get dressed, tolerates activity without issue, IV Dc'd with canula intact, pt escorted out via wheelchair by staff, no change in condition.  "

## 2022-09-22 NOTE — DISCHARGE INSTRUCTIONS
For 24 Hours after Anesthesia:  A responsible party needs to stay with you.    No critical decision making.    No driving or operating machinery.    No activities that will require concentration or coordination now or when taking your pain medications.      Diets:           * Begin with liquids. Progress to light foods, then a regular diet.         * No alcohol for 24 hours after surgery and while taking pain medications.         * Increase fluid and fiber.     =====================================  You will be sore for 24-48 hours.  Pain medications are typically not needed.    OK to take tylenol as needed, but no more than 3000mg per day    No excessive bending/lifting/twisting for 24 hours    Keep bandage on for 72 hours.  Ok to shower then.      No soaking in the tub for 14 days.    Start walking as you feel like it    Call the office for follow up - 10 days from now.

## 2022-09-22 NOTE — ANESTHESIA POSTPROCEDURE EVALUATION
Patient: Katy Stack    Procedure Summary     Date: 09/22/22 Room / Location: [facility] OR 01 / [facility] OR    Anesthesia Start: 1215 Anesthesia Stop: 1344    Procedure: RIGHT POSTERIOR SACROILIAC FUSION (Right: Back) Diagnosis:       Sacroiliitis (CMS/HCC) (HCC)      (Sacroiliitis (CMS/HCC) (HCC) [M46.1])    Surgeons: Bernardo Gonzalez MD Responsible Provider: Ollie Hammonds MD    Anesthesia Type: general ASA Status: 3          Anesthesia Type: general    Last vitals  Vitals Value Taken Time   BP     Temp     Pulse     Resp     SpO2     0-10 Pain Score         Anesthesia Post Evaluation    Patient location during evaluation: PACU  Patient participation: complete - patient participated  Level of consciousness: awake and alert  Pain management: adequate  Airway patency: patent  Anesthetic complications: no  Cardiovascular status: acceptable  Respiratory status: acceptable  Hydration status: acceptable  May dismiss recovered patient based on consultation with the appropriate physicians and/or meeting appropriate discharge criteria      Cosmetic?  This procedure is not cosmetic.

## 2022-09-22 NOTE — OP NOTE
PROCEDURE: Right Sacroiliac Fusion, Minimally invasive posterior allograft approach  Surgeon:  Bernardo Gonzalez M.D.  Preoperative Diagnosis: Chronic pain syndrome, Sacroiliac instability, Sacroiliac joint pain, Sacral dorsopathy  Postoperative Diagnosis: Same  Indications: The patient has a history of severe SI joint pain and dysfunction for over 6 months with a failure of conservative cares including NSAIDS, muscle relaxants, physical therapy for over 6 weeks.  Pain is limiting ADL's:  Sitting, walking, climbing stairs, toileting.   Complications: None  Estimated blood loss: Minimal  Anesthesia: GETA     Preoperative antibiotics: 3gm of Ancef given 30 minutes prior to incision.     Informed Consent: The diagnosis and possible alternative diagnosis, the nature of the proposed treatment/procedure, a description of the treatment/procedure, risks associated with the treatment/procedure, possible benefits of the treatment/procedure, possible alternative methods of care and associated risks, and the risk of no treatment were thoroughly discussed with the patient. An appropriate consent form was signed, indicating the patient understands the procedure and its possible complications, risks, and alternatives.     Description of Procedure: The patient was brought to the operating room.   GETA was initiated and the patient was placed in the prone position with careful attention paid to protect all pressure points.  The patient's lower back and buttock area was prepped with chlorhexidine alcohol solution and draped in the usual sterile fashion.  The surgical incision sites were identified at the right Sacroiliac joint.     Using AP fluoroscopy with a slight contralateral oblique and caudal tilt, the posterior SIJ interface was identified.  Next local was applied to the SIJ through a spinal needle.  A 3cm incision was made at the inferior aspect and superior aspect.    Two guide pins were inserted to the joint and tapped into  place in the superior and inferior portions of the joint respectively.  Using ap and lateral fluoro projections, the pins were Id'd to be well into the joint.  Next the iliac facing dilators were used to make a working channel into the joint.  At this point, I did broach the joint space at both aspects, then the RASP was used to decorticate the joint.  Finally I inserted the two allografts into the joint at the inferior and superior channels to transfix the joint.  Final pictures showed good transfixion.  I then removed the channel instrumentation.  The wounds were closed after extensive irrigation.  2-0 vicryl in a two layer closure, then staples.  Wound was dressed and anesthesia was reversed.  There were no immediate complications. The patient was extubated and taken to PACU in stable condition.  The patient tolerated the procedure well and will follow up in clinic.

## 2022-09-22 NOTE — ANESTHESIA PROCEDURE NOTES
Airway  Date/Time: 9/22/2022 12:26 PM  Urgency: elective    Airway not difficult    General Information and Staff    Patient location during procedure: OR  CRNA: Fernando Velarde CRNA  Performed: CRNA     Indications and Patient Condition  Indications for airway management: anesthesia  Spontaneous ventilation: present  Sedation level: deep  Preoxygenated: yes  Patient position: sniffing  MILS maintained throughout  Mask difficulty assessment: 1 - vent by mask    Final Airway Details  Final airway type: endotracheal airway      Successful airway: ETT  Cuffed: yes   Successful intubation technique: direct laryngoscopy  Facilitating devices/methods: stylet  Blade: Emelina  Blade size: #3  ETT size (mm): 7.0  Cormack-Lehane Classification: grade I - full view of glottis  Placement verified by: chest auscultation   Cuff volume (mL): 7  Measured from: lips  ETT to lips (cm): 22  Number of attempts at approach: 1

## 2022-09-22 NOTE — ANESTHESIA PREPROCEDURE EVALUATION
Pre-Procedure Assessment    Patient: Katy Stack, female, 82 y.o.    Ht Readings from Last 1 Encounters:   10/27/21 1.524 m (5')     Wt Readings from Last 1 Encounters:   22 83 kg (183 lb)       Last Vitals  BP      Temp      Pulse     Resp      SpO2      Pain Score         Problem list reviewed and Medical history reviewed    No history of anesthetic complications:    Family history of anesthetic complications ( Denies):      Airway   Mallampati: II  TM distance: >3 FB  Neck ROM: limited      Dental - normal exam     Pulmonary     breath sounds clear to auscultation  (+) COPD mild, sleep apnea,     ROS comment: O2 at night 2LPM    Pt denies KATY  Cardiovascular   Exercise tolerance: good (4-7 METS)  (+) hypertension, dysrhythmias,     ECG reviewed  Rhythm: regular  Rate: normal  ROS comment: Interpretation Summary    ? Normal left ventricular systolic function.  ? Normal left ventricular cavity size and wall thickness.  ? The left atrium is mildly dilated.  ? The right atrium is normal in size.  ? Normal central venous pressure (0-5 mmHg).  ? No pericardial effusion.  ? Mild aortic stenosis is noted. Mean gradient 13 mmHg  ? Mild tricuspid regurgitation. RVSP 43 mmHg  ? Grade I (mild) left ventricular diastolic abnormality.      Mental Status/Neuro/Psych    Pt is alert.      (+) arthritis, back injury, peripheral neuropathy,     Comments: D deficiency, periodic limb movement disorder    GI/Hepatic/Renal    (+) GERD well controlled,  CRI,     Endo/Other    (+) hypothyroidism,   Abdominal             Social History     Tobacco Use   • Smoking status: Former     Packs/day: 2.00     Years: 35.00     Pack years: 70.00     Types: Cigarettes     Quit date:      Years since quittin.7   • Smokeless tobacco: Never   Substance Use Topics   • Alcohol use: No      Hematology   WBC   Date Value Ref Range Status   2022 9.3 4.5 - 10.5 10*3/uL Final     RBC   Date Value Ref Range Status   2022 4.92  3.70 - 5.30 10*6/µL Final     MCV   Date Value Ref Range Status   06/12/2022 90.2 81.0 - 97.0 fL Final     Hemoglobin   Date Value Ref Range Status   06/12/2022 14.9 11.5 - 15.5 g/dL Final     Hematocrit   Date Value Ref Range Status   06/12/2022 44.3 34.0 - 45.0 % Final     Platelets   Date Value Ref Range Status   06/12/2022 222 140 - 350 10*3/uL Final      Coagulation   Protime   Date Value Ref Range Status   07/29/2020 11.7 9.4 - 12.5 seconds Final     INR   Date Value Ref Range Status   07/29/2020 1.1 <=1.1 Final      General Chemistry   Calcium   Date Value Ref Range Status   06/12/2022 9.3 8.6 - 10.3 mg/dL Final     BUN   Date Value Ref Range Status   06/12/2022 22 7 - 25 mg/dL Final     Creatinine   Date Value Ref Range Status   06/12/2022 1.04 0.60 - 1.10 mg/dL Final     Glucose   Date Value Ref Range Status   06/12/2022 102 70 - 105 mg/dL Final     Sodium   Date Value Ref Range Status   06/12/2022 141 135 - 145 mmol/L Final     Potassium   Date Value Ref Range Status   06/12/2022 4.1 3.5 - 5.1 MMOL/L Final     Magnesium   Date Value Ref Range Status   08/26/2021 2.1 1.8 - 2.4 mg/dL Final     CO2   Date Value Ref Range Status   06/12/2022 29 21 - 32 mmol/L Final     Chloride   Date Value Ref Range Status   06/12/2022 107 98 - 107 mmol/L Final     Anesthesia Plan    ASA 3   NPO status reviewed: > 8 hours    General         Induction: intravenous   Airway Planning: oral ET tube          Plan for postoperative opioid use.    Anesthetic plan and risks discussed with patient.      Plan discussed with CRNA.

## 2022-09-22 NOTE — H&P
Impression: Sacroiliitis  PLAN: right SIJ allograft fusion    Subjective     Patient is a 82 y.o. female presents for a right SIJ fusion via posterior allograft method.  She's had this pain for years and has failed PT, Meds, injections.  Furthermore, she has 2 positive SIJ blocks    Takes no blood thinners    Review of Systems  Denies F/C/N/V/D    Objective     Temp:  [36.6 °C (97.9 °F)] 36.6 °C (97.9 °F)  Heart Rate:  [61] 61  Resp:  [18] 18  SpO2:  [95 %] 95 %  BP: (194)/(83) 194/83  No intake/output data recorded.  No intake/output data recorded.      Labs reviewed: ok for surgery    RRR  CTAB  Moves all extremities  [place] + right

## 2022-09-25 ENCOUNTER — OFFICE VISIT (OUTPATIENT)
Dept: URGENT CARE | Facility: CLINIC | Age: 82
End: 2022-09-25
Payer: MEDICARE

## 2022-09-25 VITALS
TEMPERATURE: 97.6 F | HEART RATE: 58 BPM | OXYGEN SATURATION: 96 % | DIASTOLIC BLOOD PRESSURE: 70 MMHG | SYSTOLIC BLOOD PRESSURE: 140 MMHG

## 2022-09-25 DIAGNOSIS — R30.0 DYSURIA: Primary | ICD-10-CM

## 2022-09-25 LAB
BILIRUBIN, POC: NEGATIVE
BLOOD URINE, POC: NEGATIVE
GLUCOSE URINE, POC: NEGATIVE MG/DL
KETONES, POC: NEGATIVE MG/DL
LEUKOCYTE EST, POC: ABNORMAL
NITRITE, POC: NEGATIVE
POC PH URINE: 5.5 PH (ref 5–9)
POC SPECIFIC GRAVITY: 1.01 (ref 1–1.03)
PROTEIN, POC: NEGATIVE MG/DL
UROBILINOGEN, POC: 0.2

## 2022-09-25 PROCEDURE — 81003 URINALYSIS AUTO W/O SCOPE: CPT | Mod: QW | Performed by: NURSE PRACTITIONER

## 2022-09-25 PROCEDURE — 87088 URINE BACTERIA CULTURE: CPT | Performed by: NURSE PRACTITIONER

## 2022-09-25 PROCEDURE — 99213 OFFICE O/P EST LOW 20 MIN: CPT | Performed by: NURSE PRACTITIONER

## 2022-09-25 PROCEDURE — G0463 HOSPITAL OUTPT CLINIC VISIT: HCPCS | Performed by: NURSE PRACTITIONER

## 2022-09-25 RX ORDER — AMOXICILLIN AND CLAVULANATE POTASSIUM 250; 125 MG/1; MG/1
1 TABLET, FILM COATED ORAL 3 TIMES DAILY
COMMUNITY
End: 2022-12-12 | Stop reason: ALTCHOICE

## 2022-09-25 RX ORDER — PHENAZOPYRIDINE HYDROCHLORIDE 200 MG/1
200 TABLET, FILM COATED ORAL 3 TIMES DAILY PRN
Qty: 10 TABLET | Refills: 0 | Status: SHIPPED | OUTPATIENT
Start: 2022-09-25 | End: 2022-09-28

## 2022-09-25 RX ORDER — SULFAMETHOXAZOLE AND TRIMETHOPRIM 800; 160 MG/1; MG/1
1 TABLET ORAL 2 TIMES DAILY
Qty: 10 TABLET | Refills: 0 | Status: SHIPPED | OUTPATIENT
Start: 2022-09-25 | End: 2022-09-30

## 2022-09-25 ASSESSMENT — ENCOUNTER SYMPTOMS
GASTROINTESTINAL NEGATIVE: 1
MUSCULOSKELETAL NEGATIVE: 1
CARDIOVASCULAR NEGATIVE: 1
FLANK PAIN: 0
RESPIRATORY NEGATIVE: 1
ACTIVITY CHANGE: 0
DYSURIA: 1
PSYCHIATRIC NEGATIVE: 1
FEVER: 0
BACK PAIN: 0
FATIGUE: 0
NEUROLOGICAL NEGATIVE: 1

## 2022-09-25 NOTE — PROGRESS NOTES
Subjective      Katy Stack is a 82 y.o. female who presents for   Chief Complaint   Patient presents with    Urinary Problem     Pt presents with possible UTI. Pt noticed burning across vagina and in urethra ~ 2 days ago. Pt rates pain 2/10, taking augmentin. Pt reports Hx of UTI's.    .    [id number]-old female presents with burning with urination that started 2 days ago. She does have a history of UTIs, history of CRE and MRSA. She started taking an old prescription of Augmentin from her last UTI. She is not taking any potassium supplementation and has not for a while. She only takes it when she takes her furosemide, but she has not taken that either in a while.       The following have been reviewed and updated as appropriate in this visit:          Allergies   Allergen Reactions    [place] Angioedema and Hives     hives, tongue swells  Swelling of tongue    Adhesive      welts     Current Outpatient Medications   Medication Sig Dispense Refill    amoxicillin-pot clavulanate (AUGMENTIN) 250-125 mg per tablet Take 1 tablet by mouth 3 (three) times a day      sulfamethoxazole-trimethoprim (BACTRIM DS,SEPTRA DS) 800-160 mg per tablet Take 1 tablet by mouth 2 (two) times a day for 5 days 10 tablet 0    phenazopyridine (Pyridium) 200 mg tablet Take 1 tablet (200 mg total) by mouth 3 (three) times a day as needed for bladder spasms for up to 3 days 10 tablet 0    denosumab (PROLIA) 60 mg/mL syringe syringe Inject 1 mL (60 mg total) under the skin once for 1 dose Medication is being administered as part of a therapy plan. Review patient's therapy plan and chart for previous doses/dates of administration and schedule of future doses/dates of administration. 1 mL 0    atorvastatin (LIPITOR) 20 mg tablet TAKE 1 TABLET BY MOUTH  DAILY 90 tablet 3    levothyroxine (SYNTHROID, LEVOTHROID) 112 mcg tablet Take 1 tablet (112 mcg total) by mouth daily 90 tablet 0    losartan (COZAAR) 50 mg tablet Take 1 tablet (50  mg total) by mouth daily 90 tablet 1    atenoloL (TENORMIN) 25 mg tablet Take 1 tablet (25 mg total) by mouth daily 90 tablet 1    omeprazole (PriLOSEC) 20 mg capsule TAKE 1 CAPSULE BY MOUTH  DAILY (Patient taking differently: Take 20 mg by mouth daily as needed) 90 capsule 3    Premarin 0.9 mg tablet TAKE 1 TABLET BY MOUTH  WEEKLY 13 tablet 3    buPROPion (WELLBUTRIN) 75 mg tablet Take 1 tablet (75 mg total) by mouth daily 30 tablet 1    multivitamin (THERAGRAN) tablet tablet Take 1 tablet by mouth daily      furosemide (LASIX) 20 mg tablet TAKE 1 TABLET BY MOUTH  DAILY (Patient taking differently: Take 20 mg by mouth daily as needed Indications: visible water retention) 90 tablet 3    potassium chloride 10 mEq CR tablet TAKE 1 TABLET BY MOUTH  DAILY 90 tablet 3    gabapentin (NEURONTIN) 300 mg capsule Take 900 mg by mouth 3 (three) times a day      calcium carbonate-vitamin D3 600 mg(1,500mg) -400 unit per tablet Take 1 tablet by mouth daily      nitroglycerin (NITROSTAT) 0.4 mg SL tablet Place 1 tablet (0.4 mg total) under the tongue every 5 (five) minutes as needed for chest pain 25 tablet 1    vit A-vit C-vit E-zinc-copper 7,160-113-100 unit-mg-unit tablet Take 1 tab/cap by mouth 2 (two) times a day.       No current facility-administered medications for this visit.     Past Medical History:   Diagnosis Date    Benign essential hypertension 11/24/2017    Cataract of left eye     Chronic obstructive lung disease (CMS/HCC) (HCC) 11/10/2017    Dysrhythmia     LBBB    Family history of anesthesia complication     GERD (gastroesophageal reflux disease)     Hypertension     Hypothyroid     Hypothyroidism 11/10/2017    Injury of back     BENDING INJURY    Macular degeneration     Neurologic disorder     Obesity 11/10/2017    Obstructive sleep apnea syndrome 11/10/2017    Night time oxygen/2L    Osteoarthritis     Periodic limb movement disorder 11/10/2017    Peripheral neuropathy     left thigh into left groin      Rectocele     Respiratory disease      Past Surgical History:   Procedure Laterality Date    APPENDECTOMY  1987    BACK SURGERY      2 surgeries [id number]    BACK SURGERY  11/30/2016    BACK SURGERY  05/01/2017    vetebral fusion    CARDIAC CATHETERIZATION  08/31/2011    CATARACT EXTRACTION W/  INTRAOCULAR LENS IMPLANT Left 7/2/2018    Procedure: OS CATARACT LEFT PHACOEMULSIFICATION OF CATARACT WITH INTRAOCULAR LENS;  Surgeon: Emmanuel Cueto MD;  Location: [facility];  Service: Ophthalmology;  Laterality: Left;    CATARACT EXTRACTION W/  INTRAOCULAR LENS IMPLANT Right 8/6/2018    Procedure: OD CATARACT RIGHT PHACOEMULSIFICATION OF CATARACT WITH INTRAOCULAR LENS;  Surgeon: Emmanuel Cueto MD;  Location: [facility];  Service: Ophthalmology;  Laterality: Right;    CHOLECYSTECTOMY      COLONOSCOPY  02/19/2014    No polyps    COLONOSCOPY  04/26/2011    DIAGNOSTIC LAPAROSCOPY N/A 11/28/2018    Procedure: Laparoscopic band removal;  Surgeon: Chris Millan MD;  Location: [facility];  Service: General;  Laterality: N/A;    ESOPHAGOGASTRODUODENOSCOPY N/A 11/6/2018    Procedure: EGD - ESOPHAGOGASTRODUODENOSCOPY;  Surgeon: Chris Millan MD;  Location: [facility];  Service: Endoscopy;  Laterality: N/A;    FOOT SURGERY      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 Vela- shoulder    SPINAL STIMULATOR IMPLANT N/A 3/23/2021    Procedure: PERMANENT SPINAL CORD STIMULATOR IMPLANTATION;  Surgeon: Bernardo ALICEA  MD Carlos;  Location: [facility] OR;  Service: Pain Management;  Laterality: N/A;    THYROID LOBECTOMY Left     THYROIDECTOMY, PARTIAL  2010    UPPER GASTROINTESTINAL ENDOSCOPY  2014    Mild chronic gastritis     Family History   Problem Relation Age of Onset    Heart disease Mother     Heart disease Father         Myocardial infarction    Parkinsonism Father     Thyroid disease Father     Alzheimer's disease Sister     Arthritis Sister     Osteoporosis Sister     Lung cancer Brother     Diabetes Maternal Grandmother      Social History     Socioeconomic History    Marital status:    Tobacco Use    Smoking status: Former     Packs/day: 2.00     Years: 35.00     Pack years: 70.00     Types: Cigarettes     Quit date:      Years since quittin.7    Smokeless tobacco: Never   Vaping Use    Vaping Use: Never used   Substance and Sexual Activity    Alcohol use: No    Drug use: No    Sexual activity: Defer     Social Determinants of Health     Tobacco Use: Medium Risk    Smoking Tobacco Use: Former    Smokeless Tobacco Use: Never       Review of Systems   Constitutional:  Negative for activity change, fatigue and fever.   HENT: Negative.     Respiratory: Negative.     Cardiovascular: Negative.    Gastrointestinal: Negative.    Genitourinary:  Positive for dysuria. Negative for flank pain and pelvic pain.   Musculoskeletal: Negative.  Negative for back pain.   Skin: Negative.    Neurological: Negative.    Psychiatric/Behavioral: Negative.       Objective   Vitals:    22 1413   BP: 140/70   Temp: 36.4 °C (97.6 °F)   Pulse: 58   SpO2: 96%   Patient Position: Sitting        Physical Exam  Vitals reviewed.   HENT:      Right Ear: External ear normal.      Left Ear: External ear normal.   Eyes:      Extraocular Movements: Extraocular movements intact.   Cardiovascular:      Rate and Rhythm: Normal rate and regular rhythm.      Heart sounds: Normal heart sounds.   Pulmonary:      Effort:  Pulmonary effort is normal.      Breath sounds: Normal breath sounds.   Abdominal:      General: Bowel sounds are normal.      Palpations: Abdomen is soft.      Tenderness: There is no abdominal tenderness. There is no right CVA tenderness or left CVA tenderness.   Musculoskeletal:      Cervical back: Normal range of motion.      Right lower leg: No edema.      Left lower leg: No edema.   Skin:     General: Skin is warm and dry.   Neurological:      General: No focal deficit present.      Mental Status: She is alert.   Psychiatric:         Mood and Affect: Mood normal.         Behavior: Behavior normal.       Recent Results (from the past 24 hour(s))   POCT Urinalysis, Dipstick Only    Collection Time: 09/25/22  2:14 PM   Result Value Ref Range    Glucose, UA Negative mg/dL    Bilirubin, UA Negative     Ketones, UA Negative mg/dL    Spec Grav, UA 1.015 1.001 - 1.035    Blood, UA Negative     pH, UA 5.5 5.0 - 9.0 PH    Protein, UA Negative mg/dL    Urobilinogen, UA 0.2     Nitrite, UA Negative     Leukocytes, UA Small        Assessment/Plan   Diagnoses and all orders for this visit:    Dysuria  -     Urine culture  -     POCT Urinalysis, Dipstick Only  -     sulfamethoxazole-trimethoprim (BACTRIM DS,SEPTRA DS) 800-160 mg per tablet; Take 1 tablet by mouth 2 (two) times a day for 5 days  -     phenazopyridine (Pyridium) 200 mg tablet; Take 1 tablet (200 mg total) by mouth 3 (three) times a day as needed for bladder spasms for up to 3 days    Katy is seen today for dysuria. She has a history UTI with CRE. She did start taking some old Augmentin when her symptoms started. She is not taking potassium as this is only as needed and she had not taken that for a long time. Discussed about not taking potassium supplements while take Bactrim as it can increase potassium level. If she needs to take her furosemide to not take potassium it. Discussed about trying pyridium to help with UTI symptoms and she would like to try it.  Discussed if symptoms worsen or do not improve to follow-up with her primary are provider. Also discussed about trying cranberry pill/ juice to help prevent UTI, she will look into it. Katy verbalized understanding and agreed with plan of care.     Patient Instructions   Stop Augmentin  Start Bactrim twice per day for 5 days  Take with food to help avoid upset stomach  May eat yogurt or take over the counter probiotic if antibiotic causes diarrhea  Stay Hydrated  Pyridium  1 tablet three times per day as needed  Will turn your urine orange  Wear a panty liner as this medication can stain your clothing  Will notify of culture results and if needing to change the antibiotic  Follow-up if symptoms worsen or do not improve    Korin Boykin, CNP

## 2022-09-25 NOTE — PATIENT INSTRUCTIONS
Stop Augmentin  Start Bactrim twice per day for 5 days  Take with food to help avoid upset stomach  May eat yogurt or take over the counter probiotic if antibiotic causes diarrhea  Stay Hydrated  Pyridium  1 tablet three times per day as needed  Will turn your urine orange  Wear a panty liner as this medication can stain your clothing  Will notify of culture results and if needing to change the antibiotic  Follow-up if symptoms worsen or do not improve

## 2022-09-26 ENCOUNTER — TELEPHONE (OUTPATIENT)
Dept: FAMILY MEDICINE | Facility: CLINIC | Age: 82
End: 2022-09-26
Payer: MEDICARE

## 2022-09-27 LAB — BACTERIA UR CULT: NORMAL

## 2022-09-27 NOTE — TELEPHONE ENCOUNTER
Katy stated that she thinks she is getting better today.  She may have a yeast infection and will get some Monistat cream from the pharmacy. She will call if she gets worse or does not improve.

## 2022-10-20 DIAGNOSIS — E03.8 OTHER SPECIFIED HYPOTHYROIDISM: ICD-10-CM

## 2022-10-21 NOTE — TELEPHONE ENCOUNTER
Care Due:                  Date            Visit Type   Department     Provider  --------------------------------------------------------------------------------                                           [facility]  Last Visit: 07-      SAME DAY     MEDICINE       Lydia Rodgers MD                                           [facility]  Next Visit: 03-      [address] INJECTION NURSE                                                            Last  Test          Frequency    Reason                     Performed    Due Date  --------------------------------------------------------------------------------  Office Visit  12 months..  Premarin, atenoloL,        11- 11-                             atorvastatin, denosumab,                             levothyroxine, losartan..    [facility] Embedded Care Gaps. Reference number: [id number]. 10/20/2022 9:17:16 PM LEILA

## 2022-10-24 ENCOUNTER — APPOINTMENT (OUTPATIENT)
Dept: LAB | Facility: CLINIC | Age: 82
End: 2022-10-24
Payer: MEDICARE

## 2022-10-24 DIAGNOSIS — E03.8 OTHER SPECIFIED HYPOTHYROIDISM: ICD-10-CM

## 2022-10-24 LAB — TSH SERPL DL<=0.05 MIU/L-ACNC: 3.06 UIU/ML (ref 0.34–4.82)

## 2022-10-24 PROCEDURE — 36415 COLL VENOUS BLD VENIPUNCTURE: CPT

## 2022-10-24 PROCEDURE — 84443 ASSAY THYROID STIM HORMONE: CPT

## 2022-10-25 DIAGNOSIS — E03.9 HYPOTHYROIDISM, UNSPECIFIED TYPE: Primary | ICD-10-CM

## 2022-10-25 RX ORDER — LEVOTHYROXINE SODIUM 112 UG/1
TABLET ORAL
Qty: 90 TABLET | Refills: 3 | Status: SHIPPED | OUTPATIENT
Start: 2022-10-25 | End: 2023-09-02

## 2022-11-02 ENCOUNTER — TELEPHONE (OUTPATIENT)
Dept: FAMILY MEDICINE | Facility: CLINIC | Age: 82
End: 2022-11-02

## 2022-11-02 ENCOUNTER — APPOINTMENT (OUTPATIENT)
Dept: LAB | Facility: CLINIC | Age: 82
End: 2022-11-02
Payer: MEDICARE

## 2022-11-02 DIAGNOSIS — E66.09 CLASS 1 OBESITY DUE TO EXCESS CALORIES WITH SERIOUS COMORBIDITY AND BODY MASS INDEX (BMI) OF 33.0 TO 33.9 IN ADULT: ICD-10-CM

## 2022-11-02 DIAGNOSIS — E66.811 CLASS 1 OBESITY DUE TO EXCESS CALORIES WITH SERIOUS COMORBIDITY AND BODY MASS INDEX (BMI) OF 33.0 TO 33.9 IN ADULT: ICD-10-CM

## 2022-11-02 DIAGNOSIS — I10 ESSENTIAL HYPERTENSION: ICD-10-CM

## 2022-11-02 DIAGNOSIS — Z90.3 HISTORY OF SLEEVE GASTRECTOMY: ICD-10-CM

## 2022-11-02 LAB
25(OH)D3 SERPL-MCNC: 57 NG/ML (ref 30–100)
CHOLEST SERPL-MCNC: 130 MG/DL (ref 0–199)
FASTING STATUS PATIENT QL REPORTED: YES
FOLATE SERPL-MCNC: >22.3 NG/ML
HDLC SERPL-MCNC: 43 MG/DL
LDLC SERPL CALC-MCNC: 64 MG/DL (ref 20–99)
MAGNESIUM SERPL-MCNC: 2.1 MG/DL (ref 1.8–2.4)
PHOSPHATE SERPL-MCNC: 3.1 MG/DL (ref 2.5–4.9)
TRIGL SERPL-MCNC: 117 MG/DL
VIT B12 SERPL-MCNC: 1009 PG/ML (ref 180–914)

## 2022-11-02 PROCEDURE — 82607 VITAMIN B-12: CPT

## 2022-11-02 PROCEDURE — 83735 ASSAY OF MAGNESIUM: CPT

## 2022-11-02 PROCEDURE — 84630 ASSAY OF ZINC: CPT

## 2022-11-02 PROCEDURE — 82746 ASSAY OF FOLIC ACID SERUM: CPT

## 2022-11-02 PROCEDURE — 84100 ASSAY OF PHOSPHORUS: CPT

## 2022-11-02 PROCEDURE — 84425 ASSAY OF VITAMIN B-1: CPT

## 2022-11-02 PROCEDURE — 84590 ASSAY OF VITAMIN A: CPT

## 2022-11-02 PROCEDURE — 83921 ORGANIC ACID SINGLE QUANT: CPT

## 2022-11-02 PROCEDURE — 84446 ASSAY OF VITAMIN E: CPT

## 2022-11-02 PROCEDURE — 84597 ASSAY OF VITAMIN K: CPT

## 2022-11-02 PROCEDURE — 82306 VITAMIN D 25 HYDROXY: CPT

## 2022-11-02 PROCEDURE — 80061 LIPID PANEL: CPT

## 2022-11-02 PROCEDURE — 36415 COLL VENOUS BLD VENIPUNCTURE: CPT

## 2022-11-02 NOTE — TELEPHONE ENCOUNTER
Would like to speak with Dr Herrera nurse regarding trying to schedule surgery for pt.  Says she is on precautions. Also MRSA was entered needing clarification.

## 2022-11-03 LAB — ZINC SERPL-MCNC: 57 MCG/DL (ref 60–106)

## 2022-11-04 LAB
A-TOCOPHEROL VIT E SERPL-MCNC: 14.9 MG/L (ref 5.5–17)
PHYTONADIONE SERPL-MCNC: 0.4 NG/ML (ref 0.1–2.2)
VIT A SERPL-MCNC: 57.1 MCG/DL (ref 32.5–78)
VIT B1 BLD-SCNC: 203 NMOL/L (ref 70–180)

## 2022-11-04 NOTE — TELEPHONE ENCOUNTER
Spoke with Danitza at [facility].  Patient has a hx of CRE in urine and she did have MRSA in 2021 when she had right upper arm cellulitis. Per their protocol they would need patient to see an infectious disease provider for a consult. They state they can order a urine culture and a MRSA swab. But the consult with infectious disease still needs to take place. She did tell patient this information but she was very upset about it and asked us to reach out to her and discuss.

## 2022-11-06 LAB — METHYLMALONATE SERPL-SCNC: 0.25 NMOL/ML

## 2022-11-07 NOTE — TELEPHONE ENCOUNTER
Spoke with patient she states that she would be willing to go to [place] for a satellite appointment with infectious disease. Attempted to contact Danitza at [facility]. A message was left.

## 2022-11-09 ENCOUNTER — TELEPHONE (OUTPATIENT)
Dept: FAMILY MEDICINE | Facility: CLINIC | Age: 82
End: 2022-11-09
Payer: MEDICARE

## 2022-11-09 NOTE — TELEPHONE ENCOUNTER
Pt states that there is information in her medical record that she wants removed because it is not accurate. States that this information has prevented her from having a procedure done that she needs. Would like to speak to or see Dr. Thaddeus AVILA. Please call and advise.

## 2022-11-09 NOTE — TELEPHONE ENCOUNTER
She was to have surgery back surgery but she can not have it until she is seen by infectious disease for MRSA.  She states that she has never had MRSA in her life.  The MRSA and were found from [place] labs results though care everywhere ordered by laura Rock CNP    She needs a infectious disease appointment and clearance for  to do a surgery on her back.  This is there protocol.    May a referral be placed?

## 2022-11-10 NOTE — TELEPHONE ENCOUNTER
Spoke with Danitza at [facility]. Patient will be doing the infectious disease consult in [place]. [facility] will fax orders for MRSA and Urine culture. They will contact patient to let her know.

## 2022-11-12 ENCOUNTER — APPOINTMENT (OUTPATIENT)
Dept: LAB | Facility: CLINIC | Age: 82
End: 2022-11-12
Payer: MEDICARE

## 2022-11-13 ENCOUNTER — OFFICE VISIT (OUTPATIENT)
Dept: URGENT CARE | Facility: CLINIC | Age: 82
End: 2022-11-13
Payer: MEDICARE

## 2022-11-13 VITALS
OXYGEN SATURATION: 97 % | RESPIRATION RATE: 18 BRPM | SYSTOLIC BLOOD PRESSURE: 152 MMHG | TEMPERATURE: 97.8 F | HEART RATE: 72 BPM | DIASTOLIC BLOOD PRESSURE: 64 MMHG

## 2022-11-13 DIAGNOSIS — J02.9 SORE THROAT: ICD-10-CM

## 2022-11-13 DIAGNOSIS — R09.81 CONGESTION OF NASAL SINUS: ICD-10-CM

## 2022-11-13 DIAGNOSIS — U07.1 COVID-19: Primary | ICD-10-CM

## 2022-11-13 DIAGNOSIS — J06.9 ACUTE UPPER RESPIRATORY INFECTION, UNSPECIFIED: ICD-10-CM

## 2022-11-13 LAB
FLUAV RNA NPH QL NAA+NON-PROBE: NEGATIVE
FLUBV RNA NPH QL NAA+NON-PROBE: NEGATIVE
RSV RNA NPH QL NAA+NON-PROBE: NEGATIVE
SARS-COV-2 RNA RESP QL NAA+PROBE: POSITIVE

## 2022-11-13 PROCEDURE — G0463 HOSPITAL OUTPT CLINIC VISIT: HCPCS | Performed by: FAMILY MEDICINE

## 2022-11-13 PROCEDURE — 99213 OFFICE O/P EST LOW 20 MIN: CPT | Mod: CS | Performed by: FAMILY MEDICINE

## 2022-11-13 PROCEDURE — 87637 SARSCOV2&INF A&B&RSV AMP PRB: CPT | Performed by: FAMILY MEDICINE

## 2022-11-13 RX ORDER — DULOXETIN HYDROCHLORIDE 30 MG/1
30 CAPSULE, DELAYED RELEASE ORAL DAILY
COMMUNITY
Start: 2022-10-14 | End: 2022-12-12

## 2022-11-13 RX ORDER — NIRMATRELVIR AND RITONAVIR 150-100 MG
2 KIT ORAL 2 TIMES DAILY
Qty: 20 TABLET | Refills: 0 | Status: SHIPPED | OUTPATIENT
Start: 2022-11-13 | End: 2022-11-18

## 2022-11-13 NOTE — PROGRESS NOTES
"Subjective     CHIEF COMPLAINT / REASON FOR VISIT  Katy Stack is a 82 y.o. female who presents for evaluation of Nasal Congestion (SOB, runny nose, just not feeling well. Started Wednesday afternoon. Home COVID test negative on Friday. Using \"everything under the sun\", flonase, kassi-selkinzaer with no improvement. ).    HISTORY OF PRESENT ILLNESS  Been sick for three days with congestion, runny nose, sore throat.  Ears feel full.  Eating and drinking ok.  No fevers.  No cough.  Occ shortness of breath.  Lots of sinus pressure.  Using multiple OTC medications    The following portions of the patient's history were reviewed and updated as appropriate: allergies, current medications, past medical history, past social history, and problem list.    REVIEW OF SYSTEMS  Review of Systems    Negative expect for what was stated in the HPI.    Current Outpatient Medications   Medication Sig Dispense Refill    atorvastatin (LIPITOR) 20 mg tablet TAKE 1 TABLET BY MOUTH  DAILY 90 tablet 3    atenoloL (TENORMIN) 25 mg tablet Take 1 tablet (25 mg total) by mouth daily 90 tablet 1    DULoxetine (CYMBALTA) 30 mg capsule Take 30 mg by mouth daily      levothyroxine (SYNTHROID, LEVOTHROID) 112 mcg tablet TAKE 1 TABLET BY MOUTH  DAILY 90 tablet 3    amoxicillin-pot clavulanate (AUGMENTIN) 250-125 mg per tablet Take 1 tablet by mouth 3 (three) times a day      denosumab (PROLIA) 60 mg/mL syringe syringe Inject 1 mL (60 mg total) under the skin once for 1 dose Medication is being administered as part of a therapy plan. Review patient's therapy plan and chart for previous doses/dates of administration and schedule of future doses/dates of administration. 1 mL 0    losartan (COZAAR) 50 mg tablet Take 1 tablet (50 mg total) by mouth daily 90 tablet 1    omeprazole (PriLOSEC) 20 mg capsule TAKE 1 CAPSULE BY MOUTH  DAILY (Patient taking differently: Take 20 mg by mouth daily as needed) 90 capsule 3    Premarin 0.9 mg tablet TAKE 1 TABLET BY " MOUTH  WEEKLY 13 tablet 3    buPROPion (WELLBUTRIN) 75 mg tablet Take 1 tablet (75 mg total) by mouth daily (Patient not taking: Reported on 11/13/2022) 30 tablet 1    multivitamin (THERAGRAN) tablet tablet Take 1 tablet by mouth daily      furosemide (LASIX) 20 mg tablet TAKE 1 TABLET BY MOUTH  DAILY (Patient taking differently: Take 20 mg by mouth daily as needed Indications: visible water retention) 90 tablet 3    potassium chloride 10 mEq CR tablet TAKE 1 TABLET BY MOUTH  DAILY 90 tablet 3    gabapentin (NEURONTIN) 300 mg capsule Take 900 mg by mouth 3 (three) times a day      calcium carbonate-vitamin D3 600 mg(1,500mg) -400 unit per tablet Take 1 tablet by mouth daily      nitroglycerin (NITROSTAT) 0.4 mg SL tablet Place 1 tablet (0.4 mg total) under the tongue every 5 (five) minutes as needed for chest pain 25 tablet 1    vit A-vit C-vit E-zinc-copper 7,160-113-100 unit-mg-unit tablet Take 1 tab/cap by mouth 2 (two) times a day.       No current facility-administered medications for this visit.       Medication changes today:  New Medications Ordered This Visit   Medications    DULoxetine (CYMBALTA) 30 mg capsule     Sig: Take 30 mg by mouth daily       There are no discontinued medications.    Allergies   Allergen Reactions    [place] Angioedema and Hives     hives, tongue swells  Swelling of tongue    Adhesive      welts       Objective     PHYSICAL EXAM  Vitals:    11/13/22 0927   BP: 152/64   Temp: 36.6 °C (97.8 °F)   TempSrc: Temporal   Pulse: 72   Resp: 18   SpO2: 97%     There is no height or weight on file to calculate BMI.  Physical Exam  Vitals and nursing note reviewed.   Constitutional:       Appearance: Normal appearance.   HENT:      Head: Normocephalic.      Right Ear: Tympanic membrane and ear canal normal.      Left Ear: Tympanic membrane and ear canal normal.      Nose: Congestion and rhinorrhea present.      Mouth/Throat:      Mouth: Mucous membranes are moist.      Pharynx: Oropharynx  is clear. Posterior oropharyngeal erythema present.   Eyes:      Conjunctiva/sclera: Conjunctivae normal.   Cardiovascular:      Rate and Rhythm: Normal rate and regular rhythm.   Pulmonary:      Effort: Pulmonary effort is normal.      Breath sounds: Normal breath sounds. No wheezing or rhonchi.   Abdominal:      General: Bowel sounds are normal.      Palpations: Abdomen is soft.   Musculoskeletal:      Cervical back: Neck supple.   Neurological:      Mental Status: She is alert.       DIAGNOSTICS:      Assessment/Plan      ASSESSMENT/PLAN:  Problem List Items Addressed This Visit    None  Visit Diagnoses       COVID-19    -  Primary    Relevant Medications    INVESTIGATIONAL nirmatrelvir-ritonavir (Paxlovid, EUA,) 150-100 mg tablets    Congestion of nasal sinus        Relevant Orders    SARS/INFLUENZA/RSV QUADRUPLEX PCR (Completed)    Sore throat        Relevant Orders    SARS/INFLUENZA/RSV QUADRUPLEX PCR (Completed)    Acute upper respiratory infection, unspecified         Relevant Orders    SARS/INFLUENZA/RSV QUADRUPLEX PCR (Completed)          Patient is COVID-positive.  I did call and discussed this with her.  She would like Paxlovid.  She does need renal dosing based off her previous kidney function.  I did review all her medications as well in the [place] drug interaction.  She does need to stop and hold her atorvastatin for the 5 days she takes Paxlovid and then will hold it for 3 more days.  She expresses understanding.  The rest of her medication she should be okay to take while she takes the Paxlovid.  She has no further questions.    Sarah Denson, DO

## 2022-11-14 ENCOUNTER — HOME MONITORING (OUTPATIENT)
Dept: FAMILY MEDICINE | Facility: HOSPITAL | Age: 82
End: 2022-11-14
Payer: MEDICARE

## 2022-11-18 ENCOUNTER — APPOINTMENT (OUTPATIENT)
Dept: LAB | Facility: CLINIC | Age: 82
End: 2022-11-18
Payer: MEDICARE

## 2022-11-18 DIAGNOSIS — A49.8: ICD-10-CM

## 2022-11-18 DIAGNOSIS — Z86.14 HISTORY OF MRSA INFECTION: ICD-10-CM

## 2022-11-18 DIAGNOSIS — Z16.13 CARBAPENEM-RESISTANT ENTEROBACTERIACEAE INFECTION: ICD-10-CM

## 2022-11-18 DIAGNOSIS — A49.8 CARBAPENEM-RESISTANT ENTEROBACTERIACEAE INFECTION: ICD-10-CM

## 2022-11-18 DIAGNOSIS — Z16.13: ICD-10-CM

## 2022-11-18 DIAGNOSIS — Z86.19: Primary | ICD-10-CM

## 2022-11-18 LAB — MRSA DNA SPEC QL NAA+PROBE: NEGATIVE

## 2022-11-18 PROCEDURE — 87641 MR-STAPH DNA AMP PROBE: CPT

## 2022-11-18 PROCEDURE — 87088 URINE BACTERIA CULTURE: CPT | Mod: GZ

## 2022-11-22 LAB — BACTERIA UR CULT: NORMAL

## 2022-11-23 ENCOUNTER — HOSPITAL ENCOUNTER (OUTPATIENT)
Dept: CT IMAGING | Facility: HOSPITAL | Age: 82
Discharge: HOME | End: 2022-11-23
Payer: MEDICARE

## 2022-11-23 DIAGNOSIS — M43.16 SPONDYLOLISTHESIS OF LUMBAR REGION: ICD-10-CM

## 2022-11-23 PROCEDURE — 72131 CT LUMBAR SPINE W/O DYE: CPT

## 2022-11-23 PROCEDURE — 72131 CT LUMBAR SPINE W/O DYE: CPT | Mod: 26 | Performed by: INTERNAL MEDICINE

## 2022-12-05 ENCOUNTER — TELEPHONE (OUTPATIENT)
Dept: FAMILY MEDICINE | Facility: CLINIC | Age: 82
End: 2022-12-05
Payer: MEDICARE

## 2022-12-05 DIAGNOSIS — N95.1 MENOPAUSAL FLUSHING: ICD-10-CM

## 2022-12-05 RX ORDER — ESTROGENS, CONJUGATED 0.9 MG/1
TABLET, FILM COATED ORAL
Qty: 13 TABLET | Refills: 3 | Status: SHIPPED | OUTPATIENT
Start: 2022-12-05 | End: 2022-12-12 | Stop reason: SDUPTHER

## 2022-12-05 NOTE — TELEPHONE ENCOUNTER
I spoke with patient. She states she was having a lot of hot flashes over the weekend. She would like a short supply of her Premarin sent to [facility] and the remainder of prescription sent to her [facility]. This has been ordered.

## 2022-12-05 NOTE — TELEPHONE ENCOUNTER
No new care gaps identified.  [facility] Embedded Care Gaps. Reference number: [id number]. 12/05/2022 4:52:18 AM MST

## 2022-12-09 ENCOUNTER — TELEPHONE (OUTPATIENT)
Dept: FAMILY MEDICINE | Facility: CLINIC | Age: 82
End: 2022-12-09
Payer: MEDICARE

## 2022-12-12 ENCOUNTER — OFFICE VISIT (OUTPATIENT)
Dept: CARDIOLOGY | Facility: CLINIC | Age: 82
End: 2022-12-12
Payer: MEDICARE

## 2022-12-12 VITALS
WEIGHT: 184 LBS | OXYGEN SATURATION: 96 % | HEART RATE: 69 BPM | DIASTOLIC BLOOD PRESSURE: 68 MMHG | SYSTOLIC BLOOD PRESSURE: 158 MMHG | HEIGHT: 60 IN | BODY MASS INDEX: 36.12 KG/M2

## 2022-12-12 DIAGNOSIS — Z01.810 PREOPERATIVE CARDIOVASCULAR EXAMINATION: Primary | ICD-10-CM

## 2022-12-12 DIAGNOSIS — I35.0 NONRHEUMATIC AORTIC VALVE STENOSIS: ICD-10-CM

## 2022-12-12 DIAGNOSIS — I10 BENIGN ESSENTIAL HYPERTENSION: ICD-10-CM

## 2022-12-12 PROCEDURE — 99214 OFFICE O/P EST MOD 30 MIN: CPT | Performed by: PHYSICIAN ASSISTANT

## 2022-12-12 PROCEDURE — G0463 HOSPITAL OUTPT CLINIC VISIT: HCPCS | Performed by: PHYSICIAN ASSISTANT

## 2022-12-12 ASSESSMENT — ENCOUNTER SYMPTOMS
BACK PAIN: 1
SENSORY CHANGE: 1
NUMBNESS: 1

## 2022-12-12 ASSESSMENT — PAIN SCALES - GENERAL: PAINLEVEL: 0-NO PAIN

## 2022-12-12 NOTE — PROGRESS NOTES
[facility] [place]  [address] [place] [address]                                           Cardiology Outpatient Follow-up Note    Subjective    Patient ID: Katy Stack is a 82 y.o. female.      BROOKLYN Burns is a pleasant 82-year-old female who is a patient of Dr. Valentino.  She was last evaluated in October of last year.  Please see problems below outlining cardiac history.    Katy is here today for cardiac risk stratification prior to lumbar fusion scheduled 1/30/2023.  She had a prior lumbar fusion about 7 years ago.  She now has pain radiating down into her left thigh and groin.  This usually occurs after activity or exercise.  She is able to walk on a flat surface without much difficulty.  Able to do all of her own grocery shopping with the assistance of a cart.  Occasionally uses a front wheel walker.  Has noticed that she has been slightly more short of breath over the last year.  At times will notice that she has to take a deep breath or stop to catch her breath.  This is not happening all the time.  Seems to resolve with rest almost immediately.  Further admits that she has been less active over the year and since losing her  1 year ago she has gained about 20 pounds.  She has no exertional chest discomfort.  No PND, orthopnea, worsening edema, palpitations, problems bleeding, TIA or strokelike symptoms, claudication, myalgias, near-syncope or syncope.    Cardiology Problem List:      Last updated by MARGARETTE Downs Dr.     Coronary artery disease, mild nonobstructive  -Cardiac catheterization 2011 demonstrating mild CAD with a 30% mid RCA lesion    Intermittent LBBB   - Holter 2011; no evidence of high degree AVB but intermittent LBBB, no arrhythmias    Risk factors:  HTN  Type 2 Diabetes    CARDIOVASCULAR PROCEDURES:  - Echo 10/2018; EF 56%, no WMA, mild LVH, grade I dd, mild AS with NASRA 1.8 cm2, mild pHTN with PA 43 mmHg  - Lexiscan  10/2018; no ischemia, normal wall motion  - Cath (EF 0.60 (60%), LM minor calcification; RCA 30% mid) - 9/1/2011  - Lexiscan 2011;   - ETT - 5/19/2011    Past Medical History:   Diagnosis Date    Benign essential hypertension 11/24/2017    Cataract of left eye     Chronic obstructive lung disease (CMS/HCC) (HCC) 11/10/2017    Dysrhythmia     LBBB    Family history of anesthesia complication     GERD (gastroesophageal reflux disease)     Hypertension     Hypothyroid     Hypothyroidism 11/10/2017    Injury of back     BENDING INJURY    Macular degeneration     Neurologic disorder     Obesity 11/10/2017    Obstructive sleep apnea syndrome 11/10/2017    Night time oxygen/2L    Osteoarthritis     Periodic limb movement disorder 11/10/2017    Peripheral neuropathy     left thigh into left groin     Rectocele     Respiratory disease        Past Surgical History:   Procedure Laterality Date    APPENDECTOMY  1987    BACK SURGERY      2 surgeries [id number]    BACK SURGERY  11/30/2016    BACK SURGERY  05/01/2017    vetebral fusion    CARDIAC CATHETERIZATION  08/31/2011    CATARACT EXTRACTION W/  INTRAOCULAR LENS IMPLANT Left 7/2/2018    Procedure: OS CATARACT LEFT PHACOEMULSIFICATION OF CATARACT WITH INTRAOCULAR LENS;  Surgeon: Emmanuel Cueto MD;  Location: [facility];  Service: Ophthalmology;  Laterality: Left;    CATARACT EXTRACTION W/  INTRAOCULAR LENS IMPLANT Right 8/6/2018    Procedure: OD CATARACT RIGHT PHACOEMULSIFICATION OF CATARACT WITH INTRAOCULAR LENS;  Surgeon: Emmanuel Cueto MD;  Location: [facility];  Service: Ophthalmology;  Laterality: Right;    CHOLECYSTECTOMY      COLONOSCOPY  02/19/2014    No polyps    COLONOSCOPY  04/26/2011    DIAGNOSTIC LAPAROSCOPY N/A 11/28/2018    Procedure: Laparoscopic band removal;  Surgeon: Chris Millan MD;  Location: [facility];  Service: General;  Laterality: N/A;    ESOPHAGOGASTRODUODENOSCOPY N/A 11/6/2018    Procedure: EGD -  ESOPHAGOGASTRODUODENOSCOPY;  Surgeon: Chris Millan MD;  Location: [facility];  Service: Endoscopy;  Laterality: N/A;    FOOT SURGERY      11 surgeries for [place] Neuroma [id number]    GASTRECTOMY N/A 8/14/2019    Procedure: Laparoscopic sleeve Gastrectomy;  Surgeon: Chris Khalil MD;  Location: [facility] OR;  Service: General;  Laterality: N/A;    HYSTERECTOMY  1987    Total    LAPAROSCOPIC GASTRIC BANDING      03/27/2014-standard Ap    OTHER SURGICAL HISTORY      General surgery: Lt rotator cuff 2002    OTHER SURGICAL HISTORY  12/01/2014    Right facial skin cancer    OTHER SURGICAL HISTORY  1987    Ob gyn surgery:Bladder tuck    SHOULDER ARTHROSCOPY  08/02/2013    Dr. Vela- shoulder    SPINAL STIMULATOR IMPLANT N/A 3/23/2021    Procedure: PERMANENT SPINAL CORD STIMULATOR IMPLANTATION;  Surgeon: Bernardo Gonzalez MD;  Location: [facility] OR;  Service: Pain Management;  Laterality: N/A;    THYROID LOBECTOMY Left 18/18/2010    THYROIDECTOMY, PARTIAL  08/18/2010    UPPER GASTROINTESTINAL ENDOSCOPY  02/19/2014    Mild chronic gastritis       Allergies as of 12/12/2022 - Reviewed 12/12/2022   Allergen Reaction Noted    [place] Angioedema and Hives 06/20/2013    Adhesive  03/27/2014       Current Outpatient Medications   Medication Sig Dispense Refill    estrogens, conjugated, (Premarin) 0.9 mg tablet Take 1 tablet (0.9 mg total) by mouth once a week 4 tablet 0    levothyroxine (SYNTHROID, LEVOTHROID) 112 mcg tablet TAKE 1 TABLET BY MOUTH  DAILY 90 tablet 3    atorvastatin (LIPITOR) 20 mg tablet TAKE 1 TABLET BY MOUTH  DAILY 90 tablet 3    losartan (COZAAR) 50 mg tablet Take 1 tablet (50 mg total) by mouth daily 90 tablet 1    atenoloL (TENORMIN) 25 mg tablet Take 1 tablet (25 mg total) by mouth daily 90 tablet 1    omeprazole (PriLOSEC) 20 mg capsule TAKE 1 CAPSULE BY MOUTH  DAILY (Patient taking differently: Take 20 mg by mouth daily as needed) 90 capsule 3    multivitamin (THERAGRAN) tablet tablet Take 1 tablet by  mouth daily      furosemide (LASIX) 20 mg tablet TAKE 1 TABLET BY MOUTH  DAILY (Patient taking differently: Take 20 mg by mouth daily as needed Indications: visible water retention) 90 tablet 3    potassium chloride 10 mEq CR tablet TAKE 1 TABLET BY MOUTH  DAILY 90 tablet 3    gabapentin (NEURONTIN) 300 mg capsule Take 900 mg by mouth 3 (three) times a day      calcium carbonate-vitamin D3 600 mg(1,500mg) -400 unit per tablet Take 1 tablet by mouth daily      nitroglycerin (NITROSTAT) 0.4 mg SL tablet Place 1 tablet (0.4 mg total) under the tongue every 5 (five) minutes as needed for chest pain 25 tablet 1    vit A-vit C-vit E-zinc-copper 7,160-113-100 unit-mg-unit tablet Take 1 tab/cap by mouth 2 (two) times a day.      denosumab (PROLIA) 60 mg/mL syringe syringe Inject 1 mL (60 mg total) under the skin once for 1 dose Medication is being administered as part of a therapy plan. Review patient's therapy plan and chart for previous doses/dates of administration and schedule of future doses/dates of administration. 1 mL 0     No current facility-administered medications for this visit.       Family History   Problem Relation Age of Onset    Heart disease Mother     Heart disease Father         Myocardial infarction    Parkinsonism Father     Thyroid disease Father     Alzheimer's disease Sister     Arthritis Sister     Osteoporosis Sister     Lung cancer Brother     Diabetes Maternal Grandmother        Social History     Tobacco Use    Smoking status: Former     Packs/day: 2.00     Years: 35.00     Pack years: 70.00     Types: Cigarettes     Quit date:      Years since quittin.9    Smokeless tobacco: Never   Vaping Use    Vaping Use: Never used   Substance Use Topics    Alcohol use: No    Drug use: No       Review of Systems   Cardiovascular:  Positive for dyspnea on exertion.   Musculoskeletal:  Positive for back pain.   Neurological:  Positive for numbness and sensory change.   All other systems reviewed  and are negative.    Objective     Vitals:    12/12/22 1413   BP: 158/68   Pulse: 69   SpO2: 96%     Weight: 83.5 kg (184 lb)  Physical Exam    Constitutional: Well built, nourished, no acute distress.   HEENT: Head is normocephalic and atraumatic. Sclera anicteric.   Neck: Supple. No carotid bruits.  No JVD.  Lungs: Effort normal. Breath sounds are clear without wheezes or rales. No respiratory distress.   Heart: S1-S2, Regular rate and rhythm without murmur.   Extremities: Without any cyanosis, clubbing, lesions or edema. Radial pulses 2+.  Musculoskeletal: Normal ROM.    Neurologic: No focal deficits.  Psychiartic: cooperative, alert and orientated X 3.  Skin: warm, dry, intact.      Data Review:   Sodium   Date Value Ref Range Status   06/12/2022 141 135 - 145 mmol/L Final     Potassium   Date Value Ref Range Status   06/12/2022 4.1 3.5 - 5.1 MMOL/L Final     Chloride   Date Value Ref Range Status   06/12/2022 107 98 - 107 mmol/L Final     CO2   Date Value Ref Range Status   06/12/2022 29 21 - 32 mmol/L Final     BUN   Date Value Ref Range Status   06/12/2022 22 7 - 25 mg/dL Final     Creatinine   Date Value Ref Range Status   06/12/2022 1.04 0.60 - 1.10 mg/dL Final     Glucose   Date Value Ref Range Status   06/12/2022 102 70 - 105 mg/dL Final     Calcium   Date Value Ref Range Status   06/12/2022 9.3 8.6 - 10.3 mg/dL Final     hsTnI 0 Hour   Date Value Ref Range Status   06/12/2022 3.6 <=14.9 pg/mL Final     WBC   Date Value Ref Range Status   06/12/2022 9.3 4.5 - 10.5 10*3/uL Final     Hemoglobin   Date Value Ref Range Status   06/12/2022 14.9 11.5 - 15.5 g/dL Final     Hematocrit   Date Value Ref Range Status   06/12/2022 44.3 34.0 - 45.0 % Final     MCV   Date Value Ref Range Status   06/12/2022 90.2 81.0 - 97.0 fL Final     Platelets   Date Value Ref Range Status   06/12/2022 222 140 - 350 10*3/uL Final     Cholesterol   Date Value Ref Range Status   11/02/2022 130 0 - 199 mg/dL Final     Triglycerides    Date Value Ref Range Status   11/02/2022 117 <=149 mg/dL Final     HDL   Date Value Ref Range Status   11/02/2022 43 >=40 mg/dL Final     LDL Calculated   Date Value Ref Range Status   11/02/2022 64 20 - 99 mg/dL Final     Lab Results   Component Value Date    TSH 3.062 10/24/2022       Assessment/Plan   Diagnoses and all orders for this visit:    Preoperative cardiovascular examination    Benign essential hypertension    Nonrheumatic aortic valve stenosis  -     US Echo complete; Future      Katy has a history of aortic valve stenosis, hypertension and hyperlipidemia.  Most recent echocardiogram from May 2021 reviewed.  She had normal LV SF with an EF 74%, aortic valve area 1.7 cm², mean gradient 13 mmHg, peak velocity 2.22 m/s.  Slightly hypertensive today, but reports that she just came from the dermatology office where they did a lesion removal.  She is euvolemic on exam, no evidence of fluid overload.  Does complain of shortness of breath at times, but suspect that this is due to deconditioning as well as weight gain.  Able to ambulate greater than 4 METS.  I would recommend repeating her echocardiogram in May to document valve stability.  Should be able to proceed with upcoming surgery without any further testing warranted at this time.  She will have labs and EKG with her PCP at the beginning of the month.    There are no Patient Instructions on file for this visit.    No follow-ups on file.    The patient verbalized correct understanding and agreement to today's instructions and plan.  The patient verbalized that all questions have been addressed.    On this date of service 22 minutes of total time was spent on this encounter.    Electronically signed by: CHAPARRO Downs  12/12/2022  2:41 PM    A voice recognition program was used to aid in documentation of this record. Sometimes words are not printed exactly as they were spoken. While efforts were made to carefully edit and correct any inaccuracies,  some errors may be present; please take these into context. Please contact the provider if errors are identified.

## 2023-01-05 ENCOUNTER — OFFICE VISIT (OUTPATIENT)
Dept: FAMILY MEDICINE | Facility: CLINIC | Age: 83
End: 2023-01-05
Payer: MEDICARE

## 2023-01-05 ENCOUNTER — APPOINTMENT (OUTPATIENT)
Dept: CARDIOLOGY | Facility: CLINIC | Age: 83
End: 2023-01-05
Payer: MEDICARE

## 2023-01-05 ENCOUNTER — LAB (OUTPATIENT)
Dept: LAB | Facility: CLINIC | Age: 83
End: 2023-01-05
Payer: MEDICARE

## 2023-01-05 VITALS
HEIGHT: 60 IN | WEIGHT: 182 LBS | BODY MASS INDEX: 35.73 KG/M2 | HEART RATE: 72 BPM | OXYGEN SATURATION: 95 % | DIASTOLIC BLOOD PRESSURE: 66 MMHG | TEMPERATURE: 98.4 F | SYSTOLIC BLOOD PRESSURE: 126 MMHG

## 2023-01-05 DIAGNOSIS — I35.0 NONRHEUMATIC AORTIC VALVE STENOSIS: ICD-10-CM

## 2023-01-05 DIAGNOSIS — Z01.818 ENCOUNTER FOR PRE-OPERATIVE EXAMINATION: ICD-10-CM

## 2023-01-05 DIAGNOSIS — R35.0 URINARY FREQUENCY: ICD-10-CM

## 2023-01-05 DIAGNOSIS — I10 BENIGN ESSENTIAL HYPERTENSION: ICD-10-CM

## 2023-01-05 DIAGNOSIS — I27.20 PULMONARY HYPERTENSION (CMS/HCC): ICD-10-CM

## 2023-01-05 DIAGNOSIS — Z01.810 PREOPERATIVE CARDIOVASCULAR EXAMINATION: Primary | ICD-10-CM

## 2023-01-05 DIAGNOSIS — G47.33 OBSTRUCTIVE SLEEP APNEA SYNDROME: ICD-10-CM

## 2023-01-05 LAB
ALBUMIN SERPL-MCNC: 4 G/DL (ref 3.5–5.3)
ALP SERPL-CCNC: 56 U/L (ref 55–142)
ALT SERPL-CCNC: 10 U/L (ref 7–52)
ANION GAP SERPL CALC-SCNC: 5 MMOL/L (ref 3–11)
AST SERPL-CCNC: 15 U/L
BASOPHILS # BLD AUTO: 0 10*3/UL
BASOPHILS NFR BLD AUTO: 0.5 % (ref 0–2)
BILIRUB SERPL-MCNC: 0.5 MG/DL (ref 0.2–1.4)
BUN SERPL-MCNC: 16 MG/DL (ref 7–25)
CALCIUM ALBUM COR SERPL-MCNC: 9.2 MG/DL (ref 8.6–10.3)
CALCIUM SERPL-MCNC: 9.2 MG/DL (ref 8.6–10.3)
CHLORIDE SERPL-SCNC: 107 MMOL/L (ref 98–107)
CO2 SERPL-SCNC: 27 MMOL/L (ref 21–32)
CREAT SERPL-MCNC: 1.05 MG/DL (ref 0.6–1.1)
EOSINOPHIL # BLD AUTO: 0.3 10*3/UL
EOSINOPHIL NFR BLD AUTO: 3.7 % (ref 0–3)
ERYTHROCYTE [DISTWIDTH] IN BLOOD BY AUTOMATED COUNT: 14.5 % (ref 11.5–14)
GFR SERPL CREATININE-BSD FRML MDRD: 53 ML/MIN/1.73M*2
GLUCOSE SERPL-MCNC: 95 MG/DL (ref 70–105)
HCT VFR BLD AUTO: 44.4 % (ref 34–45)
HGB BLD-MCNC: 14.7 G/DL (ref 11.5–15.5)
LYMPHOCYTES # BLD AUTO: 3.3 10*3/UL
LYMPHOCYTES NFR BLD AUTO: 39.9 % (ref 11–47)
MCH RBC QN AUTO: 30.2 PG (ref 28–33)
MCHC RBC AUTO-ENTMCNC: 33.1 G/DL (ref 32–36)
MCV RBC AUTO: 91.4 FL (ref 81–97)
MONOCYTES # BLD AUTO: 0.5 10*3/UL
MONOCYTES NFR BLD AUTO: 5.9 % (ref 3–11)
NEUTROPHILS # BLD AUTO: 4.1 10*3/UL
NEUTROPHILS NFR BLD AUTO: 50 % (ref 41–81)
PLATELET # BLD AUTO: 250 10*3/UL (ref 140–350)
PMV BLD AUTO: 8.5 FL (ref 6.9–10.8)
POTASSIUM SERPL-SCNC: 4.4 MMOL/L (ref 3.5–5.1)
PROT SERPL-MCNC: 6.9 G/DL (ref 6–8.3)
RBC # BLD AUTO: 4.86 10*6/ΜL (ref 3.7–5.3)
SODIUM SERPL-SCNC: 139 MMOL/L (ref 135–145)
WBC # BLD AUTO: 8.3 10*3/UL (ref 4.5–10.5)

## 2023-01-05 PROCEDURE — 99214 OFFICE O/P EST MOD 30 MIN: CPT | Performed by: FAMILY MEDICINE

## 2023-01-05 PROCEDURE — 80053 COMPREHEN METABOLIC PANEL: CPT

## 2023-01-05 PROCEDURE — 36415 COLL VENOUS BLD VENIPUNCTURE: CPT

## 2023-01-05 PROCEDURE — 87088 URINE BACTERIA CULTURE: CPT | Performed by: FAMILY MEDICINE

## 2023-01-05 PROCEDURE — G0463 HOSPITAL OUTPT CLINIC VISIT: HCPCS | Performed by: FAMILY MEDICINE

## 2023-01-05 PROCEDURE — 93010 ELECTROCARDIOGRAM REPORT: CPT | Performed by: STUDENT IN AN ORGANIZED HEALTH CARE EDUCATION/TRAINING PROGRAM

## 2023-01-05 PROCEDURE — 93005 ELECTROCARDIOGRAM TRACING: CPT | Performed by: FAMILY MEDICINE

## 2023-01-05 PROCEDURE — 85025 COMPLETE CBC W/AUTO DIFF WBC: CPT

## 2023-01-05 ASSESSMENT — ENCOUNTER SYMPTOMS
DIAPHORESIS: 0
RHINORRHEA: 1
BACK PAIN: 1
CHILLS: 0
WHEEZING: 0
TROUBLE SWALLOWING: 0
DYSPHORIC MOOD: 0
JOINT SWELLING: 0
UNEXPECTED WEIGHT CHANGE: 0
ROS GI COMMENTS: GERD
NERVOUS/ANXIOUS: 0
VOICE CHANGE: 0
FATIGUE: 0
COUGH: 0
HEADACHES: 0
BLOOD IN STOOL: 0
ARTHRALGIAS: 1
POLYDIPSIA: 0
NUMBNESS: 1
FREQUENCY: 0
DYSURIA: 0
DIFFICULTY URINATING: 0
PALPITATIONS: 0
EYE REDNESS: 0
SINUS PRESSURE: 0
DIARRHEA: 0
ADENOPATHY: 0
APPETITE CHANGE: 0
SHORTNESS OF BREATH: 0
MYALGIAS: 0
BRUISES/BLEEDS EASILY: 1
EYE DISCHARGE: 0
HEMATURIA: 0
ABDOMINAL PAIN: 0
VOMITING: 0
SLEEP DISTURBANCE: 0
SORE THROAT: 0
NAUSEA: 0
APNEA: 1
EYE PAIN: 0
WEAKNESS: 1
CONSTIPATION: 0
FEVER: 0
CHEST TIGHTNESS: 0
DIZZINESS: 0
WOUND: 0

## 2023-01-05 ASSESSMENT — PAIN SCALES - GENERAL: PAINLEVEL: 8

## 2023-01-05 ASSESSMENT — DUKE ACTIVITY SCORE INDEX (DASI): DASI METS SCORE: 4.73

## 2023-01-05 NOTE — PROGRESS NOTES
Subjective      Chief Complaint   Patient presents with    Pre-op Exam     Pre op for lumbar fusion on 1/30/23 with Will Lua. No concerns today. Review labs. Does she need EKG? Had cardiac clearance on 12/12.        Katy Stack is a 82 y.o. female and is here for a pre-operative risk assessment. Pre-op Exam (Pre op for lumbar fusion on 1/30/23 with Will Lua. No concerns today. Review labs. Does she need EKG? Had cardiac clearance on 12/12. )    Pt denies any specific concerns or complaints. Pt denies dyspnea on exertion, chest pain, or palpitations.  No hx of MI, CVA, or DVT. She does have a hx of obstructive sleep apnea and uses oxygen nightly. No known hx of complications of surgery or anesthesia.     Patient has a hx of hypertension and is taking atenolol 25mg daily, losartan 50mg daily, and furosemide 20mg as needed for edema. She is also taking potassium chloride 10mEq daily. Patient has a hx of obstructive sleep apnea and uses 2L O2 by NC nightly.      Patient has a hx of hyperlipidemia and is taking atorvastatin 20mg daily. No concerns at this time.     Patient has a hx of hypothyroidism and is taking levothyroxine 125mcg daily. No concerns at this time.      Patient has a hx of menopausal flushing and is taking Premarin 0.9mg once weekly. No concerns at this time.      Patient has a hx of chronic back pain and is established with Dr. Gonzalez with hx of spinal stimulator implant placed on 3/23/21. She continues taking gabapentin 600mg three times daily.     Review of Systems   Constitutional:  Negative for appetite change, chills, diaphoresis, fatigue, fever and unexpected weight change.   HENT:  Positive for nosebleeds, rhinorrhea and tinnitus. Negative for congestion, dental problem, ear discharge, ear pain, hearing loss, mouth sores, sinus pressure, sore throat, trouble swallowing and voice change.    Eyes:  Negative for pain, discharge, redness and visual disturbance.   Respiratory:  Positive  for apnea. Negative for cough, chest tightness, shortness of breath and wheezing.    Cardiovascular:  Negative for chest pain, palpitations and leg swelling.   Gastrointestinal:  Negative for abdominal pain, blood in stool, constipation, diarrhea, nausea and vomiting.        GERD   Endocrine: Positive for cold intolerance. Negative for heat intolerance, polydipsia and polyuria.   Genitourinary:  Negative for difficulty urinating, dysuria, frequency and hematuria.   Musculoskeletal:  Positive for arthralgias and back pain. Negative for joint swelling and myalgias.   Skin:  Negative for rash and wound.   Allergic/Immunologic: Positive for environmental allergies.   Neurological:  Positive for weakness and numbness. Negative for dizziness and headaches.   Hematological:  Negative for adenopathy. Bruises/bleeds easily.   Psychiatric/Behavioral:  Negative for behavioral problems, dysphoric mood and sleep disturbance. The patient is not nervous/anxious.      The following portions of the patient's history were reviewed and updated as appropriate: allergies, current medications, past family history, past medical history, past social history, past surgical history, and problem list.    Objective   /66 (BP Location: Left arm, Patient Position: Sitting, Cuff Size: Regular Adult)   Pulse 72   Temp 36.9 °C (98.4 °F) (Temporal)   Ht 1.524 m (5')   Wt 82.6 kg (182 lb)   SpO2 95%   BMI 35.54 kg/m²     Physical Exam  Vitals and nursing note reviewed.   Constitutional:       General: She is not in acute distress.     Appearance: Normal appearance. She is well-developed. She is obese. She is not ill-appearing, toxic-appearing or diaphoretic.   HENT:      Head: Normocephalic and atraumatic.      Right Ear: Tympanic membrane, ear canal and external ear normal. There is no impacted cerumen.      Left Ear: Tympanic membrane, ear canal and external ear normal. There is no impacted cerumen.      Nose: Nose normal. No congestion  or rhinorrhea.      Mouth/Throat:      Mouth: Mucous membranes are moist.      Pharynx: Oropharynx is clear. No oropharyngeal exudate or posterior oropharyngeal erythema.   Eyes:      General: No scleral icterus.        Right eye: No discharge.         Left eye: No discharge.      Extraocular Movements: Extraocular movements intact.      Conjunctiva/sclera: Conjunctivae normal.      Pupils: Pupils are equal, round, and reactive to light.      Comments: Corrected vision   Neck:      Thyroid: No thyromegaly.      Vascular: No carotid bruit.      Trachea: No tracheal deviation.   Cardiovascular:      Rate and Rhythm: Normal rate and regular rhythm.      Pulses: Normal pulses.      Heart sounds: Normal heart sounds. No murmur heard.    No friction rub. No gallop.   Pulmonary:      Effort: Pulmonary effort is normal. No respiratory distress.      Breath sounds: Normal breath sounds. No stridor. No wheezing, rhonchi or rales.   Chest:      Chest wall: No tenderness.   Abdominal:      General: Bowel sounds are normal. There is no distension.      Palpations: Abdomen is soft. There is no mass.      Tenderness: There is no abdominal tenderness. There is no guarding or rebound.      Hernia: No hernia is present.   Musculoskeletal:         General: No tenderness or signs of injury.      Cervical back: Neck supple.      Right lower leg: No edema.      Left lower leg: No edema.   Lymphadenopathy:      Cervical: No cervical adenopathy.   Skin:     General: Skin is warm and dry.      Coloration: Skin is not pale.      Findings: No erythema or rash.   Neurological:      Mental Status: She is alert and oriented to person, place, and time.      Gait: Gait normal.      Deep Tendon Reflexes: Reflexes are normal and symmetric. Reflexes normal.   Psychiatric:         Mood and Affect: Mood normal.        Results for orders placed or performed in visit on 01/05/23   Urine culture    Specimen: Urine, Clean Catch   Result Value Ref Range     Urine Culture Culture in progress         Assessment/Plan          Diagnoses and all orders for this visit:    Preoperative cardiovascular examination  -     Urine culture  -     ECG 12 lead -Normal, Today    Urinary frequency  -     Urine culture    Nonrheumatic aortic valve stenosis    Benign essential hypertension    Pulmonary hypertension (CMS/HCC) (HCC)    Obstructive sleep apnea syndrome          1. Revised Cardiac Risk Index: 0 (3.9% risk)     2 . Post Op Respiratory Failure Risk: 1.42%    3. [facility] Activity Status Index: METS: 4.73    4. EKG: Sinus rhythm, left atrial enlargement, left bundle branch block (chronic); abnormal EKG.    5. CBC/CMP: Normal    6. No further risk stratification needed prior to surgery. Her postop respiratory failure risk is likely an underestimate given her obstructive sleep apnea and obesity.  Given her aortic stenosis would recommend judicious use of fluids and close postop respiratory evaluation and management.      Bronson Travis MD     Portions of this note were documented by Nneka Arita as I performed the exam and collected the information from Katy Stack. I attest that I have reviewed the information as documented, verified the accuracy of the documentation and added additional information as needed.

## 2023-01-07 LAB — BACTERIA UR CULT: ABNORMAL

## 2023-01-26 ENCOUNTER — ANCILLARY PROCEDURE (OUTPATIENT)
Dept: CARDIOLOGY | Facility: CLINIC | Age: 83
End: 2023-01-26
Payer: MEDICARE

## 2023-01-26 VITALS
BODY MASS INDEX: 35.73 KG/M2 | WEIGHT: 182 LBS | HEIGHT: 60 IN | DIASTOLIC BLOOD PRESSURE: 74 MMHG | SYSTOLIC BLOOD PRESSURE: 194 MMHG

## 2023-01-26 DIAGNOSIS — Z86.79 HISTORY OF AORTIC VALVE STENOSIS: ICD-10-CM

## 2023-01-26 LAB
ASCENDING AORTA: 2.45 CM
AV LVOT PEAK GRADIENT: 4.3 MMHG
AV MEAN GRADIENT: 11.68 MMHG
AV PEAK GRADIENT: 18.84 MMHG
BSA FOR ECHO PROCEDURE: 1.87 M2
DOP CALC AO PEAK VEL: 2.17 M/S
DOP CALC AO VTI: 61.3 CM
DOP CALC LVOT DIAMETER: 1.86 CM
DOP CALC LVOT STROKE VOLUME: 81 CM3
DOP CALC MV VTI: 31.55 CM
DOP CALC RVOT PEAK VEL: 0.9 M/S
DOP CALCLVOT PEAK VEL VTI: 29.9 CM
E/A RATIO: 0.8
E/E' RATIO (AVERAGE): 15.5
E/E' RATIO: 15.1
ECHO EF ESTIMATED: 65 %
EJECTION FRACTION: 65 %
ERAP: 5 MMHG
INTERVENTRICULAR SEPTUM: 0.9 CM (ref 0.6–1.1)
IVC PROX: 2.14 CM
LA AREA A4C SYSTOLE: 77.3 CM3
LEFT ATRIUM SIZE: 3.17 CM
LEFT ATRIUM VOLUME INDEX: 41 ML/M2
LEFT ATRIUM VOLUME: 73.2 CM3
LEFT INTERNAL DIMENSION IN SYSTOLE: 2.4 CM (ref 2.49–3.76)
LEFT VENTRICLE DIASTOLIC VOLUME: 71 CM3
LEFT VENTRICLE SYSTOLIC VOLUME: 25 CM3
LEFT VENTRICULAR INTERNAL DIMENSION IN DIASTOLE: 4 CM (ref 4.2–5.83)
LVAD-AP2: 24.9 CM2
MV DEC SLOPE: 5920 MM/S2
MV DT: 201 MS
MV MEAN GRADIENT: 2.4 MMHG
MV PEAK A VEL: 128 CM/S
MV PEAK E VEL: 96.9 CM/S
MV PEAK GRADIENT: 7 MMHG
MV STENOSIS PRESSURE HALF TIME: 55 MS
MV VALVE AREA P 1/2 METHOD: 4 CM2
MV VMAX: 128 CM/S
MVA (VTI): 2.58 CM2
PADP: 3.1 MMHG
POSTERIOR WALL: 0.8 CM (ref 0.6–1.1)
PR END VMAX: 88 CM/S
PULM VEIN S/D RATIO: 1.3
PV PEAK D VEL: 41 CM/S
PV PEAK GRADIENT: 4.49 MMHG
PV PEAK S VEL: 53 CM/S
PV VMAX: 1.06 M/S
RA AREA: 11.5 CM2
RH CV ECHO AV VALVE AREA VEL: 1.3 CM2
RH CV ECHO AV VALVE AREA VTI: 1.3 CM2
RH LVOT PEAK VELOCITY REST: 1 M/S
RIGHT VENTRICULAR INTERNAL DIMENSION IN DIASTOLE: 3.2 CM
RV AP4 BASE: 3.2 CM
S': 10.7 CM/S
TDI: 6.4 CM/S
TDILATERAL: 6.1 CM/S
TR MAX PG: 33.87 MMHG
TRICUSPID ANNULAR PLANE SYSTOLIC EXCURSION: 2.2 CM
TRICUSPID VALVE PEAK REGURGITATION VELOCITY: 2.91 M/S
TV REST PULMONARY ARTERY PRESSURE: 39 MMHG
Z-SCORE OF LEFT VENTRICULAR DIMENSION IN END DIASTOLE: -2.13
Z-SCORE OF LEFT VENTRICULAR DIMENSION IN END SYSTOLE: -1.92

## 2023-01-26 PROCEDURE — 93306 TTE W/DOPPLER COMPLETE: CPT | Mod: 26 | Performed by: INTERNAL MEDICINE

## 2023-01-26 PROCEDURE — 93306 TTE W/DOPPLER COMPLETE: CPT

## 2023-02-19 DIAGNOSIS — Z98.84 S/P LAPAROSCOPIC SLEEVE GASTRECTOMY: ICD-10-CM

## 2023-02-19 DIAGNOSIS — K21.9 GASTROESOPHAGEAL REFLUX DISEASE: ICD-10-CM

## 2023-02-20 RX ORDER — OMEPRAZOLE 20 MG/1
CAPSULE, DELAYED RELEASE ORAL
Qty: 90 CAPSULE | Refills: 3 | Status: SHIPPED | OUTPATIENT
Start: 2023-02-20

## 2023-02-25 DIAGNOSIS — I10 ESSENTIAL HYPERTENSION: ICD-10-CM

## 2023-02-26 RX ORDER — ATENOLOL 25 MG/1
25 TABLET ORAL DAILY
Qty: 90 TABLET | Refills: 3 | Status: SHIPPED | OUTPATIENT
Start: 2023-02-26 | End: 2024-01-27

## 2023-02-26 RX ORDER — LOSARTAN POTASSIUM 50 MG/1
TABLET ORAL
Qty: 90 TABLET | Refills: 1 | Status: SHIPPED | OUTPATIENT
Start: 2023-02-26 | End: 2023-08-12

## 2023-02-26 NOTE — TELEPHONE ENCOUNTER
Medication refill request:  Medication(s):  losartan not filled due to (route to Nurse Pool) Lab(s) abnormal or delinquent > 90 days

## 2023-02-26 NOTE — TELEPHONE ENCOUNTER
No new care gaps identified.  [facility] Embedded Care Gaps. Reference number: [id number]. 2/25/2023 8:52:13 PM MST

## 2023-03-06 ENCOUNTER — CLINICAL SUPPORT (OUTPATIENT)
Dept: FAMILY MEDICINE | Facility: CLINIC | Age: 83
End: 2023-03-06
Payer: MEDICARE

## 2023-03-06 DIAGNOSIS — M81.0 AGE-RELATED OSTEOPOROSIS WITHOUT CURRENT PATHOLOGICAL FRACTURE: Primary | ICD-10-CM

## 2023-03-06 PROCEDURE — 6360000200 HC RX 636 W HCPCS (ALT 250 FOR IP): Mod: TB | Performed by: FAMILY MEDICINE

## 2023-03-06 PROCEDURE — 96372 THER/PROPH/DIAG INJ SC/IM: CPT | Performed by: FAMILY MEDICINE

## 2023-03-06 RX ORDER — SODIUM CHLORIDE 9 MG/ML
0-999 INJECTION, SOLUTION INTRAVENOUS CONTINUOUS PRN
Status: DISCONTINUED | OUTPATIENT
Start: 2023-03-06 | End: 2023-03-06 | Stop reason: HOSPADM

## 2023-03-06 RX ORDER — ACETAMINOPHEN 500 MG
500 TABLET ORAL AS NEEDED
Status: CANCELLED | OUTPATIENT
Start: 2023-03-12

## 2023-03-06 RX ORDER — FAMOTIDINE 10 MG/ML
20 INJECTION INTRAVENOUS AS NEEDED
Status: CANCELLED | OUTPATIENT
Start: 2023-03-12

## 2023-03-06 RX ORDER — EPINEPHRINE 1 MG/ML
0.3 INJECTION INTRAMUSCULAR; INTRAVENOUS; SUBCUTANEOUS AS NEEDED
Status: CANCELLED | OUTPATIENT
Start: 2023-03-12

## 2023-03-06 RX ORDER — DIPHENHYDRAMINE HYDROCHLORIDE 50 MG/ML
25-50 INJECTION INTRAMUSCULAR; INTRAVENOUS AS NEEDED
Status: CANCELLED | OUTPATIENT
Start: 2023-03-12

## 2023-03-06 RX ORDER — ALBUTEROL SULFATE 0.83 MG/ML
2.5 SOLUTION RESPIRATORY (INHALATION) AS NEEDED
Status: CANCELLED | OUTPATIENT
Start: 2023-03-12

## 2023-03-06 RX ORDER — FAMOTIDINE 10 MG/ML
20 INJECTION INTRAVENOUS AS NEEDED
Status: DISCONTINUED | OUTPATIENT
Start: 2023-03-06 | End: 2023-03-06 | Stop reason: HOSPADM

## 2023-03-06 RX ORDER — SODIUM CHLORIDE 9 MG/ML
0-999 INJECTION, SOLUTION INTRAVENOUS CONTINUOUS PRN
Status: CANCELLED | OUTPATIENT
Start: 2023-03-12

## 2023-03-06 RX ORDER — EPINEPHRINE 1 MG/ML
0.3 INJECTION INTRAMUSCULAR; INTRAVENOUS; SUBCUTANEOUS AS NEEDED
Status: DISCONTINUED | OUTPATIENT
Start: 2023-03-06 | End: 2023-03-06 | Stop reason: HOSPADM

## 2023-03-06 RX ORDER — ACETAMINOPHEN 500 MG
500 TABLET ORAL AS NEEDED
Status: DISCONTINUED | OUTPATIENT
Start: 2023-03-06 | End: 2023-03-06 | Stop reason: HOSPADM

## 2023-03-06 RX ORDER — DIPHENHYDRAMINE HYDROCHLORIDE 50 MG/ML
25-50 INJECTION INTRAMUSCULAR; INTRAVENOUS AS NEEDED
Status: DISCONTINUED | OUTPATIENT
Start: 2023-03-06 | End: 2023-03-06 | Stop reason: HOSPADM

## 2023-03-06 RX ORDER — ALBUTEROL SULFATE 0.83 MG/ML
2.5 SOLUTION RESPIRATORY (INHALATION) AS NEEDED
Status: DISCONTINUED | OUTPATIENT
Start: 2023-03-06 | End: 2023-03-06 | Stop reason: HOSPADM

## 2023-03-06 RX ADMIN — DENOSUMAB 60 MG: 60 INJECTION SUBCUTANEOUS at 09:43

## 2023-03-06 NOTE — PROGRESS NOTES
Prolia Injection   Number of injections/year injection started: 2020  DXA: 4/7/21  T-score: -2.4 LFN (-2.6 LFN 2020)  FRAX: 5.9% HIP, 17.7% Major   There was no significant change in the BMD compared to the 2020 DEXA.     Prolia 60mg/1ml was injected subcutaneously in back of right upper arm, she tolerated injection well.   Possible side effects discussed INCLUDING ANY DENTAL CONCERNS   Compliance with calcium and vitamin D discussed   Push fluids for the next 24 hours.  Any recent falls/fractures discussed  Katy was advised to have follow up DXA per provider and scheduled if needed at this time.  Katy verbalized an understanding of the above discussions, she voiced no concerns at this time.    Follow up DXA scheduled for 03/28/2023. Next Prolia injection scheduled with Katy at checkout.

## 2023-03-07 ENCOUNTER — LAB (OUTPATIENT)
Dept: LAB | Facility: CLINIC | Age: 83
End: 2023-03-07
Payer: MEDICARE

## 2023-03-07 DIAGNOSIS — M54.50 LOW BACK PAIN WITHOUT SCIATICA, UNSPECIFIED BACK PAIN LATERALITY, UNSPECIFIED CHRONICITY: ICD-10-CM

## 2023-03-07 DIAGNOSIS — E03.9 HYPOTHYROIDISM, UNSPECIFIED TYPE: Primary | ICD-10-CM

## 2023-03-07 LAB
CREAT SERPL-MCNC: 1.31 MG/DL (ref 0.6–1.1)
GFR SERPL CREATININE-BSD FRML MDRD: 41 ML/MIN/1.73M*2
TSH SERPL DL<=0.05 MIU/L-ACNC: 0.93 UIU/ML (ref 0.34–4.82)

## 2023-03-07 PROCEDURE — 36415 COLL VENOUS BLD VENIPUNCTURE: CPT

## 2023-03-07 PROCEDURE — 84443 ASSAY THYROID STIM HORMONE: CPT

## 2023-03-07 PROCEDURE — 82565 ASSAY OF CREATININE: CPT

## 2023-03-14 ENCOUNTER — HOSPITAL ENCOUNTER (OUTPATIENT)
Dept: MRI IMAGING | Facility: HOSPITAL | Age: 83
End: 2023-03-14
Payer: MEDICARE

## 2023-03-20 ENCOUNTER — HOSPITAL ENCOUNTER (OUTPATIENT)
Dept: WOUND CARE | Facility: HOSPITAL | Age: 83
Discharge: STILL ADMITTED OR ADMINISTRATIVE | End: 2023-03-20
Payer: MEDICARE

## 2023-03-20 PROCEDURE — G0463 HOSPITAL OUTPT CLINIC VISIT: HCPCS

## 2023-03-20 NOTE — INTERDISCIPLINARY/THERAPY
Katy present to wound care clinic for delayed healing of an incision post lumbar surgery. Patient stated that she has had four back surgeries. This is the only time she has had issues with wound healing. Katy is especially frustrated about the four sutures that have not been removed.     Wound History: Katy has a history of severe radiculopathy, claudication, and sagittal imbalance.  She has a history of multiple prior spine surgeries, in addition to a percutaneously placed spinal cord stimulator and battery. 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 On 1/30/2023 Katy underwent removal of the spinal cord stimulator electrodes and battery. And removal of Mark instrumentation. David instrumentation at L2, L3, and L4. There was an irreparable spinal fluid leak noted on the left side, which prevented surgeons from achieving direct decompression. The wound has not healed. Four sutures remain. No dressings are being used.     Occupation, Living and Lifestyle: Katy lives alone.  She states that she quit smoking 25 years ago after 35-year history of 1.5 packs/day.  Katy has been retired for 7 years. She previously worked as a  at [employer]. She enjoys playing [employer] on Thursday afternoons.     Activities Limited by Current Condition: Katy has a weight restriction of 10#.     Current wound assessment: Two open areas are noted to the midline lumbar spine. Wounds are moist, red with fibrinous yellow slough.  Margins are intact and defined. The surrounding skin is intact, dusky red, and scared. The distal suture was removed without incident. Katy tolerated this well. The wound was cleansed and irrigated with commercial wound .  Skin barrier protectant was applied around wounds. Cadexomer iodine gel was placed on Aquacel Hydrofiber. Aquacel Hydrofiber was used to pack the sacral/lumbar cleft. Skin prep was used prior to applying silicone boarder dressing.  Tegaderm transparent film was applied over dressing.     Treatment Plan:   Cleans with soap and water, normal saline, or commercial cleanser.  Wound debridement as needed.   Cadexomer iodine gel and Hydrofiber to wound bed.  Apply skin protective barrier cream to sounding skin.  Sacral/Lumbar cleft packed with Hydrofiber.  Cover with silicone boarder dressing and transparent film.          03/20/23 1300   Wound (LDAs)   Type of Wound (LDA) Incision   Incision 03/20/23 Lower;Medial;Midline Back   Date First Assessed/Time First Assessed: 03/20/23 1323   Pre-Existing Wound: Yes  WCT Slow to Heal: Yes  Location Descriptor: Lower;Medial;Midline  Location: Back  Incision Type: Post-op incision dehiscence   Wound Image    Dressing Status No dressing - open to air   Dressing Changed Yes   Drainage Amount None (no measurable drainage)   Drainage Description Not applicable   Wound Odor None   Wound Cleansing Commercial wound cleanser   Wound Irrigation with 35cc syringe and 18g cannula tip Wound cleanser with preservatives   Wound Irrigation Return Clear   Signs of Infection None   Site Assessment Prior to Treatment Red;Moist;Slough;Yellow;Scabbed;Fibrinous   Margins Attached edges;Defined edges   Surrounding Skin Assessment Intact;Appropriate for ethnicity;Dusky;Red;Indurated (firm);Scarred   Surrounding Skin Treatment Skin protectant   Wound Bed Topical Treatment Cadexomer iodine gel   Wound Packing Hydrofiber   Dressing Silicone Border Foam;Hydrofiber;Transparent film   Date Measured 03/20/23   Wound Length (cm) 4.1 cm   Wound Width (cm) 0.5 cm   Wound Depth (cm) 0.3   Calculated Wound Size (cm^3)  0.62 cm^3   Calculated Wound Size (cm^2) FOR BILLING REASON 2.05 cm^2   Smallest to Largest Wound Range distal 1.5x 0.4x 0.1   Type of Closure Sutures  (4 present)   Number Removed 1   Debridement Performed by RN Yes

## 2023-03-20 NOTE — WCT INITIAL ASSESSMENT
Wound Care Team Initial Evaluation    03/20/23    Patient Care Team:  Bronson Travis MD as PCP - General (Family Medicine)  Chris Khalil MD as Consulting Physician (General Surgery)  Marii Wayne CNP as Nurse Practitioner (General Surgery)    Onset Date: 1/30/2023    Start of Care (SOC) Date: 3/20/2023    Recent Hospitalization: 1/30/2023 for complicated lumbar procedure    Certification Start Date: 03/20/23   Certification End Date: 04/19/23    Physician Order: Evaluate and treat per wound care protocol    Treatment Diagnosis: Disrupted healing of lumbar incision    Wound History: Katy has a history of multiple prior spine surgeries, in addition to a percutaneously placed spinal cord stimulator and battery. On 1/30/2023 Katy underwent removal of the spinal cord stimulator electrodes and battery as well as removal of Mark instrumentation.  The incision is not entirely healed.  Katy is unable to reach the wound area and has not been applying a dressing.  Currently there are 2 open areas and a total of 4 sutures.    Occupation, Living and Lifestyle Assessment: Katy lives alone.  She states that she quit smoking 25 years ago after 35-year history of 1.5 packs/day.  She is retired from being a  at [employer].    Activities Limited by Current Condition: aKty currently has a lifting restriction of 10 pounds.    Treatment included:   Wound was cleansed with commercial wound cleanser.  Protective barrier ointment was applied to the periwound cadexomer iodine gel and Hydrofiber were placed into the wound.  Additional Aquacel was placed over the wound to fill the mid back cleft.  Mepilex border Flex and Mepilex border were placed over the wound area.Tegaderm was used to secure.    Current Assessment:     03/20/23 1300   Wound (LDAs)   Type of Wound (LDA) Incision   Incision 03/20/23 Lower;Medial;Midline Back   Date First Assessed/Time First Assessed: 03/20/23 1323   Pre-Existing Wound:  Yes  WCT Slow to Heal: Yes  Location Descriptor: Lower;Medial;Midline  Location: Back  Incision Type: Post-op incision dehiscence   Wound Image    Dressing Status No dressing - open to air   Dressing Changed Yes   Drainage Amount None (no measurable drainage)   Drainage Description Not applicable   Wound Odor None   Wound Cleansing Commercial wound cleanser   Wound Irrigation with 35cc syringe and 18g cannula tip Wound cleanser with preservatives   Wound Irrigation Return Clear   Signs of Infection None   Site Assessment Prior to Treatment Red;Moist;Slough;Yellow;Scabbed;Fibrinous   Margins Attached edges;Defined edges   Surrounding Skin Assessment Intact;Appropriate for ethnicity;Dusky;Red;Indurated (firm);Scarred   Surrounding Skin Treatment Skin protectant   Wound Bed Topical Treatment Cadexomer iodine gel   Wound Packing Hydrofiber   Dressing Silicone Border Foam;Hydrofiber;Transparent film   Date Measured 03/20/23   Wound Length (cm) 4.1 cm   Wound Width (cm) 0.5 cm   Wound Depth (cm) 0.3   Calculated Wound Size (cm^3)  0.62 cm^3   Calculated Wound Size (cm^2) FOR BILLING REASON 2.05 cm^2   Smallest to Largest Wound Range distal 1.5x 0.4x 0.1   Type of Closure Sutures  (4 present)   Number Removed 1   Debridement Performed by RN Yes     Education:The following education has been completed with patient: Current Wound Status, Disease Process, Dressing Change, and Pain Management.     Goals for Treatment:   Site will be free from signs or symptoms of infection. and There will be no non-viable tissue in the wound bed.    Frequency and Duration of Treatment: The patient will be seen 2 time(s) per week for approximately 30-45 minutes for 4 weeks.    Continued treatment plan will be implemented per wound care protocol, policies, and procedures.    Thank you for allowing us to share in the care of this patient. If you have any questions, comments or concerns regarding this patient, please feel free to contact the  outpatient wound care center.

## 2023-03-23 ENCOUNTER — HOSPITAL ENCOUNTER (OUTPATIENT)
Dept: WOUND CARE | Facility: HOSPITAL | Age: 83
Discharge: STILL ADMITTED OR ADMINISTRATIVE | End: 2023-03-23
Payer: MEDICARE

## 2023-03-23 PROCEDURE — G0463 HOSPITAL OUTPT CLINIC VISIT: HCPCS

## 2023-03-23 NOTE — INTERDISCIPLINARY/THERAPY
Katy presents to wound care clinic with dressing clean. She did have some discomfort with the distal edge of dressing. Patient did lift and roll the distal edge of dressing to make it more comfortable. There is a small irritated area on her left medial buttock. Otherwise, Katy has no pain or concerns related to her wound.       Current wound assessment: Wounds are moist, red with fibrinous yellow slough.  Margins are intact and defined. The surrounding skin is intact, dusky red, and scared. The remain three sutures were removed without incident. Katy tolerated this well. The wound was cleansed with normal saline.   Cadexomer iodine gel was applied to wound bed and covered with Aquacel Hydrofiber. Silicone border dressing was discontinued as there was no drainage noted. Skin prep was used prior to applying hydrocolloid dressing. Tegaderm transparent film was applied over dressing.      Treatment Plan:   Cleans with soap and water, normal saline, or commercial cleanser.  Wound debridement as needed.   Cadexomer iodine gel and Hydrofiber to wound bed.  Skin Prep.  Cover with hydrocolloid dressing and transparent film.   Return to wound care on Monday, 3/27/23     03/23/23 0800   Wound (LDAs)   Type of Wound (LDA) Incision   Incision 03/20/23 Lower;Medial;Midline Back   Date First Assessed/Time First Assessed: 03/20/23 1323   Pre-Existing Wound: Yes  WCT Slow to Heal: Yes  Location Descriptor: Lower;Medial;Midline  Location: Back  Incision Type: Post-op incision dehiscence   Wound Image     Dressing Status Clean   Dressing Changed Yes   Drainage Amount None (no measurable drainage)   Wound Odor None   Wound Cleansing Normal saline   Signs of Infection None   Site Assessment Prior to Treatment Red;Fibrinous;Slough;Yellow;Moist   Margins Attached edges;Defined edges   Surrounding Skin Assessment Intact;Appropriate for ethnicity;Adhesive irritation;Indurated (firm);Red;Scarred   Surrounding Skin Treatment Skin  protectant   Wound Bed Topical Treatment Cadexomer iodine gel   Wound Packing Hydrofiber   Dressing Hydrocolloid;Transparent film   Type of Closure Sutures   Number Removed 3   Debridement Performed by RN No

## 2023-03-27 ENCOUNTER — HOSPITAL ENCOUNTER (OUTPATIENT)
Dept: WOUND CARE | Facility: HOSPITAL | Age: 83
Discharge: STILL ADMITTED OR ADMINISTRATIVE | End: 2023-03-27
Payer: MEDICARE

## 2023-03-27 PROCEDURE — 97597 DBRDMT OPN WND 1ST 20 CM/<: CPT

## 2023-03-28 ENCOUNTER — ANCILLARY PROCEDURE (OUTPATIENT)
Dept: BONE DENSITY | Facility: CLINIC | Age: 83
End: 2023-03-28
Payer: MEDICARE

## 2023-03-28 DIAGNOSIS — M81.0 POST-MENOPAUSAL OSTEOPOROSIS: ICD-10-CM

## 2023-03-28 PROCEDURE — 77091 TBS TECHL CALCULATION ONLY: CPT

## 2023-03-30 ENCOUNTER — HOSPITAL ENCOUNTER (OUTPATIENT)
Dept: WOUND CARE | Facility: HOSPITAL | Age: 83
Discharge: STILL ADMITTED OR ADMINISTRATIVE | End: 2023-03-30
Payer: MEDICARE

## 2023-03-30 PROCEDURE — 97597 DBRDMT OPN WND 1ST 20 CM/<: CPT

## 2023-03-30 NOTE — TREATMENT PLAN
Katy states that she was having an area that was burning and itching distal to the incision, so she did take a scissors and cut the bottom edge of the DuoDERM and Tegaderm that was covering this area.  Dressing was otherwise intact though there was an increase in drainage and there was some strikethrough noted.  There was a moderate amount of serosanguineous drainage that saturated the Aquacel.    Wound bed continues to be moist, fibrinous, with adherent yellow tissue.  There is red, granular tissue along the wound edges.  A curette was used to irritate the wound edges and the adherent fibrin.  Plan of care was changed to endoform collagen packed onto the wound bed followed by Aquacel and DuoDERM thin hydrocolloid.    Katy will return to wound care on Monday, April 3.    Cleanse with Normal Saline or Commercial Wound Cleanser  Conservative Sharp Debridement by Wound Care Team as needed  Endoform collagen  Aquacel Hydrofiber  DuoDERM thin hydrocolloid  Tegaderm to secure  Katy was given extra silicone dressings should her dressing fail       03/30/23 0800   Incision 03/20/23 Lower;Medial;Midline Back   Date First Assessed/Time First Assessed: 03/20/23 1323   Pre-Existing Wound: Yes  WCT Slow to Heal: Yes  Location Descriptor: Lower;Medial;Midline  Location: Back  Incision Type: Post-op incision dehiscence   Dressing Status Intact;Strikethrough   Dressing Changed Yes   Drainage Amount Moderate (25-75% of dressing)   Drainage Description Serosanguineous   Wound Odor None   Wound Cleansing Commercial wound cleanser   Signs of Infection None   Site Assessment Prior to Treatment Red;Fibrinous;Yellow;Moist   Margins Attached edges;Defined edges   Surrounding Skin Assessment Intact;Scarred;Adhesive irritation   Surrounding Skin Treatment Skin protectant   Wound Packing Collagen   Dressing Hydrocolloid;Transparent film   Date Measured 03/27/23   Wound Length (cm) 3 cm   Wound Width (cm) 0.5 cm   Wound Depth (cm) 0.1    Calculated Wound Size (cm^3)  0.15 cm^3   Wound Healing % 75.81   Calculated Wound Size (cm^2) FOR BILLING REASON 1.5 cm^2   Debridement Performed by RN Yes   Selective Debridement by RN Curette   Nonselective Debridement by RN Abrasion   Type of Tissue Removed Fibrin   Total Length Debrided by RN  (cm) 3 cm   Total Width Debrided by RN  (cm) 0.5 cm   Total Depth Debrided by RN (cm) 0.1 cm   Total Area Debrided (cm^2) by RN FOR BILLING 1.5 cm^2   Adherence of Nonviable Tissue Within Wound Firmly adherent   Percentage of Nonviable Tissue Remaining to Site Post Debridement 70-80%   Patient Tolerance During Debridement Well

## 2023-04-02 PROCEDURE — 77092 TBS I&R FX RSK QHP: CPT | Performed by: NURSE PRACTITIONER

## 2023-04-02 PROCEDURE — 77080 DXA BONE DENSITY AXIAL: CPT | Mod: 26 | Performed by: NURSE PRACTITIONER

## 2023-04-03 ENCOUNTER — HOSPITAL ENCOUNTER (OUTPATIENT)
Dept: WOUND CARE | Facility: HOSPITAL | Age: 83
Discharge: STILL ADMITTED OR ADMINISTRATIVE | End: 2023-04-03
Payer: MEDICARE

## 2023-04-03 PROCEDURE — 97597 DBRDMT OPN WND 1ST 20 CM/<: CPT

## 2023-04-03 NOTE — INTERDISCIPLINARY/THERAPY
Katy presents to wound care with a different dressing applied. She has some itching and irritation to the distal incision. She had a neighbor apply a silicone border dressing yesterday. However, she did have to take scissors and cut the bottom edge off.     Dressing was removed. Minimal to small amount of drain on dressing. Wound was cleans with commercial cleanser. This is yellow, adherent fibrin in the base of the wound. A curette was used to irritate wound edge and adherent fibrin. Wound was cleansed again. Skin prep around wound. Collagen packed onto the wound bed follow by Aquacel, DuoDERM thin hydrocolloid, and Tegaderm to secure superior edge.     Epithelium noted to distal wound. Skin prep and DuoDERM thin Hydrocolloid applied to distal wound.      Katy will return to wound care on Thursday, April 6, 2023    Treatment Plan   Cleanse with Normal Saline or Commercial Wound Cleanser  Conservative Sharp Debridement by Wound Care Team as needed  Endoform collagen  Aquacel Hydrofiber  DuoDERM thin hydrocolloid  Tegaderm to secure  Katy was given extra silicone dressings should her dressing fail     04/03/23 1300   Incision 03/20/23 Lower;Medial;Midline Back   Date First Assessed/Time First Assessed: 03/20/23 1323   Pre-Existing Wound: Yes  WCT Slow to Heal: Yes  Location Descriptor: Lower;Medial;Midline  Location: Back  Incision Type: Post-op incision dehiscence   Wound Image     Dressing Status Dressing in place, not as prescribed   Dressing Changed Yes   Drainage Amount Small (25% or less of dressing)   Drainage Description Serosanguineous   Wound Odor None   Wound Cleansing Commercial wound cleanser   Signs of Infection None   Site Assessment Prior to Treatment Red;Fibrinous;Yellow   Margins Attached edges;Defined edges   Surrounding Skin Assessment Intact;Scarred;Adhesive irritation   Surrounding Skin Treatment Skin protectant   Wound Packing Collagen   Dressing Hydrofiber;Hydrocolloid;Transparent  film   Date Measured 04/03/23   Wound Length (cm) 2.1 cm   Wound Width (cm) 0.3 cm   Wound Depth (cm) 0.1   Calculated Wound Size (cm^3)  0.06 cm^3   Wound Healing % 90.32   Calculated Wound Size (cm^2) FOR BILLING REASON 0.63 cm^2   Smallest to Largest Wound Range Distal 0.3x 0.2 x 0.1 cm   Debridement Performed by RN Yes   Selective Debridement by RN Curette   Nonselective Debridement by RN Abrasion   Type of Tissue Removed Fibrin   Total Length Debrided by RN  (cm) 2.1 cm   Total Width Debrided by RN  (cm) 0.3 cm   Total Depth Debrided by RN (cm) 0.1 cm   Total Area Debrided (cm^2) by RN FOR BILLING 0.63 cm^2   Adherence of Nonviable Tissue Within Wound Firmly adherent   Percentage of Nonviable Tissue Remaining to Site Post Debridement 70-80%   Patient Tolerance During Debridement Well

## 2023-04-06 ENCOUNTER — HOSPITAL ENCOUNTER (OUTPATIENT)
Dept: WOUND CARE | Facility: HOSPITAL | Age: 83
Discharge: STILL ADMITTED OR ADMINISTRATIVE | End: 2023-04-06
Payer: MEDICARE

## 2023-04-06 PROCEDURE — G0463 HOSPITAL OUTPT CLINIC VISIT: HCPCS

## 2023-04-06 NOTE — INTERDISCIPLINARY/THERAPY
Katy presents to wound care with dressing intact. Katy is doing well and had no issues with the wound or dressing.     Dressing was removed. Minimal to small amount of drain on dressing. Wound was cleans with commercial cleanser. This is white adherent fibrin in the base of the wound. Unable to debride fibrinous tissue. Silver nitrate was used to non-viable tissue. Wound was cleansed again. Skin prep around wound. Therahoney to wound bed follow by Aquacel hydrofiber, DuoDERM thin hydrocolloid, and Tegaderm to secure superior edge.      There is still a pinpoint area that appear to be open to distal wound. Skin prep and DuoDERM thin Hydrocolloid applied to distal wound.      Katy does have dressings at home if dressing fails. aKty will return to wound care on Monday, April 10, 2023     Treatment Plan   Cleanse with Normal Saline or Commercial Wound Cleanser  Conservative Sharp Debridement by Wound Care Team as needed  Therahoney   Aquacel Hydrofiber  DuoDERM thin hydrocolloid  Tegaderm to secure     04/06/23 0800   Incision 03/20/23 Lower;Medial;Midline Back   Date First Assessed/Time First Assessed: 03/20/23 1323   Pre-Existing Wound: Yes  WCT Slow to Heal: Yes  Location Descriptor: Lower;Medial;Midline  Location: Back  Incision Type: Post-op incision dehiscence   Dressing Status Dry;Intact;Old drainage   Dressing Changed Yes   Drainage Amount Small (25% or less of dressing)   Drainage Description Tan   Wound Odor None   Wound Cleansing Commercial wound cleanser   Signs of Infection None   Site Assessment Prior to Treatment Red;Fibrinous;White   Margins Attached edges;Defined edges   Surrounding Skin Assessment Intact;Scarred   Surrounding Skin Treatment Skin protectant   Wound Bed Topical Treatment Medical grade honey gel;Other (comment)  (AgNO3 to fibrinous tissue)   Wound Packing Hydrofiber   Dressing Hydrocolloid;Transparent film

## 2023-04-10 ENCOUNTER — HOSPITAL ENCOUNTER (OUTPATIENT)
Dept: WOUND CARE | Facility: HOSPITAL | Age: 83
Discharge: STILL ADMITTED OR ADMINISTRATIVE | End: 2023-04-10
Payer: MEDICARE

## 2023-04-10 PROCEDURE — 97597 DBRDMT OPN WND 1ST 20 CM/<: CPT

## 2023-04-10 NOTE — INTERDISCIPLINARY/THERAPY
Katy presents to wound care with a silicone border in place. Her dressing from Thursday stayed in place until last night. Katy noted no drainage on the dressing she removed. Katy does complain is itching to the distal wound.     Katy also complains of pain and burning around her anus. She does appear to have a red rash area around her anus. Recommended skin protectant cream BID. If this does not get better in a couple days, Katy should follow up with her PCP.     Low Mid-line Back   Dressing was removed. No drainage or odor noted.  Wound was cleans with commercial cleanser. There is a significant amount of gray/tan, loosely adherent slough to the wound base. Curette was used to debride wound. The wound does measure bigger today, 3.2 x 0.5 x 0.3 cm. Wound was cleansed again. Skin prep around wound. Therahoney to wound bed follow by Aquacel hydrofiber, DuoDERM thin hydrocolloid, and Tegaderm to secure superior edge.      The distal wound looks as if there is epithilium across the wound. However, the is a slight bump along the distal edge. Skin prep and DuoDERM thin Hydrocolloid applied to distal wound.      Katy was given dressings should her dressing fails. Katy will return to wound care on Thursday, April 13, 2023     Treatment Plan   Cleanse with Normal Saline or Commercial Wound Cleanser  Conservative Sharp Debridement by Wound Care Team as needed  Therahoney   Aquacel Hydrofiber  DuoDERM thin hydrocolloid  Tegaderm to secure     04/10/23 1300   Incision 03/20/23 Lower;Medial;Midline Back   Date First Assessed/Time First Assessed: 03/20/23 1323   Pre-Existing Wound: Yes  WCT Slow to Heal: Yes  Location Descriptor: Lower;Medial;Midline  Location: Back  Incision Type: Post-op incision dehiscence   Wound Image     Dressing Status Dressing in place, not as prescribed;Clean;Dry;Intact   Dressing Changed Yes   Drainage Amount None (no measurable drainage)   Wound Odor None   Wound Cleansing  Commercial wound cleanser   Signs of Infection None   Site Assessment Prior to Treatment Slough;Grey;Tan;Fibrinous;Moist   Margins Attached edges;Defined edges   Surrounding Skin Assessment Intact;Scarred;Pink   Surrounding Skin Treatment Skin protectant   Wound Bed Topical Treatment Medical grade honey gel   Wound Packing Hydrofiber   Dressing Hydrocolloid;Transparent film   Date Measured 04/10/23   Wound Length (cm) 3.2 cm   Wound Width (cm) 0.5 cm   Wound Depth (cm) 0.3   Calculated Wound Size (cm^3)  0.48 cm^3   Wound Healing % 22.58   Calculated Wound Size (cm^2) FOR BILLING REASON 1.6 cm^2   Smallest to Largest Wound Range Distal 0.8x0.3x0.1   Debridement Performed by RN Yes   Selective Debridement by RN Curette   Type of Tissue Removed Fibrin;Slough   Total Length Debrided by RN  (cm) 3.2 cm   Total Width Debrided by RN  (cm) 0.5 cm   Total Depth Debrided by RN (cm) 0.3 cm   Total Area Debrided (cm^2) by RN FOR BILLING 1.6 cm^2   Adherence of Nonviable Tissue Within Wound Loosely adherent   Percentage of Nonviable Tissue Remaining to Site Post Debridement 80-90%   Patient Tolerance During Debridement Well

## 2023-04-13 ENCOUNTER — HOSPITAL ENCOUNTER (OUTPATIENT)
Dept: WOUND CARE | Facility: HOSPITAL | Age: 83
Discharge: STILL ADMITTED OR ADMINISTRATIVE | End: 2023-04-13
Payer: MEDICARE

## 2023-04-13 PROCEDURE — 97597 DBRDMT OPN WND 1ST 20 CM/<: CPT

## 2023-04-13 NOTE — INTERDISCIPLINARY/THERAPY
Katy presents to wound care with dressing intact. She continues to have itching but no pain.      Low Mid-line Back   Dressing was removed. A moderate amount of serosanguinous drainage is note. No odor. Wound was cleans with commercial cleanser. The wound base is red and friable with some loosely adherent white slough/fibrinous tissue present. There is smooth nongranular tissue present. Wound was cleaned with commercial wound cleanser.  Slough was removed with forceps. Wound was cleansed again. Medical grade honey applied to wound base followed by Aquacel  hydrofiber, Mepilex  thin silicone border dressing, and Tegaderm to secure.     Treatment Plan   Cleanse with Normal Saline or Commercial Wound Cleanser  Conservative Sharp Debridement by Wound Care Team as needed  Therahoney   Aquacel Hydrofiber  Silicone boarder dressing  Tegaderm to secure  Katy has dressings should her dressing fails. Katy will return to wound care on Monday, April 17, 2023 04/13/23 0800   Incision 03/20/23 Lower;Medial;Midline Back   Date First Assessed/Time First Assessed: 03/20/23 1323   Pre-Existing Wound: Yes  WCT Slow to Heal: Yes  Location Descriptor: Lower;Medial;Midline  Location: Back  Incision Type: Post-op incision dehiscence   Dressing Status Intact;New drainage   Dressing Changed Yes   Drainage Amount Moderate (25-75% of dressing)   Drainage Description Serosanguineous   Wound Odor None   Wound Cleansing Commercial wound cleanser   Signs of Infection None   Site Assessment Prior to Treatment Red;Moist;Slough;Fibrinous;White;Friable (bleeds easily);Smooth nongranulating   Margins Attached edges;Defined edges   Surrounding Skin Assessment Intact;Scarred   Surrounding Skin Treatment Skin protectant   Wound Bed Topical Treatment Medical grade honey gel   Wound Packing Hydrofiber   Dressing Hydrofiber;Silicone Border Foam;Transparent film   Debridement Performed by RN Yes   Selective Debridement by RN Forceps   Type of  Tissue Removed Slough   Total Length Debrided by RN  (cm) 3.2 cm   Total Width Debrided by RN  (cm) 0.5 cm   Total Depth Debrided by RN (cm) 0.3 cm   Total Area Debrided (cm^2) by RN FOR BILLING 1.6 cm^2   Adherence of Nonviable Tissue Within Wound Loosely adherent   Percentage of Nonviable Tissue Remaining to Site Post Debridement 20-30%   Patient Tolerance During Debridement Well

## 2023-04-17 ENCOUNTER — HOSPITAL ENCOUNTER (OUTPATIENT)
Dept: WOUND CARE | Facility: HOSPITAL | Age: 83
Discharge: STILL ADMITTED OR ADMINISTRATIVE | End: 2023-04-17
Payer: MEDICARE

## 2023-04-17 PROCEDURE — 97597 DBRDMT OPN WND 1ST 20 CM/<: CPT

## 2023-04-17 NOTE — WCT PROGRESS ASSESSMENT
Wound Care Team Continuation of Care    04/17/23    Patient Care Team:  Bronson Travis MD as PCP - General (Family Medicine)  Chris Khalil MD as Consulting Physician (General Surgery)  Marii Wayne CNP as Nurse Practitioner (General Surgery)  Maia Junior CNP as Referring Provider    Onset Date: 1/30/23    Start of Care (SOC) Date: 3/20/23    Recent Hospitalization: 1/30/2023 for complicated lumbar procedure    Certification Start Date: 04/19/23   Certification End Date: 05/19/23    Physician Order: Evaluate and treat per wound care team protocol.    Treatment Diagnosis: Disrupted healing of lumbar incision    Visits From Start Of Care: 9 treatments have been provided since first initiated.    Treatments ranged from 30-60 minutes and were provided 2 time(s) per week.    Patient and caregiver compliance, verbal response and tolerance to Plan of Care: Katy murray attends wound care appointments as schedule and compliant with treatment.       Treatment within the last 30 days:   Wound cleansing: normal saline and commercial wound cleanser., Selective debridement: curette and forceps., Surrounding skin treatment:  adhesive remover and skin protectant., Wound bed topical treatments:  cadexomer iodine gel and medical grade honey gel., Wound packing:  hydrofiber and collagen., and Dressings: antibacterial silver dressing, silicone border foam, hydrocolloid, transparent film, and tape.    Current Assessment:    Katy came to wound care with dressing in place. She stated that the distal edge had come loose over the weekend and placed tape over it. Katy continues to complain of itching as well.      Lower Back- Lumbar  Dressing was removed with scant to small amount of tan drainage noted. No odor noted. Wound was cleansed with commercial wound cleanser. The wound base is moist in pink with loosely to firmly adherent white fibrinous tissue. Fibrin was removed with forceps. Wound measured smaller today, 1.7 x  0.3  0.2 cm. Katy does have some mild excoriation to the distally to the wound were she has been scratching. Wound was cleansed again. Will continue with current plan of care. Skin prep applied. Therahoney to wound base, pack with Aquacel hydrofiber, followed by an additional piece of hydrofiber. Cover with Mepilex lite silicone border dressing and Tegaderm transparent film to secure.      Treatment Plan:  Cleanse with Normal Saline or Commercial Wound Cleanser  Conservative Sharp Debridement by Wound Care Team as needed  Therahoney   Aquacel Hydrofiber  Silicone boarder dressing  Tegaderm to secure  Katy has dressings should her dressing fails. Katy will return to wound care on Thursday, April 20, 2023 04/17/23 1300   Incision 03/20/23 Lower;Medial;Midline Back   Date First Assessed/Time First Assessed: 03/20/23 1323   Pre-Existing Wound: Yes  WCT Slow to Heal: Yes  Location Descriptor: Lower;Medial;Midline  Location: Back  Incision Type: Post-op incision dehiscence   Wound Image    Dressing Status Intact;Dry   Dressing Changed Yes   Drainage Amount Small (25% or less of dressing)   Drainage Description Tan   Wound Odor None   Wound Cleansing Commercial wound cleanser   Signs of Infection None   Site Assessment Prior to Treatment Pink;Moist;Smooth nongranulating;Fibrinous;White   Margins Attached edges;Defined edges   Surrounding Skin Assessment Intact;Scarred;Excoriated   Surrounding Skin Treatment Skin protectant   Wound Bed Topical Treatment Medical grade honey gel   Wound Packing Hydrofiber   Dressing Hydrofiber;Silicone Border Foam;Transparent film   Date Measured 04/17/23   Wound Length (cm) 1.7 cm   Wound Width (cm) 0.3 cm   Wound Depth (cm) 0.2   Calculated Wound Size (cm^3)  0.1 cm^3   Wound Healing % 83.87   Calculated Wound Size (cm^2) FOR BILLING REASON 0.51 cm^2   Debridement Performed by RN Yes   Selective Debridement by RN Forceps   Type of Tissue Removed Fibrin   Total Length Debrided  by RN  (cm) 1.7 cm   Total Width Debrided by RN  (cm) 0.3 cm   Total Depth Debrided by RN (cm) 0.1 cm   Total Area Debrided (cm^2) by RN FOR BILLING 0.51 cm^2   Adherence of Nonviable Tissue Within Wound Loosely adherent   Percentage of Nonviable Tissue Remaining to Site Post Debridement 10-20%   Patient Tolerance During Debridement Well       Have any Wound/Sites Healed/Resolved within the past 30 days? no           Education: The following education has been completed with patient: Current Wound Status, Disease Process, Dressing Change, and Pain Management.    Established Goals and Progress Made:   Site will be free from signs or symptoms of infection.  met  and There will be no non-viable tissue in the wound bed. Not met    Updated Goals:  Wound will have a 50% decrease in wound size and/or depth in 4 weeks. and Site will be free from signs or symptoms of infection.    Frequency and Duration of Treatment: The patient will be seen 2 time(s) per week for approximately 30-45 minutes for 4 weeks.    Continued treatment plan will be implemented per wound care protocol, policies, and procedures.    Thank you for allowing us to share in the care of this patient. If you have any questions, comments or concerns regarding this patient, please feel free to contact the outpatient wound care center.      Signature:    ___________________________________  Maia Junior CNP    Date:    ____________________________________

## 2023-04-17 NOTE — INTERDISCIPLINARY/THERAPY
Katy came to wound care with dressing in place. She stated that the distal edge had come loose over the weekend and placed tape over it. Katy continues to complain of itching as well.     Lower Back- Lumbar  Dressing was removed with scant to small amount of tan drainage noted. No odor noted. Wound was cleansed with commercial wound cleanser. The wound base is moist in pink with loosely to firmly adherent white fibrinous tissue. Fibrin was removed with forceps. Wound measured smaller today, 1.7 x 0.3  0.2 cm. Katy does have some mild excoriation to the distally to the wound were she has been scratching. Wound was cleansed again. Will continue with current plan of care. Skin prep applied. Therahoney to wound base, pack with Aquacel hydrofiber, followed by an additional piece of hydrofiber. Cover with Mepilex lite silicone border dressing and Tegaderm transparent film to secure.     Treatment Plan:  Cleanse with Normal Saline or Commercial Wound Cleanser  Conservative Sharp Debridement by Wound Care Team as needed  Therahoney   Aquacel Hydrofiber  Silicone boarder dressing  Tegaderm to secure  Katy has dressings should her dressing fails. Katy will return to wound care on Thursday, April 20, 2023 04/17/23 1300   Incision 03/20/23 Lower;Medial;Midline Back   Date First Assessed/Time First Assessed: 03/20/23 1323   Pre-Existing Wound: Yes  WCT Slow to Heal: Yes  Location Descriptor: Lower;Medial;Midline  Location: Back  Incision Type: Post-op incision dehiscence   Wound Image    Dressing Status Intact;Dry   Dressing Changed Yes   Drainage Amount Small (25% or less of dressing)   Drainage Description Tan   Wound Odor None   Wound Cleansing Commercial wound cleanser   Signs of Infection None   Site Assessment Prior to Treatment Pink;Moist;Smooth nongranulating;Fibrinous;White   Margins Attached edges;Defined edges   Surrounding Skin Assessment Intact;Scarred;Excoriated   Surrounding Skin Treatment Skin  protectant   Wound Bed Topical Treatment Medical grade honey gel   Wound Packing Hydrofiber   Dressing Hydrofiber;Silicone Border Foam;Transparent film   Date Measured 04/17/23   Wound Length (cm) 1.7 cm   Wound Width (cm) 0.3 cm   Wound Depth (cm) 0.2   Calculated Wound Size (cm^3)  0.1 cm^3   Wound Healing % 83.87   Calculated Wound Size (cm^2) FOR BILLING REASON 0.51 cm^2   Debridement Performed by RN Yes   Selective Debridement by RN Forceps   Type of Tissue Removed Fibrin   Total Length Debrided by RN  (cm) 1.7 cm   Total Width Debrided by RN  (cm) 0.3 cm   Total Depth Debrided by RN (cm) 0.1 cm   Total Area Debrided (cm^2) by RN FOR BILLING 0.51 cm^2   Adherence of Nonviable Tissue Within Wound Loosely adherent   Percentage of Nonviable Tissue Remaining to Site Post Debridement 10-20%   Patient Tolerance During Debridement Well

## 2023-04-20 ENCOUNTER — HOSPITAL ENCOUNTER (OUTPATIENT)
Dept: WOUND CARE | Facility: HOSPITAL | Age: 83
Discharge: STILL ADMITTED OR ADMINISTRATIVE | End: 2023-04-20
Payer: MEDICARE

## 2023-04-20 PROCEDURE — G0463 HOSPITAL OUTPT CLINIC VISIT: HCPCS

## 2023-04-21 NOTE — TREATMENT PLAN
"Katy was scheduled to have an appointment with her surgeon this week.  She states that she received notification that she would be seeing the PA instead, she was frustrated with this and contacted the surgeons office.  She states that she, \"fired them all,\" and is not planning to return.  We discussed that this wound is nearly healed, and she will likely no longer need their services.    Katy's dressing was loose along the edges, though intact over the wound opening.  She does have some mild periwound rash and irritation and she states she continues to have itching.  We discussed that she may be reacting to the skin prep under the Tegaderm, so we did a smaller dressing today and a smaller piece of Tegaderm with no skin prep.    Treatment Plan:  Cleanse with Normal Saline or Commercial Wound Cleanser  Conservative Sharp Debridement by Wound Care Team as needed  Therahoney   Aquacel Hydrofiber  DuoDERM thin hydrocolloid  Tegaderm to secure  Katy will return to wound care on Monday, April 24 04/20/23 0800   Incision 03/20/23 Lower;Medial;Midline Back   Date First Assessed/Time First Assessed: 03/20/23 1323   Pre-Existing Wound: Yes  WCT Slow to Heal: Yes  Location Descriptor: Lower;Medial;Midline  Location: Back  Incision Type: Post-op incision dehiscence   Wound Image    Dressing Status Clean   Dressing Changed Yes   Drainage Amount Moderate (25-75% of dressing)   Drainage Description Serosanguineous   Wound Odor None   Wound Cleansing Commercial wound cleanser   Signs of Infection None   Site Assessment Prior to Treatment Pink;Moist;Epithelium;Fibrinous;Tan   Margins Attached edges;Defined edges   Surrounding Skin Assessment Scarred   Wound Bed Topical Treatment Medical grade honey gel   Wound Packing Hydrofiber   Dressing Hydrocolloid;Transparent film   Date Measured 04/17/23   Debridement Performed by RN No       "

## 2023-04-24 ENCOUNTER — HOSPITAL ENCOUNTER (OUTPATIENT)
Dept: WOUND CARE | Facility: HOSPITAL | Age: 83
Discharge: STILL ADMITTED OR ADMINISTRATIVE | End: 2023-04-24
Payer: MEDICARE

## 2023-04-24 PROCEDURE — G0463 HOSPITAL OUTPT CLINIC VISIT: HCPCS

## 2023-04-25 NOTE — TREATMENT PLAN
Katy comes to wound care with dressing intact to her lower back.  She states that she had no itching, which resolved when the skin prep was held.     Dressing had no wound drainage, only residual Thera honey gel.  Incision appears to be completely healed with new, thin, scarred epithelium.    I discussed with Katy how to protect and care for scars including moisturizing and sunscreen.  I also encouraged her to contact wound care or her PCP if she has any indications of infection such as redness, pain, or drainage. As Katy is healed, she is discharged from wound care service.       04/24/23 1500   Incision 03/20/23 Lower;Medial;Midline Back   Date First Assessed/Time First Assessed: 03/20/23 1323   Pre-Existing Wound: Yes  WCT Slow to Heal: Yes  Location Descriptor: Lower;Medial;Midline  Location: Back  Incision Type: Post-op incision dehiscence   Wound Image    Dressing Status Clean;Dry;Intact   Dressing Changed Yes   Drainage Amount None (no measurable drainage)   Drainage Description Not applicable   Wound Odor None   Wound Cleansing Commercial wound cleanser   Signs of Infection None   Site Assessment Prior to Treatment Dry;Epithelium   Surrounding Skin Assessment Scarred   Date Measured 04/24/23   Wound Length (cm) 0 cm   Wound Width (cm) 0 cm   Wound Depth (cm) 0   Calculated Wound Size (cm^3)  0 cm^3   Wound Healing % 100   Calculated Wound Size (cm^2) FOR BILLING REASON 0 cm^2   Debridement Performed by RN No

## 2023-05-24 ENCOUNTER — LAB (OUTPATIENT)
Dept: LAB | Facility: CLINIC | Age: 83
End: 2023-05-24
Payer: MEDICARE

## 2023-05-24 DIAGNOSIS — I10 ESSENTIAL HYPERTENSION, BENIGN: ICD-10-CM

## 2023-05-24 LAB
ANION GAP SERPL CALC-SCNC: 6 MMOL/L (ref 3–11)
BUN SERPL-MCNC: 13 MG/DL (ref 7–25)
CALCIUM SERPL-MCNC: 9.3 MG/DL (ref 8.6–10.3)
CHLORIDE SERPL-SCNC: 108 MMOL/L (ref 98–107)
CO2 SERPL-SCNC: 29 MMOL/L (ref 21–32)
CREAT SERPL-MCNC: 1.09 MG/DL (ref 0.6–1.1)
GFR SERPL CREATININE-BSD FRML MDRD: 51 ML/MIN/1.73M*2
GLUCOSE SERPL-MCNC: 88 MG/DL (ref 70–105)
POTASSIUM SERPL-SCNC: 4.5 MMOL/L (ref 3.5–5.1)
SODIUM SERPL-SCNC: 143 MMOL/L (ref 135–145)

## 2023-05-24 PROCEDURE — 80048 BASIC METABOLIC PNL TOTAL CA: CPT

## 2023-05-24 PROCEDURE — 36415 COLL VENOUS BLD VENIPUNCTURE: CPT

## 2023-05-25 ENCOUNTER — HOSPITAL ENCOUNTER (OUTPATIENT)
Dept: MRI IMAGING | Facility: HOSPITAL | Age: 83
Discharge: HOME | End: 2023-05-25
Payer: MEDICARE

## 2023-05-25 DIAGNOSIS — M48.061 SPINAL STENOSIS, LUMBAR REGION, WITHOUT NEUROGENIC CLAUDICATION: ICD-10-CM

## 2023-05-25 PROCEDURE — A9579 GAD-BASE MR CONTRAST NOS,1ML: HCPCS | Performed by: PAIN MEDICINE

## 2023-05-25 PROCEDURE — 72158 MRI LUMBAR SPINE W/O & W/DYE: CPT | Mod: 26,MG | Performed by: RADIOLOGY

## 2023-05-25 PROCEDURE — 2550000100 HC RX 255: Performed by: PAIN MEDICINE

## 2023-05-25 PROCEDURE — G1004 CDSM NDSC: HCPCS | Performed by: RADIOLOGY

## 2023-05-25 PROCEDURE — 72158 MRI LUMBAR SPINE W/O & W/DYE: CPT | Mod: MG

## 2023-05-25 RX ADMIN — GADOTERIDOL 15 ML: 279.3 INJECTION, SOLUTION INTRAVENOUS at 10:23

## 2023-07-18 NOTE — WCT DISCHARGE ASSESSMENT
Wound Care Team Discharge Final Evaluation    07/18/23    Patient Care Team:  Bronson Travis MD as PCP - General (Family Medicine)  Chris Khalil MD as Consulting Physician (General Surgery)  Marii Wayne CNP as Nurse Practitioner (General Surgery)    Onset Date: January 30, 2023    Start of Care (SOC) Date: March 20, 2023    Recent Hospitalization: N/A    Physician Order: Wound care team to evaluate and treat per wound care team protocol.    Treatment Diagnosis: Disrupted healing of lumbar incision    Visits From Start Of Care: 11 treatments have been provided.    Treatments ranged from 30-45 minutes and were provided 1-2 time(s) per week.    Patient and Caregiver Compliance: Katy has come to wound care appointments on time and as scheduled. She kept her dressings jaxon, dry, and intact. She was able to have a neighbor manage the dressings if there were any issues.    Treatment within the last 30 days: Treatments provided per wound care protocol. See initial plan of care and progress notes.    Current Assessment: Dressing had no wound drainage, only residual Thera honey gel.  Incision appears to be completely healed with new, thin, scarred epithelium.   04/24/23 1500   Incision 03/20/23 Lower;Medial;Midline Back   Date First Assessed/Time First Assessed: 03/20/23 1323   Pre-Existing Wound: Yes  WCT Slow to Heal: Yes  Location Descriptor: Lower;Medial;Midline  Location: Back  Incision Type: Post-op incision dehiscence   Wound Image    Dressing Status Clean;Dry;Intact   Dressing Changed Yes   Drainage Amount None (no measurable drainage)   Drainage Description Not applicable   Wound Odor None   Wound Cleansing Commercial wound cleanser   Signs of Infection None   Site Assessment Prior to Treatment Dry;Epithelium   Surrounding Skin Assessment Scarred   Date Measured 04/24/23   Wound Length (cm) 0 cm   Wound Width (cm) 0 cm   Wound Depth (cm) 0   Calculated Wound Size (cm^3)  0 cm^3   Wound Healing % 100    Calculated Wound Size (cm^2) FOR BILLING REASON 0 cm^2   Debridement Performed by RN No     Wound/Sites Healed/Resolved Since SOC: yes    Are all sites healed? yes     Education:The following education has been completed with patient: Current Wound Status, Disease Process, Emergency Care/Reporting, Risk Factors, and Signs of Infection .    Established Goals and Progress Made:   Site will be free from signs or symptoms of infection.  met and There will be no non-viable tissue in the wound bed  met    Discharged from Wound Care due to goals have been met and wound is healed.    Discharge Recommendations:     Thank you for allowing us to share in the care of this patient. If you have any questions, comments or concerns regarding this patient, please feel free to contact the outpatient wound care center.    Signature:     ___________________________________  Maia Junior CNP     Date:     ____________________________________    Time:    ____________________________________

## 2023-07-19 DIAGNOSIS — M81.0 AGE-RELATED OSTEOPOROSIS WITHOUT CURRENT PATHOLOGICAL FRACTURE: Primary | ICD-10-CM

## 2023-07-26 ENCOUNTER — TELEPHONE (OUTPATIENT)
Dept: FAMILY MEDICINE | Facility: CLINIC | Age: 83
End: 2023-07-26
Payer: MEDICARE

## 2023-07-26 ENCOUNTER — OFFICE VISIT (OUTPATIENT)
Dept: FAMILY MEDICINE | Facility: CLINIC | Age: 83
End: 2023-07-26
Payer: MEDICARE

## 2023-07-26 VITALS
TEMPERATURE: 98.1 F | HEART RATE: 62 BPM | OXYGEN SATURATION: 95 % | WEIGHT: 175.5 LBS | SYSTOLIC BLOOD PRESSURE: 120 MMHG | DIASTOLIC BLOOD PRESSURE: 68 MMHG | BODY MASS INDEX: 36.06 KG/M2

## 2023-07-26 DIAGNOSIS — R39.9 UTI SYMPTOMS: Primary | ICD-10-CM

## 2023-07-26 DIAGNOSIS — B37.31 VAGINAL YEAST INFECTION: ICD-10-CM

## 2023-07-26 LAB
BACTERIA #/AREA URNS HPF: ABNORMAL /HPF
BILIRUB UR QL: NEGATIVE
CLARITY UR: CLEAR
COLOR UR: YELLOW
GLUCOSE UR QL: NEGATIVE MG/DL
HGB UR QL: NEGATIVE
KETONES UR-MCNC: ABNORMAL MG/DL
LEUKOCYTE ESTERASE UR QL STRIP: ABNORMAL
NITRITE UR QL: NEGATIVE
PH UR: 5.5 PH
PROT UR STRIP-MCNC: NEGATIVE MG/DL
RBC #/AREA URNS HPF: ABNORMAL /HPF
SP GR UR: 1.01 (ref 1–1.03)
SQUAMOUS #/AREA URNS HPF: ABNORMAL /HPF
TRANS CELLS #/AREA URNS HPF: ABNORMAL /HPF
UROBILINOGEN UR-MCNC: 1 MG/DL
WBC #/AREA URNS HPF: ABNORMAL /HPF

## 2023-07-26 PROCEDURE — 87088 URINE BACTERIA CULTURE: CPT | Performed by: FAMILY MEDICINE

## 2023-07-26 PROCEDURE — 99214 OFFICE O/P EST MOD 30 MIN: CPT | Performed by: FAMILY MEDICINE

## 2023-07-26 PROCEDURE — 81001 URINALYSIS AUTO W/SCOPE: CPT | Performed by: FAMILY MEDICINE

## 2023-07-26 PROCEDURE — G0463 HOSPITAL OUTPT CLINIC VISIT: HCPCS | Performed by: FAMILY MEDICINE

## 2023-07-26 RX ORDER — FLUCONAZOLE 150 MG/1
150 TABLET ORAL ONCE
Qty: 2 TABLET | Refills: 0 | Status: SHIPPED | OUTPATIENT
Start: 2023-07-26 | End: 2023-07-26

## 2023-07-26 RX ORDER — PREGABALIN 150 MG/1
150 CAPSULE ORAL NIGHTLY
COMMUNITY
End: 2023-10-23 | Stop reason: ALTCHOICE

## 2023-07-26 NOTE — TELEPHONE ENCOUNTER
She is asking for a UA to test for a UTI.  She states that she does not burn when she urinates but her privates burn all the time.  She is not urinating frequently or having low abdominal or back pain.  Discussed starting to use over the counter Monistat cream, stop washing with soap and use backing soda.  Will see if Dr Travis would order a UA, or yeast infection treatment or have Katy come for an appointment.

## 2023-07-26 NOTE — TELEPHONE ENCOUNTER
Pt thinks she has UTI and wondering if would put in an order for patient?   Please call to discuss.

## 2023-07-26 NOTE — PROGRESS NOTES
Subjective      Katy Stack is a 83 y.o. female who presents for UTI (Patient states that the last 2 days she she has been having constant burning around her vagina. )  .    Patient presents due to concerns of possible UTI or yeast infection due to itching and burning sensation in vaginal area for the last 2 days. She denies pain with urination or blood in urine. No fevers or chills. No abdominal, back or flank pain.           The following have been reviewed and updated as appropriate in this visit:   Tobacco  Allergies  Meds  Problems  Med Hx  Surg Hx  Fam Hx         Allergies   Allergen Reactions    [place] Angioedema and Hives     hives, tongue swells  Swelling of tongue    Adhesive      welts     Current Outpatient Medications   Medication Sig Dispense Refill    pregabalin (LYRICA) 150 mg capsule Take 1 capsule (150 mg total) by mouth 3 (three) times a day Max Daily Amount: 450 mg      losartan (COZAAR) 50 mg tablet TAKE 1 TABLET BY MOUTH  DAILY 90 tablet 1    atenoloL (TENORMIN) 25 mg tablet Take 1 tablet (25 mg total) by mouth daily 90 tablet 3    omeprazole (PriLOSEC) 20 mg capsule TAKE 1 CAPSULE BY MOUTH  DAILY 90 capsule 3    estrogens, conjugated, (Premarin) 0.9 mg tablet Take 1 tablet (0.9 mg total) by mouth once a week 4 tablet 0    levothyroxine (SYNTHROID, LEVOTHROID) 112 mcg tablet TAKE 1 TABLET BY MOUTH  DAILY 90 tablet 3    atorvastatin (LIPITOR) 20 mg tablet TAKE 1 TABLET BY MOUTH  DAILY 90 tablet 3    multivitamin (THERAGRAN) tablet tablet Take 1 tablet by mouth daily      furosemide (LASIX) 20 mg tablet TAKE 1 TABLET BY MOUTH  DAILY (Patient taking differently: Take 1 tablet (20 mg total) by mouth daily as needed Indications: visible water retention) 90 tablet 3    potassium chloride 10 mEq CR tablet TAKE 1 TABLET BY MOUTH  DAILY 90 tablet 3    calcium carbonate-vitamin D3 600 mg(1,500mg) -400 unit per tablet Take 1 tablet by mouth daily      nitroglycerin (NITROSTAT) 0.4 mg SL  tablet Place 1 tablet (0.4 mg total) under the tongue every 5 (five) minutes as needed for chest pain 25 tablet 1    vit A-vit C-vit E-zinc-copper 7,160-113-100 unit-mg-unit tablet Take 1 tab/cap by mouth 2 (two) times a day.      fluconazole (Diflucan) 150 mg tablet Take 1 tablet (150 mg total) by mouth once for 1 dose Repeat dose in 3 days if symptoms not resolved 2 tablet 0    denosumab (PROLIA) 60 mg/mL syringe syringe Inject 1 mL (60 mg total) under the skin once for 1 dose Medication is being administered as part of a therapy plan. Review patient's therapy plan and chart for previous doses/dates of administration and schedule of future doses/dates of administration. 1 mL 0    denosumab (PROLIA) 60 mg/mL syringe syringe Inject 1 mL (60 mg total) under the skin once for 1 dose Medication is being administered as part of a therapy plan. Review patient's therapy plan and chart for previous doses/dates of administration and schedule of future doses/dates of administration. 1 mL 0    gabapentin (NEURONTIN) 300 mg capsule Take 3 capsules (900 mg total) by mouth 3 (three) times a day       No current facility-administered medications for this visit.     Past Medical History:   Diagnosis Date    Benign essential hypertension 11/24/2017    Cataract of left eye     Chronic obstructive lung disease (CMS/HCC) 11/10/2017    Dysrhythmia     LBBB    Family history of anesthesia complication     GERD (gastroesophageal reflux disease)     Hypertension     Hypothyroid     Hypothyroidism 11/10/2017    Injury of back     BENDING INJURY    Macular degeneration     Neurologic disorder     Obesity 11/10/2017    Obstructive sleep apnea syndrome 11/10/2017    Night time oxygen/2L    Osteoarthritis     Periodic limb movement disorder 11/10/2017    Peripheral neuropathy     left thigh into left groin     Rectocele     Respiratory disease      Past Surgical History:   Procedure Laterality Date    APPENDECTOMY  1987    BACK SURGERY      2  surgeries [id number]    BACK SURGERY  11/30/2016    BACK SURGERY  05/01/2017    vetebral fusion    CARDIAC CATHETERIZATION  08/31/2011    CATARACT EXTRACTION W/  INTRAOCULAR LENS IMPLANT Left 7/2/2018    Procedure: OS CATARACT LEFT PHACOEMULSIFICATION OF CATARACT WITH INTRAOCULAR LENS;  Surgeon: Emmanuel Cueto MD;  Location: [facility] SURGICAL SERVICES;  Service: Ophthalmology;  Laterality: Left;    CATARACT EXTRACTION W/  INTRAOCULAR LENS IMPLANT Right 8/6/2018    Procedure: OD CATARACT RIGHT PHACOEMULSIFICATION OF CATARACT WITH INTRAOCULAR LENS;  Surgeon: Emmanuel Cueto MD;  Location: [facility] SURGICAL SERVICES;  Service: Ophthalmology;  Laterality: Right;    CHOLECYSTECTOMY      COLONOSCOPY  02/19/2014    No polyps    COLONOSCOPY  04/26/2011    DIAGNOSTIC LAPAROSCOPY N/A 11/28/2018    Procedure: Laparoscopic band removal;  Surgeon: Chris Millan MD;  Location: [facility] OR;  Service: General;  Laterality: N/A;    ESOPHAGOGASTRODUODENOSCOPY N/A 11/6/2018    Procedure: EGD - ESOPHAGOGASTRODUODENOSCOPY;  Surgeon: Chris Millan MD;  Location: 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 Vela- shoulder    SPINAL STIMULATOR IMPLANT N/A 3/23/2021    Procedure: PERMANENT SPINAL CORD STIMULATOR IMPLANTATION;  Surgeon: Bernardo Gonzalez MD;  Location[facility] OR;  Service: Pain Management;  Laterality: N/A;    THYROID LOBECTOMY Left 18/18/2010    THYROIDECTOMY, PARTIAL   2010    UPPER GASTROINTESTINAL ENDOSCOPY  2014    Mild chronic gastritis     Family History   Problem Relation Age of Onset    Heart disease Mother     Heart disease Father         Myocardial infarction    Parkinsonism Father     Thyroid disease Father     Alzheimer's disease Sister     Arthritis Sister     Osteoporosis Sister     Lung cancer Brother     Diabetes Maternal Grandmother      Social History     Socioeconomic History    Marital status:    Tobacco Use    Smoking status: Former     Packs/day: 2.00     Years: 35.00     Pack years: 70.00     Types: Cigarettes     Quit date:      Years since quittin.5    Smokeless tobacco: Never   Vaping Use    Vaping Use: Never used   Substance and Sexual Activity    Alcohol use: No    Drug use: No    Sexual activity: Defer     Social Determinants of Health     Tobacco Use: Medium Risk (2023)    Patient History     Smoking Tobacco Use: Former     Smokeless Tobacco Use: Never       Review of Systems    Objective     Vitals  /68 (BP Location: Left arm, Patient Position: Sitting, Cuff Size: Regular Adult)   Pulse 62   Temp 36.7 °C (98.1 °F)   Wt 79.6 kg (175 lb 8 oz)   SpO2 95%   BMI 36.06 kg/m²     Physical Exam  Vitals and nursing note reviewed. Exam conducted with a chaperone present.   Constitutional:       General: She is not in acute distress.     Appearance: Normal appearance. She is not ill-appearing, toxic-appearing or diaphoretic.      Comments: Ambulating with cane.   Abdominal:      General: Abdomen is flat. There is no distension.      Palpations: Abdomen is soft. There is no mass.      Tenderness: There is no abdominal tenderness. There is no guarding or rebound.      Hernia: No hernia is present.   Genitourinary:     General: Normal vulva.      Exam position: Prone.      Pubic Area: No rash.       Labia:         Right: No rash or lesion.         Left: No rash or lesion.       Urethra: Prolapse present.      Vagina:  Vaginal discharge present.      Comments: White discharge.   Skin:     General: Skin is warm and dry.   Neurological:      Mental Status: She is alert.         Procedures    Assessment/Plan   Diagnoses and all orders for this visit:    UTI symptoms  -     Urine culture; Future  -     Urinalysis w/microscopic Urine, Clean Catch; Future    Vaginal yeast infection  -     fluconazole (Diflucan) 150 mg tablet; Take 1 tablet (150 mg total) by mouth once for 1 dose Repeat dose in 3 days if symptoms not resolved    Probable vaginal yeast infection.  No obvious abnormalities on external vagina.  Start trial of Diflucan.  Okay to use hydrocortisone externally as needed.  Return precautions given.  Follow-up as needed.    Return if symptoms worsen or fail to improve.    Bronson Travis MD    Portions of this note were documented by Nneka Arita as I performed the exam and collected the information from Katy Stack. I attest that I have reviewed the information as documented, verified the accuracy of the documentation and added additional information as needed.

## 2023-07-28 LAB — BACTERIA UR CULT: ABNORMAL

## 2023-08-01 DIAGNOSIS — E78.5 HYPERLIPIDEMIA, UNSPECIFIED HYPERLIPIDEMIA TYPE: ICD-10-CM

## 2023-08-01 RX ORDER — ATORVASTATIN CALCIUM 20 MG/1
20 TABLET, FILM COATED ORAL DAILY
Qty: 90 TABLET | Refills: 0 | Status: SHIPPED | OUTPATIENT
Start: 2023-08-01 | End: 2023-10-13

## 2023-08-01 NOTE — TELEPHONE ENCOUNTER
No care due was identified.  [facility] Embedded Care Due Messages. Reference number: [id number]. 8/01/2023 4:32:50 AM LEILA

## 2023-08-02 DIAGNOSIS — I10 BENIGN ESSENTIAL HYPERTENSION: Primary | ICD-10-CM

## 2023-08-02 DIAGNOSIS — Z01.818 PRE-OP EXAM: ICD-10-CM

## 2023-08-09 ENCOUNTER — OFFICE VISIT (OUTPATIENT)
Dept: FAMILY MEDICINE | Facility: CLINIC | Age: 83
End: 2023-08-09
Payer: MEDICARE

## 2023-08-09 ENCOUNTER — LAB (OUTPATIENT)
Dept: LAB | Facility: CLINIC | Age: 83
End: 2023-08-09
Payer: MEDICARE

## 2023-08-09 ENCOUNTER — APPOINTMENT (OUTPATIENT)
Dept: CARDIOLOGY | Facility: CLINIC | Age: 83
End: 2023-08-09
Payer: MEDICARE

## 2023-08-09 VITALS
SYSTOLIC BLOOD PRESSURE: 112 MMHG | HEART RATE: 65 BPM | TEMPERATURE: 97.8 F | BODY MASS INDEX: 38.01 KG/M2 | OXYGEN SATURATION: 95 % | WEIGHT: 185 LBS | DIASTOLIC BLOOD PRESSURE: 54 MMHG

## 2023-08-09 DIAGNOSIS — I27.20 PULMONARY HYPERTENSION (CMS/HCC): ICD-10-CM

## 2023-08-09 DIAGNOSIS — Z01.818 PRE-OP EXAM: ICD-10-CM

## 2023-08-09 DIAGNOSIS — G47.33 OBSTRUCTIVE SLEEP APNEA SYNDROME: ICD-10-CM

## 2023-08-09 DIAGNOSIS — Z01.818 PREOP EXAMINATION: Primary | ICD-10-CM

## 2023-08-09 DIAGNOSIS — I10 BENIGN ESSENTIAL HYPERTENSION: ICD-10-CM

## 2023-08-09 DIAGNOSIS — M81.0 AGE-RELATED OSTEOPOROSIS WITHOUT CURRENT PATHOLOGICAL FRACTURE: ICD-10-CM

## 2023-08-09 DIAGNOSIS — E03.8 OTHER SPECIFIED HYPOTHYROIDISM: ICD-10-CM

## 2023-08-09 LAB
ALBUMIN SERPL-MCNC: 3.8 G/DL (ref 3.5–5.3)
ALP SERPL-CCNC: 70 U/L (ref 55–142)
ALT SERPL-CCNC: 10 U/L (ref 7–52)
ANION GAP SERPL CALC-SCNC: 7 MMOL/L (ref 3–11)
AST SERPL-CCNC: 17 U/L
BASOPHILS # BLD AUTO: 0 10*3/UL
BASOPHILS NFR BLD AUTO: 0.5 % (ref 0–2)
BILIRUB SERPL-MCNC: 0.63 MG/DL (ref 0.2–1.4)
BUN SERPL-MCNC: 22 MG/DL (ref 7–25)
CALCIUM ALBUM COR SERPL-MCNC: 9.5 MG/DL (ref 8.6–10.3)
CALCIUM SERPL-MCNC: 9.3 MG/DL (ref 8.6–10.3)
CHLORIDE SERPL-SCNC: 108 MMOL/L (ref 98–107)
CO2 SERPL-SCNC: 28 MMOL/L (ref 21–32)
CREAT SERPL-MCNC: 1.3 MG/DL (ref 0.6–1.1)
EGFRCR SERPLBLD CKD-EPI 2021: 41 ML/MIN/1.73M*2
EOSINOPHIL # BLD AUTO: 0.3 10*3/UL
EOSINOPHIL NFR BLD AUTO: 2.9 % (ref 0–3)
ERYTHROCYTE [DISTWIDTH] IN BLOOD BY AUTOMATED COUNT: 15.6 % (ref 11.5–14)
GLUCOSE SERPL-MCNC: 89 MG/DL (ref 70–105)
HCT VFR BLD AUTO: 43 % (ref 34–45)
HGB BLD-MCNC: 14.4 G/DL (ref 11.5–15.5)
LYMPHOCYTES # BLD AUTO: 2.7 10*3/UL
LYMPHOCYTES NFR BLD AUTO: 27.3 % (ref 11–47)
MCH RBC QN AUTO: 29.3 PG (ref 28–33)
MCHC RBC AUTO-ENTMCNC: 33.4 G/DL (ref 32–36)
MCV RBC AUTO: 87.7 FL (ref 81–97)
MONOCYTES # BLD AUTO: 0.6 10*3/UL
MONOCYTES NFR BLD AUTO: 5.8 % (ref 3–11)
NEUTROPHILS # BLD AUTO: 6.3 10*3/UL
NEUTROPHILS NFR BLD AUTO: 63.5 % (ref 41–81)
PLATELET # BLD AUTO: 219 10*3/UL (ref 140–350)
PMV BLD AUTO: 8.5 FL (ref 6.9–10.8)
POTASSIUM SERPL-SCNC: 4.8 MMOL/L (ref 3.5–5.1)
PROT SERPL-MCNC: 7 G/DL (ref 6–8.3)
RBC # BLD AUTO: 4.9 10*6/ΜL (ref 3.7–5.3)
SODIUM SERPL-SCNC: 143 MMOL/L (ref 135–145)
WBC # BLD AUTO: 9.9 10*3/UL (ref 4.5–10.5)

## 2023-08-09 PROCEDURE — 85025 COMPLETE CBC W/AUTO DIFF WBC: CPT

## 2023-08-09 PROCEDURE — 36415 COLL VENOUS BLD VENIPUNCTURE: CPT

## 2023-08-09 PROCEDURE — 93005 ELECTROCARDIOGRAM TRACING: CPT | Performed by: FAMILY MEDICINE

## 2023-08-09 PROCEDURE — G0463 HOSPITAL OUTPT CLINIC VISIT: HCPCS | Performed by: FAMILY MEDICINE

## 2023-08-09 PROCEDURE — 99214 OFFICE O/P EST MOD 30 MIN: CPT | Performed by: FAMILY MEDICINE

## 2023-08-09 PROCEDURE — 93010 ELECTROCARDIOGRAM REPORT: CPT | Performed by: INTERNAL MEDICINE

## 2023-08-09 PROCEDURE — 80053 COMPREHEN METABOLIC PANEL: CPT

## 2023-08-09 ASSESSMENT — ENCOUNTER SYMPTOMS
DYSURIA: 0
CHILLS: 0
SORE THROAT: 0
ADENOPATHY: 0
HEADACHES: 0
VOICE CHANGE: 1
EYE REDNESS: 0
DIARRHEA: 0
BACK PAIN: 1
HEMATURIA: 0
NERVOUS/ANXIOUS: 0
BRUISES/BLEEDS EASILY: 0
WEAKNESS: 0
POLYDIPSIA: 0
NUMBNESS: 0
FATIGUE: 1
DIFFICULTY URINATING: 0
NAUSEA: 0
CHEST TIGHTNESS: 0
WHEEZING: 1
MYALGIAS: 1
SHORTNESS OF BREATH: 1
SLEEP DISTURBANCE: 0
PALPITATIONS: 0
TROUBLE SWALLOWING: 0
BLOOD IN STOOL: 0
RHINORRHEA: 1
DYSPHORIC MOOD: 0
VOMITING: 0
WOUND: 0
EYE DISCHARGE: 0
DIAPHORESIS: 0
COUGH: 1
JOINT SWELLING: 1
EYE PAIN: 0
ARTHRALGIAS: 1
ABDOMINAL PAIN: 0
SINUS PRESSURE: 0
DIZZINESS: 0
FREQUENCY: 0
APNEA: 1
APPETITE CHANGE: 0
FEVER: 0
CONSTIPATION: 0
UNEXPECTED WEIGHT CHANGE: 1

## 2023-08-09 NOTE — PROGRESS NOTES
Subjective      Chief Complaint   Patient presents with    Pre-op Exam     lumbar spine on 8/30       Katy Stack is a 83 y.o. female and is here for a pre-operative risk assessment. Pre-op Exam (lumbar spine on 8/30)    Pt denies any specific concerns or complaints. Pt denies dyspnea on exertion, chest pain, or palpitations.  No hx of MI, CVA, or DVT. She does have a hx of obstructive sleep apnea and uses oxygen nightly. No known hx of complications of surgery or anesthesia.      Patient has a hx of hypertension and is taking atenolol 25mg daily, losartan 50mg daily, and furosemide 20mg as needed for edema. She is also taking potassium chloride 10mEq daily. Patient has a hx of obstructive sleep apnea and uses 2L O2 by NC nightly.      Patient has a hx of hyperlipidemia and is taking atorvastatin 20mg daily. No concerns at this time.     Patient has a hx of hypothyroidism and is taking levothyroxine 125mcg daily. No concerns at this time.      Patient has a hx of menopausal flushing and is taking Premarin 0.9mg once weekly. No concerns at this time.      Patient has a hx of chronic back pain and is established with Dr. Gonzalez with hx of spinal stimulator implant placed on 3/23/21. She continues taking gabapentin 600mg three times daily.     Review of Systems   Constitutional:  Positive for fatigue and unexpected weight change. Negative for appetite change, chills, diaphoresis and fever.   HENT:  Positive for rhinorrhea, tinnitus and voice change. Negative for congestion, dental problem, ear discharge, ear pain, hearing loss, mouth sores, nosebleeds, sinus pressure, sore throat and trouble swallowing.    Eyes:  Negative for pain, discharge, redness and visual disturbance.   Respiratory:  Positive for apnea, cough, shortness of breath and wheezing. Negative for chest tightness.    Cardiovascular:  Negative for chest pain, palpitations and leg swelling.   Gastrointestinal:  Negative for abdominal pain, blood in  stool, constipation, diarrhea, nausea and vomiting.   Endocrine: Negative for cold intolerance, heat intolerance, polydipsia and polyuria.   Genitourinary:  Negative for difficulty urinating, dysuria, frequency and hematuria.   Musculoskeletal:  Positive for arthralgias, back pain, gait problem, joint swelling and myalgias.   Skin:  Negative for rash and wound.   Allergic/Immunologic: Positive for environmental allergies.   Neurological:  Negative for dizziness, weakness, numbness and headaches.   Hematological:  Negative for adenopathy. Does not bruise/bleed easily.   Psychiatric/Behavioral:  Negative for behavioral problems, dysphoric mood and sleep disturbance. The patient is not nervous/anxious.        The following portions of the patient's history were reviewed and updated as appropriate: allergies, current medications, past family history, past medical history, past social history, past surgical history, and problem list.    Objective   /54 (BP Location: Left arm, Patient Position: Sitting)   Pulse 65   Temp 36.6 °C (97.8 °F) (Temporal)   Wt 83.9 kg (185 lb)   SpO2 95%   BMI 38.01 kg/m²     Physical Exam  Vitals and nursing note reviewed.   Constitutional:       General: She is not in acute distress.     Appearance: Normal appearance. She is well-developed. She is obese. She is not ill-appearing, toxic-appearing or diaphoretic.   HENT:      Head: Normocephalic and atraumatic.      Right Ear: Tympanic membrane, ear canal and external ear normal. There is no impacted cerumen.      Left Ear: Tympanic membrane, ear canal and external ear normal. There is no impacted cerumen.      Nose: Nose normal. No congestion or rhinorrhea.      Mouth/Throat:      Mouth: Mucous membranes are moist.      Pharynx: Oropharynx is clear. No oropharyngeal exudate or posterior oropharyngeal erythema.   Eyes:      General: No scleral icterus.        Right eye: No discharge.         Left eye: No discharge.      Extraocular  Movements: Extraocular movements intact.      Conjunctiva/sclera: Conjunctivae normal.      Pupils: Pupils are equal, round, and reactive to light.      Comments: Corrected vision   Neck:      Thyroid: No thyromegaly.      Vascular: No carotid bruit.      Trachea: No tracheal deviation.   Cardiovascular:      Rate and Rhythm: Normal rate and regular rhythm.      Pulses: Normal pulses.      Heart sounds: Normal heart sounds. No murmur heard.     No friction rub. No gallop.   Pulmonary:      Effort: Pulmonary effort is normal. No respiratory distress.      Breath sounds: Normal breath sounds. No stridor. No wheezing, rhonchi or rales.   Chest:      Chest wall: No tenderness.   Abdominal:      General: Bowel sounds are normal. There is no distension.      Palpations: Abdomen is soft. There is no mass.      Tenderness: There is no abdominal tenderness. There is no guarding or rebound.      Hernia: No hernia is present.   Musculoskeletal:         General: No tenderness or signs of injury.      Cervical back: Neck supple.      Right lower leg: No edema.      Left lower leg: No edema.   Lymphadenopathy:      Cervical: No cervical adenopathy.   Skin:     General: Skin is warm and dry.      Coloration: Skin is not pale.      Findings: No erythema or rash.   Neurological:      Mental Status: She is alert and oriented to person, place, and time.      Gait: Gait normal.      Deep Tendon Reflexes: Reflexes are normal and symmetric. Reflexes normal.   Psychiatric:         Mood and Affect: Mood normal.        Results for orders placed or performed in visit on 08/09/23   Comprehensive metabolic panel Blood, Venous   Result Value Ref Range    Sodium 143 135 - 145 mmol/L    Potassium 4.8 3.5 - 5.1 MMOL/L    Chloride 108 (H) 98 - 107 mmol/L    CO2 28 21 - 32 mmol/L    Anion Gap 7 3 - 11 mmol/L    BUN 22 7 - 25 mg/dL    Creatinine 1.30 (H) 0.60 - 1.10 mg/dL    Glucose 89 70 - 105 mg/dL    Calcium 9.3 8.6 - 10.3 mg/dL    AST 17 <40 U/L     ALT (SGPT) 10 7 - 52 U/L    Alkaline Phosphatase 70 55 - 142 U/L    Total Protein 7.0 6.0 - 8.3 g/dL    Albumin 3.8 3.5 - 5.3 g/dL    Total Bilirubin 0.63 0.20 - 1.40 mg/dL    Corrected Calcium 9.5 8.6 - 10.3 mg/dL    eGFR 41 (L) >60 mL/min/1.73m*2   CBC w/auto differential Blood, Venous   Result Value Ref Range    WBC 9.9 4.5 - 10.5 10*3/uL    RBC 4.90 3.70 - 5.30 10*6/µL    Hemoglobin 14.4 11.5 - 15.5 g/dL    Hematocrit 43.0 34.0 - 45.0 %    MCV 87.7 81.0 - 97.0 fL    MCH 29.3 28.0 - 33.0 pg    MCHC 33.4 32.0 - 36.0 g/dL    RDW 15.6 (H) 11.5 - 14.0 %    Platelets 219 140 - 350 10*3/uL    MPV 8.5 6.9 - 10.8 fL    Neutrophils% 63.5 41.0 - 81.0 %    Lymphocytes% 27.3 11.0 - 47.0 %    Monocytes% 5.8 3.0 - 11.0 %    Eosinophils% 2.9 0.0 - 3.0 %    Basophils% 0.5 0.0 - 2.0 %    ANC (auto diff) 6.30 10*3/UL    Lymphocytes Absolute 2.70 10*3/uL    Monocytes Absolute 0.60 10*3/uL    Eosinophils Absolute 0.30 10*3/uL    Basophils Absolute 0.00 10*3/uL        Assessment/Plan          Diagnoses and all orders for this visit:    Preop examination    Benign essential hypertension    Other specified hypothyroidism    Obstructive sleep apnea syndrome    Age-related osteoporosis without current pathological fracture    Pulmonary hypertension (CMS/HCC)          1. Revised Cardiac Risk Index: 0 (3.9% risk)     2 . Post Op Respiratory Failure Risk: 1.42%    3. Duke Activity Status Index: METS: 4.73    4. EKG: CBC - RDW: 15.6, otherwise normal CBC. CMP -     5. CBC/CMP: Sinus rhythm, left atrial enlargement, left bundle branch (chronic); abnormal EKG.    6. No further risk stratification needed prior to surgery.  Her postop respiratory failure risk is likely an underestimate given her obstructive sleep apnea and obesity.  Given her aortic stenosis would recommend judicious use of fluids and close postop respiratory evaluation and management.     I did advise her to continue to titrate her Lyrica as this seems to have offered pretty  significant pain relief.  I did advise her to discuss this with her surgeon and pain management physician.    Bronson Travis MD     Portions of this note were documented by Nneka Arita as I performed the exam and collected the information from Katy Stack. I attest that I have reviewed the information as documented, verified the accuracy of the documentation and added additional information as needed.

## 2023-08-11 DIAGNOSIS — I10 ESSENTIAL HYPERTENSION: ICD-10-CM

## 2023-08-12 RX ORDER — LOSARTAN POTASSIUM 50 MG/1
50 TABLET ORAL DAILY
Qty: 90 TABLET | Refills: 3 | Status: SHIPPED | OUTPATIENT
Start: 2023-08-12 | End: 2024-07-15

## 2023-08-12 NOTE — TELEPHONE ENCOUNTER
No care due was identified.  [facility] Embedded Care Due Messages. Reference number: [id number]. 8/11/2023 8:16:16 PM MDT

## 2023-08-13 ASSESSMENT — ENCOUNTER SYMPTOMS
GASTROINTESTINAL NEGATIVE: 1
BACK PAIN: 1
ACTIVITY CHANGE: 0
FATIGUE: 0
DIAPHORESIS: 0
FEVER: 0
CHILLS: 0
RESPIRATORY NEGATIVE: 1
CARDIOVASCULAR NEGATIVE: 1
PSYCHIATRIC NEGATIVE: 1
UNEXPECTED WEIGHT CHANGE: 1
APPETITE CHANGE: 0

## 2023-08-13 NOTE — PROGRESS NOTES
Bariatric surgery annual follow-up  History of Laparoscopic vertical sleeve gastrectomy  Date of service: 8/14/23    Subjective      Patient ID: Katy Stack is a 83 y.o. female.  MRN: [id number]     No chief complaint on file.      BROOKLYN Burns presents to the weight management and bariatric surgery clinic today, unaccompanied, for annual follow-up 4 years after laparoscopic vertical sleeve gastrectomy performed 8/14/2019 by Dr. Millan.  Annual bariatric lab evaluation not yet complete.   She lost her  in the past year.    Weight trend:  Consult weight 5/3/2018: 183 pounds, BMI 35.74 kg/m²  Surgery weight on 8/14/2019:  202 pounds, BMI 39.6 kg/m²    1 year postop on 10/1/2020: 167 pounds, BMI 32.77 kg/m²  2-year postop on 8/30/2021: 170 pounds, BMI 33.40 kg/m²  3-year postop weight on 6/27/2022: 180 pounds, BMI 35.74 kg/m²  4-year post op weight on 8/14/23 180.2 pounds, BMI 35.2  Maintains 22 pound total weight loss from highest weight.    Co morbid conditions   - hypertension: continues atenolol, losartan  - pulmonary hypertension: continues lasix, now uses daily as needed. Uses potassium as needed with lasix  - hyperlipidemia: continues atorvastatin  - osteoporosis: continues prolia  - chronic pain: takes Lyrica 150 mg bid, Dr Gonzalez takes care of her pain management  Hypothyroidism: continues levothyroxine 112 mcg daily.  - GERD: She does have intermittent symptoms of GERD if eats spicy food or too much food before bed.2 times month omeprazole 20 mg p.o. daily as needed   Back surgery scheduled for 09/06 - Dr. Solis     Psychology:  She declines referral for psychological counseling at this time.  She feels that Lyrica is causing her to feel sad, spacey, she has talked to pain management and is considering a change in medication.    Nutrition:  Salad plate   - Breakfast: 1 egg,  1 piece thin sliced bread.   Half caffeine Coffee with creamer.  - Lunch: tuna fish, lunch meat and cheddar cheese rollup,  and fresh fruit  - Dinner: meatloaf + vegetable + baked or mashed potato (meat + vegetable + small portion of starch) about 1-1.5 cup at dinner.  Snacks: all the time, chips crackers, salty snacks  Beverages: Iced Tea-zero sugar    Taking the following postoperative vitamin supplementation unchanged from previous - Women's 55+ multivitamin with iron, vitamin B12, vitamin D3, and calcium + D3 twice daily.  She is unsure of the exact dosages she is taking of these supplements.    Physical activity:   She has not been exercising    BIOMETRIC TESTING ON 23 REVEALED:compared to 22  Weight: 180.2 pounds (-.4 pounds)  Fat percent: 48.8% (-.3%)  Fat Mass 88 lbs: (-.8 pounds)  Fat free mass 92.2 pounds (-.4 pounds)  Visceral fat ratin (+1)    Want fat mass/LMM loss to be 3:1 ratio  Desirable range fat percent: 24 to 35.9%    Anthropometrics' on 23 revealed:  Neck circumference: 14.75 inches (-.25 inches)  Waist: 39 inches (- inches)  Hip: 48 inches (-.25 inches)    Review of Systems   Constitutional:  Positive for unexpected weight change (gain). Negative for activity change, appetite change, chills, diaphoresis, fatigue and fever.   HENT: Negative.     Respiratory: Negative.     Cardiovascular: Negative.    Gastrointestinal: Negative.         Intermittent GERD.   Musculoskeletal:  Positive for back pain (Chronic, continues gabapentin at increased dose, MRI tomorrow, sees pain management).   Neurological:         Chronic peripheral neuropathy persists related to back pain, no worse than previous   Psychiatric/Behavioral: Negative.         I have personally reviewed the pertinent aspects of the patient's chart and updated the computerized patient record. Pertinent positives are noted above.  Items reviewed including:         Objective      Vital Signs  There were no vitals taken for this visit.      Physical Exam  Vitals and nursing note reviewed. Chaperone present: Unaccompanied.   Constitutional:        General: She is awake. She is not in acute distress.     Appearance: Normal appearance. She is well-developed and well-groomed. She is obese.      Comments: Maintains 22 pound total weight loss since surgery   Cardiovascular:      Rate and Rhythm: Normal rate.   Pulmonary:      Effort: Pulmonary effort is normal.   Abdominal:      Hernia: There is no hernia in the ventral area.   Skin:     General: Skin is warm and dry.   Neurological:      General: No focal deficit present.      Mental Status: She is alert.   Psychiatric:         Attention and Perception: Attention normal.         Mood and Affect: Mood normal.         Behavior: Behavior is cooperative.       LAB  Annual bariatric lab evaluation not yet complete.  Ordered today.    Assessment and Plan:    1. Class 1 obesity due to excess calories with serious comorbidity and body mass index (BMI) of 33.0 to 33.9 in adult    2. History of sleeve gastrectomy, annual labs ordered today.  Discussed nutrition and exercise goals today    3. Essential hypertension continue atenolol 25 mg daily, losartan 50 mg daily    4. Other specified hypothyroidism continue Levothyroxine    5. Obstructive sleep apnea syndrome wears oxygen 2 L at night    6. Heartburn continue omeprazole 20 mg as needed    7.Chronic pain: May consider Cymbalta for chronic pain and for her mood. Discuss tapering off Lyrica because it seems to contribute to depressed mood, and trouble concentrating.    This is a 83 y.o. female who presents today for ongoing follow up 4 years s/p sleeve gastrectomy.      She maintains a 22 lb weight loss since surgery.  Annual bariatric lab evaluation not yet completed, labs reordered today to be drawn around 8/15/23.  She is taking all recommended postoperative vitamins.     Management of Co morbid conditions by PCP, cardiology, and pain management. She is encouraged to continue follow up with these providers for management of hypertension, hyperlipidemia, chronic pain,  pulmonary hypertension, osteoporosis.  Omeprazole has been refilled for her to utilize daily as needed for breakthrough GERD status post sleeve gastrectomy.  Again, reviewed benefit of continued weight loss and optimized lifestyle on management of all related health conditions.     Nutrition:   Protein shake for breakfast- 30 gm each meal  Reduce sweetened tea, try to move to plain or half and half  Snacking try fresh vegetables and fruit, don't buy chips or crackers, sweets  Fruit and vegetables: 4-5 servings day    Reviewed  protein sparing weight loss plan, each meal contains quality protein and 1-3 servings of fruit or vegetables.     1-Quality of food-whole foods-vegetables, fruits, legumes, whole grains, unsalted nuts and seeds  2-Quantity of food-use smaller plate, portion control  3-Timing-be consistent when eat am/pm meal, fast, and bedtime/wakeup.  Eat am and pm meal at similar time of day,consistency is important because body works on a clock with the gut.   Eating window 10-12 hours, be consistent when this is done.     Metabolic Matrix: Quality of food  Choose foods that do all 3 of the following  1-reduce/remove added sugar, avoid fructose. Both of these are found in processed food. Will not find in whole/natural food.  2-Increase fiber through whole foods which are naturally occurring and not processed, 4-5 servings of fruit/vegetables each day  3-Increase Healthy fats; avocado, olive oil, raw and unsalted seeds, sunflower, flax, sesame, oily fish.    1- First meal: 30-40 gm of protein within 60-90 minutes of waking up to initiate muscle building, protect skeletal muscle. Protein goal:  gm/day. 20-30 gm protein at each meal or snack.     2-Timing: -Eat carbohydrates/starches at meal time: fruits, vegetables, and whole grains paired with a protein. Handout on simple vs complex carbs.    3- Water: .5 oz/lb body weight baseline, increase for exercise, if taking diuretics, hot environments,  sauna/hot tub. Drink 8 -16 oz water before caffeine    -snacks: high protein plus fiber(fruit or vegetables). Avoid simple carbohydrates, and processed snacks.     30 gm of protein= 4 oz meat, 5 eggs, 1.5 cups greek yogurt or cottage cheese      Physical Activity:increasing physical activity is how to sustain weight loss, and prevent weight regain    Resistance bands daily, and PT for her back       Katy Stack will RTC in 1 year for annual bariatric surgery follow up and labs, sooner with concerns or further weight regain.  She participated in the decision making process and agreed with the above plan.  All questions were sought and answered to her satisfaction.           Yvette Llanes PA-C

## 2023-08-14 ENCOUNTER — OFFICE VISIT (OUTPATIENT)
Dept: SURGERY | Facility: CLINIC | Age: 83
End: 2023-08-14
Payer: MEDICARE

## 2023-08-14 VITALS
SYSTOLIC BLOOD PRESSURE: 106 MMHG | HEART RATE: 94 BPM | TEMPERATURE: 98.4 F | WEIGHT: 180.2 LBS | DIASTOLIC BLOOD PRESSURE: 50 MMHG | OXYGEN SATURATION: 96 % | HEIGHT: 60 IN | BODY MASS INDEX: 35.38 KG/M2

## 2023-08-14 DIAGNOSIS — E66.09 CLASS 1 OBESITY DUE TO EXCESS CALORIES WITH SERIOUS COMORBIDITY AND BODY MASS INDEX (BMI) OF 33.0 TO 33.9 IN ADULT: Primary | ICD-10-CM

## 2023-08-14 DIAGNOSIS — E66.811 CLASS 1 OBESITY DUE TO EXCESS CALORIES WITH SERIOUS COMORBIDITY AND BODY MASS INDEX (BMI) OF 33.0 TO 33.9 IN ADULT: Primary | ICD-10-CM

## 2023-08-14 DIAGNOSIS — R12 HEARTBURN: ICD-10-CM

## 2023-08-14 DIAGNOSIS — I10 ESSENTIAL HYPERTENSION: ICD-10-CM

## 2023-08-14 DIAGNOSIS — E03.8 OTHER SPECIFIED HYPOTHYROIDISM: ICD-10-CM

## 2023-08-14 DIAGNOSIS — G47.33 OBSTRUCTIVE SLEEP APNEA SYNDROME: ICD-10-CM

## 2023-08-14 DIAGNOSIS — Z90.3 HISTORY OF SLEEVE GASTRECTOMY: ICD-10-CM

## 2023-08-14 PROCEDURE — G0463 HOSPITAL OUTPT CLINIC VISIT: HCPCS | Performed by: PHYSICIAN ASSISTANT

## 2023-08-14 PROCEDURE — 99215 OFFICE O/P EST HI 40 MIN: CPT | Performed by: PHYSICIAN ASSISTANT

## 2023-08-14 NOTE — PATIENT INSTRUCTIONS
1. Class 1 obesity due to excess calories with serious comorbidity and body mass index (BMI) of 33.0 to 33.9 in adult    2. History of sleeve gastrectomy    3. Essential hypertension    4. Other specified hypothyroidism continue Levothyroxine    5. Obstructive sleep apnea syndrome wears oxygen 2 L at night    6. Heartburn    7.Chronic pain: May consider Cymbalta for chronic pain and for her mood. Discuss tapering off Lyrica because it seems to contribute to depressed mood, and trouble concentrating.    This is a 83 y.o. female who presents today for ongoing follow up 4 years s/p sleeve gastrectomy.      She maintains a 22 lb weight loss since surgery.  She has regained 10 lbs since I last saw her.  She is undergoing an MRI tomorrow and I have contacted radiology to see if they could obtain biometric information on the MRI scan.  Unfortunately, they were unable to do this, and therefore she stepped into the clinic for biometric testing following her MRI scan since her implanted stimulator had been turned off.    Offered/recommended referral for psychological counseling and IBT for nutrition counseling and she declines these referrals at this time.     Annual bariatric lab evaluation not yet completed, labs reordered today to be drawn around 8/15/23.  She is taking all recommended postoperative vitamins.     Management of Co morbid conditions by PCP, cardiology, and pain management. She is encouraged to continue follow up with these providers for management of hypertension, hyperlipidemia, chronic pain, pulmonary hypertension, osteoporosis.  Omeprazole has been refilled for her to utilize daily as needed for breakthrough GERD status post sleeve gastrectomy.  Again, reviewed benefit of continued weight loss and optimized lifestyle on management of all related health conditions.     Nutrition:   Protein shake for breakfast- 30 gm each meal  Reduce sweetened tea, try to move to plain or half and half  Snacking try fresh  vegetables and fruit, don't buy chips or crackers, sweets  Fruit and vegetables: 4-5 servings day    Reviewed  protein sparing weight loss plan, each meal contains quality protein and 1-3 servings of fruit or vegetables.     1-Quality of food-whole foods-vegetables, fruits, legumes, whole grains, unsalted nuts and seeds  2-Quantity of food-use smaller plate, portion control  3-Timing-be consistent when eat am/pm meal, fast, and bedtime/wakeup.  Eat am and pm meal at similar time of day,consistency is important because body works on a clock with the gut.   Eating window 10-12 hours, be consistent when this is done.     Metabolic Matrix: Quality of food  Choose foods that do all 3 of the following  1-reduce/remove added sugar, avoid fructose. Both of these are found in processed food. Will not find in whole/natural food.  2-Increase fiber through whole foods which are naturally occurring and not processed, 4-5 servings of fruit/vegetables each day  3-Increase Healthy fats; avocado, olive oil, raw and unsalted seeds, sunflower, flax, sesame, oily fish.    1- First meal: 30-40 gm of protein within 60-90 minutes of waking up to initiate muscle building, protect skeletal muscle. Protein goal:  gm/day. 20-30 gm protein at each meal or snack.     2-Timing: -Eat carbohydrates/starches at meal time: fruits, vegetables, and whole grains paired with a protein. Handout on simple vs complex carbs.    3- Water: .5 oz/lb body weight baseline, increase for exercise, if taking diuretics, hot environments, sauna/hot tub. Drink 8 -16 oz water before caffeine    -snacks: high protein plus fiber(fruit or vegetables). Avoid simple carbohydrates, and processed snacks.     30 gm of protein= 4 oz meat, 5 eggs, 1.5 cups greek yogurt or cottage cheese      Physical Activity:increasing physical activity is how to sustain weight loss, and prevent weight regain    Resistance bands daily, and PT for her back       Kayt LIVIER Stack will RTC  in 1 year for annual bariatric surgery follow up and labs, sooner with concerns or further weight regain.  She participated in the decision making process and agreed with the above plan.  All questions were sought and answered to her satisfaction.

## 2023-08-17 ENCOUNTER — LAB (OUTPATIENT)
Dept: LAB | Facility: CLINIC | Age: 83
End: 2023-08-17
Payer: MEDICARE

## 2023-08-17 DIAGNOSIS — E03.9 HYPOTHYROIDISM, UNSPECIFIED TYPE: ICD-10-CM

## 2023-08-17 DIAGNOSIS — E66.811 CLASS 1 OBESITY DUE TO EXCESS CALORIES WITH SERIOUS COMORBIDITY AND BODY MASS INDEX (BMI) OF 33.0 TO 33.9 IN ADULT: ICD-10-CM

## 2023-08-17 DIAGNOSIS — E66.09 CLASS 1 OBESITY DUE TO EXCESS CALORIES WITH SERIOUS COMORBIDITY AND BODY MASS INDEX (BMI) OF 33.0 TO 33.9 IN ADULT: ICD-10-CM

## 2023-08-17 DIAGNOSIS — Z90.3 HISTORY OF SLEEVE GASTRECTOMY: ICD-10-CM

## 2023-08-17 LAB
ALBUMIN SERPL-MCNC: 3.6 G/DL (ref 3.5–5.3)
ALP SERPL-CCNC: 62 U/L (ref 55–142)
ALT SERPL-CCNC: 8 U/L (ref 7–52)
ANION GAP SERPL CALC-SCNC: 7 MMOL/L (ref 3–11)
AST SERPL-CCNC: 15 U/L
BILIRUB SERPL-MCNC: 0.49 MG/DL (ref 0.2–1.4)
BUN SERPL-MCNC: 20 MG/DL (ref 7–25)
CALCIUM ALBUM COR SERPL-MCNC: 9.7 MG/DL (ref 8.6–10.3)
CALCIUM SERPL-MCNC: 9.4 MG/DL (ref 8.6–10.3)
CHLORIDE SERPL-SCNC: 107 MMOL/L (ref 98–107)
CHOLEST SERPL-MCNC: 121 MG/DL (ref 0–199)
CO2 SERPL-SCNC: 28 MMOL/L (ref 21–32)
CREAT SERPL-MCNC: 1.33 MG/DL (ref 0.6–1.1)
EGFRCR SERPLBLD CKD-EPI 2021: 40 ML/MIN/1.73M*2
ERYTHROCYTE [DISTWIDTH] IN BLOOD BY AUTOMATED COUNT: 15.5 % (ref 11.5–14)
FASTING STATUS PATIENT QL REPORTED: YES
FOLATE SERPL-MCNC: >22.3 NG/ML
GLUCOSE SERPL-MCNC: 88 MG/DL (ref 70–105)
HCT VFR BLD AUTO: 41.3 % (ref 34–45)
HDLC SERPL-MCNC: 42 MG/DL
HGB BLD-MCNC: 13.7 G/DL (ref 11.5–15.5)
LDLC SERPL CALC-MCNC: 57 MG/DL (ref 20–99)
MCH RBC QN AUTO: 29.2 PG (ref 28–33)
MCHC RBC AUTO-ENTMCNC: 33.2 G/DL (ref 32–36)
MCV RBC AUTO: 88 FL (ref 81–97)
PHOSPHATE SERPL-MCNC: 4.3 MG/DL (ref 2.5–4.9)
PLATELET # BLD AUTO: 273 10*3/UL (ref 140–350)
PMV BLD AUTO: 8.4 FL (ref 6.9–10.8)
POTASSIUM SERPL-SCNC: 4.4 MMOL/L (ref 3.5–5.1)
PROT SERPL-MCNC: 6.7 G/DL (ref 6–8.3)
RBC # BLD AUTO: 4.7 10*6/ΜL (ref 3.7–5.3)
SODIUM SERPL-SCNC: 142 MMOL/L (ref 135–145)
TRIGL SERPL-MCNC: 111 MG/DL
TSH SERPL DL<=0.05 MIU/L-ACNC: 1.63 UIU/ML (ref 0.34–4.82)
VIT B12 SERPL-MCNC: 423 PG/ML (ref 180–914)
WBC # BLD AUTO: 7.7 10*3/UL (ref 4.5–10.5)

## 2023-08-17 PROCEDURE — 80061 LIPID PANEL: CPT

## 2023-08-17 PROCEDURE — 36415 COLL VENOUS BLD VENIPUNCTURE: CPT

## 2023-08-17 PROCEDURE — 80053 COMPREHEN METABOLIC PANEL: CPT

## 2023-08-17 PROCEDURE — 84597 ASSAY OF VITAMIN K: CPT

## 2023-08-17 PROCEDURE — 84590 ASSAY OF VITAMIN A: CPT

## 2023-08-17 PROCEDURE — 82607 VITAMIN B-12: CPT

## 2023-08-17 PROCEDURE — 84446 ASSAY OF VITAMIN E: CPT

## 2023-08-17 PROCEDURE — 85027 COMPLETE CBC AUTOMATED: CPT

## 2023-08-17 PROCEDURE — 84100 ASSAY OF PHOSPHORUS: CPT

## 2023-08-17 PROCEDURE — 84425 ASSAY OF VITAMIN B-1: CPT

## 2023-08-17 PROCEDURE — 82746 ASSAY OF FOLIC ACID SERUM: CPT

## 2023-08-17 PROCEDURE — 84443 ASSAY THYROID STIM HORMONE: CPT

## 2023-08-17 PROCEDURE — 83921 ORGANIC ACID SINGLE QUANT: CPT

## 2023-08-19 LAB
PHYTONADIONE SERPL-MCNC: 0.14 NG/ML (ref 0.1–2.2)
VIT A SERPL-MCNC: 49.5 MCG/DL (ref 32.5–78)

## 2023-08-20 LAB — METHYLMALONATE SERPL-SCNC: 0.32 NMOL/ML

## 2023-08-21 LAB — A-TOCOPHEROL VIT E SERPL-MCNC: 14.7 MG/L (ref 5.5–17)

## 2023-08-22 LAB — VIT B1 BLD-SCNC: 256 NMOL/L (ref 70–180)

## 2023-08-31 ENCOUNTER — CLINICAL SUPPORT (OUTPATIENT)
Dept: FAMILY MEDICINE | Facility: CLINIC | Age: 83
End: 2023-08-31
Payer: MEDICARE

## 2023-08-31 DIAGNOSIS — M81.0 AGE-RELATED OSTEOPOROSIS WITHOUT CURRENT PATHOLOGICAL FRACTURE: Primary | ICD-10-CM

## 2023-08-31 PROCEDURE — 6360000200 HC RX 636 W HCPCS (ALT 250 FOR IP): Mod: TB,JZ | Performed by: FAMILY MEDICINE

## 2023-08-31 PROCEDURE — 96372 THER/PROPH/DIAG INJ SC/IM: CPT | Performed by: FAMILY MEDICINE

## 2023-08-31 RX ORDER — EPINEPHRINE 1 MG/ML
0.3 INJECTION INTRAMUSCULAR; INTRAVENOUS; SUBCUTANEOUS AS NEEDED
Status: CANCELLED | OUTPATIENT
Start: 2023-09-03

## 2023-08-31 RX ORDER — ALBUTEROL SULFATE 0.83 MG/ML
2.5 SOLUTION RESPIRATORY (INHALATION) AS NEEDED
Status: CANCELLED | OUTPATIENT
Start: 2023-09-03

## 2023-08-31 RX ORDER — ACETAMINOPHEN 500 MG
500 TABLET ORAL AS NEEDED
Status: CANCELLED | OUTPATIENT
Start: 2023-09-03

## 2023-08-31 RX ORDER — DIPHENHYDRAMINE HYDROCHLORIDE 50 MG/ML
25-50 INJECTION INTRAMUSCULAR; INTRAVENOUS AS NEEDED
Status: CANCELLED | OUTPATIENT
Start: 2023-09-03

## 2023-08-31 RX ORDER — FAMOTIDINE 10 MG/ML
20 INJECTION INTRAVENOUS AS NEEDED
Status: CANCELLED | OUTPATIENT
Start: 2023-09-03

## 2023-08-31 RX ORDER — SODIUM CHLORIDE 9 MG/ML
0-999 INJECTION, SOLUTION INTRAVENOUS CONTINUOUS PRN
Status: CANCELLED | OUTPATIENT
Start: 2023-09-03

## 2023-08-31 RX ADMIN — DENOSUMAB 60 MG: 60 INJECTION SUBCUTANEOUS at 10:10

## 2023-08-31 NOTE — PROGRESS NOTES
Prolia Injection   Number of injections/year injection started: 2020  DXA: 03/28/2023  T-score: -2.0 in the left femoral neck.  FRAX: 4.4% HIP , 10.6% MAJOR  The patient has had a significant increase of BMD of 0.053 g/cm² in the total hip resulting in a +6.8% change compared to the 2021 DXA.    Prolia 60mg/1ml was injected subcutaneously in back of upper arm, tolerated injection well.  Possible side effects discussed INCLUDING ANY DENTAL CONCERNS   Compliance with calcium and vitamin D discussed   Push fluids for the next 24 hours  Any recent falls/fractures discussed  Patient advised to have follow up DXA per provider and scheduled if needed at this time  Katy verbalized an understanding of the above discussions and voiced no concerns at this time.    Next Prolia appointment scheduled at checkout.

## 2023-09-01 DIAGNOSIS — E03.8 OTHER SPECIFIED HYPOTHYROIDISM: ICD-10-CM

## 2023-09-02 RX ORDER — LEVOTHYROXINE SODIUM 112 UG/1
112 TABLET ORAL DAILY
Qty: 90 TABLET | Refills: 3 | Status: SHIPPED | OUTPATIENT
Start: 2023-09-02 | End: 2024-08-07

## 2023-09-02 NOTE — TELEPHONE ENCOUNTER
No care due was identified.  [facility] Embedded Care Due Messages. Reference number: [id number]. 9/01/2023 9:00:23 PM MDT

## 2023-10-03 ENCOUNTER — OFFICE VISIT (OUTPATIENT)
Dept: FAMILY MEDICINE | Facility: CLINIC | Age: 83
End: 2023-10-03
Payer: MEDICARE

## 2023-10-03 VITALS
WEIGHT: 181 LBS | SYSTOLIC BLOOD PRESSURE: 154 MMHG | OXYGEN SATURATION: 98 % | RESPIRATION RATE: 16 BRPM | BODY MASS INDEX: 35.35 KG/M2 | HEART RATE: 65 BPM | DIASTOLIC BLOOD PRESSURE: 68 MMHG | TEMPERATURE: 98.2 F

## 2023-10-03 DIAGNOSIS — E16.2 HYPOGLYCEMIA: Primary | ICD-10-CM

## 2023-10-03 PROCEDURE — G0463 HOSPITAL OUTPT CLINIC VISIT: HCPCS | Performed by: FAMILY MEDICINE

## 2023-10-03 PROCEDURE — 99214 OFFICE O/P EST MOD 30 MIN: CPT | Performed by: FAMILY MEDICINE

## 2023-10-03 RX ORDER — GABAPENTIN 300 MG/1
300 CAPSULE ORAL 3 TIMES DAILY
COMMUNITY

## 2023-10-03 NOTE — Clinical Note
I was looking at the possibility of getting a continuous glucose monitor either professional or sample to evaluate for hypoglycemia.  Please let me know if this would be a possibility.  Thanks

## 2023-10-03 NOTE — PROGRESS NOTES
Subjective      Katy Stack is a 83 y.o. female who presents for Blood Sugar Problem (Having episodes of low blood sugar in the 70's for the last several months intermittently, improves after eating something. Feels shaky when blood sugar is low.)  .    Patient presents due to concerns of episodes of low blood sugars within the last several months. She estimates she has had 1 episode per week of blood sugar in the 70's where she feels shaky and clammy. This resolves after about 30 minutes after she eats something with protein. These episodes typically happen about mid-morning. She denies shortness of breath or chest pain during these episodes. She does have a hx of bariatric surgery.         The following have been reviewed and updated as appropriate in this visit:   Tobacco  Allergies  Meds  Problems  Med Hx  Surg Hx  Fam Hx         Allergies   Allergen Reactions    [place] Angioedema and Hives     hives, tongue swells  Swelling of tongue    Adhesive      welts     Current Outpatient Medications   Medication Sig Dispense Refill    gabapentin (NEURONTIN) 300 mg capsule Take 1 capsule (300 mg total) by mouth 3 (three) times a day 600mg in AM, 600mg in evening, occasionally additional tablet during the night      levothyroxine (SYNTHROID, LEVOTHROID) 112 mcg tablet Take 1 tablet (112 mcg total) by mouth daily 90 tablet 3    losartan (COZAAR) 50 mg tablet Take 1 tablet (50 mg total) by mouth daily 90 tablet 3    atorvastatin (LIPITOR) 20 mg tablet Take 1 tablet (20 mg total) by mouth daily 90 tablet 0    atenoloL (TENORMIN) 25 mg tablet Take 1 tablet (25 mg total) by mouth daily 90 tablet 3    omeprazole (PriLOSEC) 20 mg capsule TAKE 1 CAPSULE BY MOUTH  DAILY 90 capsule 3    estrogens, conjugated, (Premarin) 0.9 mg tablet Take 1 tablet (0.9 mg total) by mouth once a week 4 tablet 0    multivitamin (THERAGRAN) tablet tablet Take 1 tablet by mouth daily      furosemide (LASIX) 20 mg tablet TAKE 1 TABLET BY  MOUTH  DAILY (Patient taking differently: Take 1 tablet (20 mg total) by mouth daily as needed Indications: visible water retention) 90 tablet 3    potassium chloride 10 mEq CR tablet TAKE 1 TABLET BY MOUTH  DAILY 90 tablet 3    calcium carbonate-vitamin D3 600 mg(1,500mg) -400 unit per tablet Take 1 tablet by mouth daily      nitroglycerin (NITROSTAT) 0.4 mg SL tablet Place 1 tablet (0.4 mg total) under the tongue every 5 (five) minutes as needed for chest pain 25 tablet 1    vit A-vit C-vit E-zinc-copper [id number] unit-mg-unit tablet Take 1 tab/cap by mouth 2 (two) times a day.      denosumab (PROLIA) 60 mg/mL syringe syringe Inject 1 mL (60 mg total) under the skin once for 1 dose Medication is being administered as part of a therapy plan. Review patient's therapy plan and chart for previous doses/dates of administration and schedule of future doses/dates of administration. 1 mL 0    pregabalin (LYRICA) 150 mg capsule Take 1 capsule (150 mg total) by mouth nightly      denosumab (PROLIA) 60 mg/mL syringe syringe Inject 1 mL (60 mg total) under the skin once for 1 dose Medication is being administered as part of a therapy plan. Review patient's therapy plan and chart for previous doses/dates of administration and schedule of future doses/dates of administration. 1 mL 0     No current facility-administered medications for this visit.     Past Medical History:   Diagnosis Date    Benign essential hypertension 11/24/2017    Cataract of left eye     Chronic obstructive lung disease (CMS/HCC) 11/10/2017    Dysrhythmia     LBBB    Family history of anesthesia complication     GERD (gastroesophageal reflux disease)     Hypertension     Hypothyroid     Hypothyroidism 11/10/2017    Injury of back     BENDING INJURY    Macular degeneration     Neurologic disorder     Obesity 11/10/2017    Obstructive sleep apnea syndrome 11/10/2017    Night time oxygen/2L    Osteoarthritis     Periodic limb movement disorder 11/10/2017     Peripheral neuropathy     left thigh into left groin     Rectocele     Respiratory disease      Past Surgical History:   Procedure Laterality Date    APPENDECTOMY  1987    BACK SURGERY      2 surgeries [id number]    BACK SURGERY  11/30/2016    BACK SURGERY  05/01/2017    vetebral fusion    CARDIAC CATHETERIZATION  08/31/2011    CATARACT EXTRACTION W/  INTRAOCULAR LENS IMPLANT Left 7/2/2018    Procedure: OS CATARACT LEFT PHACOEMULSIFICATION OF CATARACT WITH INTRAOCULAR LENS;  Surgeon: Emmanuel Cueto MD;  Location: \[facility]\"" SURGICAL SERVICES;  Service: Ophthalmology;  Laterality: Left;    CATARACT EXTRACTION W/  INTRAOCULAR LENS IMPLANT Right 8/6/2018    Procedure: OD CATARACT RIGHT PHACOEMULSIFICATION OF CATARACT WITH INTRAOCULAR LENS;  Surgeon: Emmanuel Cueto MD;  Location: \[facility]\"" SURGICAL SERVICES;  Service: Ophthalmology;  Laterality: Right;    CHOLECYSTECTOMY      COLONOSCOPY  02/19/2014    No polyps    COLONOSCOPY  04/26/2011    DIAGNOSTIC LAPAROSCOPY N/A 11/28/2018    Procedure: Laparoscopic band removal;  Surgeon: Chris Millan MD;  Location: [facility];  Service: General;  Laterality: N/A;    ESOPHAGOGASTRODUODENOSCOPY N/A 11/6/2018    Procedure: EGD - ESOPHAGOGASTRODUODENOSCOPY;  Surgeon: Chris Millan MD;  Location: \[facility]\"" Endoscopy;  Service: Endoscopy;  Laterality: N/A;    FOOT SURGERY      11 surgeries for [place] Neuroma [id number]    GASTRECTOMY N/A 8/14/2019    Procedure: Laparoscopic sleeve Gastrectomy;  Surgeon: Chris Khalil MD;  Location: [facility] OR;  Service: General;  Laterality: N/A;    HYSTERECTOMY  1987    Total    LAPAROSCOPIC GASTRIC BANDING      03/27/2014-standard Ap    OTHER SURGICAL HISTORY      General surgery: Lt rotator cuff 2002    OTHER SURGICAL HISTORY  12/01/2014    Right facial skin cancer    OTHER SURGICAL HISTORY  1987    Ob gyn surgery:Bladder tuck    SHOULDER ARTHROSCOPY  08/02/2013    Dr. Vela- shoulder    SPINAL STIMULATOR IMPLANT N/A 3/23/2021    Procedure: PERMANENT  SPINAL CORD STIMULATOR IMPLANTATION;  Surgeon: Bernardo Gonzalez MD;  Location: [facility] OR;  Service: Pain Management;  Laterality: N/A;    THYROID LOBECTOMY Left     THYROIDECTOMY, PARTIAL  2010    UPPER GASTROINTESTINAL ENDOSCOPY  2014    Mild chronic gastritis     Family History   Problem Relation Age of Onset    Heart disease Mother     Heart disease Father         Myocardial infarction    Parkinsonism Father     Thyroid disease Father     Alzheimer's disease Sister     Arthritis Sister     Osteoporosis Sister     Lung cancer Brother     Diabetes Maternal Grandmother      Social History     Socioeconomic History    Marital status:    Tobacco Use    Smoking status: Former     Packs/day: 2.00     Years: 35.00     Additional pack years: 0.00     Total pack years: 70.00     Types: Cigarettes     Quit date:      Years since quittin.7    Smokeless tobacco: Never   Vaping Use    Vaping Use: Never used   Substance and Sexual Activity    Alcohol use: No    Drug use: No    Sexual activity: Defer     Social Determinants of Health     Tobacco Use: Medium Risk (10/3/2023)    Patient History     Smoking Tobacco Use: Former     Smokeless Tobacco Use: Never       Review of Systems    Objective     Vitals  /68 (BP Location: Left arm, Patient Position: Sitting, Cuff Size: Regular Adult)   Pulse 65   Temp 36.8 °C (98.2 °F) (Temporal)   Resp 16   Wt 82.1 kg (181 lb)   SpO2 98%   BMI 35.35 kg/m²     Physical Exam  Vitals and nursing note reviewed.   Constitutional:       General: She is not in acute distress.     Appearance: Normal appearance. She is obese. She is not ill-appearing, toxic-appearing or diaphoretic.      Comments: Ambulating with cane   Eyes:      Comments: Corrected vision   Neck:      Vascular: No carotid bruit.   Cardiovascular:      Rate and Rhythm: Normal rate and regular rhythm.      Pulses: Normal pulses.      Heart sounds: Normal heart sounds. No murmur heard.      No friction rub. No gallop.   Pulmonary:      Effort: Pulmonary effort is normal. No respiratory distress.      Breath sounds: Normal breath sounds. No stridor. No wheezing, rhonchi or rales.   Musculoskeletal:         General: No tenderness or signs of injury.      Cervical back: Neck supple. No tenderness.      Right lower leg: No edema.      Left lower leg: No edema.   Lymphadenopathy:      Cervical: No cervical adenopathy.   Skin:     General: Skin is warm and dry.      Coloration: Skin is not pale.      Findings: No erythema or rash.   Neurological:      Mental Status: She is alert and oriented to person, place, and time.       Procedures    Assessment/Plan   Diagnoses and all orders for this visit:    Hypoglycemia    Concern for hypoglycemic events versus medication side effect versus possible dumping syndrome.  Will attempt to get patient set up with continuous glucose monitor for further evaluation.  Follow-up pending that evaluation.    Return if symptoms worsen or fail to improve.    Bronson Tarvis MD    Portions of this note were documented by Nneka Arita as I performed the exam and collected the information from Katy Stack. I attest that I have reviewed the information as documented, verified the accuracy of the documentation and added additional information as needed.

## 2023-10-04 ENCOUNTER — OFFICE VISIT NO LOS (OUTPATIENT)
Dept: DIABETES SERVICES | Facility: CLINIC | Age: 83
End: 2023-10-04
Payer: MEDICARE

## 2023-10-04 DIAGNOSIS — E16.2 HYPOGLYCEMIA: Primary | ICD-10-CM

## 2023-10-04 DIAGNOSIS — N95.1 MENOPAUSAL FLUSHING: ICD-10-CM

## 2023-10-04 DIAGNOSIS — E16.2 HYPOGLYCEMIA: ICD-10-CM

## 2023-10-04 NOTE — PROGRESS NOTES
Continuous Glucose Monitoring Placement Appointment Note      Patient was instructed on the following:    CGM requires the insertion of a glucose sensor into the skin. Infection, inflammation or bleeding at the site can be possible risks. Remove the sensor if experiencing redness, pain, tenderness, swelling or bleeding at the insertion site and notify your provider.  It is okay to shower or bathe while wearing the sensor.  Data from the sensor is collected, downloaded and interpreted by your provider or an endocrinologist. If a Professional sensor is placed there will be a charge both for wearing the CGM and the interpretation of the data, however, these costs may be covered by your insurance.  Data is stored in a web-based software program and protected under HIPPA regulations.  Give insulin in an area that is at least 2 inches away from the sensor site.     Patient provided verbal consent for sensor placement by Diabetes Health Care Professional    Session Performed by:  Magaly Higuera RN    Time:  1102    Equipment Tracking:                           Dexcom G6 Professional     Lot #:  [id number]          SN: [id number]            Expiration Date:  11/28/23                 Placement Site:  Back of right arm SQ.  No bleeding with insertion.     Sensor Activation:  Warm up session started.  Dexcom Pro Blinded No warm up   Return for Download:  Instructed  October 13th at 9am.     Additional Comments:  Requested patient keep a food log as well as document if she feels she is having low blood sugar.  She will document what her blood sugar is if/when she checks it.      Instructed when wearing a professional sensor, if sensor should dislodge prior to return appointment, place sensor/transmitter in provided Ziploc bag and envelope and return to Diabetes [facility]     It was verified that the patient does not have an X-ray, MRI or CT scan scheduled within the next 2 weeks. Patient instructed to remove sensor/transmitter  for imaging appointments.      The patient will not be charged for the placing of the sensor. Billing will take place at the [facility] Return visit.

## 2023-10-05 RX ORDER — ESTROGENS, CONJUGATED 0.9 MG/1
TABLET, FILM COATED ORAL
Qty: 13 TABLET | Refills: 3 | Status: SHIPPED | OUTPATIENT
Start: 2023-10-05 | End: 2024-08-08

## 2023-10-05 NOTE — TELEPHONE ENCOUNTER
No care due was identified.  [facility] Embedded Care Due Messages. Reference number: [id number]. 10/04/2023 9:43:30 PM MDT

## 2023-10-05 NOTE — TELEPHONE ENCOUNTER
Medication refill request:  Medication(s):  premarin not filled due to Manual Review message review

## 2023-10-08 ENCOUNTER — OFFICE VISIT (OUTPATIENT)
Dept: URGENT CARE | Facility: CLINIC | Age: 83
End: 2023-10-08
Payer: MEDICARE

## 2023-10-08 VITALS
HEART RATE: 64 BPM | WEIGHT: 180.1 LBS | RESPIRATION RATE: 18 BRPM | TEMPERATURE: 98.1 F | OXYGEN SATURATION: 94 % | DIASTOLIC BLOOD PRESSURE: 54 MMHG | BODY MASS INDEX: 35.17 KG/M2 | SYSTOLIC BLOOD PRESSURE: 158 MMHG

## 2023-10-08 DIAGNOSIS — R30.0 DYSURIA: Primary | ICD-10-CM

## 2023-10-08 DIAGNOSIS — N30.00 ACUTE CYSTITIS WITHOUT HEMATURIA: ICD-10-CM

## 2023-10-08 LAB
BACTERIA #/AREA URNS HPF: ABNORMAL /HPF
BILIRUB UR QL: NEGATIVE
CLARITY UR: CLEAR
COLOR UR: YELLOW
GLUCOSE UR QL: NEGATIVE MG/DL
HGB UR QL: NEGATIVE
KETONES UR-MCNC: NEGATIVE MG/DL
LEUKOCYTE ESTERASE UR QL STRIP: ABNORMAL
NITRITE UR QL: NEGATIVE
PH UR: 5.5 PH
PROT UR STRIP-MCNC: NEGATIVE MG/DL
RBC #/AREA URNS HPF: ABNORMAL /HPF
SP GR UR: 1.01 (ref 1–1.03)
SQUAMOUS #/AREA URNS HPF: ABNORMAL /HPF
UROBILINOGEN UR-MCNC: 0.2 MG/DL
WBC #/AREA URNS HPF: ABNORMAL /HPF

## 2023-10-08 PROCEDURE — 87088 URINE BACTERIA CULTURE: CPT | Performed by: NURSE PRACTITIONER

## 2023-10-08 PROCEDURE — G0463 HOSPITAL OUTPT CLINIC VISIT: HCPCS | Performed by: NURSE PRACTITIONER

## 2023-10-08 PROCEDURE — 99213 OFFICE O/P EST LOW 20 MIN: CPT | Performed by: NURSE PRACTITIONER

## 2023-10-08 PROCEDURE — 81001 URINALYSIS AUTO W/SCOPE: CPT | Performed by: NURSE PRACTITIONER

## 2023-10-08 RX ORDER — CEPHALEXIN 500 MG/1
500 CAPSULE ORAL 3 TIMES DAILY
Qty: 21 CAPSULE | Refills: 0 | Status: SHIPPED | OUTPATIENT
Start: 2023-10-08 | End: 2023-10-15

## 2023-10-08 ASSESSMENT — ENCOUNTER SYMPTOMS
DYSURIA: 1
PALPITATIONS: 0
DIFFICULTY URINATING: 0
TROUBLE SWALLOWING: 0
SINUS PAIN: 0
AGITATION: 0
FEVER: 0
SHORTNESS OF BREATH: 0
SINUS PRESSURE: 0
WHEEZING: 0
CONSTIPATION: 0
DIZZINESS: 0
SORE THROAT: 0
DIARRHEA: 0
RHINORRHEA: 0
NERVOUS/ANXIOUS: 0
CHEST TIGHTNESS: 0
HEADACHES: 0
MYALGIAS: 0
LIGHT-HEADEDNESS: 0
ARTHRALGIAS: 0
VOMITING: 0
COUGH: 0
WEAKNESS: 0
CHILLS: 0
NAUSEA: 0
FREQUENCY: 0
EYE PAIN: 0
ABDOMINAL PAIN: 0

## 2023-10-08 NOTE — PROGRESS NOTES
Subjective      Katy Stack is a 83 y.o. female who presents for Urinary symptoms.    Patient presents today with complaints of dysuria.  She reports her symptoms started a couple of days ago.  She denies any fever or chills.  She does have a longstanding history of frequent urinary tract infections.        The following have been reviewed and updated as appropriate in this visit:   Tobacco  Allergies  Meds  Problems  Med Hx  Surg Hx  Fam Hx         Allergies   Allergen Reactions    [place] Angioedema and Hives     hives, tongue swells  Swelling of tongue    Adhesive      welts     Current Outpatient Medications   Medication Sig Dispense Refill    Premarin 0.9 mg tablet TAKE 1 TABLET BY MOUTH WEEKLY 13 tablet 3    gabapentin (NEURONTIN) 300 mg capsule Take 1 capsule (300 mg total) by mouth 3 (three) times a day 600mg in AM, 600mg in evening, occasionally additional tablet during the night      levothyroxine (SYNTHROID, LEVOTHROID) 112 mcg tablet Take 1 tablet (112 mcg total) by mouth daily 90 tablet 3    losartan (COZAAR) 50 mg tablet Take 1 tablet (50 mg total) by mouth daily 90 tablet 3    atorvastatin (LIPITOR) 20 mg tablet Take 1 tablet (20 mg total) by mouth daily 90 tablet 0    atenoloL (TENORMIN) 25 mg tablet Take 1 tablet (25 mg total) by mouth daily 90 tablet 3    omeprazole (PriLOSEC) 20 mg capsule TAKE 1 CAPSULE BY MOUTH  DAILY 90 capsule 3    multivitamin (THERAGRAN) tablet tablet Take 1 tablet by mouth daily      potassium chloride 10 mEq CR tablet TAKE 1 TABLET BY MOUTH  DAILY 90 tablet 3    calcium carbonate-vitamin D3 600 mg(1,500mg) -400 unit per tablet Take 1 tablet by mouth daily      nitroglycerin (NITROSTAT) 0.4 mg SL tablet Place 1 tablet (0.4 mg total) under the tongue every 5 (five) minutes as needed for chest pain 25 tablet 1    vit A-vit C-vit E-zinc-copper 7,160-113-100 unit-mg-unit tablet Take 1 tab/cap by mouth 2 (two) times a day.      cephalexin (Keflex) 500 mg capsule  Take 1 capsule (500 mg total) by mouth 3 (three) times a day for 7 days 21 capsule 0    denosumab (PROLIA) 60 mg/mL syringe syringe Inject 1 mL (60 mg total) under the skin once for 1 dose Medication is being administered as part of a therapy plan. Review patient's therapy plan and chart for previous doses/dates of administration and schedule of future doses/dates of administration. 1 mL 0    pregabalin (LYRICA) 150 mg capsule Take 1 capsule (150 mg total) by mouth nightly      denosumab (PROLIA) 60 mg/mL syringe syringe Inject 1 mL (60 mg total) under the skin once for 1 dose Medication is being administered as part of a therapy plan. Review patient's therapy plan and chart for previous doses/dates of administration and schedule of future doses/dates of administration. 1 mL 0    furosemide (LASIX) 20 mg tablet TAKE 1 TABLET BY MOUTH  DAILY (Patient not taking: Reported on 10/8/2023) 90 tablet 3     No current facility-administered medications for this visit.     Past Medical History:   Diagnosis Date    Benign essential hypertension 11/24/2017    Cataract of left eye     Chronic obstructive lung disease (CMS/HCC) 11/10/2017    Dysrhythmia     LBBB    Family history of anesthesia complication     GERD (gastroesophageal reflux disease)     Hypertension     Hypothyroid     Hypothyroidism 11/10/2017    Injury of back     BENDING INJURY    Macular degeneration     Neurologic disorder     Obesity 11/10/2017    Obstructive sleep apnea syndrome 11/10/2017    Night time oxygen/2L    Osteoarthritis     Periodic limb movement disorder 11/10/2017    Peripheral neuropathy     left thigh into left groin     Rectocele     Respiratory disease      Past Surgical History:   Procedure Laterality Date    APPENDECTOMY  1987    BACK SURGERY      2 surgeries [id number]    BACK SURGERY  11/30/2016    BACK SURGERY  05/01/2017    vetebral fusion    CARDIAC CATHETERIZATION  08/31/2011    CATARACT EXTRACTION W/  INTRAOCULAR LENS IMPLANT Left  7/2/2018    Procedure: OS CATARACT LEFT PHACOEMULSIFICATION OF CATARACT WITH INTRAOCULAR LENS;  Surgeon: Emmanuel Cueto MD;  Location: [facility] SUR[facility];  Service: Ophthalmology;  Laterality: Left;    CATARACT EXTRACTION W/  INTRAOCULAR LENS IMPLANT Right 8/6/2018    Procedure: OD CATARACT RIGHT PHACOEMULSIFICATION OF CATARACT WITH INTRAOCULAR LENS;  Surgeon: Emmanuel Cueto MD;  Location: [facility] SUR[facility] SERVICES;  Service: Ophthalmology;  Laterality: Right;    CHOLECYSTECTOMY      COLONOSCOPY  02/19/2014    No polyps    COLONOSCOPY  04/26/2011    DIAGNOSTIC LAPAROSCOPY N/A 11/28/2018    Procedure: Laparoscopic band removal;  Surgeon: Chris Millan MD;  Location: [facility] OR;  Service: General;  Laterality: N/A;    ESOPHAGOGASTRODUODENOSCOPY N/A 11/6/2018    Procedure: EGD - ESOPHAGOGASTRODUODENOSCOPY;  Surgeon: Chris Millan MD;  Location: [facility] End[facility];  Service: Endoscopy;  Laterality: N/A;    FOOT SURGERY      11 surgeries for [place]ons Neuroma [id number]    GASTRECTOMY N/A 8/14/2019    Procedure: Laparoscopic sleeve Gastrectomy;  Surgeon: Chris Khalil MD;  Location: [facility] OR;  Service: General;  Laterality: N/A;    HYSTERECTOMY  1987    Total    LAPAROSCOPIC GASTRIC BANDING      03/27/2014-standard Ap    OTHER SURGICAL HISTORY      General surgery: Lt rotator cuff 2002    OTHER SURGICAL HISTORY  12/01/2014    Right facial skin cancer    OTHER SURGICAL HISTORY  1987    Ob gyn surgery:Bladder tuck    SHOULDER ARTHROSCOPY  08/02/2013    Dr. Vela- shoulder    SPINAL STIMULATOR IMPLANT N/A 3/23/2021    Procedure: PERMANENT SPINAL CORD STIMULATOR IMPLANTATION;  Surgeon: Bernardo Gonzalez MD;  Location: [facility] OR;  Service: Pain Management;  Laterality: N/A;    THYROID LOBECTOMY Left 18/18/2010    THYROIDECTOMY, PARTIAL  08/18/2010    UPPER GASTROINTESTINAL ENDOSCOPY  02/19/2014    Mild chronic gastritis     Family History   Problem Relation Age of Onset    Heart disease Mother     Heart disease Father          Myocardial infarction    Parkinsonism Father     Thyroid disease Father     Alzheimer's disease Sister     Arthritis Sister     Osteoporosis Sister     Lung cancer Brother     Diabetes Maternal Grandmother      Social History     Socioeconomic History    Marital status:    Tobacco Use    Smoking status: Former     Packs/day: 2.00     Years: 35.00     Additional pack years: 0.00     Total pack years: 70.00     Types: Cigarettes     Quit date:      Years since quittin.7    Smokeless tobacco: Never   Vaping Use    Vaping Use: Never used   Substance and Sexual Activity    Alcohol use: No    Drug use: No    Sexual activity: Defer     Social Determinants of Health     Tobacco Use: Medium Risk (10/3/2023)    Patient History     Smoking Tobacco Use: Former     Smokeless Tobacco Use: Never       Review of Systems   Constitutional:  Negative for chills and fever.   HENT:  Negative for congestion, ear pain, rhinorrhea, sinus pressure, sinus pain, sore throat and trouble swallowing.    Eyes:  Negative for pain and visual disturbance.   Respiratory:  Negative for cough, chest tightness, shortness of breath and wheezing.    Cardiovascular:  Negative for chest pain and palpitations.   Gastrointestinal:  Negative for abdominal pain, constipation, diarrhea, nausea and vomiting.   Endocrine: Negative for cold intolerance and heat intolerance.   Genitourinary:  Positive for dysuria. Negative for difficulty urinating, frequency and urgency.   Musculoskeletal:  Negative for arthralgias and myalgias.   Skin:  Negative for rash.   Neurological:  Negative for dizziness, weakness, light-headedness and headaches.   Psychiatric/Behavioral:  Negative for agitation. The patient is not nervous/anxious.        Objective   /54 (BP Location: Right arm, Patient Position: Sitting, Cuff Size: Regular Adult)   Pulse 64   Temp 36.7 °C (98.1 °F) (Temporal)   Resp 18   Wt 81.7 kg (180 lb 1.6 oz)   SpO2 94%   BMI 35.17 kg/m²    BP Readings from Last 3 Encounters:   10/08/23 158/54   10/03/23 154/68   08/14/23 106/50      Wt Readings from Last 3 Encounters:   10/08/23 81.7 kg (180 lb 1.6 oz)   10/03/23 82.1 kg (181 lb)   08/14/23 81.7 kg (180 lb 3.2 oz)      Pulse Readings from Last 3 Encounters:   10/08/23 64   10/03/23 65   08/14/23 94      Resp Readings from Last 3 Encounters:   10/08/23 18   10/03/23 16   11/13/22 18       Physical Exam  Vitals and nursing note reviewed.   Constitutional:       Appearance: Normal appearance. She is normal weight.   HENT:      Head: Normocephalic and atraumatic.      Right Ear: External ear normal.      Left Ear: External ear normal.      Nose: Nose normal.      Mouth/Throat:      Mouth: Mucous membranes are moist.   Eyes:      Extraocular Movements: Extraocular movements intact.      Pupils: Pupils are equal, round, and reactive to light.   Cardiovascular:      Rate and Rhythm: Normal rate and regular rhythm.      Pulses: Normal pulses.      Heart sounds: Normal heart sounds.   Pulmonary:      Effort: Pulmonary effort is normal.      Breath sounds: Normal breath sounds.   Abdominal:      General: Abdomen is flat. Bowel sounds are normal.      Palpations: Abdomen is soft.   Musculoskeletal:         General: Normal range of motion.      Cervical back: Normal range of motion.   Skin:     General: Skin is warm and dry.      Capillary Refill: Capillary refill takes less than 2 seconds.   Neurological:      General: No focal deficit present.      Mental Status: She is alert and oriented to person, place, and time.   Psychiatric:         Mood and Affect: Mood normal.         Behavior: Behavior normal.         Recent Results (from the past 24 hour(s))   Urinalysis, Dip (part 1 of panel) Urine, Clean Catch    Collection Time: 10/08/23 12:18 PM    Specimen: Urine, Clean Catch   Result Value Ref Range    Color, Urine Yellow Yellow    Clarity, Urine Clear Clear    pH, Urine 5.5 5.0 - 8.0 PH    Specific Gravity,  Urine 1.010 1.003 - 1.030    Protein, Urine Negative Negative MG/DL    Glucose, Urine Negative Negative MG/DL    Ketones, Urine Negative Negative MG/DL    Blood, Urine Negative Negative    Nitrite, Urine Negative Negative    Bilirubin, Urine Negative Negative    Leukocytes, Urine Moderate (A) Negative    Urobilinogen, Urine 0.2 <2.0 mg/dL   Urinalysis, Micro (part 2 of panel) Urine, Clean Catch    Collection Time: 10/08/23 12:18 PM    Specimen: Urine, Clean Catch   Result Value Ref Range    RBC, Urine 0-2 None seen, 0-2, Negative /HPF    WBC, Urine 10-14 (A) 0 - 4 /HPF    Squamous Epithelial, Urine 5-9 None Seen-9 /HPF    Bacteria, Urine Few None seen, Few /HPF       Assessment/Plan   Diagnoses and all orders for this visit:    Dysuria  -     Urinalysis w/microscopic, reflex culture Urine, Clean Catch  -     Urine culture    Acute cystitis without hematuria  -     cephalexin (Keflex) 500 mg capsule; Take 1 capsule (500 mg total) by mouth 3 (three) times a day for 7 days    Urinalysis shows positive white blood cells, bacteria with no red blood cells present.  Culture is pending.  Will initiate antibiotics and wait for culture results.    Patient Instructions   It is very important to drink plenty of fluids.  Drink small amounts frequently.       Sirena Acosta, CNP

## 2023-10-09 LAB — BACTERIA UR CULT: ABNORMAL

## 2023-10-12 DIAGNOSIS — E78.5 HYPERLIPIDEMIA, UNSPECIFIED HYPERLIPIDEMIA TYPE: ICD-10-CM

## 2023-10-13 ENCOUNTER — OFFICE VISIT NO LOS (OUTPATIENT)
Dept: DIABETES SERVICES | Facility: CLINIC | Age: 83
End: 2023-10-13
Payer: MEDICARE

## 2023-10-13 ENCOUNTER — DOCUMENTATION (OUTPATIENT)
Dept: DIABETES SERVICES | Facility: CLINIC | Age: 83
End: 2023-10-13
Payer: MEDICARE

## 2023-10-13 DIAGNOSIS — Z98.84 S/P BARIATRIC SURGERY: Primary | ICD-10-CM

## 2023-10-13 DIAGNOSIS — E16.2 HYPOGLYCEMIA: Primary | ICD-10-CM

## 2023-10-13 PROCEDURE — 95251 CONT GLUC MNTR ANALYSIS I&R: CPT | Performed by: FAMILY MEDICINE

## 2023-10-13 PROCEDURE — 95250 CONT GLUC MNTR PHYS/QHP EQP: CPT

## 2023-10-13 RX ORDER — ATORVASTATIN CALCIUM 20 MG/1
20 TABLET, FILM COATED ORAL DAILY
Qty: 90 TABLET | Refills: 3 | Status: SHIPPED | OUTPATIENT
Start: 2023-10-13 | End: 2024-08-07

## 2023-10-13 NOTE — TELEPHONE ENCOUNTER
No care due was identified.  [facility] Embedded Care Due Messages. Reference number: [id number]. 10/12/2023 9:06:56 PM LEILA

## 2023-10-13 NOTE — PROGRESS NOTES
CGM (Continuous Glucose Monitoring) Removal    Session Performed by:  Magaly Higuera RN    Time:  0945    Equipment Return:  Yes    Meter/Pump Downloaded:  No    Insertion Site Status:  No redness    Data Downloaded and Sent to Provider for Interpretation:  Yes                 Provider:  Dr. Travis    Comments:  Patient states she did keep a food diary but forgot it.  She will plan to drop it off at the clinic later.  Full report will be provided to Dr. Travis for review and Interpretation.

## 2023-10-13 NOTE — Clinical Note
Please let patient know she has no concerning hypoglycemic episodes.  She does have episodes of rapid drop in blood sugar which may be related to her symptoms.  I would recommend she follow-up with her bariatric surgeon for further discussion regarding this.  In the meantime she should eat frequent small meals.  Thanks

## 2023-10-13 NOTE — PROGRESS NOTES
Professional Continuous Glucose Monitoring Visit Progress Note:      HPI:  CGM performed to evaluate for any hypoglycemia.    Dates of the continuous glucose monitoring study:   10/4/23-10/13-23    Current Diabetes Medications:   N/a    Study Interpretation:  1.  Average sensor value 104mg/dl  2.  Glucose Management Indicator 5.8 %  3.  Standard deviation was 19mg/dl.  4.  Total time spent between 80- 180 mg/dL was 98 %.  5.  Total time spent greater than 180 mg/dL was less than 1 %.  6.  In regards to hypoglycemia, No concerning hypoglycemia noted..  7.  Notable trends, None.    Assessment and Plan:  1.  In regards to medication changes, None    2.  In regards to behavioral changes, Frequent small meals.    3.  In regards to follow-up, Recommend follow-up with general surgery regarding postprandial episodes..    Encounter Diagnoses   Name Primary?    S/P bariatric surgery Yes         Signed by Bronson Travis MD

## 2023-10-19 ENCOUNTER — TELEPHONE (OUTPATIENT)
Dept: FAMILY MEDICINE | Facility: CLINIC | Age: 83
End: 2023-10-19
Payer: MEDICARE

## 2023-10-19 NOTE — TELEPHONE ENCOUNTER
Pt states she had a blood glucose test done and has never heard of the results.  Please call pt back to discuss.

## 2023-10-23 ENCOUNTER — OFFICE VISIT (OUTPATIENT)
Dept: SURGERY | Facility: CLINIC | Age: 83
End: 2023-10-23
Payer: MEDICARE

## 2023-10-23 VITALS
DIASTOLIC BLOOD PRESSURE: 68 MMHG | TEMPERATURE: 97.4 F | HEART RATE: 58 BPM | OXYGEN SATURATION: 96 % | WEIGHT: 184 LBS | SYSTOLIC BLOOD PRESSURE: 134 MMHG | HEIGHT: 60 IN | BODY MASS INDEX: 36.12 KG/M2

## 2023-10-23 DIAGNOSIS — R42 LIGHTHEADED: ICD-10-CM

## 2023-10-23 DIAGNOSIS — R61 DIAPHORESIS: ICD-10-CM

## 2023-10-23 DIAGNOSIS — Z90.3 HISTORY OF SLEEVE GASTRECTOMY: Primary | ICD-10-CM

## 2023-10-23 DIAGNOSIS — R25.1 SHAKINESS: ICD-10-CM

## 2023-10-23 PROCEDURE — 99214 OFFICE O/P EST MOD 30 MIN: CPT | Performed by: NURSE PRACTITIONER

## 2023-10-23 PROCEDURE — G0463 HOSPITAL OUTPT CLINIC VISIT: HCPCS | Performed by: NURSE PRACTITIONER

## 2023-10-23 ASSESSMENT — PAIN SCALES - GENERAL: PAINLEVEL: 0-NO PAIN

## 2023-10-23 NOTE — PATIENT INSTRUCTIONS
Nutrition changes:   - Increase water to at minimum 32 fl oz, preferably 64 fl oz.   - Stop pancake at breakfast time.   - Switch from [place] creamer to heavy whipping cream or whole milk.   - Switch from Goldfish to Triscuit or Nut Thin crackers or nuts (almonds, pistachios)   - Switch from simple carbohydrate to complex carbohydrate at dinner time meal:   LOW starch vegetables: Alfalfa sprouts, Asparagus, Bean sprouts, Beetroot (30-40 g), silver beet, Bok [place], Broccoli, [place] sprouts, Cabbage, Capsicum, Carrots (30-40 g), Cauliflower, Celery, Cucumber, Eggplant, Green beans, Konjac noodles (slender/slim pasta range), Lettuce (all types), Leeks, Mushrooms, Onions, Shallots, Radishes, Snow peas, Spinach, Squash, Tomatoes, Watercress, Zucchini.   If having potato, avoid instant and occasionally substitute sweet potato or squash for white potato.     **if symptoms persist despite these nutrition changes, consider asking PCP Bronson Travis MD about 30 day Holter monitor due to heart history and infrequent episodes of current symptoms.

## 2023-10-23 NOTE — PROGRESS NOTES
10/23/2023         [facility] SURGERY   Katy Stack   [id number]     REFERRING PROVIDER: Bronson Travis MD    SUBJECTIVE     CHIEF COMPLAINT:   Chief Complaint   Patient presents with    bariatric surgery     DOS 8/14/2019   1. Laparoscopic vertical Sleeve Gastrectomy, having issues with hypoglycemia, usually happens midmorning, feels weak, sweaty and shaky, she eats breakfast every morning, usually eggs and toast or oatmeal                   HPI:  Katy Stack is a 83 y.o. female who presents to the surgery clinic for discussion of symptoms consistent with reactive hypoglycemia following referral by her PCP.  However, patient wore a 7-day CGM and this was unremarkable for any episodes of hypoglycemia with average sensor value 104 mg/dL, glucose management indicator 5.8% and 98% of study time with glucose range of 80 to 180 mg/dL.     Patient indicates episodes of  postprandial diaphoresis, shakiness, lightheadedness approximately 1 time per month.  She reports that after these episodes she experiences extreme fatigue and may sleep for the remainder of the day.    She does have a cardiac history as well and last cardiology follow-up was 12/12/2022.    She has history of laparoscopic vertical sleeve gastrectomy 8/14/2019 and has had standard follow-up postoperatively.  She last saw Yvette Llanes PA-C on 8/14/2023 for annual follow-up and bariatric lab evaluation at that time was within normal limits.    Nutrition:  Maintains adequate portion control utilizing the salad plate.  She typically eats 3 times per day and grazes on snacks in between meals throughout the day.  Food recall:   - Breakfast: 1 egg, 1 piece 45 agnieszka whole grain wheat thin sliced bread, piece of medrano or sausage. Sometimes with a pancake over the past week. Other 2-3 mornings has bowl of oatmeal + walnuts. Half caffeinated coffee with Coffeemate creamer (0 g sugar, corn starch on ingredient list)  - Lunch: 1/2 sandwich -  tuna fish, lunch meat and cheddar cheese on bread or with crackers, and fresh fruit  - Dinner: meat + vegetable + starch (dressing, mashed potatoes, macaroni) about 1-1.5 cup portion size at dinner.  - Snacks: 1/2 to 1 cup Goldfish lasts her all day.   - Beverages: 1-1/2 bottle iced Tea-zero sugar diluted with water. 16 fl oz plain water.      I have personally reviewed the pertinent aspects of the patient's chart and updated the computerized patient record. Pertinent positives are noted above.  Items reviewed including:  Tobacco  Allergies  Meds  Problems  Med Hx  Surg Hx  Fam Hx          Review of Systems   Constitutional:  Positive for diaphoresis (1x/month).   HENT: Negative.     Respiratory: Negative.     Cardiovascular: Negative.    Gastrointestinal: Negative.    Skin: Negative.    Neurological:  Positive for tremors (shakiness 1x/month) and light-headedness (1x/month).   Psychiatric/Behavioral: Negative.          OBJECTIVE:    Vital Signs  /68 (BP Location: Right arm, Patient Position: Sitting, Cuff Size: Long Adult)   Pulse 58   Temp 36.3 °C (97.4 °F) (Temporal)   Ht 1.524 m (5')   Wt 83.5 kg (184 lb)   SpO2 96%   BMI 35.94 kg/m²        Physical Exam  Vitals and nursing note reviewed. Chaperone present: unaccompanied.   Constitutional:       General: She is awake. She is not in acute distress.     Appearance: Normal appearance. She is well-developed and well-groomed. She is obese.   Cardiovascular:      Rate and Rhythm: Normal rate.   Pulmonary:      Effort: Pulmonary effort is normal.   Abdominal:      Palpations: Abdomen is soft.      Tenderness: There is no abdominal tenderness.   Skin:     General: Skin is warm and dry.   Neurological:      General: No focal deficit present.      Mental Status: She is alert.   Psychiatric:         Attention and Perception: Attention normal.         Mood and Affect: Mood normal.         Behavior: Behavior is cooperative.       ASSESSMENT/PLAN:     Diagnosis Plan   1. History of sleeve gastrectomy        2. Diaphoresis        3. Shakiness        4. Lightheaded             This is a 83 y.o. female   Presenting for symptoms consistent with reactive hypoglycemia 1 time per month.  Recent 7-day CGM was within normal range for the entirety of the study.  Given that she had a sleeve gastrectomy and the symptoms only occur 1 time per month despite essentially unchanged nutrition I have low concern that this is related to reactive hypoglycemia or dumping syndrome.  However, we did review nutrition and the following changes were suggested to optimize nutrition and reduce risk for reactive hypoglycemia or postprandial dumping.      We also discussed the possibility of these episodes being related to dehydration as she is getting to 16 fluid ounces of water per day which is evident on CMP with renal dysfunction characterized by low eGFR and elevated Cr. We reviewed goal for daily hydration.     With recent bariatric lab evaluation 2 months ago no concern for nutritional malabsorption or vitamin deficiencies as a cause of her current symptoms.     The following was discussed, mutually agreed upon, provided via AVS today:  Nutrition changes:   - Increase water to at minimum 32 fl oz, preferably 64 fl oz.   - Stop pancake at breakfast time.   - Switch from Coffeemate creamer to heavy whipping cream or whole milk.   - Switch from Goldfish to Triscuit or Nut Thin crackers or nuts (almonds, pistachios)   - Switch from simple carbohydrate to complex carbohydrate at dinner time meal:   LOW starch vegetables: Alfalfa sprouts, Asparagus, Bean sprouts, Beetroot (30-40 g), silver beet, Bok Robyn, Broccoli, Grandin sprouts, Cabbage, Capsicum, Carrots (30-40 g), Cauliflower, Celery, Cucumber, Eggplant, Green beans, Konjac noodles (slender/slim pasta range), Lettuce (all types), Leeks, Mushrooms, Onions, Shallots, Radishes, Snow peas, Spinach, Squash, Tomatoes, Watercress, Zucchini.    If having potato, avoid instant and occasionally substitute sweet potato or squash for white potato.     **if symptoms persist despite these nutrition changes, consider asking PCP Bronson Travis MD or cardiology team about a longer CGM trial or 30 day Holter monitor due to heart history and infrequent episodes of current symptoms.     Katy Stack will RTC as scheduled for diagnostic workup. Katy Stack participated in the decision-making process and agreed with the above plan. All questions were sought and answered to her satisfaction.         Marii Wayne, CNP  10/25/2023 12:38 PM    Total Time spent with the patient is 35 with more than 50% in direct counseling and coordination of care regarding History of sleeve gastrectomy, and differentials for diaphoresis, shakiness, lightheadedness.    A voice recognition program was used to aid in medical record documentation. Sometimes words are printed not exactly as they were spoken. While efforts were made to carefully edit and correct any inaccuracies, some errors may be present and should be taken within the context of the discussion.  Please contact our office if you need assistance interpreting this medical record or notice any mistakes.

## 2023-10-25 ASSESSMENT — ENCOUNTER SYMPTOMS
RESPIRATORY NEGATIVE: 1
LIGHT-HEADEDNESS: 1
PSYCHIATRIC NEGATIVE: 1
DIAPHORESIS: 1
TREMORS: 1
GASTROINTESTINAL NEGATIVE: 1
CARDIOVASCULAR NEGATIVE: 1

## 2023-11-14 ENCOUNTER — TELEPHONE (OUTPATIENT)
Dept: FAMILY MEDICINE | Facility: CLINIC | Age: 83
End: 2023-11-14
Payer: MEDICARE

## 2023-12-15 ENCOUNTER — OFFICE VISIT (OUTPATIENT)
Dept: URGENT CARE | Facility: CLINIC | Age: 83
End: 2023-12-15
Payer: MEDICARE

## 2023-12-15 VITALS
HEART RATE: 57 BPM | RESPIRATION RATE: 24 BRPM | SYSTOLIC BLOOD PRESSURE: 122 MMHG | DIASTOLIC BLOOD PRESSURE: 58 MMHG | WEIGHT: 175.7 LBS | TEMPERATURE: 97.7 F | OXYGEN SATURATION: 93 % | BODY MASS INDEX: 34.31 KG/M2

## 2023-12-15 DIAGNOSIS — Z23 NEED FOR VACCINATION: Primary | ICD-10-CM

## 2023-12-15 DIAGNOSIS — S61.212A LACERATION OF RIGHT MIDDLE FINGER WITHOUT FOREIGN BODY WITHOUT DAMAGE TO NAIL, INITIAL ENCOUNTER: ICD-10-CM

## 2023-12-15 PROCEDURE — 99212 OFFICE O/P EST SF 10 MIN: CPT | Mod: 25

## 2023-12-15 PROCEDURE — 90715 TDAP VACCINE 7 YRS/> IM: CPT

## 2023-12-15 PROCEDURE — 90471 IMMUNIZATION ADMIN: CPT

## 2023-12-15 PROCEDURE — G0463 HOSPITAL OUTPT CLINIC VISIT: HCPCS

## 2023-12-15 ASSESSMENT — ENCOUNTER SYMPTOMS
GASTROINTESTINAL NEGATIVE: 1
CONSTITUTIONAL NEGATIVE: 1
RESPIRATORY NEGATIVE: 1
NEUROLOGICAL NEGATIVE: 1
PSYCHIATRIC NEGATIVE: 1
WOUND: 1
CARDIOVASCULAR NEGATIVE: 1

## 2023-12-15 NOTE — PROGRESS NOTES
Subjective      Katy Stack is a 83 y.o. female who presents for laceration to right middle finger.    Patient presents to urgent care for laceration to right middle finger that occurred roughly an hour prior to arrival.  States she was opening some rope with a scissors and cut the tip of her finger off.  She was able to control the bleeding at home.  Full range of motion.  Denies numbness or tingling.  Denies fever or chills.  Last Tdap was in 2017.    The following have been reviewed and updated as appropriate in this visit:          Allergies   Allergen Reactions    Alton Angioedema and Hives     hives, tongue swells  Swelling of tongue    Adhesive      welts     Current Outpatient Medications   Medication Sig Dispense Refill    atorvastatin (LIPITOR) 20 mg tablet Take 1 tablet (20 mg total) by mouth daily 90 tablet 3    Premarin 0.9 mg tablet TAKE 1 TABLET BY MOUTH WEEKLY 13 tablet 3    gabapentin (NEURONTIN) 300 mg capsule Take 1 capsule (300 mg total) by mouth 3 (three) times a day 600mg in AM, 600mg in evening, occasionally additional tablet during the night      levothyroxine (SYNTHROID, LEVOTHROID) 112 mcg tablet Take 1 tablet (112 mcg total) by mouth daily 90 tablet 3    losartan (COZAAR) 50 mg tablet Take 1 tablet (50 mg total) by mouth daily 90 tablet 3    atenoloL (TENORMIN) 25 mg tablet Take 1 tablet (25 mg total) by mouth daily 90 tablet 3    omeprazole (PriLOSEC) 20 mg capsule TAKE 1 CAPSULE BY MOUTH  DAILY 90 capsule 3    multivitamin (THERAGRAN) tablet tablet Take 1 tablet by mouth daily      furosemide (LASIX) 20 mg tablet TAKE 1 TABLET BY MOUTH  DAILY 90 tablet 3    potassium chloride 10 mEq CR tablet TAKE 1 TABLET BY MOUTH  DAILY 90 tablet 3    calcium carbonate-vitamin D3 600 mg(1,500mg) -400 unit per tablet Take 1 tablet by mouth daily      vit A-vit C-vit E-zinc-copper 7,160-113-100 unit-mg-unit tablet Take 1 tab/cap by mouth 2 (two) times a day.      denosumab (PROLIA) 60 mg/mL  syringe syringe Inject 1 mL (60 mg total) under the skin once for 1 dose Medication is being administered as part of a therapy plan. Review patient's therapy plan and chart for previous doses/dates of administration and schedule of future doses/dates of administration. 1 mL 0    nitroglycerin (NITROSTAT) 0.4 mg SL tablet Place 1 tablet (0.4 mg total) under the tongue every 5 (five) minutes as needed for chest pain 25 tablet 1     No current facility-administered medications for this visit.     Past Medical History:   Diagnosis Date    Benign essential hypertension 11/24/2017    Cataract of left eye     Chronic obstructive lung disease (CMS/HCC) 11/10/2017    Dysrhythmia     LBBB    Family history of anesthesia complication     GERD (gastroesophageal reflux disease)     Hypertension     Hypothyroid     Hypothyroidism 11/10/2017    Injury of back     BENDING INJURY    Macular degeneration     Neurologic disorder     Obesity 11/10/2017    Obstructive sleep apnea syndrome 11/10/2017    Night time oxygen/2L    Osteoarthritis     Periodic limb movement disorder 11/10/2017    Peripheral neuropathy     left thigh into left groin     Rectocele     Respiratory disease      Past Surgical History:   Procedure Laterality Date    APPENDECTOMY  1987    BACK SURGERY      2 surgeries [id number]    BACK SURGERY  11/30/2016    BACK SURGERY  05/01/2017    vetebral fusion    CARDIAC CATHETERIZATION  08/31/2011    CATARACT EXTRACTION W/  INTRAOCULAR LENS IMPLANT Left 7/2/2018    Procedure: OS CATARACT LEFT PHACOEMULSIFICATION OF CATARACT WITH INTRAOCULAR LENS;  Surgeon: Emmanuel Cueto MD;  Location: [facility] SURGICAL SERVICES;  Service: Ophthalmology;  Laterality: Left;    CATARACT EXTRACTION W/  INTRAOCULAR LENS IMPLANT Right 8/6/2018    Procedure: OD CATARACT RIGHT PHACOEMULSIFICATION OF CATARACT WITH INTRAOCULAR LENS;  Surgeon: Emmanuel Cueto MD;  Location: [facility] SURGICAL SERVICES;  Service: Ophthalmology;  Laterality: Right;     CHOLECYSTECTOMY      COLONOSCOPY  02/19/2014    No polyps    COLONOSCOPY  04/26/2011    DIAGNOSTIC LAPAROSCOPY N/A 11/28/2018    Procedure: Laparoscopic band removal;  Surgeon: Chris Millan MD;  Location: [facility] OR;  Service: General;  Laterality: N/A;    ESOPHAGOGASTRODUODENOSCOPY N/A 11/6/2018    Procedure: EGD - ESOPHAGOGASTRODUODENOSCOPY;  Surgeon: Chris Millan MD;  Location: [facility] Endoscopy;  Service: Endoscopy;  Laterality: N/A;    FOOT SURGERY      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 Vela- shoulder    SPINAL STIMULATOR IMPLANT N/A 3/23/2021    Procedure: PERMANENT SPINAL CORD STIMULATOR IMPLANTATION;  Surgeon: Bernardo Gonzalez MD;  Location: [facility] OR;  Service: Pain Management;  Laterality: N/A;    THYROID LOBECTOMY Left 18/18/2010    THYROIDECTOMY, PARTIAL  08/18/2010    UPPER GASTROINTESTINAL ENDOSCOPY  02/19/2014    Mild chronic gastritis     Family History   Problem Relation Age of Onset    Heart disease Mother     Heart disease Father         Myocardial infarction    Parkinsonism Father     Thyroid disease Father     Alzheimer's disease Sister     Arthritis Sister     Osteoporosis Sister     Lung cancer Brother     Diabetes Maternal Grandmother      Social History     Socioeconomic History    Marital status:    Tobacco Use    Smoking status: Former     Packs/day: 2.00     Years: 35.00     Additional pack years: 0.00     Total pack years: 70.00     Types: Cigarettes     Quit date: 1995     Years since  quittin.9    Smokeless tobacco: Never   Vaping Use    Vaping Use: Never used   Substance and Sexual Activity    Alcohol use: No    Drug use: No    Sexual activity: Defer     Social Determinants of Health     Tobacco Use: Medium Risk (10/25/2023)    Patient History     Smoking Tobacco Use: Former     Smokeless Tobacco Use: Never       Review of Systems   Constitutional: Negative.    Respiratory: Negative.     Cardiovascular: Negative.    Gastrointestinal: Negative.    Skin:  Positive for wound.   Neurological: Negative.    Psychiatric/Behavioral: Negative.         Objective   /58 (BP Location: Left arm, Patient Position: Sitting, Cuff Size: Large Adult)   Pulse 57   Temp 36.5 °C (97.7 °F) (Temporal)   Resp 24   Wt 79.7 kg (175 lb 11.2 oz)   SpO2 93%   BMI 34.31 kg/m²     Physical Exam  Constitutional:       General: She is not in acute distress.     Appearance: Normal appearance. She is not ill-appearing or toxic-appearing.   Cardiovascular:      Rate and Rhythm: Normal rate and regular rhythm.      Heart sounds: Normal heart sounds.   Pulmonary:      Effort: Pulmonary effort is normal.      Breath sounds: Normal breath sounds.   Musculoskeletal:      Right hand: Laceration (Right third distal finger) present. No tenderness. Normal range of motion. Normal pulse.   Skin:     Capillary Refill: Capillary refill takes less than 2 seconds.      Findings: Laceration (Superficial avulsion laceration to right distal finger.) present.   Neurological:      General: No focal deficit present.      Mental Status: She is alert and oriented to person, place, and time.         Assessment/Plan   Diagnoses and all orders for this visit:    Need for vaccination  -     Tdap vaccine greater than or equal to 6yo IM    Laceration of right middle finger without foreign body without damage to nail, initial encounter    Patient is well-appearing.  Vital signs are stable.  She does have a superficial avulsion laceration to  right distal finger.  Bleeding controlled prior to arrival to clinic.  Full range of motion.  Cleanse wound in clinic.  Applied bacitracin, nonadherent Telfa, gauze and Coban.  Discussed at home wound care.  Monitor for signs and symptoms of secondary infection.  Tdap updated in clinic.  Discussed return guidelines.  Urgent care as needed.  Patient in agreement with plan and verbalized understanding.    Clari Zhao, CNP

## 2024-01-10 ENCOUNTER — LAB (OUTPATIENT)
Dept: LAB | Facility: CLINIC | Age: 84
End: 2024-01-10
Payer: MEDICARE

## 2024-01-10 ENCOUNTER — TELEPHONE (OUTPATIENT)
Dept: FAMILY MEDICINE | Facility: CLINIC | Age: 84
End: 2024-01-10
Payer: MEDICARE

## 2024-01-10 DIAGNOSIS — R39.9 UTI SYMPTOMS: Primary | ICD-10-CM

## 2024-01-10 DIAGNOSIS — R39.9 UTI SYMPTOMS: ICD-10-CM

## 2024-01-10 LAB
BACTERIA #/AREA URNS HPF: ABNORMAL /HPF
BILIRUB UR QL: NEGATIVE
CLARITY UR: ABNORMAL
COLOR UR: YELLOW
GLUCOSE UR QL: NEGATIVE MG/DL
HGB UR QL: ABNORMAL
KETONES UR-MCNC: NEGATIVE MG/DL
LEUKOCYTE ESTERASE UR QL STRIP: ABNORMAL
NITRITE UR QL: NEGATIVE
PH UR: 6 PH
PROT UR STRIP-MCNC: NEGATIVE MG/DL
RBC #/AREA URNS HPF: ABNORMAL /HPF
SP GR UR: <=1.005 (ref 1–1.03)
SQUAMOUS #/AREA URNS HPF: ABNORMAL /HPF
UROBILINOGEN UR-MCNC: 0.2 MG/DL
WBC #/AREA URNS HPF: >100 /HPF
WBC CLUMPS #/AREA URNS HPF: PRESENT /HPF

## 2024-01-10 PROCEDURE — 87088 URINE BACTERIA CULTURE: CPT

## 2024-01-10 PROCEDURE — 81001 URINALYSIS AUTO W/SCOPE: CPT

## 2024-01-10 NOTE — PROGRESS NOTES
Notified Katy that she can come in to do a UA for possible UTI.  She verbalized understanding and will come to the clinic when she has to urinate.

## 2024-01-10 NOTE — TELEPHONE ENCOUNTER
Pt states she has a UTI. Asking if she needs to be seen for this or if she can get a prescription. Please call.

## 2024-01-10 NOTE — TELEPHONE ENCOUNTER
Katy states that she is going to the bathroom frequently, not going very much and has pain when she is urinating.  She states that she knows that she has a UTI because she has had so many.  She is asking if she needs to be seen in UC.  Thank you

## 2024-01-10 NOTE — TELEPHONE ENCOUNTER
UA concerning for infection.  Please start on amoxicillin 500 mg 3 times daily for 7 days while awaiting culture results.  Thanks

## 2024-01-12 LAB — BACTERIA UR CULT: ABNORMAL

## 2024-01-26 DIAGNOSIS — I10 ESSENTIAL HYPERTENSION: ICD-10-CM

## 2024-01-27 RX ORDER — ATENOLOL 25 MG/1
25 TABLET ORAL DAILY
Qty: 90 TABLET | Refills: 2 | Status: SHIPPED | OUTPATIENT
Start: 2024-01-27 | End: 2024-09-09

## 2024-01-27 NOTE — TELEPHONE ENCOUNTER
No care due was identified.  [facility] Embedded Care Due Messages. Reference number: [id number]. 1/26/2024 8:13:19 PM MST

## 2024-02-29 ENCOUNTER — TELEPHONE (OUTPATIENT)
Dept: FAMILY MEDICINE | Facility: CLINIC | Age: 84
End: 2024-02-29
Payer: MEDICARE

## 2024-02-29 ENCOUNTER — TELEPHONE (OUTPATIENT)
Dept: SURGERY | Facility: CLINIC | Age: 84
End: 2024-02-29
Payer: MEDICARE

## 2024-02-29 NOTE — TELEPHONE ENCOUNTER
Requesting an EFREN letter- explained that should come from her PCP. Patient states she also contacted Dr. Travis's office today requesting the letter.

## 2024-02-29 NOTE — TELEPHONE ENCOUNTER
Pt is wondering if Dr. Travis can write a letter stating that the pt's dog is an EFREN. Please call back when possible.

## 2024-03-04 ENCOUNTER — CLINICAL SUPPORT (OUTPATIENT)
Dept: FAMILY MEDICINE | Facility: CLINIC | Age: 84
End: 2024-03-04
Payer: MEDICARE

## 2024-03-04 VITALS
RESPIRATION RATE: 19 BRPM | OXYGEN SATURATION: 95 % | HEART RATE: 55 BPM | TEMPERATURE: 98 F | DIASTOLIC BLOOD PRESSURE: 55 MMHG | SYSTOLIC BLOOD PRESSURE: 150 MMHG

## 2024-03-04 DIAGNOSIS — M81.0 AGE-RELATED OSTEOPOROSIS WITHOUT CURRENT PATHOLOGICAL FRACTURE: Primary | ICD-10-CM

## 2024-03-04 PROCEDURE — 6360000200 HC RX 636 W HCPCS (ALT 250 FOR IP): Mod: TB,JZ | Performed by: FAMILY MEDICINE

## 2024-03-04 PROCEDURE — 96372 THER/PROPH/DIAG INJ SC/IM: CPT | Performed by: FAMILY MEDICINE

## 2024-03-04 RX ORDER — SODIUM CHLORIDE 9 MG/ML
0-999 INJECTION, SOLUTION INTRAVENOUS CONTINUOUS PRN
Status: CANCELLED | OUTPATIENT
Start: 2024-08-27

## 2024-03-04 RX ORDER — ACETAMINOPHEN 500 MG
500 TABLET ORAL AS NEEDED
Status: CANCELLED | OUTPATIENT
Start: 2024-08-27

## 2024-03-04 RX ORDER — EPINEPHRINE 1 MG/ML
0.3 INJECTION INTRAMUSCULAR; INTRAVENOUS; SUBCUTANEOUS AS NEEDED
Status: CANCELLED | OUTPATIENT
Start: 2024-08-27

## 2024-03-04 RX ORDER — DIPHENHYDRAMINE HYDROCHLORIDE 50 MG/ML
25-50 INJECTION INTRAMUSCULAR; INTRAVENOUS AS NEEDED
Status: CANCELLED | OUTPATIENT
Start: 2024-08-27

## 2024-03-04 RX ORDER — FAMOTIDINE 10 MG/ML
20 INJECTION INTRAVENOUS AS NEEDED
Status: CANCELLED | OUTPATIENT
Start: 2024-08-27

## 2024-03-04 RX ORDER — ALBUTEROL SULFATE 0.83 MG/ML
2.5 SOLUTION RESPIRATORY (INHALATION) AS NEEDED
Status: CANCELLED | OUTPATIENT
Start: 2024-08-27

## 2024-03-04 RX ADMIN — DENOSUMAB 60 MG: 60 INJECTION SUBCUTANEOUS at 10:07

## 2024-06-19 DIAGNOSIS — M81.0 AGE-RELATED OSTEOPOROSIS WITHOUT CURRENT PATHOLOGICAL FRACTURE: Primary | ICD-10-CM

## 2024-07-14 DIAGNOSIS — I10 ESSENTIAL HYPERTENSION: ICD-10-CM

## 2024-07-15 RX ORDER — LOSARTAN POTASSIUM 50 MG/1
50 TABLET ORAL DAILY
Qty: 90 TABLET | Refills: 0 | Status: SHIPPED | OUTPATIENT
Start: 2024-07-15 | End: 2024-09-16

## 2024-07-15 NOTE — TELEPHONE ENCOUNTER
No care due was identified.  [facility] Embedded Care Due Messages. Reference number: [id number]. 7/14/2024 8:54:19 PM MDT

## 2024-08-05 DIAGNOSIS — E03.8 OTHER SPECIFIED HYPOTHYROIDISM: ICD-10-CM

## 2024-08-05 DIAGNOSIS — E78.5 HYPERLIPIDEMIA, UNSPECIFIED HYPERLIPIDEMIA TYPE: ICD-10-CM

## 2024-08-05 DIAGNOSIS — N95.1 MENOPAUSAL FLUSHING: ICD-10-CM

## 2024-08-06 NOTE — TELEPHONE ENCOUNTER
Care Due:                  Date            Visit Type   Department     Provider  --------------------------------------------------------------------------------                              ESTABLISHED   [facility]  Last Visit: 10-      PATIENT      MEDICINE       ENRRIQUE BALES  Next Visit: None Scheduled  None         None Found                                                            Last  Test          Frequency    Reason                     Performed    Due Date  --------------------------------------------------------------------------------  BMP.........  12 months..  losartan.................  Not Found    Overdue    Lipid Panel.  12 months..  atorvastatin.............  Not Found    Overdue    TSH.........  12 months..  levothyroxine............  08- 08-    [facility] Embedded Care Due Messages. Reference number: [id number]. 8/05/2024 8:48:12 PM LEILA

## 2024-08-07 ENCOUNTER — OFFICE VISIT (OUTPATIENT)
Dept: URBAN - METROPOLITAN AREA CLINIC 27 | Facility: CLINIC | Age: 84
End: 2024-08-07
Payer: MEDICARE

## 2024-08-07 DIAGNOSIS — H35.3112 NONEXUDATIVE AGE-RELATED MACULAR DEGENERATION, RIGHT EYE, INTERMEDIATE DRY STAGE: ICD-10-CM

## 2024-08-07 DIAGNOSIS — H43.813 VITREOUS DEGENERATION, BILATERAL: ICD-10-CM

## 2024-08-07 DIAGNOSIS — H35.3221 EXUDATIVE AGE-RELATED MACULAR DEGENERATION, LEFT EYE, WITH ACTIVE CHOROIDAL NEOVASCULARIZATION: Primary | ICD-10-CM

## 2024-08-07 DIAGNOSIS — Z96.1 PRESENCE OF INTRAOCULAR LENS: ICD-10-CM

## 2024-08-07 PROCEDURE — 67028 INJECTION EYE DRUG: CPT | Performed by: OPHTHALMOLOGY

## 2024-08-07 PROCEDURE — 92004 COMPRE OPH EXAM NEW PT 1/>: CPT | Performed by: OPHTHALMOLOGY

## 2024-08-07 PROCEDURE — 92134 CPTRZ OPH DX IMG PST SGM RTA: CPT | Performed by: OPHTHALMOLOGY

## 2024-08-07 RX ORDER — ATORVASTATIN CALCIUM 20 MG/1
20 TABLET, FILM COATED ORAL DAILY
Qty: 90 TABLET | Refills: 0 | Status: SHIPPED | OUTPATIENT
Start: 2024-08-07 | End: 2024-10-09

## 2024-08-07 RX ORDER — LEVOTHYROXINE SODIUM 112 UG/1
112 TABLET ORAL DAILY
Qty: 90 TABLET | Refills: 0 | Status: SHIPPED | OUTPATIENT
Start: 2024-08-07 | End: 2024-10-09

## 2024-08-07 RX ORDER — VIT E/DHA/LUT/ZEAX/ASTAX/BILB 25-220-5
CAPSULE ORAL
Qty: 60 | Refills: 10 | Status: ACTIVE
Start: 2024-08-07

## 2024-08-07 ASSESSMENT — INTRAOCULAR PRESSURE
OD: 13
OS: 13

## 2024-08-07 NOTE — TELEPHONE ENCOUNTER
Medication refill request:  Medication(s):  estrogen, conjugated 0.9 tablet  not filled due to Manual Review message review

## 2024-08-08 DIAGNOSIS — N95.1 MENOPAUSAL FLUSHING: ICD-10-CM

## 2024-08-08 NOTE — TELEPHONE ENCOUNTER
COPD with high CAT score with decreased exercise capacity and cough predominating symptoms. Prior history of exacerbation. Started on Trelegy (sample afforded as well) with albuterol PRN. Imaging with L lung post radiation changes and bilateral upper lobe centrilobular emphysema. Plan to obtain PFTs.    No care due was identified.  [facility] Embedded Care Due Messages. Reference number: [id number]. 8/08/2024 6:26:15 AM LEILA

## 2024-08-12 DIAGNOSIS — N95.1 MENOPAUSAL FLUSHING: ICD-10-CM

## 2024-08-12 NOTE — TELEPHONE ENCOUNTER
No care due was identified.  [facility] Embedded Care Due Messages. Reference number: [id number]. 8/12/2024 10:24:59 AM LEILA

## 2024-08-26 DIAGNOSIS — N95.1 MENOPAUSAL FLUSHING: ICD-10-CM

## 2024-08-26 NOTE — TELEPHONE ENCOUNTER
No care due was identified.  [facility] Embedded Care Due Messages. Reference number: [id number]. 8/26/2024 3:53:58 PM MDT

## 2024-08-28 DIAGNOSIS — N95.1 MENOPAUSAL FLUSHING: ICD-10-CM

## 2024-08-28 NOTE — TELEPHONE ENCOUNTER
Medication refill request:  Medication(s):  estrogen not filled due to Lab(s) abnormal or delinquent > 90 days

## 2024-08-28 NOTE — TELEPHONE ENCOUNTER
Care Due:                  Date            Visit Type   Department     Provider  --------------------------------------------------------------------------------                              ESTABLISHED   [facility]  Last Visit: 10-      PATIENT      MEDICINE       ENRRIQUE BALES  Next Visit: None Scheduled  None         None Found                                                            Last  Test          Frequency    Reason                     Performed    Due Date  --------------------------------------------------------------------------------  Office Visit  12 months..  atenoloL, atorvastatin,    10-   09-                             denosumab, estrogens,,                             levothyroxine, losartan..    [facility] Embedded Care Due Messages. Reference number: [id number]. 8/28/2024 4:11:38 PM LEILA

## 2024-08-29 NOTE — TELEPHONE ENCOUNTER
Medication refill request:  Medication(s):  premarin not filled due to Orders due: Review care due message/pended orders

## 2024-09-05 ENCOUNTER — TELEPHONE (OUTPATIENT)
Dept: FAMILY MEDICINE | Facility: CLINIC | Age: 84
End: 2024-09-05
Payer: MEDICARE

## 2024-09-05 NOTE — TELEPHONE ENCOUNTER
Consult 2020 with Haley, last dxa 3/2023 continue prolia 2-3 years.  Last prolia injection was 3/4/24 here.  [place] reports that pt is due for injection but it shows for them that its a $700 charge for the patient.  Pt is currently in [place] but may be coming back to [place] for a short time.  Advised [place] I don't know the billing side, but it looks like pt has a current PA with us. I will reach out and see if pt can get injection while here.  Otherwise will reach back out to [facility] on Monday.    Pt reports does have to come to [place] to clean out daughter's house. She doesn't know when that will be.  She will let us know so we can get her scheduled for that time.  She is living permanently in [place] now, so wondering about injection in 6 months. Advised I would discuss with our PA department and then let [place] know.  Pt verbalized understanding.

## 2024-09-08 DIAGNOSIS — I10 ESSENTIAL HYPERTENSION: ICD-10-CM

## 2024-09-09 RX ORDER — ATENOLOL 25 MG/1
25 TABLET ORAL DAILY
Qty: 90 TABLET | Refills: 0 | Status: SHIPPED | OUTPATIENT
Start: 2024-09-09 | End: 2024-11-12

## 2024-09-09 NOTE — TELEPHONE ENCOUNTER
Patient due for annual appointment 10/2024  Medication refill request:  90-day Courtesy Refill given

## 2024-09-09 NOTE — TELEPHONE ENCOUNTER
No care due was identified.  [facility] Embedded Care Due Messages. Reference number: [id number]. 9/08/2024 8:13:33 PM MDT

## 2024-09-10 NOTE — TELEPHONE ENCOUNTER
"From Ajit in PA department, \"Patient should have OOP of $0.00. Patient has MCR with [employer] supplement plan G. The clinic would order the Prolia and administer. If was running through pharmacy it would go under Medicare part D. The patient does not need to have a prior auth with supplement plan.   If the patient was using pharmacy benefit the pharmacy would deliver the Prolia to the clinic to be administered this process is referred to as ( clear bagging)\"      "

## 2024-09-10 NOTE — TELEPHONE ENCOUNTER
5/14/2018    CHIEF COMPLAINT:  Hypertension. Hyperlipidemia. Diabetes. Chronic kidney disease    HISTORY OF PRESENT ILLNESS:   89 year old female patient with a history of hyperlipidemia, hypertension, diabetes, and chronic kidney disease, who comes to the clinic for follow-up appointment.  She was accompanied by her daughter who contributed to the history.    She denies chest pain, shortness breath, palpitations, PND, orthopnea or edema.  Her main complaint is fatigue.    In July 2017, she fell and fractured her distal fibula. She underwent head CT and subsequent brain MRI which showed a meningioma. She was started on Keppra and Decadron. She is followed by Dr. Ochoa.        Current Outpatient Prescriptions   Medication Sig Dispense Refill   • glimepiride (AMARYL) 2 MG tablet TAKE ONE TABLET BY MOUTH ONCE DAILY 90 tablet 0   • omeprazole (PRILOSEC) 20 MG capsule Take 1 capsule by mouth daily. 90 capsule 1   • mirabegron ER (MYRBETRIQ) 25 MG 24 hour tablet Take 1 tablet by mouth daily. 30 tablet 1   • losartan (COZAAR) 50 MG tablet Take 1 tablet by mouth daily. 90 tablet 1   • sertraline (ZOLOFT) 50 MG tablet Take 1 tablet by mouth daily. 90 tablet 1   • Biotin (BIOTIN MAXIMUM) 54048 MCG TABLET DISPERSIBLE      • allopurinol (ZYLOPRIM) 100 MG tablet TAKE ONE TABLET BY MOUTH ONCE DAILY 90 tablet 1   • levothyroxine (SYNTHROID, LEVOTHROID) 88 MCG tablet TAKE ONE TABLET BY MOUTH ONCE DAILY 90 tablet 3   • nitroGLYcerin (NITROSTAT) 0.4 MG sublingual tablet Place 1 tablet under the tongue every 5 minutes as needed for Chest pain. 30 tablet 1   • levETIRAcetam (KEPPRA) 250 MG tablet Take 1 tablet by mouth 2 times daily. 60 tablet 5   • dexamethasone (DECADRON) 1 MG tablet Take 1 tablet by mouth daily (with breakfast). 30 tablet 5   • metoPROLOL succinate (TOPROL-XL) 50 MG 24 hr tablet TAKE ONE TABLET BY MOUTH ONCE DAILY 90 tablet 1   • atorvastatin (LIPITOR) 40 MG tablet Take 1 tablet by mouth daily. 90 tablet 3  [place] reports that pt did decide she will do while in [place].  She does need to see rheumatology down there to get covered and approved. Pt doesn't know her plans yet, but will call when she does.  They will take care the next prolia injection.  Nothing further needed at this time.     • Multiple Vitamin tablet Take 1 tablet by mouth daily.     • acetaminophen (TYLENOL) 500 MG tablet Take 1 tablet by mouth every 6 hours as needed for Pain or Fever. Max 6 tabs in 1 day 40 tablet 0   • cyanocobalamin (VITAMIN B-12) 500 MCG tablet Take 1 tablet by mouth daily.     • aspirin 81 MG tablet Take 1 tablet by mouth daily.     • Calcium Carbonate-Vitamin D (CALCIUM-CARB 600 + D) 600-125 MG-UNIT TABS Take 1 tablet by mouth daily.       No current facility-administered medications for this visit.        Past Medical History:   Diagnosis Date   • Anxiety    • Disorder of bone and cartilage, unspecified 01/30/2012   • Diverticulosis    • DM (diabetes mellitus), type 2 (CMS/HCC)    • Esophageal reflux    • GERD (gastroesophageal reflux disease)    • HTN (hypertension)    • Hyperlipidemia    • Hypothyroidism    • Renal insufficiency    • Retention of urine, unspecified 2/21/13    resolved         ALLERGIES:   Allergen Reactions   • Macrodantin [Nitrofurantoin] VOMITING   • Penicillins      (burning with urination)   • Prednisone RASH   • Sulfa Drugs Cross Reactors HIVES     Social History     Social History   • Marital status:      Spouse name: N/A   • Number of children: 2   • Years of education: N/A     Occupational History   •       retired     Social History Main Topics   • Smoking status: Former Smoker     Packs/day: 0.10     Years: 10.00     Types: Cigarettes     Quit date: 1/1/1963   • Smokeless tobacco: Never Used   • Alcohol use No   • Drug use: No   • Sexual activity: Not on file     Other Topics Concern   •  Service No   • Bike Helmet Not Asked     Don't ride   • Seat Belt Yes     Social History Narrative   • No narrative on file     Family History   Problem Relation Age of Onset   • Cancer Father      stomach   • Stroke Mother    • Diabetes Mother    • Cancer Brother    • Diabetes Maternal Grandmother        REVIEW OF SYSTEMS:   GENERAL:  No fever, chills, night sweats.    HEENT: No change in vision. Denies hearing loss.     CARDIAC: No chest pain. No shortness of breath.  No palpitations, PND (paroxysmal nocturnal dyspnea), orthopnea. Minimal edema.   RESPIRATORY: No cough or wheezing.   GASTROINTESTINAL: Denies abdominal pain, heartburn, nausea, emesis. Denies changes in bowel habits.   MUSCULOSKELETAL: Denies muscle pains, back pains. Positive for joint aches.  NEUROLOGIC: Denies headache, weakness, seizures, syncope.   PSYCHOLOGIC: Denies depression, anxiety.  SKIN: Denies easy bruisability, hair loss or rashes.     PHYSICAL EXAMINATION:      Visit Vitals  /66 (BP Location: [facility], Patient Position: Sitting, Cuff Size: Regular)   Pulse 68 Comment: apical,normal   Resp 14   Ht 5' 2\" (1.575 m)   Wt 86.1 kg   SpO2 98% Comment: room air   BMI 34.72 kg/m²       GENERAL: The patient is alert, and oriented.. Recent and remote memory are intact. Mood and affect are appropriate.  HEENT: Pupils are equal and reactive to light and accommodation. Conjunctiva clear. Sclera are non-red and anicteric. Extraocular movements are intact.   NECK: Carotid revealed no bruit or JVD. No thyromegaly or masses noted. Trachea is midline.   HEART: Apical regular rate and rhythm. S1 and S2 auscultated. No murmurs, gallops, or rubs noted.   LUNGS: Lung sounds clear to auscultation throughout. Respirations even and nonlabored at rest. No rales, rhonchi, or wheezes noted.   EXTREMITIES: No clubbing, or cyanosis. Trace bilateral pedal edema. +2/4 radial pulses bilaterally, +2/4  posterior tibial pulses bilaterally.   SKIN: Color is pink, warm to touch, turgor is good, free from rashes.          TESTS REVIEWED:   EKG November 13, 2017 independently reviewed and interpreted by Dr. Urbina: Normal sinus rhythm, right bundle branch block.  EKG January 11, 2016: Normal sinus rhythm. Right bundle branch block.    Nuclear stress test: January 17, 2016. 72% ejection fraction. Normal wall motion. No perfusion  defect.    Pertinent laboratory tests:  Lab Results   Component Value Date    SODIUM 136 11/10/2017    POTASSIUM 3.3 (L) 11/10/2017    CHLORIDE 104 11/10/2017    CO2 24 11/10/2017    BUN 31 (H) 11/10/2017    CREATININE 1.10 (H) 11/10/2017    GLUCOSE 79 11/10/2017   ,   Lab Results   Component Value Date    WBC 8.3 11/10/2017    HCT 33.9 (L) 11/10/2017    HGB 11.6 (L) 11/10/2017     11/10/2017   ,   TSH (mcUnits/mL)   Date Value   11/10/2017 2.959   ,   Lab Results   Component Value Date    CHOLESTEROL 204 (H) 05/11/2018    HDL 57 05/11/2018    CALCLDL 103 05/11/2018    TRIGLYCERIDE 218 (H) 05/11/2018    and   Lab Results   Component Value Date    AST 17 05/11/2018    GPT 28 05/11/2018    ALKPT 54 05/11/2018    BILIRUBIN 0.6 05/11/2018     GFR Estimate, Non  (no units)   Date Value   11/10/2017 44     Hemoglobin A1C (%)   Date Value   05/11/2018 8.3 (H)         IMPRESSION:   1. Chronic kidney disease: Stage III. Year 54 stable.  2. Hypertension: Controlled. Continue Toprol-XL and losartan.  3. Hyperlipidemia: Cholesterol is decreased from 242 over 4. LDL is decreased to . We will continue Lipitor  4. Diabetes. Poorly controlled. A1c 8.3.        PREVENTIVE COUNSELING.  Discussed diet and exercise.  Discussed risk factor modification.  Discussed hyperlipidemia and restarting atorvastatin.  Discussed reason for follow-up lab work.  Discussed reason for medications.   Discussed in the past that she should not use NSAIDs including Voltaren because of her renal disease.      TEST ORDERED:   None    FOLLOW UP: 6 months

## 2024-09-15 DIAGNOSIS — I10 ESSENTIAL HYPERTENSION: ICD-10-CM

## 2024-09-16 ENCOUNTER — CLINICAL SUPPORT (OUTPATIENT)
Dept: FAMILY MEDICINE | Facility: CLINIC | Age: 84
End: 2024-09-16
Payer: MEDICARE

## 2024-09-16 DIAGNOSIS — M81.0 AGE-RELATED OSTEOPOROSIS WITHOUT CURRENT PATHOLOGICAL FRACTURE: Primary | ICD-10-CM

## 2024-09-16 PROCEDURE — 6360000200 HC RX 636 W HCPCS (ALT 250 FOR IP): Mod: TB,JZ | Performed by: FAMILY MEDICINE

## 2024-09-16 PROCEDURE — 96372 THER/PROPH/DIAG INJ SC/IM: CPT | Performed by: FAMILY MEDICINE

## 2024-09-16 RX ORDER — DIPHENHYDRAMINE HYDROCHLORIDE 50 MG/ML
25-50 INJECTION INTRAMUSCULAR; INTRAVENOUS AS NEEDED
OUTPATIENT
Start: 2025-03-01

## 2024-09-16 RX ORDER — ALBUTEROL SULFATE 0.83 MG/ML
2.5 SOLUTION RESPIRATORY (INHALATION) AS NEEDED
OUTPATIENT
Start: 2025-03-01

## 2024-09-16 RX ORDER — ACETAMINOPHEN 500 MG
500 TABLET ORAL AS NEEDED
OUTPATIENT
Start: 2025-03-01

## 2024-09-16 RX ORDER — FAMOTIDINE 10 MG/ML
20 INJECTION INTRAVENOUS AS NEEDED
OUTPATIENT
Start: 2025-03-01

## 2024-09-16 RX ORDER — EPINEPHRINE 1 MG/ML
0.3 INJECTION INTRAMUSCULAR; INTRAVENOUS; SUBCUTANEOUS AS NEEDED
OUTPATIENT
Start: 2025-03-01

## 2024-09-16 RX ORDER — LOSARTAN POTASSIUM 50 MG/1
50 TABLET ORAL DAILY
Qty: 90 TABLET | Refills: 0 | Status: SHIPPED | OUTPATIENT
Start: 2024-09-16 | End: 2024-11-19

## 2024-09-16 RX ORDER — SODIUM CHLORIDE 9 MG/ML
0-999 INJECTION, SOLUTION INTRAVENOUS CONTINUOUS PRN
OUTPATIENT
Start: 2025-03-01

## 2024-09-16 RX ADMIN — DENOSUMAB 60 MG: 60 INJECTION SUBCUTANEOUS at 14:02

## 2024-09-16 NOTE — PROGRESS NOTES
Prolia Injection   Number of injections/year injection started: 2020  DXA: 3/28/2023  T-score: -2.0 in the left femoral neck.   FRAX: 10.6% major. and 4.4% hip       Prolia 60mg/1ml was injected subcutaneously in back of upper arm, tolerated injection well.  Possible side effects discussed INCLUDING ANY DENTAL CONCERNS   Compliance with calcium and vitamin D discussed   Push fluids for the next 24 hours  Any recent falls/fractures discussed  Patient advised to have follow up DXA per provider and scheduled if needed at this time  Patient verbalized an understanding of the above discussions and voiced no concerns at this time.  Next Prolia appointment scheduled at checkout.

## 2024-09-16 NOTE — TELEPHONE ENCOUNTER
No care due was identified.  [facility] Embedded Care Due Messages. Reference number: [id number]. 9/15/2024 8:44:16 PM MDT

## 2024-09-16 NOTE — TELEPHONE ENCOUNTER
Medication refill request:  Medication(s):  losartan not filled due to Lab (s) are due and Patient does not have a future appointment scheduled within 90 days

## 2024-10-07 ENCOUNTER — TELEPHONE (OUTPATIENT)
Dept: FAMILY MEDICINE | Facility: CLINIC | Age: 84
End: 2024-10-07
Payer: MEDICARE

## 2024-10-07 DIAGNOSIS — E03.8 OTHER SPECIFIED HYPOTHYROIDISM: ICD-10-CM

## 2024-10-07 DIAGNOSIS — E78.5 HYPERLIPIDEMIA, UNSPECIFIED HYPERLIPIDEMIA TYPE: ICD-10-CM

## 2024-10-08 NOTE — TELEPHONE ENCOUNTER
Care Due:                  Date            Visit Type   Department     Provider  --------------------------------------------------------------------------------                              ESTABLISHED   [facility]  Last Visit: 10-      PATIENT      MEDICINE       ENRRIQUE BALES  Next Visit: None Scheduled  None         None Found                                                            Last  Test          Frequency    Reason                     Performed    Due Date  --------------------------------------------------------------------------------  BMP.........  12 months..  losartan.................  Not Found    Overdue    Lipid Panel.  12 months..  atorvastatin.............  Not Found    Overdue    TSH.........  12 months..  levothyroxine............  08- 08-    [facility] Embedded Care Due Messages. Reference number: [id number]. 10/07/2024 8:43:43 PM LEILA

## 2024-10-09 RX ORDER — LEVOTHYROXINE SODIUM 112 UG/1
112 TABLET ORAL DAILY
Qty: 90 TABLET | Refills: 0 | Status: SHIPPED | OUTPATIENT
Start: 2024-10-09 | End: 2024-12-26

## 2024-10-09 RX ORDER — ATORVASTATIN CALCIUM 20 MG/1
20 TABLET, FILM COATED ORAL DAILY
Qty: 90 TABLET | Refills: 0 | Status: SHIPPED | OUTPATIENT
Start: 2024-10-09 | End: 2024-12-26

## 2024-10-09 NOTE — TELEPHONE ENCOUNTER
Medication refill request:  Medication(s):  synthroid and lipitor not filled due to Orders due: Review care due message/pended orders  Lab (s) are due and Patient does not have a future appointment scheduled within 90 days

## 2024-11-04 ENCOUNTER — OFFICE VISIT (OUTPATIENT)
Dept: URBAN - METROPOLITAN AREA CLINIC 27 | Facility: CLINIC | Age: 84
End: 2024-11-04
Payer: MEDICARE

## 2024-11-04 DIAGNOSIS — H35.3221 EXUDATIVE AGE-RELATED MACULAR DEGENERATION, LEFT EYE, WITH ACTIVE CHOROIDAL NEOVASCULARIZATION: Primary | ICD-10-CM

## 2024-11-04 DIAGNOSIS — H35.3112 NONEXUDATIVE AGE-RELATED MACULAR DEGENERATION, RIGHT EYE, INTERMEDIATE DRY STAGE: ICD-10-CM

## 2024-11-04 PROCEDURE — 92134 CPTRZ OPH DX IMG PST SGM RTA: CPT | Performed by: OPHTHALMOLOGY

## 2024-11-04 PROCEDURE — 67028 INJECTION EYE DRUG: CPT | Performed by: OPHTHALMOLOGY

## 2024-11-04 PROCEDURE — 99213 OFFICE O/P EST LOW 20 MIN: CPT | Performed by: OPHTHALMOLOGY

## 2024-11-04 ASSESSMENT — INTRAOCULAR PRESSURE
OS: 12
OD: 8

## 2024-11-10 DIAGNOSIS — I10 ESSENTIAL HYPERTENSION: ICD-10-CM

## 2024-11-11 NOTE — TELEPHONE ENCOUNTER
Care Due:                  Date            Visit Type   Department     Provider  --------------------------------------------------------------------------------                              ESTABLISHED   [facility]  Last Visit: 10-      PATIENT      MEDICINE       ENRRIQUE BALES  Next Visit: None Scheduled  None         None Found                                                            Last  Test          Frequency    Reason                     Performed    Due Date  --------------------------------------------------------------------------------  Office Visit  12 months..  atenoloL, atorvastatin,    10-   09-                             denosumab, estrogens,,                             levothyroxine, losartan..    [facility] Embedded Care Due Messages. Reference number: [id number]. 11/10/2024 8:12:56 PM MST

## 2024-11-12 RX ORDER — ATENOLOL 25 MG/1
25 TABLET ORAL DAILY
Qty: 90 TABLET | Refills: 0 | Status: SHIPPED | OUTPATIENT
Start: 2024-11-12

## 2024-11-12 NOTE — TELEPHONE ENCOUNTER
Patient is a snow bird. Currently living in [place]. Does not expect to be back in [place] until April.

## 2024-11-17 DIAGNOSIS — I10 ESSENTIAL HYPERTENSION: ICD-10-CM

## 2024-11-18 NOTE — TELEPHONE ENCOUNTER
No care due was identified.  [facility] Embedded Care Due Messages. Reference number: [id number]. 11/17/2024 8:23:37 PM MST

## 2024-11-19 RX ORDER — LOSARTAN POTASSIUM 50 MG/1
50 TABLET ORAL DAILY
Qty: 90 TABLET | Refills: 0 | Status: SHIPPED | OUTPATIENT
Start: 2024-11-19

## 2024-11-19 NOTE — TELEPHONE ENCOUNTER
Medication refill request:  Medication(s):  losartan potassium not filled due to Lab (s) are due and Patient does not have a future appointment scheduled within 90 days

## 2024-11-19 NOTE — TELEPHONE ENCOUNTER
Last ordered 9/16/24 #90 tablets 0 refills.   Last visit 10/3/23.   Does not have an upcoming appt scheduled.

## 2024-11-19 NOTE — TELEPHONE ENCOUNTER
Medication refill request:  Medication(s):  losartan not filled due to Lab(s) abnormal or delinquent > 90 days

## 2024-12-24 DIAGNOSIS — E03.8 OTHER SPECIFIED HYPOTHYROIDISM: ICD-10-CM

## 2024-12-24 DIAGNOSIS — E78.5 HYPERLIPIDEMIA, UNSPECIFIED HYPERLIPIDEMIA TYPE: ICD-10-CM

## 2024-12-24 NOTE — TELEPHONE ENCOUNTER
Care Due:                  Date            Visit Type   Department     Provider  --------------------------------------------------------------------------------                              ESTABLISHED   [facility]  Last Visit: 10-      PATIENT      MEDICINE       ENRRIQUE BALES  Next Visit: None Scheduled  None         None Found                                                            Last  Test          Frequency    Reason                     Performed    Due Date  --------------------------------------------------------------------------------  BMP.........  12 months..  losartan.................  Not Found    Overdue    Lipid Panel.  12 months..  atorvastatin.............  Not Found    Overdue    TSH.........  12 months..  levothyroxine............  08- 08-    [facility] Embedded Care Due Messages. Reference number: [id number]. 12/24/2024 2:42:01 AM ILDA

## 2024-12-24 NOTE — TELEPHONE ENCOUNTER
Medication refill request:  Medication(s):    not filled due to Lab(s) abnormal or delinquent > 90 days

## 2024-12-26 RX ORDER — ATORVASTATIN CALCIUM 20 MG/1
20 TABLET, FILM COATED ORAL DAILY
Qty: 90 TABLET | Refills: 0 | Status: SHIPPED | OUTPATIENT
Start: 2024-12-26

## 2024-12-26 RX ORDER — LEVOTHYROXINE SODIUM 112 UG/1
112 TABLET ORAL DAILY
Qty: 90 TABLET | Refills: 0 | Status: SHIPPED | OUTPATIENT
Start: 2024-12-26

## 2024-12-31 NOTE — TELEPHONE ENCOUNTER
Patient is a snow bird and is currently in [place]. Patient states she will call back to schedule an appointment when she returns to [place].

## 2025-01-26 DIAGNOSIS — I10 ESSENTIAL HYPERTENSION: ICD-10-CM

## 2025-01-27 ENCOUNTER — OFFICE VISIT (OUTPATIENT)
Dept: URBAN - METROPOLITAN AREA CLINIC 27 | Facility: CLINIC | Age: 85
End: 2025-01-27
Payer: MEDICARE

## 2025-01-27 DIAGNOSIS — H35.3112 NONEXUDATIVE AGE-RELATED MACULAR DEGENERATION, RIGHT EYE, INTERMEDIATE DRY STAGE: ICD-10-CM

## 2025-01-27 DIAGNOSIS — H35.3221 EXUDATIVE AGE-RELATED MACULAR DEGENERATION, LEFT EYE, WITH ACTIVE CHOROIDAL NEOVASCULARIZATION: Primary | ICD-10-CM

## 2025-01-27 DIAGNOSIS — Z96.1 PRESENCE OF INTRAOCULAR LENS: ICD-10-CM

## 2025-01-27 DIAGNOSIS — H43.813 VITREOUS DEGENERATION, BILATERAL: ICD-10-CM

## 2025-01-27 PROCEDURE — 92134 CPTRZ OPH DX IMG PST SGM RTA: CPT | Performed by: OPHTHALMOLOGY

## 2025-01-27 PROCEDURE — 92014 COMPRE OPH EXAM EST PT 1/>: CPT | Performed by: OPHTHALMOLOGY

## 2025-01-27 PROCEDURE — 67028 INJECTION EYE DRUG: CPT | Performed by: OPHTHALMOLOGY

## 2025-01-27 RX ORDER — VIT E/DHA/LUT/ZEAX/ASTAX/BILB 25-220-5
CAPSULE ORAL
Qty: 60 | Refills: 10 | Status: ACTIVE
Start: 2025-01-27

## 2025-01-27 RX ORDER — ATENOLOL 25 MG/1
25 TABLET ORAL DAILY
Qty: 30 TABLET | Refills: 0 | Status: SHIPPED | OUTPATIENT
Start: 2025-01-27 | End: 2025-02-19

## 2025-01-27 ASSESSMENT — INTRAOCULAR PRESSURE
OS: 13
OD: 10

## 2025-01-27 NOTE — TELEPHONE ENCOUNTER
For Clinic Staff: please review this note,  Centralized RN/Nurse Triage: medication(s) atenolol not filled due to -Patient has received 1 courtesy refill and does not have appt within the next 90 days.

## 2025-01-27 NOTE — TELEPHONE ENCOUNTER
Care Due:                  Date            Visit Type   Department     Provider  --------------------------------------------------------------------------------                              ESTABLISHED   [facility]  Last Visit: 10-      PATIENT      MEDICINE       ENRRIQUE BALES  Next Visit: None Scheduled  None         None Found                                                            Last  Test          Frequency    Reason                     Performed    Due Date  --------------------------------------------------------------------------------  Office Visit  12 months..  atenoloL, atorvastatin,    10-   09-                             denosumab, estrogens,,                             levothyroxine, losartan..    [facility] Embedded Care Due Messages. Reference number: [id number]. 1/26/2025 8:13:42 PM MST

## 2025-02-02 DIAGNOSIS — I10 ESSENTIAL HYPERTENSION: ICD-10-CM

## 2025-02-03 RX ORDER — LOSARTAN POTASSIUM 50 MG/1
50 TABLET ORAL DAILY
Qty: 90 TABLET | Refills: 0 | Status: SHIPPED | OUTPATIENT
Start: 2025-02-03

## 2025-02-03 NOTE — TELEPHONE ENCOUNTER
No care due was identified.  [facility] Embedded Care Due Messages. Reference number: [id number]. 2/02/2025 8:03:40 PM MST

## 2025-02-17 DIAGNOSIS — I10 ESSENTIAL HYPERTENSION: ICD-10-CM

## 2025-02-18 NOTE — TELEPHONE ENCOUNTER
For Clinic Staff: please review this note,  Centralized RN/Nurse Triage: medication(s) atenolol not filled due to -Medication has not been filled greater than 15 months by PCP listed.  Review by PCP is indicated.

## 2025-02-18 NOTE — TELEPHONE ENCOUNTER
No care due was identified.  [facility] Embedded Care Due Messages. Reference number: [id number]. 2/17/2025 8:03:37 PM MST

## 2025-02-19 RX ORDER — ATENOLOL 25 MG/1
25 TABLET ORAL DAILY
Qty: 90 TABLET | Refills: 1 | Status: SHIPPED | OUTPATIENT
Start: 2025-02-19

## 2025-03-10 DIAGNOSIS — E78.5 HYPERLIPIDEMIA, UNSPECIFIED HYPERLIPIDEMIA TYPE: ICD-10-CM

## 2025-03-10 DIAGNOSIS — E03.8 OTHER SPECIFIED HYPOTHYROIDISM: ICD-10-CM

## 2025-03-11 NOTE — TELEPHONE ENCOUNTER
Care Due:                  Date            Visit Type   Department     Provider  --------------------------------------------------------------------------------                              ESTABLISHED   [facility]  Last Visit: 10-      PATIENT      MEDICINE       ENRRIQUE BALES                                           [facility]  Next Visit: 05-      None         MEDICINE       [facility]                                                            Last  Test          Frequency    Reason                     Performed    Due Date  --------------------------------------------------------------------------------  BMP.........  12 months..  losartan.................  Not Found    Overdue    Lipid Panel.  12 months..  atorvastatin.............  Not Found    Overdue    TSH.........  12 months..  levothyroxine............  08- 08-    [facility] Embedded Care Due Messages. Reference number: [id number]. 3/10/2025 9:07:16 PM LEILA

## 2025-03-12 RX ORDER — ATORVASTATIN CALCIUM 20 MG/1
20 TABLET, FILM COATED ORAL DAILY
Qty: 90 TABLET | Refills: 0 | Status: SHIPPED | OUTPATIENT
Start: 2025-03-12

## 2025-03-12 RX ORDER — LEVOTHYROXINE SODIUM 112 UG/1
112 TABLET ORAL DAILY
Qty: 90 TABLET | Refills: 0 | Status: SHIPPED | OUTPATIENT
Start: 2025-03-12

## 2025-03-12 NOTE — TELEPHONE ENCOUNTER
Upcoming appointment 5/29/25  Needs labs  Medication refill request:  90-day Courtesy Refill given

## 2025-04-06 DIAGNOSIS — I10 ESSENTIAL HYPERTENSION: ICD-10-CM

## 2025-04-07 RX ORDER — LOSARTAN POTASSIUM 50 MG/1
50 TABLET ORAL DAILY
Qty: 60 TABLET | Refills: 0 | Status: SHIPPED | OUTPATIENT
Start: 2025-04-07

## 2025-04-07 NOTE — TELEPHONE ENCOUNTER
No care due was identified.  [facility] Embedded Care Due Messages. Reference number: [id number]. 4/06/2025 8:20:32 PM MDT

## 2025-04-24 ENCOUNTER — OFFICE VISIT (OUTPATIENT)
Dept: URBAN - METROPOLITAN AREA CLINIC 27 | Facility: CLINIC | Age: 85
End: 2025-04-24
Payer: MEDICARE

## 2025-04-24 DIAGNOSIS — H35.3221 EXUDATIVE AGE-RELATED MACULAR DEGENERATION, LEFT EYE, WITH ACTIVE CHOROIDAL NEOVASCULARIZATION: Primary | ICD-10-CM

## 2025-04-24 DIAGNOSIS — H43.813 VITREOUS DEGENERATION, BILATERAL: ICD-10-CM

## 2025-04-24 DIAGNOSIS — Z96.1 PRESENCE OF INTRAOCULAR LENS: ICD-10-CM

## 2025-04-24 DIAGNOSIS — H35.3112 NONEXUDATIVE AGE-RELATED MACULAR DEGENERATION, RIGHT EYE, INTERMEDIATE DRY STAGE: ICD-10-CM

## 2025-04-24 PROCEDURE — 99214 OFFICE O/P EST MOD 30 MIN: CPT | Performed by: OPHTHALMOLOGY

## 2025-04-24 PROCEDURE — 92134 CPTRZ OPH DX IMG PST SGM RTA: CPT | Performed by: OPHTHALMOLOGY

## 2025-04-24 PROCEDURE — 67028 INJECTION EYE DRUG: CPT | Performed by: OPHTHALMOLOGY

## 2025-04-24 ASSESSMENT — INTRAOCULAR PRESSURE
OS: 16
OD: 17

## 2025-05-11 DIAGNOSIS — I10 ESSENTIAL HYPERTENSION: ICD-10-CM

## 2025-05-12 DIAGNOSIS — E78.5 HYPERLIPIDEMIA, UNSPECIFIED HYPERLIPIDEMIA TYPE: ICD-10-CM

## 2025-05-12 DIAGNOSIS — E03.8 OTHER SPECIFIED HYPOTHYROIDISM: ICD-10-CM

## 2025-05-12 NOTE — TELEPHONE ENCOUNTER
For Clinic Staff: please review this note,  Centralized RN/Nurse Triage: medication(s) losartan potassium not filled due to *Lab(s): abnormal or delinquent >90 days.

## 2025-05-12 NOTE — TELEPHONE ENCOUNTER
Care Due:                  Date            Visit Type   Department     Provider  --------------------------------------------------------------------------------                              ESTABLISHED   [facility]  Last Visit: 10-      PATIENT      MEDICINE       ENRRIQUE BALES                                           [facility]  Next Visit: 05-      None         MEDICINE       [facility] NURSE                                                            Last  Test          Frequency    Reason                     Performed    Due Date  --------------------------------------------------------------------------------  BMP.........  12 months..  losartan.................  Not Found    Overdue    Lipid Panel.  12 months..  atorvastatin.............  Not Found    Overdue    TSH.........  12 months..  levothyroxine............  08- 08-    [facility] Embedded Care Due Messages. Reference number: [id number]. 5/11/2025 8:12:38 PM LEILA

## 2025-05-13 RX ORDER — LOSARTAN POTASSIUM 50 MG/1
50 TABLET ORAL DAILY
Qty: 30 TABLET | Refills: 0 | Status: SHIPPED | OUTPATIENT
Start: 2025-05-13

## 2025-05-13 NOTE — TELEPHONE ENCOUNTER
No care due was identified.  [facility] Embedded Care Due Messages. Reference number: [id number]. 5/12/2025 8:53:22 PM MDT

## 2025-05-14 RX ORDER — LEVOTHYROXINE SODIUM 112 UG/1
112 TABLET ORAL DAILY
Qty: 90 TABLET | Refills: 0 | Status: SHIPPED | OUTPATIENT
Start: 2025-05-14

## 2025-05-14 RX ORDER — ATORVASTATIN CALCIUM 20 MG/1
20 TABLET, FILM COATED ORAL DAILY
Qty: 90 TABLET | Refills: 0 | Status: SHIPPED | OUTPATIENT
Start: 2025-05-14

## 2025-05-14 NOTE — TELEPHONE ENCOUNTER
For Clinic Staff: please review this note,  Centralized RN/Nurse Triage: medication(s) lipitor and synthroid not filled due to *Lab(s): abnormal or delinquent >90 days.

## 2025-05-22 DIAGNOSIS — M81.0 AGE-RELATED OSTEOPOROSIS WITHOUT CURRENT PATHOLOGICAL FRACTURE: Primary | ICD-10-CM

## 2025-06-05 ENCOUNTER — CLINICAL SUPPORT (OUTPATIENT)
Dept: FAMILY MEDICINE | Facility: CLINIC | Age: 85
End: 2025-06-05
Payer: COMMERCIAL

## 2025-06-05 DIAGNOSIS — M81.0 AGE-RELATED OSTEOPOROSIS WITHOUT CURRENT PATHOLOGICAL FRACTURE: Primary | ICD-10-CM

## 2025-06-05 PROCEDURE — 6360000200 HC RX 636 W HCPCS (ALT 250 FOR IP): Mod: TB,JZ | Performed by: FAMILY MEDICINE

## 2025-06-05 PROCEDURE — 96372 THER/PROPH/DIAG INJ SC/IM: CPT | Performed by: FAMILY MEDICINE

## 2025-06-05 RX ORDER — DIPHENHYDRAMINE HYDROCHLORIDE 50 MG/ML
25-50 INJECTION, SOLUTION INTRAMUSCULAR; INTRAVENOUS AS NEEDED
OUTPATIENT
Start: 2025-09-13

## 2025-06-05 RX ORDER — FAMOTIDINE 10 MG/ML
20 INJECTION, SOLUTION INTRAVENOUS AS NEEDED
OUTPATIENT
Start: 2025-09-13

## 2025-06-05 RX ORDER — EPINEPHRINE 1 MG/ML
0.3 INJECTION INTRAMUSCULAR; INTRAVENOUS; SUBCUTANEOUS AS NEEDED
OUTPATIENT
Start: 2025-09-13

## 2025-06-05 RX ORDER — ACETAMINOPHEN 500 MG
500 TABLET ORAL AS NEEDED
OUTPATIENT
Start: 2025-09-13

## 2025-06-05 RX ORDER — ALBUTEROL SULFATE 0.83 MG/ML
2.5 SOLUTION RESPIRATORY (INHALATION) AS NEEDED
OUTPATIENT
Start: 2025-09-13

## 2025-06-05 RX ORDER — SODIUM CHLORIDE 9 MG/ML
0-999 INJECTION, SOLUTION INTRAVENOUS CONTINUOUS PRN
OUTPATIENT
Start: 2025-09-13

## 2025-06-05 RX ADMIN — DENOSUMAB 60 MG: 60 INJECTION SUBCUTANEOUS at 13:04

## 2025-06-05 NOTE — PROGRESS NOTES
Prolia Injection   Number of injections/year injection started: 2020  DXA: 3/28/2023  T-score: -2.0 in the left femoral neck.   FRAX: 10.6% major. and 4.4% hip   Risk of fracture is moderate.       Prolia 60mg/1ml was injected subcutaneously in back of upper arm, tolerated injection well.  Possible side effects discussed INCLUDING ANY DENTAL CONCERNS   Compliance with calcium and vitamin D discussed   Push fluids for the next 24 hours  Any recent falls/fractures discussed  Patient advised to have follow up DXA per provider and scheduled if needed at this time  Patient verbalized an understanding of the above discussions and voiced no concerns at this time.  Patient will most likely be in [place] for next injection, she will call to let us know

## 2025-06-17 ENCOUNTER — OFFICE VISIT (OUTPATIENT)
Dept: URGENT CARE | Facility: CLINIC | Age: 85
End: 2025-06-17
Payer: COMMERCIAL

## 2025-06-17 VITALS
DIASTOLIC BLOOD PRESSURE: 58 MMHG | SYSTOLIC BLOOD PRESSURE: 148 MMHG | HEART RATE: 63 BPM | BODY MASS INDEX: 29.88 KG/M2 | OXYGEN SATURATION: 96 % | WEIGHT: 153 LBS | RESPIRATION RATE: 18 BRPM | TEMPERATURE: 97.8 F

## 2025-06-17 DIAGNOSIS — L08.9 LOCALIZED BACTERIAL SKIN INFECTION: Primary | ICD-10-CM

## 2025-06-17 DIAGNOSIS — B96.89 LOCALIZED BACTERIAL SKIN INFECTION: Primary | ICD-10-CM

## 2025-06-17 RX ORDER — DOXYCYCLINE 100 MG/1
100 CAPSULE ORAL 2 TIMES DAILY
Qty: 14 CAPSULE | Refills: 0 | Status: SHIPPED | OUTPATIENT
Start: 2025-06-17 | End: 2025-06-24

## 2025-06-17 RX ORDER — MUPIROCIN 20 MG/G
1 OINTMENT TOPICAL 2 TIMES DAILY
Qty: 15 G | Refills: 0 | Status: SHIPPED | OUTPATIENT
Start: 2025-06-17

## 2025-06-17 RX ORDER — EZETIMIBE 10 MG/1
10 TABLET ORAL DAILY
COMMUNITY

## 2025-06-17 ASSESSMENT — ENCOUNTER SYMPTOMS
WEAKNESS: 0
WOUND: 1
LIGHT-HEADEDNESS: 0
MYALGIAS: 0
SORE THROAT: 0
PALPITATIONS: 0
SINUS PRESSURE: 0
NAUSEA: 0
AGITATION: 0
ARTHRALGIAS: 0
COUGH: 0
TROUBLE SWALLOWING: 0
SINUS PAIN: 0
CONSTIPATION: 0
WHEEZING: 0
CHILLS: 0
RHINORRHEA: 0
HEADACHES: 0
VOMITING: 0
NERVOUS/ANXIOUS: 0
FREQUENCY: 0
DIFFICULTY URINATING: 0
DIARRHEA: 0
FEVER: 0
ABDOMINAL PAIN: 0
SHORTNESS OF BREATH: 0
DIZZINESS: 0
CHEST TIGHTNESS: 0
EYE PAIN: 0

## 2025-06-17 NOTE — PROGRESS NOTES
Subjective      Katy Stack is a 85 y.o. female who presents for skin infection.    Patient presents today with concerns for possible infection to the skin on her left lower leg.  Apparently she fell a few weeks ago and hit her shins on some concrete.  She was treated by a doctor in a different city at that time and given bandages and antibiotics.  Currently she has noticed some increasing redness and concerns for infection to that area despite using antibiotic ointment daily.        The following have been reviewed and updated as appropriate in this visit:   Tobacco  Allergies  Meds  Problems  Med Hx  Surg Hx  Fam Hx         Allergies   Allergen Reactions    Beryl Angioedema and Hives     hives, tongue swells  Swelling of tongue    Adhesive      welts     Current Outpatient Medications   Medication Sig Dispense Refill    ezetimibe (Zetia) 10 mg tablet Take 1 tablet (10 mg total) by mouth daily      denosumab (PROLIA) 60 mg/mL syringe syringe Inject 1 Syringe (60 mg total) under the skin once for 1 dose Medication is being administered as part of a therapy plan. Review patient's therapy plan and chart for previous doses/dates of administration and schedule of future doses/dates of administration. 1 mL 0    levothyroxine (SYNTHROID, LEVOTHROID) 112 mcg tablet TAKE 1 TABLET BY MOUTH DAILY 90 tablet 0    losartan (COZAAR) 50 mg tablet TAKE 1 TABLET BY MOUTH DAILY 30 tablet 0    atenoloL (TENORMIN) 25 mg tablet TAKE 1 TABLET BY MOUTH DAILY 90 tablet 1    denosumab (PROLIA) 60 mg/mL syringe syringe Inject 1 mL (60 mg total) under the skin once for 1 dose Medication is being administered as part of a therapy plan. Review patient's therapy plan and chart for previous doses/dates of administration and schedule of future doses/dates of administration. 1 mL 0    estrogens, conjugated, (Premarin) 0.9 mg tablet Take 1 tablet (0.9 mg total) by mouth once a week 12 tablet 4    gabapentin (NEURONTIN) 300 mg  capsule Take 1 capsule (300 mg total) by mouth 3 (three) times a day 600mg in AM, 600mg in evening, occasionally additional tablet during the night      omeprazole (PriLOSEC) 20 mg capsule TAKE 1 CAPSULE BY MOUTH  DAILY 90 capsule 3    multivitamin (THERAGRAN) tablet tablet Take 1 tablet by mouth daily      calcium carbonate-vitamin D3 600 mg(1,500mg) -400 unit per tablet Take 1 tablet by mouth daily      nitroglycerin (NITROSTAT) 0.4 mg SL tablet Place 1 tablet (0.4 mg total) under the tongue every 5 (five) minutes as needed for chest pain 25 tablet 1    vit A-vit C-vit E-zinc-copper 7,160-113-100 unit-mg-unit tablet Take 1 tab/cap by mouth 2 (two) times a day.      mupirocin (BACTROBAN) 2 % ointment Apply 1 Application topically 2 times a day Left leg wound 15 g 0    doxycycline (Vibramycin) 100 mg capsule Take 1 capsule (100 mg total) by mouth 2 times a day for 7 days 14 capsule 0    atorvastatin (LIPITOR) 20 mg tablet TAKE 1 TABLET BY MOUTH DAILY (Patient not taking: Reported on 6/17/2025) 90 tablet 0    furosemide (LASIX) 20 mg tablet TAKE 1 TABLET BY MOUTH  DAILY (Patient not taking: Reported on 6/17/2025) 90 tablet 3    potassium chloride 10 mEq CR tablet TAKE 1 TABLET BY MOUTH  DAILY (Patient not taking: Reported on 6/17/2025) 90 tablet 3     No current facility-administered medications for this visit.     Past Medical History:   Diagnosis Date    Benign essential hypertension 11/24/2017    Cataract of left eye     Chronic obstructive lung disease (CMS/HCC) 11/10/2017    Dysrhythmia     LBBB    Family history of anesthesia complication     GERD (gastroesophageal reflux disease)     Hypertension     Hypothyroid     Hypothyroidism 11/10/2017    Injury of back     BENDING INJURY    Macular degeneration     Neurologic disorder     Obesity 11/10/2017    Obstructive sleep apnea syndrome 11/10/2017    Night time oxygen/2L    Osteoarthritis     Periodic limb movement disorder 11/10/2017    Peripheral neuropathy      left thigh into left groin     Rectocele     Respiratory disease      Past Surgical History:   Procedure Laterality Date    APPENDECTOMY  1987    BACK SURGERY      2 surgeries [id number]    BACK SURGERY  11/30/2016    BACK SURGERY  05/01/2017    vetebral fusion    CARDIAC CATHETERIZATION  08/31/2011    CATARACT EXTRACTION W/  INTRAOCULAR LENS IMPLANT Left 7/2/2018    Procedure: OS CATARACT LEFT PHACOEMULSIFICATION OF CATARACT WITH INTRAOCULAR LENS;  Surgeon: Emmanuel Cueto MD;  Location: [facility];  Service: Ophthalmology;  Laterality: Left;    CATARACT EXTRACTION W/  INTRAOCULAR LENS IMPLANT Right 8/6/2018    Procedure: OD CATARACT RIGHT PHACOEMULSIFICATION OF CATARACT WITH INTRAOCULAR LENS;  Surgeon: Emmanuel Cueto MD;  Location: [facility];  Service: Ophthalmology;  Laterality: Right;    CHOLECYSTECTOMY      COLONOSCOPY  02/19/2014    No polyps    COLONOSCOPY  04/26/2011    DIAGNOSTIC LAPAROSCOPY N/A 11/28/2018    Procedure: Laparoscopic band removal;  Surgeon: Chris Millan MD;  Location: [facility];  Service: General;  Laterality: N/A;    ESOPHAGOGASTRODUODENOSCOPY N/A 11/6/2018    Procedure: EGD - ESOPHAGOGASTRODUODENOSCOPY;  Surgeon: Chris Millan MD;  Location: [facility];  Service: Endoscopy;  Laterality: N/A;    FOOT SURGERY      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 Vela- shoulder    SPINAL STIMULATOR IMPLANT N/A 3/23/2021    Procedure: PERMANENT SPINAL CORD STIMULATOR  IMPLANTATION;  Surgeon: Bernardo Gonzalez MD;  Location: [facility] OR;  Service: Pain Management;  Laterality: N/A;    THYROID LOBECTOMY Left     THYROIDECTOMY, PARTIAL  2010    UPPER GASTROINTESTINAL ENDOSCOPY  2014    Mild chronic gastritis     Family History   Problem Relation Age of Onset    Heart disease Mother     Heart disease Father         Myocardial infarction    Parkinsonism Father     Thyroid disease Father     Alzheimer's disease Sister     Arthritis Sister     Osteoporosis Sister     Lung cancer Brother     Diabetes Maternal Grandmother      Social History     Socioeconomic History    Marital status:    Tobacco Use    Smoking status: Former     Current packs/day: 0.00     Average packs/day: 2.0 packs/day for 35.0 years (70.0 ttl pk-yrs)     Types: Cigarettes     Start date:      Quit date:      Years since quittin.4    Smokeless tobacco: Never   Vaping Use    Vaping status: Never Used   Substance and Sexual Activity    Alcohol use: No    Drug use: No    Sexual activity: Defer     Social Drivers of Health     Tobacco Use: Medium Risk (2025)    Patient History     Smoking Tobacco Use: Former     Smokeless Tobacco Use: Never   Alcohol Use: Unknown (2025)    Received from [facility]    AUDIT-C     Frequency of Alcohol Consumption: Monthly or less     Frequency of Binge Drinking: Never       Review of Systems   Constitutional:  Negative for chills and fever.   HENT:  Negative for congestion, ear pain, rhinorrhea, sinus pressure, sinus pain, sore throat and trouble swallowing.    Eyes:  Negative for pain and visual disturbance.   Respiratory:  Negative for cough, chest tightness, shortness of breath and wheezing.    Cardiovascular:  Negative for chest pain and palpitations.   Gastrointestinal:  Negative for abdominal pain, constipation, diarrhea, nausea and vomiting.   Endocrine: Negative for cold intolerance and heat intolerance.    Genitourinary:  Negative for difficulty urinating, frequency and urgency.   Musculoskeletal:  Negative for arthralgias and myalgias.   Skin:  Positive for wound (Redness and tenderness to a previous laceration concerning for infection). Negative for rash.   Neurological:  Negative for dizziness, weakness, light-headedness and headaches.   Psychiatric/Behavioral:  Negative for agitation. The patient is not nervous/anxious.        Objective   /58 (BP Location: Left arm, Patient Position: Sitting, Cuff Size: Regular Adult)   Pulse 63   Temp 36.6 °C (97.8 °F) (Temporal)   Resp 18   Wt 69.4 kg (153 lb)   SpO2 96%   BMI 29.88 kg/m²   BP Readings from Last 3 Encounters:   06/17/25 148/58   03/04/24 150/55   12/15/23 122/58      Wt Readings from Last 3 Encounters:   06/17/25 69.4 kg (153 lb)   12/15/23 79.7 kg (175 lb 11.2 oz)   10/23/23 83.5 kg (184 lb)      Pulse Readings from Last 3 Encounters:   06/17/25 63   03/04/24 55   12/15/23 57      Resp Readings from Last 3 Encounters:   06/17/25 18   03/04/24 19   12/15/23 24       Physical Exam  Vitals and nursing note reviewed.   Constitutional:       Appearance: Normal appearance.   Cardiovascular:      Rate and Rhythm: Normal rate.      Pulses: Normal pulses.   Skin:     General: Skin is warm.      Capillary Refill: Capillary refill takes less than 2 seconds.          Neurological:      Mental Status: She is alert.         No results found for this or any previous visit (from the past 24 hours).    Assessment/Plan   Diagnoses and all orders for this visit:    Localized bacterial skin infection  -     mupirocin (BACTROBAN) 2 % ointment; Apply 1 Application topically 2 times a day Left leg wound  -     doxycycline (Vibramycin) 100 mg capsule; Take 1 capsule (100 mg total) by mouth 2 times a day for 7 days        Patient Instructions   Take Tylenol 500 mg every 6 hours as needed for fever or body aches alternate with Ibuprofen 400 mg every 8 hours.     Sirena LOUIS  Dave, CNP

## 2025-06-17 NOTE — PATIENT INSTRUCTIONS
Take Tylenol 500 mg every 6 hours as needed for fever or body aches alternate with Ibuprofen 400 mg every 8 hours.     Clean wound with chlorhexidine soap and water and cover with Mupirocin ointment and a bandage  
No

## 2025-06-24 DIAGNOSIS — I10 ESSENTIAL HYPERTENSION: ICD-10-CM

## 2025-06-25 NOTE — TELEPHONE ENCOUNTER
No care due was identified.  [facility] Embedded Care Due Messages. Reference number: [id number]. 6/24/2025 8:22:33 PM MDT

## 2025-06-25 NOTE — TELEPHONE ENCOUNTER
For Clinic Staff: please review this note,  Centralized RN/Nurse Triage: medication(s) losartan potassium  not filled due to *Lab(s): abnormal or delinquent >90 days., *90 day or less supply or previous Rx indicates a medication plan or an appt is needed.  Chart review completed and Centralized refill team is unable to determine.  Or med plan is not specific enough.

## 2025-06-26 RX ORDER — LOSARTAN POTASSIUM 50 MG/1
50 TABLET ORAL DAILY
Qty: 30 TABLET | Refills: 11 | OUTPATIENT
Start: 2025-06-26

## 2025-06-26 NOTE — TELEPHONE ENCOUNTER
Last ordered 5/13/25 #30 tablets 0 refills.   Last visit 10/3/23.   No upcoming appt scheduled, recent appts canceled.

## 2025-07-07 DIAGNOSIS — I10 ESSENTIAL HYPERTENSION: ICD-10-CM

## 2025-07-07 DIAGNOSIS — E78.5 HYPERLIPIDEMIA, UNSPECIFIED HYPERLIPIDEMIA TYPE: ICD-10-CM

## 2025-07-07 DIAGNOSIS — E03.8 OTHER SPECIFIED HYPOTHYROIDISM: ICD-10-CM

## 2025-07-07 RX ORDER — ATORVASTATIN CALCIUM 20 MG/1
20 TABLET, FILM COATED ORAL DAILY
Qty: 90 TABLET | Refills: 3 | OUTPATIENT
Start: 2025-07-07

## 2025-07-07 RX ORDER — LOSARTAN POTASSIUM 50 MG/1
50 TABLET ORAL DAILY
Qty: 30 TABLET | Refills: 11 | OUTPATIENT
Start: 2025-07-07

## 2025-07-07 RX ORDER — LEVOTHYROXINE SODIUM 112 UG/1
112 TABLET ORAL DAILY
Qty: 90 TABLET | Refills: 3 | OUTPATIENT
Start: 2025-07-07

## 2025-07-07 RX ORDER — ATENOLOL 25 MG/1
25 TABLET ORAL DAILY
Qty: 90 TABLET | Refills: 3 | OUTPATIENT
Start: 2025-07-07

## 2025-07-07 NOTE — TELEPHONE ENCOUNTER
No care due was identified.  [facility] Embedded Care Due Messages. Reference number: [id number]. 7/07/2025 9:10:05 AM LEILA

## 2025-07-07 NOTE — TELEPHONE ENCOUNTER
For Clinic Staff: please review this note,  Centralized RN/Nurse Triage: medication(s)  not filled due to -No valid appointment within protocol timeline of 6 or 12 months and unable to give a courtesy refill due past due greater than 90 days.  Please review if courtesy refill is appropriate and if appointment should be scheduled.

## 2025-08-20 ENCOUNTER — OFFICE VISIT (OUTPATIENT)
Dept: URBAN - METROPOLITAN AREA CLINIC 27 | Facility: CLINIC | Age: 85
End: 2025-08-20
Payer: COMMERCIAL

## 2025-08-20 DIAGNOSIS — H35.3221 EXUDATIVE AGE-RELATED MACULAR DEGENERATION, LEFT EYE, WITH ACTIVE CHOROIDAL NEOVASCULARIZATION: Primary | ICD-10-CM

## 2025-08-20 DIAGNOSIS — Z96.1 PRESENCE OF INTRAOCULAR LENS: ICD-10-CM

## 2025-08-20 DIAGNOSIS — H35.3112 NONEXUDATIVE AGE-RELATED MACULAR DEGENERATION, RIGHT EYE, INTERMEDIATE DRY STAGE: ICD-10-CM

## 2025-08-20 DIAGNOSIS — H43.813 VITREOUS DEGENERATION, BILATERAL: ICD-10-CM

## 2025-08-20 PROCEDURE — 67028 INJECTION EYE DRUG: CPT | Performed by: OPHTHALMOLOGY

## 2025-08-20 PROCEDURE — 92014 COMPRE OPH EXAM EST PT 1/>: CPT | Performed by: OPHTHALMOLOGY

## 2025-08-20 PROCEDURE — 92134 CPTRZ OPH DX IMG PST SGM RTA: CPT | Performed by: OPHTHALMOLOGY

## 2025-08-20 ASSESSMENT — INTRAOCULAR PRESSURE
OS: 9
OD: 10

## (undated) DEVICE — SYRINGE 35CC LUER LOCK TIP STL MONOJECT

## (undated) DEVICE — GLOVE SURG SZ 7.5 GREEN W/ ALOE VERA

## (undated) DEVICE — SUTURE MONOCRYL 4-0 PS-2 27" 3/8 CIRCLE NEEDLE REVERSE CUT

## (undated) DEVICE — NEEDLE SPINAL 18G 3 1/2

## (undated) DEVICE — GLOVE SURG SZ 6.5 GREEN W/ ALOE VERA

## (undated) DEVICE — WATER STL IRR 1000ML BTL USP PLASTIC POUR BOTTLE

## (undated) DEVICE — SOL LAC RING INJ 1000ML BAG USP VIAFLEX PLASTIC CONTAINER

## (undated) DEVICE — SOL SOD CHL INJ .9% 1000ML BAG USP VIAFLEX PLASTIC CONTAINER

## (undated) DEVICE — DRAPE IOBAN 2 60CM X 45CM @

## (undated) DEVICE — SPONGE GAUZE 12PLY 4X4 STL

## (undated) DEVICE — PREP SKIN CHLORAPREP ORNG 26ML STL

## (undated) DEVICE — CATH IV 20G 1 1/4" SAFETY

## (undated) DEVICE — BENZOIN TINCTURE COMPOUND

## (undated) DEVICE — PIN LONG BLUNT TIP 650MM  SI 1PIECE TARGETING

## (undated) DEVICE — SPONGE GAUZE 4X4-12 STL 10'S

## (undated) DEVICE — DRAPE TOWEL ADHESIVE 19X30"STL TIBURON UTILITY TRACH

## (undated) DEVICE — Device

## (undated) DEVICE — KIT ENDOGATOR

## (undated) DEVICE — JAW SEALER DIVIDER VESSEL MARYLAND LIGASURE

## (undated) DEVICE — BLANKET WARMING LOWER BODY HYPOTHERMIA FORCED AIR

## (undated) DEVICE — CATH IV 18G 1 1/4" SAFETY

## (undated) DEVICE — GLOVE SENSICARE SLT 6.5 SURG

## (undated) DEVICE — RELOAD GIA 60MM PURPLE

## (undated) DEVICE — ELECTRODE LAP WIRE J-HOOK 36CM CLEANCOAT

## (undated) DEVICE — RELOAD GIA 60MM BLACK

## (undated) DEVICE — GOWN SURG IMPERVOUS ASTOUND LG SMARTSLEEVE

## (undated) DEVICE — TUBING SUCTION 3/16"X10'

## (undated) DEVICE — KIT ENDO PROCEDURE CARRY ON

## (undated) DEVICE — MANIFOLD 4 PORT NEPTUNE

## (undated) DEVICE — BLOCK BITE 60F OMNIBLOC YLW YELLOW

## (undated) DEVICE — SYSTEM FIXATION KII FIOS TROCAR 5X100

## (undated) DEVICE — PENCIL ELECTROSURGICAL ROCKER SWITCH - TEFLON COATED

## (undated) DEVICE — NEEDLE INSUFFLATION 150MM 13 GA

## (undated) DEVICE — GLOVE SENSICARE SLT 7.0 SURG

## (undated) DEVICE — WATER STL IRR 250ML BTL USP PLASTIC POUR BOTTLE

## (undated) DEVICE — GOWN IMPERVIOUS BLUE UNIVERSAL KNIT CUFF

## (undated) DEVICE — GOWN SURG IMPERVIOUS XLG ASTOUND SLEEVE

## (undated) DEVICE — PACK DRAPE 30X36 INCH ROUND C-ARM GE9800

## (undated) DEVICE — PIN TAIL 850MM SI 2 PIECE TARGETING
Type: IMPLANTABLE DEVICE | Site: BACK | Status: NON-FUNCTIONAL
Removed: 2022-09-26

## (undated) DEVICE — BLADE SCISSOR TIP SWITCH METZ

## (undated) DEVICE — SOL SOD CHL IRR .9% 250ML BTL USP PLASTIC POUR BOTTLE

## (undated) DEVICE — DRAPE STERI SM

## (undated) DEVICE — SUTURE PDSII 1 CT-1

## (undated) DEVICE — SOL SOD CHL IRR .9% 500ML BTL USP PLASTIC POUR BOTTLE

## (undated) DEVICE — TRAY LAP CHOLE CUSTOM SPRH CUSTOM PACK

## (undated) DEVICE — BANDAID 1X3" FABRIC

## (undated) DEVICE — PAD BOVIE ADULT 9' CORD REM ELECTRODE PATIENT RETURN

## (undated) DEVICE — SUCTION YANKAUER BULB TIP W 1 PIECE HANDLE

## (undated) DEVICE — DEVICE ELECTROSURGICAL 1450X TRIVERSE PENCIL

## (undated) DEVICE — SYRINGE IRRIGATION ASEPTO BULB @

## (undated) DEVICE — STAPLER SKIN WIDE 35

## (undated) DEVICE — TUBE SET INSUFFLATION ISC CONNECTOR/DISP

## (undated) DEVICE — GLOVE SENSICARE SLT 7.5 SURG STL POWDER FREE

## (undated) DEVICE — RELOAD GIA 45 PURPLE GIA

## (undated) DEVICE — SUTURE VICRYL 2-0 CT-1 CR8 18" UNDYED

## (undated) DEVICE — HANDLE SUCTION CONTROL

## (undated) DEVICE — TROCAR NON BLADED 12 X 100MM OPTICAL

## (undated) DEVICE — PIN TROCAR TIP SI 2 PIECE TARGETING

## (undated) DEVICE — APPLIER ENDOCLIP III 5MM

## (undated) DEVICE — NEEDLE SPINAL 22G 3 1/2

## (undated) DEVICE — HANDLE X LONG ENDO GIA

## (undated) DEVICE — DRAPE LATERAL C ARM STL C ARMOR

## (undated) DEVICE — GLOVE SURG SZ 7 GREEN W/ ALOE VERA

## (undated) DEVICE — RELOAD GIA 45MM BLACK THICK

## (undated) DEVICE — BLANKET WARMING UPPER BODY @ HYPOTHERMIA FORCED AIR

## (undated) DEVICE — SUTURE SILK 2-0 SH 30

## (undated) DEVICE — BANDAID 2X3.75" FABRIC

## (undated) DEVICE — GLOVE SENSICARE SLT 8.0 SURG

## (undated) DEVICE — TROCAR NON BLADED 15 X 100MM OPTICAL KII

## (undated) DEVICE — GARMENT CALF 18" MED GREEN VASOPRESS

## (undated) DEVICE — BANDAGE CO-FLEX 6"X5YD STL SELF ADHESIVE

## (undated) DEVICE — DRAPE LAPAROTOMY 77X106X122" 41X12" FENESTRATED

## (undated) DEVICE — SUTURE VICRYL 2-0 SH

## (undated) DEVICE — DRAPE EQUIP MAYO 23X55" POLY MERCURY FREE

## (undated) DEVICE — CLOSURE SKIN STERISTRIP 1/2" 3M

## (undated) DEVICE — NEEDLE INSUFFLATION 120M C2201